# Patient Record
Sex: FEMALE | Race: WHITE | NOT HISPANIC OR LATINO | Employment: UNEMPLOYED | URBAN - NONMETROPOLITAN AREA
[De-identification: names, ages, dates, MRNs, and addresses within clinical notes are randomized per-mention and may not be internally consistent; named-entity substitution may affect disease eponyms.]

---

## 2016-04-11 LAB — EXTERNAL HIV SCREEN: NORMAL

## 2017-01-14 ENCOUNTER — HOSPITAL ENCOUNTER (EMERGENCY)
Facility: HOSPITAL | Age: 22
Discharge: HOME | End: 2017-01-14
Attending: EMERGENCY MEDICINE | Admitting: EMERGENCY MEDICINE
Payer: COMMERCIAL

## 2017-01-14 ENCOUNTER — APPOINTMENT (EMERGENCY)
Dept: RADIOLOGY | Facility: HOSPITAL | Age: 22
End: 2017-01-14
Payer: COMMERCIAL

## 2017-01-14 VITALS
OXYGEN SATURATION: 100 % | HEART RATE: 80 BPM | TEMPERATURE: 98.7 F | SYSTOLIC BLOOD PRESSURE: 117 MMHG | RESPIRATION RATE: 18 BRPM | WEIGHT: 150 LBS | DIASTOLIC BLOOD PRESSURE: 71 MMHG | BODY MASS INDEX: 27.44 KG/M2

## 2017-01-14 DIAGNOSIS — M25.569 KNEE PAIN: Primary | ICD-10-CM
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
## 2017-01-14 DIAGNOSIS — S46.819A TRAPEZIUS STRAIN: ICD-10-CM

## 2017-01-14 PROCEDURE — 73564 X-RAY EXAM KNEE 4 OR MORE: CPT

## 2017-01-14 PROCEDURE — 99284 EMERGENCY DEPT VISIT MOD MDM: CPT

## 2017-01-14 RX ORDER — DIAZEPAM 5 MG/1
5 TABLET ORAL ONCE
Status: COMPLETED | OUTPATIENT
Start: 2017-01-14 | End: 2017-01-14

## 2017-01-14 RX ORDER — NAPROXEN 250 MG/1
500 TABLET ORAL ONCE
Status: COMPLETED | OUTPATIENT
Start: 2017-01-14 | End: 2017-01-14

## 2017-01-14 RX ORDER — NAPROXEN 500 MG/1
500 TABLET ORAL 2 TIMES DAILY WITH MEALS
Qty: 14 TABLET | Refills: 0 | Status: SHIPPED | OUTPATIENT
Start: 2017-01-14 | End: 2017-08-25

## 2017-01-14 RX ORDER — DIAZEPAM 5 MG/1
5 TABLET ORAL EVERY 12 HOURS PRN
Qty: 10 TABLET | Refills: 0 | Status: SHIPPED | OUTPATIENT
Start: 2017-01-14 | End: 2017-08-25

## 2017-01-14 RX ADMIN — DIAZEPAM 5 MG: 5 TABLET ORAL at 16:09

## 2017-01-14 RX ADMIN — NAPROXEN 500 MG: 250 TABLET ORAL at 16:57

## 2017-01-19 ENCOUNTER — HOSPITAL ENCOUNTER (OUTPATIENT)
Dept: RADIOLOGY | Facility: HOSPITAL | Age: 22
Discharge: HOME | End: 2017-01-19
Payer: COMMERCIAL

## 2017-01-19 ENCOUNTER — TRANSCRIBE ORDERS (OUTPATIENT)
Dept: ADMINISTRATIVE | Facility: HOSPITAL | Age: 22
End: 2017-01-19

## 2017-01-19 ENCOUNTER — APPOINTMENT (OUTPATIENT)
Dept: LAB | Facility: HOSPITAL | Age: 22
End: 2017-01-19
Payer: COMMERCIAL

## 2017-01-19 DIAGNOSIS — O99.280 ENDOCRINE, NUTRITIONAL AND METABOLIC DISEASES COMPLICATING PREGNANCY, UNSPECIFIED TRIMESTER: ICD-10-CM

## 2017-01-19 DIAGNOSIS — M79.605 PAIN OF LEFT LEG: ICD-10-CM

## 2017-01-19 DIAGNOSIS — M79.604 PAIN OF RIGHT LEG: ICD-10-CM

## 2017-01-19 DIAGNOSIS — F39 MOOD DISORDER (HCC): ICD-10-CM

## 2017-01-19 LAB
ALBUMIN SERPL BCP-MCNC: 3.7 G/DL (ref 3.5–5)
ALP SERPL-CCNC: 82 U/L (ref 46–116)
ALT SERPL W P-5'-P-CCNC: 39 U/L (ref 12–78)
ANION GAP SERPL CALCULATED.3IONS-SCNC: 12 MMOL/L (ref 4–13)
AST SERPL W P-5'-P-CCNC: 27 U/L (ref 5–45)
BILIRUB SERPL-MCNC: 0.5 MG/DL (ref 0.2–1)
BUN SERPL-MCNC: 11 MG/DL (ref 5–25)
CALCIUM SERPL-MCNC: 9.1 MG/DL (ref 8.3–10.1)
CHLORIDE SERPL-SCNC: 103 MMOL/L (ref 100–108)
CO2 SERPL-SCNC: 25 MMOL/L (ref 21–32)
CREAT SERPL-MCNC: 0.68 MG/DL (ref 0.6–1.3)
GFR SERPL CREATININE-BSD FRML MDRD: >60 ML/MIN/1.73SQ M
GLUCOSE SERPL-MCNC: 69 MG/DL (ref 65–140)
POTASSIUM SERPL-SCNC: 3.9 MMOL/L (ref 3.5–5.3)
PROT SERPL-MCNC: 7 G/DL (ref 6.4–8.2)
SODIUM SERPL-SCNC: 140 MMOL/L (ref 136–145)
T3 SERPL-MCNC: 0.6 NG/ML (ref 0.6–1.8)
T4 FREE SERPL-MCNC: 0.6 NG/DL (ref 0.76–1.46)
TSH SERPL DL<=0.05 MIU/L-ACNC: 9.87 UIU/ML (ref 0.36–3.74)

## 2017-01-19 PROCEDURE — 36415 COLL VENOUS BLD VENIPUNCTURE: CPT

## 2017-01-19 PROCEDURE — 73522 X-RAY EXAM HIPS BI 3-4 VIEWS: CPT

## 2017-01-19 PROCEDURE — 86376 MICROSOMAL ANTIBODY EACH: CPT

## 2017-01-19 PROCEDURE — 80053 COMPREHEN METABOLIC PANEL: CPT

## 2017-01-19 PROCEDURE — 84443 ASSAY THYROID STIM HORMONE: CPT

## 2017-01-19 PROCEDURE — 84439 ASSAY OF FREE THYROXINE: CPT

## 2017-01-19 PROCEDURE — 73590 X-RAY EXAM OF LOWER LEG: CPT

## 2017-01-19 PROCEDURE — 84480 ASSAY TRIIODOTHYRONINE (T3): CPT

## 2017-01-20 LAB — THYROPEROXIDASE AB SERPL-ACNC: 395 IU/ML (ref 0–34)

## 2017-01-24 ENCOUNTER — ALLSCRIPTS OFFICE VISIT (OUTPATIENT)
Dept: FAMILY MEDICINE CLINIC | Facility: CLINIC | Age: 22
End: 2017-01-24
Payer: COMMERCIAL

## 2017-01-24 DIAGNOSIS — E06.3 AUTOIMMUNE THYROIDITIS: ICD-10-CM

## 2017-01-24 PROCEDURE — T1015 CLINIC SERVICE: HCPCS | Performed by: NURSE PRACTITIONER

## 2017-04-06 ENCOUNTER — ALLSCRIPTS OFFICE VISIT (OUTPATIENT)
Dept: FAMILY MEDICINE CLINIC | Facility: CLINIC | Age: 22
End: 2017-04-06
Payer: COMMERCIAL

## 2017-04-06 ENCOUNTER — APPOINTMENT (OUTPATIENT)
Dept: LAB | Facility: HOSPITAL | Age: 22
End: 2017-04-06
Payer: COMMERCIAL

## 2017-04-06 DIAGNOSIS — E06.3 AUTOIMMUNE THYROIDITIS: ICD-10-CM

## 2017-04-06 LAB — TSH SERPL DL<=0.05 MIU/L-ACNC: 14.5 UIU/ML (ref 0.36–3.74)

## 2017-04-06 PROCEDURE — T1015 CLINIC SERVICE: HCPCS | Performed by: NURSE PRACTITIONER

## 2017-04-06 PROCEDURE — 36415 COLL VENOUS BLD VENIPUNCTURE: CPT

## 2017-04-06 PROCEDURE — 84443 ASSAY THYROID STIM HORMONE: CPT

## 2017-04-07 ENCOUNTER — GENERIC CONVERSION - ENCOUNTER (OUTPATIENT)
Dept: OTHER | Facility: OTHER | Age: 22
End: 2017-04-07

## 2017-04-22 ENCOUNTER — HOSPITAL ENCOUNTER (EMERGENCY)
Facility: HOSPITAL | Age: 22
Discharge: HOME | End: 2017-04-22
Admitting: EMERGENCY MEDICINE
Payer: COMMERCIAL

## 2017-04-22 VITALS
TEMPERATURE: 98.9 F | OXYGEN SATURATION: 99 % | WEIGHT: 160.94 LBS | RESPIRATION RATE: 18 BRPM | BODY MASS INDEX: 29.44 KG/M2 | SYSTOLIC BLOOD PRESSURE: 101 MMHG | DIASTOLIC BLOOD PRESSURE: 62 MMHG | HEART RATE: 56 BPM

## 2017-04-22 DIAGNOSIS — E87.6 HYPOKALEMIA: ICD-10-CM

## 2017-04-22 DIAGNOSIS — E03.9 HYPOTHYROIDISM: ICD-10-CM

## 2017-04-22 DIAGNOSIS — R11.10 VOMITING: ICD-10-CM

## 2017-04-22 DIAGNOSIS — F41.0 PANIC ATTACK: Primary | ICD-10-CM

## 2017-04-22 LAB
ANION GAP SERPL CALCULATED.3IONS-SCNC: 7 MMOL/L (ref 4–13)
BASOPHILS # BLD AUTO: 0.07 THOUSANDS/ΜL (ref 0–0.1)
BASOPHILS NFR BLD AUTO: 1 % (ref 0–1)
BILIRUB UR QL STRIP: NEGATIVE
BUN SERPL-MCNC: 6 MG/DL (ref 5–25)
CALCIUM SERPL-MCNC: 8.5 MG/DL (ref 8.3–10.1)
CHLORIDE SERPL-SCNC: 105 MMOL/L (ref 100–108)
CLARITY UR: NORMAL
CO2 SERPL-SCNC: 30 MMOL/L (ref 21–32)
COLOR UR: YELLOW
CREAT SERPL-MCNC: 0.81 MG/DL (ref 0.6–1.3)
EOSINOPHIL # BLD AUTO: 0.25 THOUSAND/ΜL (ref 0–0.61)
EOSINOPHIL NFR BLD AUTO: 2 % (ref 0–6)
ERYTHROCYTE [DISTWIDTH] IN BLOOD BY AUTOMATED COUNT: 13.3 % (ref 11.6–15.1)
GFR SERPL CREATININE-BSD FRML MDRD: >60 ML/MIN/1.73SQ M
GLUCOSE SERPL-MCNC: 90 MG/DL (ref 65–140)
GLUCOSE UR STRIP-MCNC: NEGATIVE MG/DL
HCG UR QL: NEGATIVE
HCT VFR BLD AUTO: 39.2 % (ref 34.8–46.1)
HGB BLD-MCNC: 13.3 G/DL (ref 11.5–15.4)
HGB UR QL STRIP.AUTO: NEGATIVE
KETONES UR STRIP-MCNC: NEGATIVE MG/DL
LEUKOCYTE ESTERASE UR QL STRIP: NEGATIVE
LYMPHOCYTES # BLD AUTO: 3.27 THOUSANDS/ΜL (ref 0.6–4.47)
LYMPHOCYTES NFR BLD AUTO: 30 % (ref 14–44)
MCH RBC QN AUTO: 29.1 PG (ref 26.8–34.3)
MCHC RBC AUTO-ENTMCNC: 33.9 G/DL (ref 31.4–37.4)
MCV RBC AUTO: 86 FL (ref 82–98)
MONOCYTES # BLD AUTO: 1.22 THOUSAND/ΜL (ref 0.17–1.22)
MONOCYTES NFR BLD AUTO: 11 % (ref 4–12)
NEUTROPHILS # BLD AUTO: 6.16 THOUSANDS/ΜL (ref 1.85–7.62)
NEUTS SEG NFR BLD AUTO: 56 % (ref 43–75)
NITRITE UR QL STRIP: NEGATIVE
PH UR STRIP.AUTO: 6 [PH] (ref 4.5–8)
PLATELET # BLD AUTO: 346 THOUSANDS/UL (ref 149–390)
PMV BLD AUTO: 10 FL (ref 8.9–12.7)
POTASSIUM SERPL-SCNC: 3 MMOL/L (ref 3.5–5.3)
PROT UR STRIP-MCNC: NEGATIVE MG/DL
RBC # BLD AUTO: 4.57 MILLION/UL (ref 3.81–5.12)
SODIUM SERPL-SCNC: 142 MMOL/L (ref 136–145)
SP GR UR STRIP.AUTO: 1.02 (ref 1–1.03)
TSH SERPL DL<=0.05 MIU/L-ACNC: 3.81 UIU/ML (ref 0.36–3.74)
UROBILINOGEN UR QL STRIP.AUTO: 0.2 E.U./DL
WBC # BLD AUTO: 10.97 THOUSAND/UL (ref 4.31–10.16)

## 2017-04-22 PROCEDURE — 80048 BASIC METABOLIC PNL TOTAL CA: CPT | Performed by: PHYSICIAN ASSISTANT

## 2017-04-22 PROCEDURE — 81003 URINALYSIS AUTO W/O SCOPE: CPT | Performed by: PHYSICIAN ASSISTANT

## 2017-04-22 PROCEDURE — 96361 HYDRATE IV INFUSION ADD-ON: CPT

## 2017-04-22 PROCEDURE — 84443 ASSAY THYROID STIM HORMONE: CPT | Performed by: PHYSICIAN ASSISTANT

## 2017-04-22 PROCEDURE — 81025 URINE PREGNANCY TEST: CPT | Performed by: PHYSICIAN ASSISTANT

## 2017-04-22 PROCEDURE — 96375 TX/PRO/DX INJ NEW DRUG ADDON: CPT

## 2017-04-22 PROCEDURE — 85025 COMPLETE CBC W/AUTO DIFF WBC: CPT | Performed by: PHYSICIAN ASSISTANT

## 2017-04-22 PROCEDURE — 96374 THER/PROPH/DIAG INJ IV PUSH: CPT

## 2017-04-22 PROCEDURE — 36415 COLL VENOUS BLD VENIPUNCTURE: CPT | Performed by: PHYSICIAN ASSISTANT

## 2017-04-22 PROCEDURE — 93005 ELECTROCARDIOGRAM TRACING: CPT | Performed by: PHYSICIAN ASSISTANT

## 2017-04-22 PROCEDURE — 99284 EMERGENCY DEPT VISIT MOD MDM: CPT

## 2017-04-22 RX ORDER — POTASSIUM CHLORIDE 20 MEQ/1
20 TABLET, EXTENDED RELEASE ORAL ONCE
Status: COMPLETED | OUTPATIENT
Start: 2017-04-22 | End: 2017-04-22

## 2017-04-22 RX ORDER — LORAZEPAM 2 MG/ML
0.5 INJECTION INTRAMUSCULAR ONCE
Status: COMPLETED | OUTPATIENT
Start: 2017-04-22 | End: 2017-04-22

## 2017-04-22 RX ORDER — ONDANSETRON 4 MG/1
4 TABLET, ORALLY DISINTEGRATING ORAL EVERY 8 HOURS PRN
Qty: 10 TABLET | Refills: 0 | Status: SHIPPED | OUTPATIENT
Start: 2017-04-22 | End: 2017-08-25

## 2017-04-22 RX ORDER — ONDANSETRON 2 MG/ML
4 INJECTION INTRAMUSCULAR; INTRAVENOUS ONCE
Status: COMPLETED | OUTPATIENT
Start: 2017-04-22 | End: 2017-04-22

## 2017-04-22 RX ADMIN — POTASSIUM CHLORIDE 20 MEQ: 1500 TABLET, EXTENDED RELEASE ORAL at 19:50

## 2017-04-22 RX ADMIN — SODIUM CHLORIDE 1000 ML: 0.9 INJECTION, SOLUTION INTRAVENOUS at 19:07

## 2017-04-22 RX ADMIN — ONDANSETRON 4 MG: 2 INJECTION INTRAMUSCULAR; INTRAVENOUS at 19:07

## 2017-04-22 RX ADMIN — LORAZEPAM 0.5 MG: 2 INJECTION INTRAMUSCULAR; INTRAVENOUS at 19:29

## 2017-04-24 LAB
ATRIAL RATE: 63 BPM
P AXIS: 41 DEGREES
PR INTERVAL: 114 MS
QRS AXIS: 40 DEGREES
QRSD INTERVAL: 88 MS
QT INTERVAL: 402 MS
QTC INTERVAL: 411 MS
T WAVE AXIS: -2 DEGREES
VENTRICULAR RATE: 63 BPM

## 2017-08-11 ENCOUNTER — ALLSCRIPTS OFFICE VISIT (OUTPATIENT)
Dept: FAMILY MEDICINE CLINIC | Facility: CLINIC | Age: 22
End: 2017-08-11
Payer: COMMERCIAL

## 2017-08-11 DIAGNOSIS — E06.3 AUTOIMMUNE THYROIDITIS: ICD-10-CM

## 2017-08-11 PROCEDURE — T1015 CLINIC SERVICE: HCPCS | Performed by: FAMILY MEDICINE

## 2017-08-15 ENCOUNTER — ALLSCRIPTS OFFICE VISIT (OUTPATIENT)
Dept: FAMILY MEDICINE CLINIC | Facility: CLINIC | Age: 22
End: 2017-08-15
Payer: COMMERCIAL

## 2017-08-15 PROCEDURE — T1015 CLINIC SERVICE: HCPCS | Performed by: FAMILY MEDICINE

## 2017-08-25 ENCOUNTER — HOSPITAL ENCOUNTER (EMERGENCY)
Facility: HOSPITAL | Age: 22
Discharge: HOME | End: 2017-08-25
Admitting: EMERGENCY MEDICINE
Payer: COMMERCIAL

## 2017-08-25 VITALS
DIASTOLIC BLOOD PRESSURE: 81 MMHG | WEIGHT: 170 LBS | HEIGHT: 62 IN | SYSTOLIC BLOOD PRESSURE: 138 MMHG | BODY MASS INDEX: 31.28 KG/M2 | HEART RATE: 91 BPM | OXYGEN SATURATION: 99 % | TEMPERATURE: 98.4 F | RESPIRATION RATE: 20 BRPM
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
## 2017-08-25 DIAGNOSIS — S40.862A: ICD-10-CM
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
## 2017-08-25 PROCEDURE — 99281 EMR DPT VST MAYX REQ PHY/QHP: CPT

## 2017-08-25 RX ORDER — DIPHENHYDRAMINE HYDROCHLORIDE, ZINC ACETATE 2; .1 G/100G; G/100G
CREAM TOPICAL 3 TIMES DAILY PRN
Qty: 28.4 G | Refills: 0 | Status: SHIPPED | OUTPATIENT
Start: 2017-08-25 | End: 2017-11-14

## 2017-08-25 RX ORDER — DIPHENHYDRAMINE HYDROCHLORIDE, ZINC ACETATE 2; .1 G/100G; G/100G
CREAM TOPICAL 3 TIMES DAILY PRN
Status: DISCONTINUED | OUTPATIENT
Start: 2017-08-25 | End: 2017-08-25

## 2017-09-22 ENCOUNTER — APPOINTMENT (OUTPATIENT)
Dept: LAB | Facility: HOSPITAL | Age: 22
End: 2017-09-22
Payer: COMMERCIAL

## 2017-09-22 ENCOUNTER — ALLSCRIPTS OFFICE VISIT (OUTPATIENT)
Dept: FAMILY MEDICINE CLINIC | Facility: CLINIC | Age: 22
End: 2017-09-22
Payer: COMMERCIAL

## 2017-09-22 DIAGNOSIS — E03.9 HYPOTHYROIDISM: ICD-10-CM

## 2017-09-22 LAB — TSH SERPL DL<=0.05 MIU/L-ACNC: 3.84 UIU/ML (ref 0.36–3.74)

## 2017-09-22 PROCEDURE — 36415 COLL VENOUS BLD VENIPUNCTURE: CPT

## 2017-09-22 PROCEDURE — T1015 CLINIC SERVICE: HCPCS | Performed by: FAMILY MEDICINE

## 2017-09-22 PROCEDURE — 84443 ASSAY THYROID STIM HORMONE: CPT

## 2017-11-14 ENCOUNTER — APPOINTMENT (EMERGENCY)
Dept: CT IMAGING | Facility: HOSPITAL | Age: 22
End: 2017-11-14
Payer: COMMERCIAL

## 2017-11-14 ENCOUNTER — HOSPITAL ENCOUNTER (EMERGENCY)
Facility: HOSPITAL | Age: 22
Discharge: HOME | End: 2017-11-14
Attending: EMERGENCY MEDICINE | Admitting: EMERGENCY MEDICINE
Payer: COMMERCIAL

## 2017-11-14 VITALS
HEART RATE: 83 BPM | OXYGEN SATURATION: 96 % | BODY MASS INDEX: 32.37 KG/M2 | WEIGHT: 175.93 LBS | SYSTOLIC BLOOD PRESSURE: 108 MMHG | TEMPERATURE: 98.1 F | HEIGHT: 62 IN | RESPIRATION RATE: 18 BRPM | DIASTOLIC BLOOD PRESSURE: 67 MMHG
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
## 2017-11-14 DIAGNOSIS — S16.1XXA ACUTE STRAIN OF NECK MUSCLE, INITIAL ENCOUNTER: Primary | ICD-10-CM

## 2017-11-14 LAB — EXT PREG TEST URINE: NORMAL

## 2017-11-14 PROCEDURE — 81025 URINE PREGNANCY TEST: CPT | Performed by: EMERGENCY MEDICINE

## 2017-11-14 PROCEDURE — 99284 EMERGENCY DEPT VISIT MOD MDM: CPT

## 2017-11-14 PROCEDURE — 70450 CT HEAD/BRAIN W/O DYE: CPT

## 2017-11-14 PROCEDURE — 72125 CT NECK SPINE W/O DYE: CPT

## 2017-11-14 RX ORDER — NAPROXEN 500 MG/1
500 TABLET ORAL 2 TIMES DAILY WITH MEALS
Qty: 14 TABLET | Refills: 0 | Status: SHIPPED | OUTPATIENT
Start: 2017-11-14 | End: 2018-02-02 | Stop reason: SDUPTHER

## 2017-11-14 RX ORDER — NAPROXEN 250 MG/1
500 TABLET ORAL ONCE
Status: COMPLETED | OUTPATIENT
Start: 2017-11-14 | End: 2017-11-14

## 2017-11-14 RX ORDER — LIDOCAINE 50 MG/G
1 PATCH TOPICAL DAILY
Qty: 30 PATCH | Refills: 0 | Status: SHIPPED | OUTPATIENT
Start: 2017-11-14 | End: 2018-02-02 | Stop reason: SDUPTHER

## 2017-11-14 RX ADMIN — NAPROXEN 500 MG: 250 TABLET ORAL at 16:59
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
## 2017-12-08 ENCOUNTER — ALLSCRIPTS OFFICE VISIT (OUTPATIENT)
Dept: FAMILY MEDICINE CLINIC | Facility: CLINIC | Age: 22
End: 2017-12-08
Payer: COMMERCIAL

## 2017-12-08 PROCEDURE — T1015 CLINIC SERVICE: HCPCS | Performed by: FAMILY MEDICINE

## 2017-12-12 ENCOUNTER — ALLSCRIPTS OFFICE VISIT (OUTPATIENT)
Dept: FAMILY MEDICINE CLINIC | Facility: CLINIC | Age: 22
End: 2017-12-12
Payer: COMMERCIAL

## 2017-12-12 PROCEDURE — T1015 CLINIC SERVICE: HCPCS | Performed by: FAMILY MEDICINE

## 2017-12-15 NOTE — PROGRESS NOTES
Assessment  1  History of acute sinusitis (V12 69) (Z87 09)   2  History of acute bronchitis (V12 69) (Z87 09)    Plan   PMH: History of acute sinusitis    · Amoxicillin-Pot Clavulanate 875-125 MG Oral Tablet (Augmentin); TAKE 1 TABLETTWICE DAILY AFTER MEALS   · Fluticasone Propionate 50 MCG/ACT Nasal Suspension; USE 1 SPRAY IN EACHNOSTRIL TWICE DAILY  Acute sinusitis (461 9) (J01 90)       Discussion/Summary    Acute sinusitis - Augmentin and Fliuticasone sent to pharmacy  Patient advised to take abx with food  Acute bronchitis - OTC Cough syrup as needed  Continue albuterol nebulizer treatments 4x/daily  Patient is to follow up as scheduled or sooner if needed  The patient was counseled regarding instructions for management,-- patient and family education  Possible side effects of new medications were reviewed with the patient/guardian today  The treatment plan was reviewed with the patient/guardian  The patient/guardian understands and agrees with the treatment plan      Chief Complaint  Patient is here today for a sick visit  Patient stated that she may have an ear infection that she got from her son  Patient also stated that her throat is sore and her chest is hurting her along with a cough  History of Present Illness  HPI: Patient is a 25year old female presents today for sick visit  Patient reports sore throat, cough, chest tightness with cough x few days  Patient reports worsening sore throat  Patient has been taking Dayquil without relief  Review of Systems   Constitutional: No fever, no chills, feels well, no tiredness, no recent weight gain or loss  ENT: sore throat-- and-- hoarseness  Cardiovascular: no complaints of slow or fast heart rate, no chest pain, no palpitations, no leg claudication or lower extremity edema  Respiratory: cough-- and-- wheezing  Breasts: no complaints of breast pain, breast lump or nipple discharge    Gastrointestinal: no complaints of abdominal pain, no constipation, no nausea or diarrhea, no vomiting, no bloody stools  Genitourinary: no complaints of dysuria, no incontinence, no pelvic pain, no dysmenorrhea, no vaginal discharge or abnormal vaginal bleeding  Musculoskeletal: no complaints of arthralgia, no myalgia, no joint swelling or stiffness, no limb pain or swelling  Integumentary: no complaints of skin rash or lesion, no itching or dry skin, no skin wounds  Neurological: no complaints of headache, no confusion, no numbness or tingling, no dizziness or fainting  ROS reviewed  Active Problems   1  Birth control (V25 9) (Z30 9)   2  Depression (311) (F32 9)   3  Hashimoto's thyroiditis (245 2) (E06 3)   4  Mild persistent asthma without complication (064 10) (B88 88)   5  Mood disorder (296 90) (F39)   6  Non compliance w medication regimen (V15 81) (Z91 14)   7  Seizures (780 39) (R56 9)   8  Wart (078 10) (B07 9)  Asthma (493 90) (J45 909)     Strain of left shoulder, subsequent encounter (V58 89) (S46 912D)     Left arm pain (729 5) (M79 602)     Positive urine pregnancy test (V72 42) (Z32 01)     Pregnancy (V22 2) (Z33 1)     Vaginal discharge (623 5) (N89 8)     Hypothyroidism in pregnancy (648 10) (O99 280)     Lower extremity pain, bilateral (729 5) (M79 604)     Acute sinusitis (461 9) (J01 90)     Exposure to scabies (V01 89) (Z20 89)     Hypothyroidism (244 9) (E03 9)       Past Medical History  1  History of No known problems (V49 89) (Z78 9)  Active Problems And Past Medical History Reviewed: The active problems and past medical history were reviewed and updated today  Family History  Mother    1  No pertinent family history  Father    2  Family history of Thyroid disease (246 9) (E07 9)  Brother    3  Family history of Hypertension (401 9) (I10)  Grandmother    4  Family history of Thyroid disease (246 9) (E07 9)  Maternal Grandmother    5  Family history of Hypertension (401 9) (I10)  Aunt    6   Family history of Thyroid disease (246 9) (E07 9)  Family History Reviewed: The family history was reviewed and updated today  Social History   · Current smoker (305 1) (F17 200)   · Former smoker (V15 82) (T19 220)   · No alcohol use   · No drug use  The social history was reviewed and updated today  Surgical History  1  History of Appendectomy   2  History of Wrist Surgery  Surgical History Reviewed: The surgical history was reviewed and updated today  Current Meds   1  Albuterol Sulfate (2 5 MG/3ML) 0 083% Inhalation Nebulization Solution; USE 1 UNIT DOSE VIA NEBULIZER  4 TIMES A DAY AS NEEDED; Therapy: [id number] to (Last Rx:06Apr2017)  Requested for: 06Apr2017 Ordered   2  Levothyroxine Sodium 75 MCG Oral Tablet; One tablet daily in the morning on an empty stomach 45 minutes before breakfast; Therapy: 23Jan2017 to (Evaluate:[id number])  Requested for: 16Xgi0358; Last Rx:[id number] Ordered    The medication list was reviewed and updated today  Allergies  1  Abilify TABS   2  Azithromycin TABS   3  Tessalon Perles CAPS   4  Zithromax CAPS    Vitals   Recorded: [id number] 01:13PM   Temperature 97 5 F   Heart Rate 631   Systolic 325   Diastolic 70   Height 5 ft 2 in   Weight 175 lb 8 oz   BMI Calculated 32 1   BSA Calculated 1 81   O2 Saturation 98     Physical Exam   Constitutional  General appearance: No acute distress, well appearing and well nourished  Eyes  Conjunctiva and lids: No swelling, erythema or discharge  Pupils and irises: Equal, round and reactive to light  Ears, Nose, Mouth, and Throat  External inspection of ears and nose: Normal    Otoscopic examination: Tympanic membranes translucent with normal light reflex  Canals patent without erythema  Nasal mucosa, septum, and turbinates: Abnormal   There was a mucoid discharge from both nares  The bilateral nasal mucosa was edematous-- and-- red  Oropharynx: Normal with no erythema, edema, exudate or lesions     Pulmonary  Respiratory effort: Abnormal   Respiratory Findings: dry cough  Auscultation of lungs: Clear to auscultation  Cardiovascular  Auscultation of heart: Normal rate and rhythm, normal S1 and S2, without murmurs  Skin  Skin and subcutaneous tissue: Normal without rashes or lesions     Psychiatric  Orientation to person, place, and time: Normal    Mood and affect: Normal          Future Appointments    Date/Time Provider Specialty Site   12/15/2017 09:00 AM Bill Teague, [address] [place] [address] 22     Signatures   Electronically signed by : CHRISTOPHER Gunn; Dec 11 2017  7:43AM EST                       (Author)    Electronically signed by : Sonya Pena MD; Dec 14 2017  8:27AM EST                       (Author)

## 2017-12-21 ENCOUNTER — APPOINTMENT (OUTPATIENT)
Dept: FAMILY MEDICINE CLINIC | Facility: CLINIC | Age: 22
End: 2017-12-21
Payer: COMMERCIAL

## 2017-12-21 ENCOUNTER — GENERIC CONVERSION - ENCOUNTER (OUTPATIENT)
Dept: OTHER | Facility: OTHER | Age: 22
End: 2017-12-21

## 2017-12-21 PROCEDURE — T1015 CLINIC SERVICE: HCPCS | Performed by: FAMILY MEDICINE

## 2017-12-27 NOTE — PROGRESS NOTES
Assessment   1  Bullous myringitis of right ear (384 01) (H73 011)    Plan   Bullous myringitis of right ear    · Cefuroxime Axetil 500 MG Oral Tablet; TAKE 1 TABLET EVERY 12 HOURS DAILY   · Ofloxacin 0 3 % Otic Solution; INSTILL 5 DROPS INTO THE AFFECTED EAR(S)    TWICE DAILY    Discussion/Summary      Explained to patient high risk for rupture, to avoid swimming and injury to right ear  CHange oral antbx, start drops as prescribed  Recheck on Friday  Will need referral to ENT if rupture present  The patient was counseled regarding instructions for management,-- importance of compliance with treatment  total time of encounter was 20 minutes-- and-- 10 minutes was spent counseling  Possible side effects of new medications were reviewed with the patient/guardian today  The treatment plan was reviewed with the patient/guardian  The patient/guardian understands and agrees with the treatment plan      Chief Complaint   pt is here for left ear pain  History of Present Illness   HPI: 25year old here today with ear pain, left  Right ear pain, severe  SHe reports pressure and ear feeling blocked  She is currently on Augmentin  SHe denies drainage, fever  Review of Systems        Constitutional: No fever, no chills, feels well, no tiredness, no recent weight gain or loss  ENT: earache  Cardiovascular: no complaints of slow or fast heart rate, no chest pain, no palpitations, no leg claudication or lower extremity edema  Respiratory: no complaints of shortness of breath, no wheezing, no dyspnea on exertion, no orthopnea or PND  Breasts: no complaints of breast pain, breast lump or nipple discharge  Gastrointestinal: no complaints of abdominal pain, no constipation, no nausea or diarrhea, no vomiting, no bloody stools  Genitourinary: no complaints of dysuria, no incontinence, no pelvic pain, no dysmenorrhea, no vaginal discharge or abnormal vaginal bleeding        Musculoskeletal: no complaints of arthralgia, no myalgia, no joint swelling or stiffness, no limb pain or swelling  Integumentary: no complaints of skin rash or lesion, no itching or dry skin, no skin wounds  Neurological: no complaints of headache, no confusion, no numbness or tingling, no dizziness or fainting  Active Problems   1  Birth control (V25 9) (Z30 9)   2  Depression (311) (F32 9)   3  Hashimoto's thyroiditis (245 2) (E06 3)   4  Mild persistent asthma without complication (556 52) (Q34 21)   5  Mood disorder (296 90) (F39)   6  Non compliance w medication regimen (V15 81) (Z91 14)   7  Seizures (780 39) (R56 9)   8  Wart (078 10) (B07 9)    Past Medical History   1  History of No known problems (V49 89) (Z78 9)  Active Problems And Past Medical History Reviewed: The active problems and past medical history were reviewed and updated today  Family History   Mother    1  No pertinent family history  Father    2  Family history of Thyroid disease (246 9) (E07 9)  Brother    3  Family history of Hypertension (401 9) (I10)  Grandmother    4  Family history of Thyroid disease (246 9) (E07 9)  Maternal Grandmother    5  Family history of Hypertension (401 9) (I10)  Aunt    6  Family history of Thyroid disease (246 9) (E07 9)  Family History Reviewed: The family history was reviewed and updated today  Social History    · Current smoker (305 1) (F17 200)   · Former smoker (V15 82) (S08 963)   · No alcohol use   · No drug use  The social history was reviewed and updated today  The social history was reviewed and is unchanged  Surgical History   1  History of Appendectomy   2  History of Wrist Surgery  Surgical History Reviewed: The surgical history was reviewed and updated today  Current Meds    1  Albuterol Sulfate (2 5 MG/3ML) 0 083% Inhalation Nebulization Solution; USE 1 UNIT     DOSE VIA NEBULIZER  4 TIMES A DAY AS NEEDED;      Therapy: 27VBC1189 to (Last Rx:06Apr2017)  Requested for: 52KUI7131 Ordered   2  Amoxicillin-Pot Clavulanate 875-125 MG Oral Tablet; TAKE 1 TABLET TWICE DAILY     AFTER MEALS; Therapy: 23KGN1621 to (Evaluate:29Hkj6879)  Requested for: 61JTO6734; Last     Rx:79Hvw4820 Ordered   3  Fluticasone Propionate 50 MCG/ACT Nasal Suspension; USE 1 SPRAY IN EACH     NOSTRIL TWICE DAILY; Therapy: 25ESM3497 to (Last Rx:78Qrc9371)  Requested for: 55TZH2351 Ordered   4  Levothyroxine Sodium 75 MCG Oral Tablet; One tablet daily in the morning on an empty     stomach 45 minutes before breakfast;     Therapy: 39Cpu3520 to (Evaluate:08Ihz5816)  Requested for: 44Utv2205; Last     Rx:60Gsm6035 Ordered     The medication list was reviewed and updated today  Allergies   1  Abilify TABS   2  Azithromycin TABS   3  Tessalon Perles CAPS   4  Zithromax CAPS    Vitals    Recorded: 12Dec2017 09:09AM   Temperature 98 2 F   Heart Rate 90   Respiration 18   Systolic 389   Diastolic 70   Height 5 ft 2 in   Weight 174 lb    BMI Calculated 31 83   BSA Calculated 1 8   O2 Saturation 97     Physical Exam        Constitutional      General appearance: No acute distress, well appearing and well nourished  Ears, Nose, Mouth, and Throat      External inspection of ears and nose: Normal        Otoscopic examination: Abnormal  -- (fluid filled blister) The right external canal was tender  Nasal mucosa, septum, and turbinates: Normal without edema or erythema  Oropharynx: Normal with no erythema, edema, exudate or lesions  Pulmonary      Respiratory effort: No increased work of breathing or signs of respiratory distress  Auscultation of lungs: Clear to auscultation  Cardiovascular      Auscultation of heart: Normal rate and rhythm, normal S1 and S2, without murmurs  Lymphatic      Palpation of lymph nodes in neck: No lymphadenopathy         Psychiatric      Orientation to person, place, and time: Normal        Mood and affect: Normal           Future Appointments      Date/Time Provider Specialty Site   12/15/2017 09:00 AM Dago Puente [address]     Signatures    Electronically signed by :  Ines Phoenix, CRNP; Dec 12 2017 10:19AM EST                       (Author)     Electronically signed by : Gus Vigil DO; Dec 26 2017  7:27PM EST

## 2018-01-10 NOTE — MISCELLANEOUS
Message  Return to work or school:   Poppy Do is under my professional care  She was seen in my office on 12/16/2015   She is able to return to work on  12/17/2015    She can perform work without limitations  , She is able to work with limitations (no lifting, pushing or pulling 5 pounds)  Patient will return to work light duty for the next 4 weeks          Signatures   Electronically signed by : Grey Swenson, [facility]; Jan 19 2016  2:11PM EST                       (Author)    Electronically signed by : ALIX Wong ; Jan 19 2016  2:37PM EST                       (Author)

## 2018-01-12 VITALS
HEART RATE: 93 BPM | BODY MASS INDEX: 29.44 KG/M2 | WEIGHT: 160 LBS | TEMPERATURE: 98 F | HEIGHT: 62 IN | OXYGEN SATURATION: 98 % | RESPIRATION RATE: 20 BRPM

## 2018-01-13 VITALS
HEIGHT: 62 IN | DIASTOLIC BLOOD PRESSURE: 72 MMHG | OXYGEN SATURATION: 98 % | HEART RATE: 78 BPM | WEIGHT: 159 LBS | RESPIRATION RATE: 20 BRPM | BODY MASS INDEX: 29.26 KG/M2 | SYSTOLIC BLOOD PRESSURE: 112 MMHG | TEMPERATURE: 96.4 F

## 2018-01-13 VITALS
HEART RATE: 92 BPM | TEMPERATURE: 97.9 F | OXYGEN SATURATION: 98 % | WEIGHT: 165 LBS | SYSTOLIC BLOOD PRESSURE: 110 MMHG | HEIGHT: 62 IN | BODY MASS INDEX: 30.36 KG/M2 | RESPIRATION RATE: 18 BRPM | DIASTOLIC BLOOD PRESSURE: 78 MMHG

## 2018-01-13 VITALS
HEART RATE: 93 BPM | WEIGHT: 160 LBS | SYSTOLIC BLOOD PRESSURE: 124 MMHG | BODY MASS INDEX: 29.26 KG/M2 | OXYGEN SATURATION: 97 % | RESPIRATION RATE: 18 BRPM | TEMPERATURE: 96 F | DIASTOLIC BLOOD PRESSURE: 62 MMHG

## 2018-01-13 NOTE — MISCELLANEOUS
Message  Message left for patient to call office, lab results reviewed and needing medication change  Active Problems    1  Acute sinusitis (461 9) (J01 90)   2  Asthma (493 90) (J45 909)   3  Depression (311) (F32 9)   4  Hashimoto's thyroiditis (245 2) (E06 3)   5  Hypothyroidism in pregnancy (648 10,244 9) (O99 280,E03 9)   6  Left arm pain (729 5) (M79 602)   7  Lower extremity pain, bilateral (729 5) (M79 604,M79 605)   8  Mild persistent asthma without complication (862 90) (C37 15)   9  Mood disorder (296 90) (F39)   10  Positive urine pregnancy test (V72 42) (Z32 01)   11  Pregnancy (V22 2) (Z33 1)   12  Seizures (780 39) (R56 9)   13  Strain of left shoulder, subsequent encounter (V58 89,840 9) (S46 912D)   14  Vaginal discharge (623 5) (N89 8)   15  Wart (078 10) (B07 9)    Current Meds   1  Albuterol Sulfate (2 5 MG/3ML) 0 083% Inhalation Nebulization Solution; USE 1 UNIT   DOSE VIA NEBULIZER  4 TIMES A DAY AS NEEDED; Therapy: 00ZDG3060 to (Last Rx:06Apr2017)  Requested for: 06Apr2017 Ordered   2  Amoxicillin-Pot Clavulanate 875-125 MG Oral Tablet; Take one tablet twice daily for 7   days; Therapy: 86UUP7961 to (Last Rx:42Myh1547)  Requested for: 06Apr2017 Ordered   3  Gabapentin 100 MG Oral Capsule; TAKE 1 CAPSULE Bedtime; Therapy: 87KHT5289 to (Last MU:37HFI4075)  Requested for: 54ZSI4559; Status:   ACTIVE - Transmit to Pharmacy - Awaiting Verification Ordered   4  Levothyroxine Sodium 50 MCG Oral Tablet; One tablet daily on an empty stomach 45   minutes before breakfast;   Therapy: 23Jan2017 to (Evaluate:80Yqa9255)  Requested for: 23Jan2017; Last   Rx:23Jan2017 Ordered   5  Prenatal 19 Oral Tablet; TAKE 1 TABLET DAILY; Therapy: 09QNN3138 to (Evaluate:13Nov2016)  Requested for: 20Rbd5772; Last   Rx:02Jau9121 Ordered   6  TraZODone HCl - 100 MG Oral Tablet; Take 1 tablet daily; Therapy: 70WOQ4166 to (Last Rx:10Tjz0505)  Requested for: 66KZS3878 Ordered   7   Ventolin  (90 Base) MCG/ACT Inhalation Aerosol Solution; INHALE 2 PUFFS   EVERY 4 HOURS AS NEEDED; Therapy: 59LDY1936 to (Last Rx:06Apr2017)  Requested for: 06Apr2017 Ordered   8  Ziprasidone HCl - 20 MG Oral Capsule (Geodon); take 1 capsule daily; Therapy: 83POA0579 to (Evaluate:31Mar2017)  Requested for: 39LAV3569; Last   Rx:84Dyn6263 Ordered    Allergies    1  Abilify TABS   2  Azithromycin TABS   3  Tessalon Perles CAPS   4  Zithromax CAPS    Plan  Hashimoto's thyroiditis    · Levothyroxine Sodium 75 MCG Oral Tablet; One tablet daily in the morning on  an empty stomach 45 minutes before breakfast    Signatures   Electronically signed by : MARGO Wood;  Apr 7 2017  1:09PM EST                       (Author)

## 2018-01-14 VITALS
RESPIRATION RATE: 20 BRPM | TEMPERATURE: 97.4 F | HEIGHT: 62 IN | BODY MASS INDEX: 30.18 KG/M2 | HEART RATE: 64 BPM | SYSTOLIC BLOOD PRESSURE: 116 MMHG | DIASTOLIC BLOOD PRESSURE: 70 MMHG | OXYGEN SATURATION: 98 % | WEIGHT: 164 LBS

## 2018-01-15 NOTE — PROGRESS NOTES
Assessment    1  Strain of left shoulder, subsequent encounter (V50 89,240 9) (Y45 862J)    Plan  Strain of left shoulder, subsequent encounter    · Betamethasone Sod Phos & Acet 6 (3-3) MG/ML Injection Suspension   · Follow-up visit in 3 months Evaluation and Treatment  Follow-up  Status: Hold For -  Scheduling  Requested for: 67RPK7736    Discussion/Summary    Left shoulder strain secondary to fall at home improving  Patient received cortisone injection today into left subacromial space  Use ice at injection site for 20 minutes at a time  Continue home exercise program  Will return to work full duty no restrictions  Follow up in 3 months or sooner if needed  The treatment plan was reviewed with the patient/guardian  The patient/guardian understands and agrees with the treatment plan      Chief Complaint  Left shoulder pain      History of Present Illness  Left shoulder problem, follow-up  Left shoulder injury, This occurred at home  The injury resulted from a fall  Symptoms:  decreased range of motion and stiffness, but no pain  The patient is currently experiencing symptoms  Exacerbating factors:  direct pressure and arm movement  Relieving factors:  rest, ice and support  No associated symptoms are reported  Patient feels improvement today  Patient with the therapy for 2 weeks and now doing a home program  Still has some stiffness on elevation some pain at times  Review of Systems    Constitutional: No fever, no chills, feels well, no tiredness, no recent weight gain or loss  Eyes: No complaints of eyesight problems, no red eyes  ENT: no loss of hearing, no nosebleeds, no sore throat  Cardiovascular: No complaints of chest pain, no palpitations, no leg claudication or lower extremity edema  Respiratory: no compliants of shortness of breath, no wheezing, no cough  Gastrointestinal: no complaints of abdominal pain, no constipation, no nausea or diarrhea, no vomiting, no bloody stools  Genitourinary: no complaints of dysuria, no incontinence  Musculoskeletal: as noted in HPI  Integumentary: no complaints of skin rash or lesion, no itching or dry skin, no skin wounds  Neurological: no complaints of headache, no confusion, no numbness or tingling, no dizziness  Endocrine: No complaints of muscle weakness, no feelings of weakness, no frequent urination, no excessive thirst    Psychiatric: No suicidal thoughts, no anxiety, no feelings of depression  ROS reviewed  Active Problems    1  Asthma (493 90) (J45 909)   2  Depression (311) (F32 9)   3  Left arm pain (729 5) (M79 602)   4  Seizures (780 39) (R56 9)   5  Strain of left shoulder, subsequent encounter (V58 89,840 9) (Y34 428B)    Past Medical History    The active problems and past medical history were reviewed and updated today  Surgical History    The surgical history was reviewed and updated today  Family History    · No pertinent family history    · Family history of Thyroid disease (246 9) (E07 9)    · Family history of Hypertension (401 9) (I10)    · Family history of Thyroid disease (246 9) (E07 9)    · Family history of Hypertension (401 9) (I10)    · Family history of Thyroid disease (246 9) (E07 9)    The family history was reviewed and updated today  Social History    · Current smoker (305 1) (F17 200)  The social history was reviewed and updated today  The social history was reviewed and is unchanged  Current Meds   1  No Reported Medications Recorded    The medication list was reviewed and updated today  Allergies    1  Abilify TABS   2  Azithromycin TABS   3  Tessalon Perles CAPS    Vitals   Recorded: [id number] 01:32PM   Heart Rate 89   Systolic 908   Diastolic 74   Height 5 ft 2 in   Weight 168 lb    BMI Calculated 30 73   BSA Calculated 1 78     Physical Exam    subacromial bursa ROM: Full except as noted: Forward flexion: painful restricted AROM 0-165 degrees   Motor: Normal except as noted:   Special Tests: Negative except positive Neer test, but negative Painful Arc, negative Tim test, negative Drop Arm test, negative Empty Can test and negative Jerk test  Left Shoulder Appearance[de-identified] Normal except  Tenderness: None except the    Constitutional - General appearance: Normal    Musculoskeletal - Lower extremity compartments: Normal    Cardiovascular - Pulses: Normal    Neurologic - Sensation: Normal    Psychiatric - Orientation to person, place, and time: Normal    Eyes   Pupils and irises: Normal        Procedure    Procedure: Injection of the left subacromial bursa  Indication:  inflammation  Risk, benefits and alternatives were discussed with the patient  Verbal consent was obtained prior to the procedure  Alcohol was used to prep the area  ethyl chloride spray was used as a topical anesthetic  A 22-gauge was used to inject 9 mL of 1% Lidocaine, 4cc of 1% Lidocaine and 1 mL of 6mg/mL betamethasone  A bandage was applied  the patient tolerated the procedure well  Complications: none  Message  Return to work or school:   Diego Donovan is under my professional care  She was seen in my office on 12/16/2015   She is able to return to work on  12/17/2015    She can perform work without limitations  , She is able to work with limitations (no lifting, pushing or pulling 5 pounds)  Patient will return to work light duty for the next 4 weeks          Future Appointments    Date/Time Provider Specialty Site   04/19/2016 01:00 PM JOSE Armendariz MD Orthopedic Surgery [address]   Electronically signed by : Tavia Degroot, [facility]; Jan 19 2016  2:11PM EST                       (Author)    Electronically signed by : ALIX Garcia ; Jan 19 2016  2:37PM EST                       (Author)

## 2018-01-22 VITALS
OXYGEN SATURATION: 97 % | WEIGHT: 174 LBS | HEIGHT: 62 IN | DIASTOLIC BLOOD PRESSURE: 70 MMHG | BODY MASS INDEX: 32.02 KG/M2 | SYSTOLIC BLOOD PRESSURE: 108 MMHG | TEMPERATURE: 98.2 F | HEART RATE: 90 BPM | RESPIRATION RATE: 18 BRPM

## 2018-01-23 VITALS
HEART RATE: 101 BPM | OXYGEN SATURATION: 98 % | DIASTOLIC BLOOD PRESSURE: 70 MMHG | SYSTOLIC BLOOD PRESSURE: 115 MMHG | WEIGHT: 175.5 LBS | TEMPERATURE: 97.5 F | HEIGHT: 62 IN | BODY MASS INDEX: 32.3 KG/M2

## 2018-01-23 NOTE — MISCELLANEOUS
Message  Return to work or school:   Javier Collazo is under my professional care   She was seen in my office on 12/08/2017             Signatures   Electronically signed by : Tanika Kelly, ; Dec  8 2017  2:14PM EST                       (Author)

## 2018-01-24 VITALS
BODY MASS INDEX: 32.02 KG/M2 | HEIGHT: 62 IN | WEIGHT: 174 LBS | OXYGEN SATURATION: 98 % | RESPIRATION RATE: 18 BRPM | HEART RATE: 91 BPM | TEMPERATURE: 98 F

## 2018-01-29 ENCOUNTER — HOSPITAL ENCOUNTER (EMERGENCY)
Facility: HOSPITAL | Age: 23
Discharge: HOME | End: 2018-01-29
Attending: EMERGENCY MEDICINE
Payer: COMMERCIAL

## 2018-01-29 VITALS
SYSTOLIC BLOOD PRESSURE: 134 MMHG | DIASTOLIC BLOOD PRESSURE: 85 MMHG | BODY MASS INDEX: 33.87 KG/M2 | WEIGHT: 185.19 LBS | OXYGEN SATURATION: 100 % | TEMPERATURE: 98.9 F | HEART RATE: 102 BPM

## 2018-01-29 DIAGNOSIS — J02.9 PHARYNGITIS: Primary | ICD-10-CM

## 2018-01-29 PROCEDURE — 99282 EMERGENCY DEPT VISIT SF MDM: CPT

## 2018-01-29 RX ORDER — AMOXICILLIN 500 MG/1
500 CAPSULE ORAL 2 TIMES DAILY
Qty: 6 CAPSULE | Refills: 0 | Status: SHIPPED | OUTPATIENT
Start: 2018-01-29 | End: 2018-02-02 | Stop reason: SDUPTHER

## 2018-01-29 RX ADMIN — LIDOCAINE HYDROCHLORIDE 15 ML: 20 SOLUTION ORAL; TOPICAL at 04:18

## 2018-01-29 NOTE — ED PROVIDER NOTES
History  Chief Complaint   Patient presents with    Sore Throat     Pt c/o severe sore throat for the past 12 hours with fever and pain in left ear     HPI     25 yoF presents with one day sore throat, painful swallowing, painful lymph nodes, chills, and less appetite  No rashes or cp or cough or congestion  No sick contacts  Some ear fullness bilaterally  No neck rigidity or HA or neuro changes  Mdm:  Imp: pharyngitis, possible strep but well-appearing female  Warrants short course of amoxil and OP f/u  Prior to Admission Medications   Prescriptions Last Dose Informant Patient Reported? Taking? Levothyroxine Sodium 75 MCG CAPS   Yes No   Sig: Take 75 mcg by mouth daily     lidocaine (LIDODERM) 5 %   No No   Sig: Place 1 patch on the skin daily Remove & Discard patch within 12 hours or as directed by MD   naproxen (NAPROSYN) 500 mg tablet   No No   Sig: Take 1 tablet by mouth 2 (two) times a day with meals      Facility-Administered Medications: None       Past Medical History:   Diagnosis Date    Anxiety     Asthma     Depression     Disease of thyroid gland     Mood disorder ([facility] [address] )     Pregnant     Psychiatric disorder     depression,anxiety, mood disorder       Past Surgical History:   Procedure Laterality Date    APPENDECTOMY      FRACTURE SURGERY Left     wrist       History reviewed  No pertinent family history  I have reviewed and agree with the history as documented  Social History   Substance Use Topics    Smoking status: Current Every Day Smoker     Packs/day: 0 25     Types: Cigarettes    Smokeless tobacco: Never Used    Alcohol use No        Review of Systems   Constitutional: Positive for chills  Negative for fatigue and fever  HENT: Positive for sore throat  Negative for congestion, ear pain, rhinorrhea, sinus pressure, trouble swallowing and voice change  Eyes: Negative for pain and visual disturbance     Respiratory: Negative for cough, choking, shortness of breath and stridor  Cardiovascular: Negative for chest pain, palpitations and leg swelling  Gastrointestinal: Negative for abdominal pain, blood in stool, constipation, diarrhea, nausea and vomiting  Endocrine: Negative  Genitourinary: Negative for dysuria, flank pain, hematuria, pelvic pain and urgency  Musculoskeletal: Negative  Skin: Negative  Allergic/Immunologic: Negative  Neurological: Negative for dizziness, speech difficulty, weakness, light-headedness and numbness  Hematological: Negative  Psychiatric/Behavioral: Negative  Physical Exam  ED Triage Vitals [01/29/18 0343]   Temperature Pulse Resp Blood Pressure SpO2   98 9 °F (37 2 °C) 102 -- 134/85 100 %      Temp Source Heart Rate Source Patient Position - Orthostatic VS BP Location FiO2 (%)   Temporal -- Sitting Left arm --      Pain Score       Worst Possible Pain           Orthostatic Vital Signs  Vitals:    01/29/18 0343   BP: 134/85   Pulse: 102   Patient Position - Orthostatic VS: Sitting       Physical Exam   Constitutional: She is oriented to person, place, and time  She appears well-developed and well-nourished  No distress  HENT:   Head: Normocephalic and atraumatic  Nose: Nose normal    Red pharynx, no pus  No trismus  Eyes: Conjunctivae and EOM are normal  Pupils are equal, round, and reactive to light  Neck: Normal range of motion  Neck supple  Submandibular adenopathy  No rigidity  Cardiovascular: Normal rate, regular rhythm, normal heart sounds and intact distal pulses  Exam reveals no gallop and no friction rub  No murmur heard  Pulmonary/Chest: Effort normal and breath sounds normal  No stridor  No respiratory distress  She has no wheezes  She has no rales  She exhibits no tenderness  Abdominal: Soft  Bowel sounds are normal  She exhibits no distension  There is no tenderness  There is no rebound and no guarding  Musculoskeletal: Normal range of motion  She exhibits no deformity  Neurological: She is alert and oriented to person, place, and time  She exhibits normal muscle tone  Skin: Skin is warm and dry  No rash noted  No erythema  Psychiatric: She has a normal mood and affect  Vitals reviewed  ED Medications  Medications   lidocaine viscous (XYLOCAINE) 2 % mucosal solution 15 mL (not administered)       Diagnostic Studies  Results Reviewed     None                 No orders to display              Procedures  Procedures       Phone Contacts  ED Phone Contact    ED Course  ED Course                                MDM  CritCare Time    Disposition  Final diagnoses:   Pharyngitis     Time reflects when diagnosis was documented in both MDM as applicable and the Disposition within this note     Time User Action Codes Description Comment    1/29/2018  4:10 AM Reynaldo, Case Add [J02 9] Pharyngitis       ED Disposition     ED Disposition Condition Comment    Discharge  [facility] [place]queline [place] discharge to home/self care  Condition at discharge: Good        Follow-up Information     Follow up With Specialties Details Why [address] Schedule an appointment as soon as possible for a visit in 3 days As needed [facility]  [address]   [phone]          Patient's Medications   Discharge Prescriptions    AMOXICILLIN (AMOXIL) 500 MG CAPSULE    Take 1 capsule (500 mg total) by mouth 2 (two) times a day for 3 days       Start Date: 1/29/2018 End Date: 2/1/2018       Order Dose: 500 mg       Quantity: 6 capsule    Refills: 0    PHENOL-GLYCERIN (CHLORASEPTIC MAX SORE THROAT) 1 5-33 % LIQD    Apply 1 spray to the mouth or throat 3 (three) times a day as needed (sore throat) for up to 30 days       Start Date: 1/29/2018 End Date: 2/28/2018       Order Dose: 1 spray       Quantity: 1 Bottle    Refills: 0     No discharge procedures on file      ED Provider  Electronically Signed by           Justus Yates DO  01/29/18 0488

## 2018-01-29 NOTE — DISCHARGE INSTRUCTIONS

## 2018-02-02 ENCOUNTER — TRANSCRIBE ORDERS (OUTPATIENT)
Dept: ADMINISTRATIVE | Facility: HOSPITAL | Age: 23
End: 2018-02-02

## 2018-02-02 ENCOUNTER — OFFICE VISIT (OUTPATIENT)
Dept: FAMILY MEDICINE CLINIC | Facility: CLINIC | Age: 23
End: 2018-02-02
Payer: COMMERCIAL

## 2018-02-02 ENCOUNTER — APPOINTMENT (OUTPATIENT)
Dept: LAB | Facility: HOSPITAL | Age: 23
End: 2018-02-02
Payer: COMMERCIAL

## 2018-02-02 VITALS
BODY MASS INDEX: 33.86 KG/M2 | SYSTOLIC BLOOD PRESSURE: 108 MMHG | DIASTOLIC BLOOD PRESSURE: 68 MMHG | TEMPERATURE: 97.2 F | HEIGHT: 62 IN | HEART RATE: 94 BPM | WEIGHT: 184 LBS | RESPIRATION RATE: 18 BRPM | OXYGEN SATURATION: 98 %

## 2018-02-02 DIAGNOSIS — J02.0 STREP PHARYNGITIS: Primary | ICD-10-CM

## 2018-02-02 DIAGNOSIS — E06.3 HASHIMOTO'S THYROIDITIS: ICD-10-CM

## 2018-02-02 LAB — TSH SERPL DL<=0.05 MIU/L-ACNC: 8.79 UIU/ML (ref 0.36–3.74)

## 2018-02-02 PROCEDURE — 84443 ASSAY THYROID STIM HORMONE: CPT

## 2018-02-02 PROCEDURE — 36415 COLL VENOUS BLD VENIPUNCTURE: CPT

## 2018-02-02 PROCEDURE — T1015 CLINIC SERVICE: HCPCS | Performed by: FAMILY MEDICINE

## 2018-02-02 RX ORDER — LEVOTHYROXINE SODIUM 0.07 MG/1
1 TABLET ORAL
COMMUNITY
Start: 2017-01-23 | End: 2018-03-05 | Stop reason: SDUPTHER

## 2018-02-02 RX ORDER — AMOXICILLIN 250 MG/1
500 CAPSULE ORAL EVERY 12 HOURS SCHEDULED
Status: DISCONTINUED | OUTPATIENT
Start: 2018-02-02 | End: 2018-02-08

## 2018-02-02 RX ORDER — FLUTICASONE PROPIONATE 50 MCG
1 SPRAY, SUSPENSION (ML) NASAL 2 TIMES DAILY
COMMUNITY
Start: 2017-12-08 | End: 2018-02-11

## 2018-02-02 RX ORDER — ALBUTEROL SULFATE 2.5 MG/3ML
1 SOLUTION RESPIRATORY (INHALATION) 4 TIMES DAILY PRN
COMMUNITY
Start: 2016-06-13 | End: 2018-10-14 | Stop reason: HOSPADM

## 2018-02-02 NOTE — PROGRESS NOTES
OFFICE VISIT  Hellen Duong 25 y o  female MRN: [id number]      Assessment / Plan:  Diagnoses and all orders for this visit:    Strep pharyngitis  -     amoxicillin (AMOXIL) capsule 500 mg; Take 2 capsules (500 mg total) by mouth every 12 (twelve) hours     Hashimoto's thyroiditis  -     TSH baseline; Future     Emphasize on importance of taking thyroid medication daily on empty stomach in the a m  [facility] Patient has been noncompliant with this medication for over 8 months  Discussed risks of and side effects of uncontrolled thyroid  Will continue amoxicillin for a 7 day course of treatment  Reason For Visit / Chief Complaint  Chief Complaint   Patient presents with    Follow-up     States she has mono and strep  HPI:  Hellen Duong is a 25 y o  female  Presents today for followup from Ed  She was seen in ED on 1/29 for pharyngitis, was placed on amoxicillin for three days She   Then reports being seem at [facility] for same symptoms  She reports today she was positive for strep and mono  She was given prednisone and cough medication  She did not received further antbx  She reports gaining a lot of weight, she has been taking her thyroid medication  Only for the last 2 weeks     Historical Information   Past Medical History:   Diagnosis Date    Anxiety     Asthma     Depression     Disease of thyroid gland     Mood disorder ([address] )     Pregnant     Psychiatric disorder     depression,anxiety, mood disorder     Past Surgical History:   Procedure Laterality Date    APPENDECTOMY      FRACTURE SURGERY Left     wrist     Social History   History   Alcohol Use No     History   Drug Use No     History   Smoking Status    Current Every Day Smoker    Packs/day: 0 25    Types: Cigarettes   Smokeless Tobacco    Never Used     Family History   Problem Relation Age of Onset    No Known Problems Mother     Thyroid disease Father     Hypertension Brother     Hypertension Maternal Grandmother     Thyroid disease Paternal Grandmother        Meds/Allergies   Allergies   Allergen Reactions    Abilify [Aripiprazole] Other (See Comments) and Seizures     hallucination    Tessalon [Benzonatate] Anxiety     Other reaction(s): Psych complications  hallucinations    Zithromax [Azithromycin] Rash and Hallucinations       Meds:    Current Outpatient Prescriptions:     albuterol (2 5 mg/3 mL) 0 083 % nebulizer solution, Inhale 1 each 4 (four) times a day as needed, Disp: , Rfl:     fluticasone (FLONASE) 50 mcg/act nasal spray, 1 spray into each nostril 2 (two) times a day, Disp: , Rfl:     levothyroxine 75 mcg tablet, Take 1 tablet by mouth daily before breakfast, Disp: , Rfl:       REVIEW OF SYSTEMS  A comprehensive review of systems was negative except for: Ears, nose, mouth, throat, and face: positive for sore throat      Current Vitals:   Blood Pressure: 108/68 (02/02/18 1120)  Pulse: 94 (02/02/18 1120)  Temperature: (!) 97 2 °F (36 2 °C) (02/02/18 1120)  Respirations: 18 (02/02/18 1120)  Height: 5' 2" (157 5 cm) (02/02/18 1120)  Weight - Scale: 83 5 kg (184 lb) (02/02/18 1120)  SpO2: 98 % (02/02/18 1120)  [unfilled]    PHYSICAL EXAMS:  General appearance: alert and oriented, in no acute distress  Head: Normocephalic, without obvious abnormality, atraumatic  Eyes: conjunctivae/corneas clear  PERRL, EOM's intact  Fundi benign  Ears: normal TM's and external ear canals both ears  Nose: Nares normal  Septum midline  Mucosa normal  No drainage or sinus tenderness  Throat: abnormal findings: exudates present  Lungs: clear to auscultation bilaterally  Heart: regular rate and rhythm, S1, S2 normal, no murmur, click, rub or [id number]{[id number]        Follow up at this office in1  week    Counseling / Coordination of Care  Total floor / unit time spent today 20 minutes  Greater than 50% of total time was spent with the patient and / or family counseling and / or coordination of care

## 2018-02-05 ENCOUNTER — TELEPHONE (OUTPATIENT)
Dept: FAMILY MEDICINE CLINIC | Facility: CLINIC | Age: 23
End: 2018-02-05

## 2018-02-05 DIAGNOSIS — E06.3 HYPOTHYROIDISM DUE TO HASHIMOTO'S THYROIDITIS: Primary | ICD-10-CM

## 2018-02-05 DIAGNOSIS — E03.8 HYPOTHYROIDISM DUE TO HASHIMOTO'S THYROIDITIS: Primary | ICD-10-CM

## 2018-02-05 RX ORDER — LEVOTHYROXINE SODIUM 0.03 MG/1
25 TABLET ORAL DAILY
Qty: 30 TABLET | Refills: 1 | Status: SHIPPED | OUTPATIENT
Start: 2018-02-05 | End: 2018-06-08

## 2018-02-08 DIAGNOSIS — J02.0 STREP THROAT: Primary | ICD-10-CM

## 2018-02-08 RX ORDER — AMOXICILLIN 500 MG/1
500 CAPSULE ORAL EVERY 8 HOURS SCHEDULED
Qty: 21 CAPSULE | Refills: 0 | Status: SHIPPED | OUTPATIENT
Start: 2018-02-08 | End: 2018-02-11

## 2018-02-11 ENCOUNTER — HOSPITAL ENCOUNTER (EMERGENCY)
Facility: HOSPITAL | Age: 23
Discharge: HOME | End: 2018-02-11
Attending: EMERGENCY MEDICINE | Admitting: EMERGENCY MEDICINE
Payer: COMMERCIAL

## 2018-02-11 ENCOUNTER — APPOINTMENT (EMERGENCY)
Dept: CT IMAGING | Facility: HOSPITAL | Age: 23
End: 2018-02-11
Payer: COMMERCIAL

## 2018-02-11 VITALS
DIASTOLIC BLOOD PRESSURE: 72 MMHG | TEMPERATURE: 97.2 F | OXYGEN SATURATION: 96 % | HEART RATE: 84 BPM | SYSTOLIC BLOOD PRESSURE: 138 MMHG | BODY MASS INDEX: 33.15 KG/M2 | RESPIRATION RATE: 20 BRPM | WEIGHT: 181.22 LBS

## 2018-02-11 DIAGNOSIS — J02.9 PHARYNGITIS: Primary | ICD-10-CM

## 2018-02-11 DIAGNOSIS — R59.1 LYMPHADENOPATHY: ICD-10-CM

## 2018-02-11 DIAGNOSIS — J03.90 TONSILLITIS: ICD-10-CM

## 2018-02-11 LAB
ALBUMIN SERPL BCP-MCNC: 3.3 G/DL (ref 3.5–5)
ALP SERPL-CCNC: 93 U/L (ref 46–116)
ALT SERPL W P-5'-P-CCNC: 34 U/L (ref 12–78)
ANION GAP SERPL CALCULATED.3IONS-SCNC: 11 MMOL/L (ref 4–13)
AST SERPL W P-5'-P-CCNC: 18 U/L (ref 5–45)
BACTERIA UR QL AUTO: ABNORMAL /HPF
BASOPHILS # BLD AUTO: 0.05 THOUSANDS/ΜL (ref 0–0.1)
BASOPHILS NFR BLD AUTO: 0 % (ref 0–1)
BILIRUB SERPL-MCNC: 0.9 MG/DL (ref 0.2–1)
BILIRUB UR QL STRIP: NEGATIVE
BUN SERPL-MCNC: 9 MG/DL (ref 5–25)
CALCIUM SERPL-MCNC: 8.6 MG/DL (ref 8.3–10.1)
CHLORIDE SERPL-SCNC: 102 MMOL/L (ref 100–108)
CLARITY UR: ABNORMAL
CO2 SERPL-SCNC: 25 MMOL/L (ref 21–32)
COLOR UR: YELLOW
CREAT SERPL-MCNC: 0.69 MG/DL (ref 0.6–1.3)
EOSINOPHIL # BLD AUTO: 0.12 THOUSAND/ΜL (ref 0–0.61)
EOSINOPHIL NFR BLD AUTO: 1 % (ref 0–6)
ERYTHROCYTE [DISTWIDTH] IN BLOOD BY AUTOMATED COUNT: 13 % (ref 11.6–15.1)
EXT PREG TEST URINE: NEGATIVE
GFR SERPL CREATININE-BSD FRML MDRD: 124 ML/MIN/1.73SQ M
GLUCOSE SERPL-MCNC: 89 MG/DL (ref 65–140)
GLUCOSE UR STRIP-MCNC: NEGATIVE MG/DL
HCT VFR BLD AUTO: 38.5 % (ref 34.8–46.1)
HGB BLD-MCNC: 13.3 G/DL (ref 11.5–15.4)
HGB UR QL STRIP.AUTO: NEGATIVE
KETONES UR STRIP-MCNC: NEGATIVE MG/DL
LACTATE SERPL-SCNC: 0.6 MMOL/L (ref 0.5–2)
LEUKOCYTE ESTERASE UR QL STRIP: ABNORMAL
LYMPHOCYTES # BLD AUTO: 3.17 THOUSANDS/ΜL (ref 0.6–4.47)
LYMPHOCYTES NFR BLD AUTO: 14 % (ref 14–44)
MCH RBC QN AUTO: 29.3 PG (ref 26.8–34.3)
MCHC RBC AUTO-ENTMCNC: 34.5 G/DL (ref 31.4–37.4)
MCV RBC AUTO: 85 FL (ref 82–98)
MONOCYTES # BLD AUTO: 1.96 THOUSAND/ΜL (ref 0.17–1.22)
MONOCYTES NFR BLD AUTO: 8 % (ref 4–12)
NEUTROPHILS # BLD AUTO: 17.95 THOUSANDS/ΜL (ref 1.85–7.62)
NEUTS SEG NFR BLD AUTO: 77 % (ref 43–75)
NITRITE UR QL STRIP: NEGATIVE
NON-SQ EPI CELLS URNS QL MICRO: ABNORMAL /HPF
PH UR STRIP.AUTO: 5.5 [PH] (ref 4.5–8)
PLATELET # BLD AUTO: 350 THOUSANDS/UL (ref 149–390)
PMV BLD AUTO: 9.5 FL (ref 8.9–12.7)
POTASSIUM SERPL-SCNC: 3.6 MMOL/L (ref 3.5–5.3)
PROT SERPL-MCNC: 7.2 G/DL (ref 6.4–8.2)
PROT UR STRIP-MCNC: NEGATIVE MG/DL
RBC # BLD AUTO: 4.54 MILLION/UL (ref 3.81–5.12)
RBC #/AREA URNS AUTO: ABNORMAL /HPF
SODIUM SERPL-SCNC: 138 MMOL/L (ref 136–145)
SP GR UR STRIP.AUTO: 1.02 (ref 1–1.03)
UROBILINOGEN UR QL STRIP.AUTO: 0.2 E.U./DL
WBC # BLD AUTO: 23.25 THOUSAND/UL (ref 4.31–10.16)
WBC #/AREA URNS AUTO: ABNORMAL /HPF

## 2018-02-11 PROCEDURE — 81001 URINALYSIS AUTO W/SCOPE: CPT | Performed by: EMERGENCY MEDICINE

## 2018-02-11 PROCEDURE — 83605 ASSAY OF LACTIC ACID: CPT | Performed by: EMERGENCY MEDICINE

## 2018-02-11 PROCEDURE — 85025 COMPLETE CBC W/AUTO DIFF WBC: CPT | Performed by: EMERGENCY MEDICINE

## 2018-02-11 PROCEDURE — 96365 THER/PROPH/DIAG IV INF INIT: CPT

## 2018-02-11 PROCEDURE — 81025 URINE PREGNANCY TEST: CPT | Performed by: EMERGENCY MEDICINE

## 2018-02-11 PROCEDURE — 96375 TX/PRO/DX INJ NEW DRUG ADDON: CPT

## 2018-02-11 PROCEDURE — 99283 EMERGENCY DEPT VISIT LOW MDM: CPT

## 2018-02-11 PROCEDURE — 80053 COMPREHEN METABOLIC PANEL: CPT | Performed by: EMERGENCY MEDICINE

## 2018-02-11 PROCEDURE — 70491 CT SOFT TISSUE NECK W/DYE: CPT

## 2018-02-11 RX ORDER — DEXAMETHASONE SODIUM PHOSPHATE 10 MG/ML
10 INJECTION, SOLUTION INTRAMUSCULAR; INTRAVENOUS ONCE
Status: COMPLETED | OUTPATIENT
Start: 2018-02-11 | End: 2018-02-11

## 2018-02-11 RX ORDER — CLINDAMYCIN PHOSPHATE 600 MG/50ML
600 INJECTION INTRAVENOUS ONCE
Status: COMPLETED | OUTPATIENT
Start: 2018-02-11 | End: 2018-02-11

## 2018-02-11 RX ORDER — KETOROLAC TROMETHAMINE 30 MG/ML
15 INJECTION, SOLUTION INTRAMUSCULAR; INTRAVENOUS ONCE
Status: COMPLETED | OUTPATIENT
Start: 2018-02-11 | End: 2018-02-11

## 2018-02-11 RX ORDER — CLINDAMYCIN HYDROCHLORIDE 300 MG/1
300 CAPSULE ORAL 3 TIMES DAILY
Qty: 30 CAPSULE | Refills: 0 | Status: SHIPPED | OUTPATIENT
Start: 2018-02-11 | End: 2018-02-21

## 2018-02-11 RX ORDER — PREDNISOLONE 15 MG/5 ML
15 SOLUTION, ORAL ORAL DAILY
Qty: 100 ML | Refills: 0 | Status: SHIPPED | OUTPATIENT
Start: 2018-02-11 | End: 2018-02-14

## 2018-02-11 RX ADMIN — DEXAMETHASONE SODIUM PHOSPHATE 10 MG: 10 INJECTION, SOLUTION INTRAMUSCULAR; INTRAVENOUS at 13:04

## 2018-02-11 RX ADMIN — KETOROLAC TROMETHAMINE 15 MG: 30 INJECTION, SOLUTION INTRAMUSCULAR at 13:22

## 2018-02-11 RX ADMIN — CLINDAMYCIN PHOSPHATE 600 MG: 12 INJECTION, SOLUTION INTRAMUSCULAR; INTRAVENOUS at 13:07

## 2018-02-11 RX ADMIN — SODIUM CHLORIDE 1000 ML: 0.9 INJECTION, SOLUTION INTRAVENOUS at 13:02

## 2018-02-11 RX ADMIN — IOHEXOL 85 ML: 350 INJECTION, SOLUTION INTRAVENOUS at 14:20

## 2018-02-11 NOTE — ED PROVIDER NOTES
History  Chief Complaint   Patient presents with    Sore Throat     recently had mono and now throat swollen and hoarse     Patient is a 22-year-old female with a history of hypothyroidism coming in today with worsening sore throat  Patient states that she was here several weeks ago and [facility] she was diagnosed with mono and strep throat  Patient reports due to [facility] pharmacy I only took 3 days of the antibiotic  Patient was prescribed amoxicillin  Patient states over the past several days she has been having subjective fevers, chills, worsening sore throat with difficulty eating  She reports that her neck is sore  She has pain when she swallows that radiates to her ear  She has no ear pain at rest   She has difficulty eating or drinking due to her sore throat  She has no nausea, vomiting, diarrhea, abdominal pain, urinary symptoms  Patient reports that she feels her voices different  History provided by:  Patient   used: No    Sore Throat   Location:  Generalized  Quality:  Sore and aching  Severity:  Moderate  Onset quality:  Gradual  Timing:  Constant  Progression:  Worsening  Chronicity:  New  Relieved by:  None tried  Worsened by:  Nothing  Ineffective treatments:  None tried  Associated symptoms: adenopathy, chills, ear pain, trouble swallowing and voice change    Associated symptoms: no abdominal pain, no chest pain, no cough, no drooling, no ear discharge, no epistaxis, no eye discharge, no fever, no headaches, no neck stiffness, no night sweats, no plugged ear sensation, no postnasal drip, no rash, no rhinorrhea, no shortness of breath, no sinus congestion and no stridor    Risk factors: exposure to strep and exposure to mono    Risk factors: no sick contacts, no recent endoscopy and no recent ENT procedure        Prior to Admission Medications   Prescriptions Last Dose Informant Patient Reported? Taking?    albuterol (2 5 mg/3 mL) 0 083 % nebulizer solution   Yes Yes   Sig: Inhale 1 each 4 (four) times a day as needed   levothyroxine 25 mcg tablet   No No   Sig: Take 1 tablet (25 mcg total) by mouth daily   Patient taking differently: Take 100 mcg by mouth daily     levothyroxine 75 mcg tablet   Yes No   Sig: Take 1 tablet by mouth daily before breakfast      Facility-Administered Medications: None       Past Medical History:   Diagnosis Date    Anxiety     Asthma     Depression     Disease of thyroid gland     [address] disorder [address] [address]     Pregnant     Psychiatric disorder     depression,anxiety, mood disorder       Past Surgical History:   Procedure Laterality Date    APPENDECTOMY      FRACTURE SURGERY Left     wrist       Family History   Problem Relation Age of Onset    No Known Problems Mother     Thyroid disease Father     Hypertension Brother     Hypertension Maternal Grandmother     Thyroid disease Paternal Grandmother      I have reviewed and agree with the history as documented  Social History   Substance Use Topics    Smoking status: Current Every Day Smoker     Packs/day: 0 25     Types: Cigarettes    Smokeless tobacco: Never Used    Alcohol use No        Review of Systems   Constitutional: Positive for chills  Negative for fever and night sweats  HENT: Positive for ear pain, sore throat, trouble swallowing and voice change  Negative for drooling, ear discharge, nosebleeds, postnasal drip and rhinorrhea  Eyes: Negative for discharge  Respiratory: Negative for cough, shortness of breath and stridor  Cardiovascular: Negative for chest pain  Gastrointestinal: Negative for abdominal pain  Musculoskeletal: Negative for myalgias and neck stiffness  Skin: Negative for rash  Neurological: Negative for headaches  Hematological: Positive for adenopathy  Psychiatric/Behavioral: Negative for confusion  All other systems reviewed and are negative        Physical Exam  ED Triage Vitals   Temperature Pulse Respirations Blood Pressure SpO2   02/11/18 1216 02/11/18 1218 02/11/18 1216 02/11/18 1218 02/11/18 1218   (!) 97 2 °F (36 2 °C) 104 20 138/72 99 %      Temp Source Heart Rate Source Patient Position - Orthostatic VS BP Location FiO2 (%)   02/11/18 1216 02/11/18 1218 02/11/18 1218 02/11/18 1218 --   Temporal Right Sitting Right arm       Pain Score       02/11/18 1216       Worst Possible Pain           Orthostatic Vital Signs  Vitals:    02/11/18 1218 02/11/18 1400   BP: 138/72    Pulse: 104 84   Patient Position - Orthostatic VS: Sitting        Physical Exam   Constitutional: She is oriented to person, place, and time  She appears well-developed and well-nourished  No distress  HENT:   Head: Normocephalic and atraumatic  Right Ear: Hearing, tympanic membrane, external ear and ear canal normal  No mastoid tenderness  Left Ear: Hearing, tympanic membrane, external ear and ear canal normal  No mastoid tenderness  Nose: Nose normal    Mouth/Throat: Uvula is midline  Mucous membranes are dry  No uvula swelling  Oropharyngeal exudate and posterior oropharyngeal erythema present  No posterior oropharyngeal edema  Tonsillar exudate  Eyes: Conjunctivae and EOM are normal  Pupils are equal, round, and reactive to light  Neck: Normal range of motion  Neck supple  No neck rigidity  No tracheal deviation and normal range of motion present  No Brudzinski's sign and no Kernig's sign noted  Patient maintaining airway secretions   Cardiovascular: Normal rate, regular rhythm, normal heart sounds and intact distal pulses  No murmur heard  Pulmonary/Chest: Effort normal and breath sounds normal  No stridor  No respiratory distress  She has no wheezes  She exhibits no tenderness  No conversational dyspnea   Abdominal: Soft  Bowel sounds are normal  She exhibits no distension  There is no tenderness  Musculoskeletal: Normal range of motion  She exhibits no edema  Lymphadenopathy:     She has cervical adenopathy     Neurological: She is alert and oriented to person, place, and time  No cranial nerve deficit  Skin: Skin is warm  Capillary refill takes less than 2 seconds  She is not diaphoretic  Nursing note and vitals reviewed  ED Medications  Medications   sodium chloride 0 9 % bolus 1,000 mL (0 mL Intravenous Stopped 2/11/18 1351)   dexamethasone (PF) (DECADRON) injection 10 mg (10 mg Intravenous Given 2/11/18 1304)   clindamycin (CLEOCIN) IVPB (premix) 600 mg (0 mg Intravenous Stopped 2/11/18 1351)   ketorolac (TORADOL) injection 15 mg (15 mg Intravenous Given 2/11/18 1322)   iohexol (OMNIPAQUE) 350 MG/ML injection (MULTI-DOSE) 85 mL (85 mL Intravenous Given 2/11/18 1420)       Diagnostic Studies  Results Reviewed     Procedure Component Value Units Date/Time    Lactic acid, plasma [[id number]]  (Normal) Collected:  02/11/18 1300    Lab Status:  Final result Specimen:  Blood from Arm, Right Updated:  02/11/18 1333     LACTIC ACID 0 6 mmol/L     Narrative:         Result may be elevated if tourniquet was used during collection  Comprehensive metabolic panel [[id number]]  (Abnormal) Collected:  02/11/18 1300    Lab Status:  Final result Specimen:  Blood from Arm, Right Updated:  02/11/18 1330     Sodium 138 mmol/L      Potassium 3 6 mmol/L      Chloride 102 mmol/L      CO2 25 mmol/L      Anion Gap 11 mmol/L      BUN 9 mg/dL      Creatinine 0 69 mg/dL      Glucose 89 mg/dL      Calcium 8 6 mg/dL      AST 18 U/L      ALT 34 U/L      Alkaline Phosphatase 93 U/L      Total Protein 7 2 g/dL      Albumin 3 3 (L) g/dL      Total Bilirubin 0 90 mg/dL      eGFR 124 ml/min/1 73sq m     Narrative:         National Kidney Disease Education Program recommendations are as follows:  GFR calculation is accurate only with a steady state creatinine  Chronic Kidney disease less than 60 ml/min/1 73 sq  meters  Kidney failure less than 15 ml/min/1 73 sq  meters      CBC and differential [[id number]]  (Abnormal) Collected:  02/11/18 1300    Lab Status: Final result Specimen:  Blood from Arm, Right Updated:  02/11/18 1312     WBC 23 25 (H) Thousand/uL      RBC 4 54 Million/uL      Hemoglobin 13 3 g/dL      Hematocrit 38 5 %      MCV 85 fL      MCH 29 3 pg      MCHC 34 5 g/dL      RDW 13 0 %      MPV 9 5 fL      Platelets 281 Thousands/uL      Neutrophils Relative 77 (H) %      Lymphocytes Relative 14 %      Monocytes Relative 8 %      Eosinophils Relative 1 %      Basophils Relative 0 %      Neutrophils Absolute 17 95 (H) Thousands/µL      Lymphocytes Absolute 3 17 Thousands/µL      Monocytes Absolute 1 96 (H) Thousand/µL      Eosinophils Absolute 0 12 Thousand/µL      Basophils Absolute 0 05 Thousands/µL     Urine Microscopic [[id number]]  (Abnormal) Collected:  02/11/18 1249    Lab Status:  Final result Specimen:  Urine from Urine, Clean Catch Updated:  02/11/18 1310     RBC, UA 0-1 (A) /hpf      WBC, UA None Seen /hpf      Epithelial Cells None Seen /hpf      Bacteria, UA None Seen /hpf     UA w Reflex to Microscopic [[id number]]  (Abnormal) Collected:  02/11/18 1249    Lab Status:  Final result Specimen:  Urine from Urine, Clean Catch Updated:  02/11/18 1303     Color, UA Yellow     Clarity, UA Slightly Cloudy     Specific Reading, UA 1 020     pH, UA 5 5     Leukocytes, UA Trace (A)     Nitrite, UA Negative     Protein, UA Negative mg/dl      Glucose, UA Negative mg/dl      Ketones, UA Negative mg/dl      Urobilinogen, UA 0 2 E U /dl      Bilirubin, UA Negative     Blood, UA Negative    POCT pregnancy, urine [[id number]]  (Normal) Resulted:  02/11/18 1252    Lab Status:  Final result Updated:  02/11/18 1252     EXT PREG TEST UR (Ref: Negative) Negative                 CT soft tissue neck with contrast   Final Result by Shannon Payne MD (02/11 1434)      Mild enlargement of the bilateral palatine tonsils and adenoid soft tissues likely infectious or inflammatory in nature  However, there is no focal fluid collection to suggest an abscess        Mild bilateral cervical lymphadenopathy  Workstation performed: [id number]                    Procedures  Procedures       Phone Contacts  ED Phone Contact    ED Course  ED Course                                MDM  Number of Diagnoses or Management Options  Lymphadenopathy:   Pharyngitis:   Tonsillitis:   Diagnosis management comments: Patient is a 61-year-old female coming in today with worsening sore throat  On exam patient is nontoxic appearing maintaining her airway and secretions  Patient clinically has strep pharyngitis  Given her treatment of only 3 days will give clindamycin, Decadron as well as CT rule out peritonsillar abscess  Once patient is preg negative will give Toradol as well for symptomatic treatment relief  2:32 PM  Patient updated on labs  Elevated white count - however no bandemia  Patient is afebrile and hemodynamically stable in the ER  Keven Thurman Pending CT  Patient resting in bed lying on her side maintaining her airway and secretions  3:12 PM  CT without acute abscess  There is diffuse lymphadenopathy as well as tonsillitis  Patient was given dose of clindamycin as well as Decadron and Toradol  Will trial p o  patient initially sleeping on when I went update patient  Patient  updated on CT report  Patient states she feels better  Patient is maintaining her airway, maintaining her secretions and able to swallow  Patient states it does still hurt to swallow however improved compared to when she 1st came into the ER  Patient is alert oriented x3, cranial nerves 2-12 grossly intact with no focal neuro deficits  Patient has full active range of motion of bilateral upper and lower extremities  Regular rate and rhythm  Lungs clear to auscultation bilaterally  Normocephalic atraumatic  EOMI  PERRLA  Maintaining airway and maintaining secretions  Patient has diffuse lymphadenopathy in anterior cervical chain  No rigidity or meningeal sign      Patient updated stressed importance of need to complete clindamycin for full 10 days  Also give 1-2 days of steroids  Patient asking for liquids  Also discussed with her to increase fluids and stated with soft diet  Return to ER instructions given as well as need for follow-up with PCP  Amount and/or Complexity of Data Reviewed  Clinical lab tests: ordered and reviewed  Tests in the radiology section of CPT®: ordered and reviewed  Tests in the medicine section of CPT®: ordered and reviewed  Independent visualization of images, tracings, or specimens: yes      CritCare Time    Disposition  Final diagnoses:   Pharyngitis   Lymphadenopathy   Tonsillitis     Time reflects when diagnosis was documented in both MDM as applicable and the Disposition within this note     Time User Action Codes Description Comment    2/11/2018  3:12 PM Telly Piña Add [J02 9] Pharyngitis     2/11/2018  3:12 PM Telly Piña Add [R59 1] Lymphadenopathy     2/11/2018  3:26 PM Micky Llanes Add [J03 90] Tonsillitis       ED Disposition     ED Disposition Condition Comment    Discharge  [facility] discharge to home/self care      Condition at discharge: Stable        Follow-up Information     Follow up With Specialties Details Why [address] Schedule an appointment as soon as possible for a visit in 3 days  [facility]  [address]   [phone]          Discharge Medication List as of 2/11/2018  3:29 PM      START taking these medications    Details   clindamycin (CLEOCIN) 300 MG capsule Take 1 capsule (300 mg total) by mouth 3 (three) times a day for 10 days, Starting Sun 2/11/2018, Until Wed 2/21/2018, Print      prednisoLONE (PRELONE) 15 MG/5ML syrup Take 5 mL (15 mg total) by mouth daily for 3 days, Starting Sun 2/11/2018, Until Wed 2/14/2018, Print         CONTINUE these medications which have NOT CHANGED    Details   albuterol (2 5 mg/3 mL) 0 083 % nebulizer solution Inhale 1 each 4 (four) times a day as needed, Starting Mon 6/13/2016, Historical Med      !! levothyroxine 25 mcg tablet Take 1 tablet (25 mcg total) by mouth daily, Starting Mon 2/5/2018, Normal      !! levothyroxine 75 mcg tablet Take 1 tablet by mouth daily before breakfast, Starting Mon 1/23/2017, Historical Med       !! - Potential duplicate medications found  Please discuss with provider  No discharge procedures on file      ED Provider  Electronically Signed by           Adilene Tavares DO  02/11/18 2150

## 2018-02-11 NOTE — DISCHARGE INSTRUCTIONS
You must complete your full dose of antibiotics  Take acidophilus or lactobacillus while taking antibiotics to prevent yeast infection  Or eat yogurt today    Stick to a soft diet    If he developed fevers while on antibiotics or any worsening symptoms return to the ER    Lymphadenopathy   WHAT YOU NEED TO KNOW:   What is lymphadenopathy? Lymphadenopathy is swelling of your lymph nodes  Lymph nodes are small organs that are part of your immune system  Lymph nodes are found throughout your body  They are most easily felt in your neck, under your arms, and near your groin  Lymphadenopathy can occur in one or more areas of your body  What causes lymphadenopathy? Lymphadenopathy is usually caused by a bacterial, viral, or fungal infection  Other causes include autoimmune diseases (such as rheumatoid arthritis or lupus), cancer, and sarcoidosis  What are the signs and symptoms of lymphadenopathy? You may have no symptoms, or you may have any of the following:  · A painful, warm, or red lump under your skin    · More tired than usual    · Skin rash    · Unexplained weight loss    · Enlarged spleen (organ that filters blood)    · Fever or night sweats  How is lymphadenopathy diagnosed? Your healthcare provider will check your lymph node for its size and location  You may need the following tests to help healthcare providers find the cause of your lymphadenopathy:  · Blood tests  may show if you have an infection or other medical condition  · An x-ray, ultrasound, CT, or MRI  of your lymph nodes make be taken  You may be given contrast liquid to help the lymph nodes show up better in the pictures  Tell the healthcare provider if you have ever had an allergic reaction to contrast liquid  Do not enter the MRI room with anything metal  Metal can cause serious injury  Tell the healthcare provider if you have any metal in or on your body      · A lymph node biopsy  is a procedure used to remove a sample of tissue to be tested  Healthcare providers may remove lymph cells through a needle or remove one or more lymph nodes during surgery  How is lymphadenopathy treated? Your symptoms may go away without treatment  Your healthcare provider may need to treat the problem that has caused the lymph nodes to swell  Medicines may be given for infections, cancer, or other causes of your lymphadenopathy  When should I seek immediate care? · The swollen lymph nodes bleed  · You have swollen lymph nodes in your neck that affect your breathing or swallowing  When should I contact my healthcare provider? · You have a fever  · You have a new swollen and painful lymph node  · You have a skin rash  · Your lymph node remains swollen or painful, or it gets bigger  · Your lymph node has red streaks around it, or the skin around the lymph node is red  · You have questions or concerns about your condition or care  CARE AGREEMENT:   You have the right to help plan your care  Learn about your health condition and how it may be treated  Discuss treatment options with your caregivers to decide what care you want to receive  You always have the right to refuse treatment  The above information is an  only  It is not intended as medical advice for individual conditions or treatments  Talk to your doctor, nurse or pharmacist before following any medical regimen to see if it is safe and effective for you  © 2017 [employer]  Information is for End User's use only and may not be sold, redistributed or otherwise used for commercial purposes  All illustrations and images included in CareNotes® are the copyrighted property of [employer]  or [employer]  Tonsillitis   AMBULATORY CARE:   Tonsillitis  is inflammation of your tonsils  Tonsils are the lumps of tissue on both sides of the back of your throat  Tonsils are part of your immune system  They help you fight infections   Tonsillitis is usually caused by bacteria or a virus  Recurrent tonsillitis is when you have tonsillitis many times in 1 year  Chronic tonsillitis is when you have a sore throat that lasts 3 months or longer  Common symptoms include the following:   · Severe sore throat    · Red, swollen tonsils    · Painful swallowing    · Fever and chills    · Bad breath    · White spots on the tonsils  Call 911 for the following:   · Trouble breathing because your tonsils are swollen    Treatment for tonsillitis  may include any of the following:  · Acetaminophen  decreases pain and fever  It is available without a doctor's order  Ask how much to take and how often to take it  Follow directions  Acetaminophen can cause liver damage if not taken correctly  · NSAIDs , such as ibuprofen, help decrease swelling, pain, and fever  This medicine is available with or without a doctor's order  NSAIDs can cause stomach bleeding or kidney problems in certain people  If you take blood thinner medicine, always ask your healthcare provider if NSAIDs are safe for you  Always read the medicine label and follow directions  · Antibiotics  help treat a bacterial infection  · A tonsillectomy  is surgery to remove your tonsils  You may need surgery if you have chronic or recurrent tonsillitis  Surgery is also done if antibiotics are not getting rid of your tonsillitis  Rest  when you feel it is needed  Slowly start to do more each day  Return to your daily activities as directed  Drink liquids as directed: You may need to drink more liquid than usual to help prevent dehydration  Ask how much liquid to drink each day and which liquids are best for you  Gargle with warm salt water: This may help decrease throat pain  Mix 1 teaspoon of salt in 8 ounces of warm water  Ask how often you should do this  Follow up with your healthcare provider as directed:  Write down your questions so you remember to ask them during your visits     © 2017 [facility] [address] is for End User's use only and may not be sold, redistributed or otherwise used for commercial purposes  All illustrations and images included in CareNotes® are the copyrighted property of [employer]  or [employer]  The above information is an  only  It is not intended as medical advice for individual conditions or treatments  Talk to your doctor, nurse or pharmacist before following any medical regimen to see if it is safe and effective for you  Tonsillitis   WHAT YOU NEED TO KNOW:   Tonsillitis is inflammation of your tonsils  Tonsils are the lumps of tissue on both sides of the back of your throat  Tonsils are part of your immune system  They help you fight infections  Recurrent tonsillitis is when you have tonsillitis many times in 1 year  Chronic tonsillitis is when you have a sore throat that lasts 3 months or longer  DISCHARGE INSTRUCTIONS:   Medicines: You may need any of the following:  · Acetaminophen  decreases pain and fever  It is available without a doctor's order  Ask how much to take and how often to take it  Follow directions  Acetaminophen can cause liver damage if not taken correctly  · NSAIDs , such as ibuprofen, help decrease swelling, pain, and fever  This medicine is available with or without a doctor's order  NSAIDs can cause stomach bleeding or kidney problems in certain people  If you take blood thinner medicine, always ask your healthcare provider if NSAIDs are safe for you  Always read the medicine label and follow directions  · Antibiotics  help treat a bacterial infection  · Take your medicine as directed  Contact your healthcare provider if you think your medicine is not helping or if you have side effects  Tell him or her if you are allergic to any medicine  Keep a list of the medicines, vitamins, and herbs you take  Include the amounts, and when and why you take them   Bring the list or the pill bottles to follow-up visits  Carry your medicine list with you in case of an emergency  Call 911 for the following:   · You have trouble breathing because your tonsils are swollen  Contact your healthcare provider if:   · You have a fever  · Your pain gets worse or does not get better after you take pain medicine  · Your sore throat is not better after you have finished antibiotic treatment  · You have trouble sleeping and wake up trying to catch your breath  · You have questions or concerns about your condition or care  Rest  when you feel it is needed  Slowly start to do more each day  Return to your daily activities as directed  Drink liquids as directed: You may need to drink more liquid than usual to help prevent dehydration  Ask how much liquid to drink each day and which liquids are best for you  Gargle with warm salt water: This may help decrease throat pain  Mix 1 teaspoon of salt in 8 ounces of warm water  Ask how often you should do this  Prevent the spread of germs:  Wash your hands often  Do not share food or drinks with anyone  You may be able to return to work when you feel better and your fever is gone for at least 24 hours  Follow up with your healthcare provider as directed:  Write down your questions so you remember to ask them during your visits  © 2017 [address] [employer]  Information is for End User's use only and may not be sold, redistributed or otherwise used for commercial purposes  All illustrations and images included in CareNotes® are the copyrighted property of [employer]  or [employer]  The above information is an  only  It is not intended as medical advice for individual conditions or treatments  Talk to your doctor, nurse or pharmacist before following any medical regimen to see if it is safe and effective for you  Tonsillitis   WHAT YOU NEED TO KNOW:   Tonsillitis is inflammation of your tonsils   Tonsils are the lumps of tissue on both sides of the back of your throat  Tonsils are part of your immune system  They help you fight infections  Recurrent tonsillitis is when you have tonsillitis many times in 1 year  Chronic tonsillitis is when you have a sore throat that lasts 3 months or longer  DISCHARGE INSTRUCTIONS:   Medicines: You may need any of the following:  · Acetaminophen  decreases pain and fever  It is available without a doctor's order  Ask how much to take and how often to take it  Follow directions  Acetaminophen can cause liver damage if not taken correctly  · NSAIDs , such as ibuprofen, help decrease swelling, pain, and fever  This medicine is available with or without a doctor's order  NSAIDs can cause stomach bleeding or kidney problems in certain people  If you take blood thinner medicine, always ask your healthcare provider if NSAIDs are safe for you  Always read the medicine label and follow directions  · Antibiotics  help treat a bacterial infection  · Take your medicine as directed  Contact your healthcare provider if you think your medicine is not helping or if you have side effects  Tell him or her if you are allergic to any medicine  Keep a list of the medicines, vitamins, and herbs you take  Include the amounts, and when and why you take them  Bring the list or the pill bottles to follow-up visits  Carry your medicine list with you in case of an emergency  Call 911 for the following:   · You have trouble breathing because your tonsils are swollen  Contact your healthcare provider if:   · You have a fever  · Your pain gets worse or does not get better after you take pain medicine  · Your sore throat is not better after you have finished antibiotic treatment  · You have trouble sleeping and wake up trying to catch your breath  · You have questions or concerns about your condition or care  Rest  when you feel it is needed  Slowly start to do more each day   Return to your daily activities as directed  Drink liquids as directed: You may need to drink more liquid than usual to help prevent dehydration  Ask how much liquid to drink each day and which liquids are best for you  Gargle with warm salt water: This may help decrease throat pain  Mix 1 teaspoon of salt in 8 ounces of warm water  Ask how often you should do this  Prevent the spread of germs:  Wash your hands often  Do not share food or drinks with anyone  You may be able to return to work when you feel better and your fever is gone for at least 24 hours  Follow up with your healthcare provider as directed:  Write down your questions so you remember to ask them during your visits  © 2017 [facility] Information is for End User's use only and may not be sold, redistributed or otherwise used for commercial purposes  All illustrations and images included in CareNotes® are the copyrighted property of [employer]  or [employer]  The above information is an  only  It is not intended as medical advice for individual conditions or treatments  Talk to your doctor, nurse or pharmacist before following any medical regimen to see if it is safe and effective for you

## 2018-03-05 DIAGNOSIS — E03.8 HYPOTHYROIDISM DUE TO HASHIMOTO'S THYROIDITIS: Primary | ICD-10-CM

## 2018-03-05 DIAGNOSIS — E06.3 HYPOTHYROIDISM DUE TO HASHIMOTO'S THYROIDITIS: Primary | ICD-10-CM

## 2018-03-05 RX ORDER — LEVOTHYROXINE SODIUM 0.07 MG/1
75 TABLET ORAL
Qty: 30 TABLET | Refills: 3 | Status: SHIPPED | OUTPATIENT
Start: 2018-03-05 | End: 2018-08-23 | Stop reason: SDUPTHER

## 2018-04-04 ENCOUNTER — OFFICE VISIT (OUTPATIENT)
Dept: FAMILY MEDICINE CLINIC | Facility: CLINIC | Age: 23
End: 2018-04-04
Payer: COMMERCIAL

## 2018-04-04 VITALS
SYSTOLIC BLOOD PRESSURE: 124 MMHG | RESPIRATION RATE: 18 BRPM | OXYGEN SATURATION: 97 % | BODY MASS INDEX: 34.96 KG/M2 | DIASTOLIC BLOOD PRESSURE: 60 MMHG | HEART RATE: 98 BPM | WEIGHT: 190 LBS | TEMPERATURE: 97 F | HEIGHT: 62 IN

## 2018-04-04 DIAGNOSIS — J02.9 VIRAL PHARYNGITIS: ICD-10-CM

## 2018-04-04 DIAGNOSIS — E06.3 HASHIMOTO'S THYROIDITIS: Primary | ICD-10-CM

## 2018-04-04 DIAGNOSIS — J30.89 CHRONIC NONSEASONAL ALLERGIC RHINITIS DUE TO OTHER ALLERGEN: ICD-10-CM

## 2018-04-04 LAB — TSH SERPL DL<=0.05 MIU/L-ACNC: 1.88 UIU/ML (ref 0.36–3.74)

## 2018-04-04 PROCEDURE — 36415 COLL VENOUS BLD VENIPUNCTURE: CPT | Performed by: NURSE PRACTITIONER

## 2018-04-04 PROCEDURE — T1015 CLINIC SERVICE: HCPCS | Performed by: FAMILY MEDICINE

## 2018-04-04 PROCEDURE — 84443 ASSAY THYROID STIM HORMONE: CPT | Performed by: NURSE PRACTITIONER

## 2018-04-04 RX ORDER — LORATADINE 10 MG/1
10 TABLET ORAL DAILY
Qty: 30 TABLET | Refills: 3 | Status: SHIPPED | OUTPATIENT
Start: 2018-04-04 | End: 2018-09-29

## 2018-04-04 RX ORDER — ALBUTEROL SULFATE 90 UG/1
2 AEROSOL, METERED RESPIRATORY (INHALATION) EVERY 6 HOURS PRN
Qty: 1 INHALER | Refills: 3 | Status: ON HOLD | OUTPATIENT
Start: 2018-04-04 | End: 2018-09-30

## 2018-04-04 NOTE — PROGRESS NOTES
OFFICE VISIT  Shabnam Harris 25 y o  female MRN: [id number]      Assessment / Plan:  Diagnoses and all orders for this visit:    Hashimoto's thyroiditis  -     TSH, 3rd generation with T4 reflex; Future    Chronic nonseasonal allergic rhinitis due to other allergen  -     albuterol (VENTOLIN HFA) 90 mcg/act inhaler; Inhale 2 puffs every 6 (six) hours as needed for wheezing  -     loratadine (CLARITIN) 10 mg tablet; Take 1 tablet (10 mg total) by mouth daily    Viral pharyngitis     Instructed patient on warm salt water gargles for sore throat  Appears viral in nature  If symptoms persists call office  Hypothyroidism-TSH level drawn in office today  Discussed with patient better compliance  Patient to establish with OBGYN for current pregnancy  Reason For Visit / Chief Complaint  Chief Complaint   Patient presents with    Sore Throat        HPI:  Shabnam Harris is a 25 y o  female presents today with sore throat, started last week  She has had frequent sore throats  She reports worsening sore throat throughout the day  She reports feeling run down, currently pregnant  She has known hypothyroidism, she reports only taking 75mcq, was ordered 100mcg of levothyroxine       Historical Information   Past Medical History:   Diagnosis Date    Anxiety     Asthma     Depression     Disease of thyroid gland     Mood disorder ([address] )     Pregnant     Psychiatric disorder     depression,anxiety, mood disorder     Past Surgical History:   Procedure Laterality Date    APPENDECTOMY      FRACTURE SURGERY Left     wrist     Social History   History   Alcohol Use No     History   Drug Use No     History   Smoking Status    Current Every Day Smoker    Packs/day: 0 25    Types: Cigarettes   Smokeless Tobacco    Never Used     Family History   Problem Relation Age of Onset    No Known Problems Mother     Thyroid disease Father     Hypertension Brother     Hypertension Maternal Grandmother     Thyroid disease Paternal Grandmother        Meds/Allergies   Allergies   Allergen Reactions    Abilify [Aripiprazole] Other (See Comments) and Seizures     hallucination    Tessalon [Benzonatate] Anxiety     Other reaction(s): Psych complications  hallucinations    Zithromax [Azithromycin] Rash and Hallucinations       Meds:    Current Outpatient Prescriptions:     albuterol (2 5 mg/3 mL) 0 083 % nebulizer solution, Inhale 1 each 4 (four) times a day as needed, Disp: , Rfl:     levothyroxine 25 mcg tablet, Take 1 tablet (25 mcg total) by mouth daily (Patient taking differently: Take 100 mcg by mouth daily  ), Disp: 30 tablet, Rfl: 1    levothyroxine 75 mcg tablet, Take 1 tablet (75 mcg total) by mouth daily before breakfast, Disp: 30 tablet, Rfl: 3    albuterol (VENTOLIN HFA) 90 mcg/act inhaler, Inhale 2 puffs every 6 (six) hours as needed for wheezing, Disp: 1 Inhaler, Rfl: 3    loratadine (CLARITIN) 10 mg tablet, Take 1 tablet (10 mg total) by mouth daily, Disp: 30 tablet, Rfl: 3      REVIEW OF SYSTEMS  A comprehensive review of systems was negative except for: Ears, nose, mouth, throat, and face: positive for sore throat      Current Vitals:   Blood Pressure: 124/60 (04/04/18 0929)  Pulse: 98 (04/04/18 0929)  Temperature: (!) 97 °F (36 1 °C) (04/04/18 0929)  Respirations: 18 (04/04/18 0929)  Height: 5' 2" (157 5 cm) (04/04/18 0929)  Weight - Scale: 86 2 kg (190 lb) (04/04/18 0929)  SpO2: 97 % (04/04/18 0929)  [unfilled]    PHYSICAL EXAMS:  Physical Exam   Constitutional: She appears well-developed  HENT:   Head: Normocephalic  Eyes: Pupils are equal, round, and reactive to light  Neck: Normal range of motion  Cardiovascular: Normal rate  Pulmonary/Chest: Effort normal    Neurological: She is alert  Skin: Skin is warm  Psychiatric: She has a normal mood and affect  Follow up at this office if not better    Counseling / Coordination of Care  Total floor / unit time spent today 20 minutes  Greater than 50% of total time was spent with the patient and / or family counseling and / or coordination of care

## 2018-04-06 ENCOUNTER — TELEPHONE (OUTPATIENT)
Dept: FAMILY MEDICINE CLINIC | Facility: CLINIC | Age: 23
End: 2018-04-06

## 2018-04-06 NOTE — TELEPHONE ENCOUNTER
Spoke to patient, She still doesn't understand why she is gaining so much weight  I told her she is pregnant  I checked her vitals she gained 6 lbs from the beginning of February to April  I also let her know with being pregnant her body is changing and stretching to make room for her pregnancy   She sees OB on April 11 and will see what they say         ----- Message from [address] sent at 4/6/2018 11:26 AM EDT -----  Can you let patient know her TSH level is normal, we can repeat in 4-6 weeks due to her current pregnancy, she can continue 75mcq    ----- Message -----  From: Lab, Background User  Sent: 4/4/2018   7:35 PM  To: Monika Williamson

## 2018-05-01 LAB
C TRACH RRNA SPEC QL PROBE: NEGATIVE
N GONORRHOEA RRNA SPEC QL PROBE: NEGATIVE

## 2018-05-31 LAB
ABO/RH(D) (HISTORICAL): NORMAL
BASOPHILS # BLD AUTO: 0 X3/UL (ref 0–0.3)
BASOPHILS # BLD AUTO: 0.3 % (ref 0–2)
BILIRUB UR QL STRIP: ABNORMAL
BLD GP AB SCN SERPL QL: NEGATIVE
CLARITY UR: CLEAR
COLOR UR: YELLOW
DEPRECATED RDW RBC AUTO: 13.2 % (ref 11.5–14.5)
EOSINOPHIL # BLD AUTO: 0.2 X3/UL (ref 0–0.5)
EOSINOPHIL NFR BLD AUTO: 1.3 % (ref 0–5)
GLUCOSE UR STRIP-MCNC: NEGATIVE MG/DL
HCT VFR BLD AUTO: 38.9 % (ref 37–47)
HGB BLD-MCNC: 13.6 G/DL (ref 12–16)
HGB UR QL STRIP.AUTO: NEGATIVE
KETONES UR STRIP-MCNC: ABNORMAL MG/DL
LEUKOCYTE ESTERASE UR QL STRIP: NEGATIVE
LYMPHOCYTES # BLD AUTO: 2.1 X3/UL (ref 1.2–4.2)
LYMPHOCYTES NFR BLD AUTO: 16.8 % (ref 20.5–51.1)
MCH RBC QN AUTO: 29.3 PG (ref 26–34)
MCHC RBC AUTO-ENTMCNC: 34.8 G/DL (ref 31–36)
MCV RBC AUTO: 84.1 FL (ref 81–99)
MONOCYTES # BLD AUTO: 0.9 X3/UL (ref 0–1)
MONOCYTES NFR BLD AUTO: 7.6 % (ref 1.7–12)
NEUTROPHILS # BLD AUTO: 9.1 X3/UL (ref 1.4–6.5)
NEUTS SEG NFR BLD AUTO: 74 % (ref 42.2–75.2)
NITRITE UR QL STRIP: NEGATIVE
PH UR STRIP.AUTO: 6 [PH] (ref 4.5–8)
PLATELET # BLD AUTO: 325 X3/UL (ref 130–400)
PMV BLD AUTO: 9.2 FL (ref 8.6–11.7)
PROT UR STRIP-MCNC: NEGATIVE MG/DL
RBC # BLD AUTO: 4.63 X6/UL (ref 3.9–5.2)
SP GR UR STRIP.AUTO: 1.02 (ref 1–1.03)
UROBILINOGEN UR QL STRIP.AUTO: 2 EU/DL (ref 0.2–8)
WBC # BLD AUTO: 12.3 X3/UL (ref 4.8–10.8)

## 2018-06-02 LAB
HEPATITIS B SURFACE ANTIGEN (HISTORICAL): NEGATIVE
HEPATITIS C ANTIBODY (HISTORICAL): 0.1 S/CO (ref 0–0.9)
HIV1 (HISTORICAL): NON REACTIVE
RUBELLA, IGG (HISTORICAL): 1.43 INDEX

## 2018-06-05 LAB — RPR SCREEN (HISTORICAL): NORMAL

## 2018-06-06 LAB
AFP MOM (HISTORICAL): 1.19
AFP VALUE (HISTORICAL): 31.9 NG/ML
DIA MOM VALUE (HISTORICAL): 1.57
DSR (BY AGE) 1 IN (HISTORICAL): 1097
DSR (SECOND TRIMESTER) 1 IN (HISTORICAL): 5855
GESTAT. AGE BASED ON (HISTORICAL): NORMAL
GESTATIONAL AGE (HISTORICAL): 15.3 WEEKS
HCG MOM (HISTORICAL): 0.67
HCG QUANTITATIVE (HISTORICAL): NORMAL MIU/M
INSULIN DEP. DIABETIC (HISTORICAL): NO
INTERPRETATION (HISTORICAL): NORMAL
Lab: 257.99 PG/ML
Lab: NORMAL
MATERNAL AGE AT EDD (HISTORICAL): 23.2 YR
MULTIPLE GESTATION (HISTORICAL): NO
OSB RISK 1N (HISTORICAL): 6704
RACE/ETHNIC ORIGIN (HISTORICAL): NORMAL
T18 (BY AGE) (HISTORICAL): NORMAL
T18 RISK (HISTORICAL): NORMAL
TEST RESULT (HISTORICAL): NORMAL
UE3 MOM (HISTORICAL): 0.97
UE3 VALUE (HISTORICAL): 0.57 NG/ML
WEIGHT (HISTORICAL): 172 LBS

## 2018-06-08 ENCOUNTER — OFFICE VISIT (OUTPATIENT)
Dept: FAMILY MEDICINE CLINIC | Facility: CLINIC | Age: 23
End: 2018-06-08
Payer: COMMERCIAL

## 2018-06-08 VITALS
HEIGHT: 62 IN | HEART RATE: 97 BPM | OXYGEN SATURATION: 98 % | SYSTOLIC BLOOD PRESSURE: 118 MMHG | DIASTOLIC BLOOD PRESSURE: 78 MMHG | WEIGHT: 175 LBS | TEMPERATURE: 98.3 F | BODY MASS INDEX: 32.2 KG/M2 | RESPIRATION RATE: 16 BRPM

## 2018-06-08 DIAGNOSIS — J02.0 ACUTE STREPTOCOCCAL PHARYNGITIS: Primary | ICD-10-CM

## 2018-06-08 LAB — S PYO AG THROAT QL: POSITIVE

## 2018-06-08 PROCEDURE — T1015 CLINIC SERVICE: HCPCS | Performed by: FAMILY MEDICINE

## 2018-06-08 PROCEDURE — 87070 CULTURE OTHR SPECIMN AEROBIC: CPT | Performed by: FAMILY MEDICINE

## 2018-06-08 PROCEDURE — 87147 CULTURE TYPE IMMUNOLOGIC: CPT | Performed by: FAMILY MEDICINE

## 2018-06-08 PROCEDURE — 87880 STREP A ASSAY W/OPTIC: CPT | Performed by: FAMILY MEDICINE

## 2018-06-08 RX ORDER — AMOXICILLIN 875 MG/1
875 TABLET, COATED ORAL 2 TIMES DAILY
Qty: 20 TABLET | Refills: 0 | Status: SHIPPED | OUTPATIENT
Start: 2018-06-08 | End: 2018-06-18

## 2018-06-08 NOTE — PROGRESS NOTES
Assessment/Plan:     Diagnoses and all orders for this visit:    Acute streptococcal pharyngitis  -     POCT rapid strepA  -     Throat culture; Future  -     amoxicillin (AMOXIL) 875 mg tablet; Take 1 tablet (875 mg total) by mouth 2 (two) times a day for 10 days      Discussion/Plan:  Acute streptococcal pharyngitis - poct strep positive, will send for culture  Amoxicillin BID x 10 days sent to pharmacy, take with food  Rest, fluids, supportive measures  Advised patient not to share drinks/utensils with others, change toothbrush, proper oral hygiene  Subjective:      Patient ID: Kaylee Green is a 25 y o  female  Chief Complaint   Patient presents with    Sore Throat     FREQUENT SORE THROATS       Patient presents to the office today for acute sick visit  Patient reports recurrent sore throat with exudate  She has history of mono and streptococcal pharyngitis  Patient currently reports sore throat x 2 days and noticed pus pockets last night  She reports ear popping with swallowing  She reports nausea with vomiting           The following portions of the patient's history were reviewed and updated as appropriate: allergies, current medications, past family history, past medical history, past social history, past surgical history and problem list   Patient Active Problem List   Diagnosis    Depression    Hashimoto's thyroiditis    Mild persistent asthma without complication    Asthma    [facility] [address] [facility]    [facility]     Current Outpatient Prescriptions on File Prior to Visit   Medication Sig Dispense Refill    albuterol (2 5 mg/3 mL) 0 083 % nebulizer solution Inhale 1 each 4 (four) times a day as needed      albuterol (VENTOLIN HFA) 90 mcg/act inhaler Inhale 2 puffs every 6 (six) hours as needed for wheezing 1 Inhaler 3    levothyroxine 75 mcg tablet Take 1 tablet (75 mcg total) by mouth daily before breakfast 30 tablet 3    loratadine (CLARITIN) 10 mg tablet Take 1 tablet (10 mg total) by mouth daily 30 tablet 3     No current facility-administered medications on file prior to visit  Review of Systems   Constitutional: Positive for fatigue  Negative for activity change, appetite change, chills, diaphoresis, fever and unexpected weight change  HENT: Positive for ear pain, sore throat and trouble swallowing  Negative for congestion, dental problem, drooling, ear discharge, facial swelling, hearing loss, mouth sores, postnasal drip, rhinorrhea, sinus pain, sinus pressure, sneezing, tinnitus and voice change  Eyes: Negative  Respiratory: Negative  Cardiovascular: Negative  Gastrointestinal: Positive for nausea and vomiting  Negative for abdominal distention, abdominal pain, anal bleeding, blood in stool, constipation, diarrhea and rectal pain  Genitourinary: Negative  Neurological: Negative  Psychiatric/Behavioral: Negative  Objective:      /78   Pulse 97   Temp 98 3 °F (36 8 °C)   Resp 16   Ht 5' 2" (1 575 m)   Wt 79 4 kg (175 lb)   SpO2 98%   BMI 32 01 kg/m²          Physical Exam   Constitutional: She is oriented to person, place, and time  She appears well-developed and well-nourished  HENT:   Head: Normocephalic and atraumatic  Right Ear: Tympanic membrane, external ear and ear canal normal    Left Ear: Tympanic membrane, external ear and ear canal normal    Nose: Nose normal    Mouth/Throat: Uvula is midline and mucous membranes are normal  Oropharyngeal exudate, posterior oropharyngeal edema and posterior oropharyngeal erythema present  No tonsillar abscesses  Eyes: Conjunctivae are normal  Pupils are equal, round, and reactive to light  Neck: Neck supple  No JVD present  No tracheal deviation present  No thyromegaly present  Cardiovascular: Normal rate and regular rhythm  Pulmonary/Chest: Effort normal    Abdominal: Soft  Bowel sounds are normal  There is no tenderness  Lymphadenopathy:     She has no cervical adenopathy  Neurological: She is alert and oriented to person, place, and time  Skin: Skin is warm and dry  Psychiatric: She has a normal mood and affect   Her behavior is normal

## 2018-06-09 LAB — BACTERIA THROAT CULT: ABNORMAL

## 2018-07-02 ENCOUNTER — OFFICE VISIT (OUTPATIENT)
Dept: FAMILY MEDICINE CLINIC | Facility: CLINIC | Age: 23
End: 2018-07-02
Payer: COMMERCIAL

## 2018-07-02 VITALS
OXYGEN SATURATION: 98 % | DIASTOLIC BLOOD PRESSURE: 76 MMHG | HEART RATE: 83 BPM | SYSTOLIC BLOOD PRESSURE: 120 MMHG | WEIGHT: 172 LBS | BODY MASS INDEX: 31.65 KG/M2 | HEIGHT: 62 IN | RESPIRATION RATE: 16 BRPM | TEMPERATURE: 97.3 F

## 2018-07-02 DIAGNOSIS — E07.9 THYROID DISEASE DURING PREGNANCY IN SECOND TRIMESTER: ICD-10-CM

## 2018-07-02 DIAGNOSIS — E06.3 HASHIMOTO'S THYROIDITIS: Primary | ICD-10-CM

## 2018-07-02 DIAGNOSIS — O99.282 THYROID DISEASE DURING PREGNANCY IN SECOND TRIMESTER: ICD-10-CM

## 2018-07-02 LAB
ALBUMIN SERPL BCP-MCNC: 3.2 G/DL (ref 3.5–5)
ALP SERPL-CCNC: 80 U/L (ref 46–116)
ALT SERPL W P-5'-P-CCNC: 20 U/L (ref 12–78)
ANION GAP SERPL CALCULATED.3IONS-SCNC: 11 MMOL/L (ref 4–13)
AST SERPL W P-5'-P-CCNC: 20 U/L (ref 5–45)
BASOPHILS # BLD AUTO: 0.06 THOUSANDS/ΜL (ref 0–0.1)
BASOPHILS NFR BLD AUTO: 0 % (ref 0–1)
BILIRUB SERPL-MCNC: 0.57 MG/DL (ref 0.2–1)
BUN SERPL-MCNC: 5 MG/DL (ref 5–25)
CALCIUM SERPL-MCNC: 8.9 MG/DL (ref 8.3–10.1)
CHLORIDE SERPL-SCNC: 105 MMOL/L (ref 100–108)
CO2 SERPL-SCNC: 21 MMOL/L (ref 21–32)
CREAT SERPL-MCNC: 0.54 MG/DL (ref 0.6–1.3)
EOSINOPHIL # BLD AUTO: 0.2 THOUSAND/ΜL (ref 0–0.61)
EOSINOPHIL NFR BLD AUTO: 1 % (ref 0–6)
ERYTHROCYTE [DISTWIDTH] IN BLOOD BY AUTOMATED COUNT: 13.7 % (ref 11.6–15.1)
GFR SERPL CREATININE-BSD FRML MDRD: 134 ML/MIN/1.73SQ M
GLUCOSE P FAST SERPL-MCNC: 71 MG/DL (ref 65–99)
HCT VFR BLD AUTO: 36.3 % (ref 34.8–46.1)
HGB BLD-MCNC: 11.9 G/DL (ref 11.5–15.4)
IMM GRANULOCYTES # BLD AUTO: 0.07 THOUSAND/UL (ref 0–0.2)
IMM GRANULOCYTES NFR BLD AUTO: 1 % (ref 0–2)
LYMPHOCYTES # BLD AUTO: 2.45 THOUSANDS/ΜL (ref 0.6–4.47)
LYMPHOCYTES NFR BLD AUTO: 18 % (ref 14–44)
MCH RBC QN AUTO: 29.2 PG (ref 26.8–34.3)
MCHC RBC AUTO-ENTMCNC: 32.8 G/DL (ref 31.4–37.4)
MCV RBC AUTO: 89 FL (ref 82–98)
MONOCYTES # BLD AUTO: 1.02 THOUSAND/ΜL (ref 0.17–1.22)
MONOCYTES NFR BLD AUTO: 7 % (ref 4–12)
NEUTROPHILS # BLD AUTO: 10.24 THOUSANDS/ΜL (ref 1.85–7.62)
NEUTS SEG NFR BLD AUTO: 73 % (ref 43–75)
NRBC BLD AUTO-RTO: 0 /100 WBCS
PLATELET # BLD AUTO: 315 THOUSANDS/UL (ref 149–390)
PMV BLD AUTO: 11.3 FL (ref 8.9–12.7)
POTASSIUM SERPL-SCNC: 3.6 MMOL/L (ref 3.5–5.3)
PROT SERPL-MCNC: 6.6 G/DL (ref 6.4–8.2)
RBC # BLD AUTO: 4.08 MILLION/UL (ref 3.81–5.12)
SODIUM SERPL-SCNC: 137 MMOL/L (ref 136–145)
TSH SERPL DL<=0.05 MIU/L-ACNC: 1.81 UIU/ML (ref 0.36–3.74)
WBC # BLD AUTO: 14.04 THOUSAND/UL (ref 4.31–10.16)

## 2018-07-02 PROCEDURE — 85025 COMPLETE CBC W/AUTO DIFF WBC: CPT | Performed by: NURSE PRACTITIONER

## 2018-07-02 PROCEDURE — 80053 COMPREHEN METABOLIC PANEL: CPT | Performed by: NURSE PRACTITIONER

## 2018-07-02 PROCEDURE — 84443 ASSAY THYROID STIM HORMONE: CPT | Performed by: NURSE PRACTITIONER

## 2018-07-02 PROCEDURE — T1015 CLINIC SERVICE: HCPCS | Performed by: FAMILY MEDICINE

## 2018-07-02 NOTE — PROGRESS NOTES
OFFICE VISIT  Terrance Sutherland 25 y o  female MRN: [id number]      Assessment / Plan:  Diagnoses and all orders for this visit:    Hashimoto's thyroiditis    Thyroid disease during pregnancy in second trimester  -     TSH, 3rd generation with T4 reflex; Future  -     CBC and differential; Future  -     Comprehensive metabolic panel; Future          Reason For Visit / Chief Complaint  Chief Complaint   Patient presents with    Follow-up     thyroid check    Weight Loss        HPI:  Terrance Sutherland is a 25 y o  female presents today for follow up on her thyroid  She has decreased appetite over the last month, she has had weight loss of 20 pounds  She has been only eating once daily  She has occas nausea  OBGYN recommended labs drawn by PCP       Historical Information   Past Medical History:   Diagnosis Date    Anxiety     Asthma     Depression     Disease of thyroid gland     Mood disorder ([address] )     Pregnant     Psychiatric disorder     depression,anxiety, mood disorder     Past Surgical History:   Procedure Laterality Date    APPENDECTOMY      FRACTURE SURGERY Left     wrist     Social History   History   Alcohol Use No     History   Drug Use No     History   Smoking Status    Current Every Day Smoker    Packs/day: 0 25    Types: Cigarettes   Smokeless Tobacco    Never Used     Family History   Problem Relation Age of Onset    No Known Problems Mother     Thyroid disease Father     Hypertension Brother     Hypertension Maternal Grandmother     Thyroid disease Paternal Grandmother        Meds/Allergies   Allergies   Allergen Reactions    Abilify [Aripiprazole] Other (See Comments) and Seizures     hallucination    Tessalon [Benzonatate] Anxiety     Other reaction(s): Psych complications  hallucinations    Zithromax [Azithromycin] Rash and Hallucinations       Meds:    Current Outpatient Prescriptions:     albuterol (2 5 mg/3 mL) 0 083 % nebulizer solution, Inhale 1 each 4 (four) times a day as needed, Disp: , Rfl:     albuterol (VENTOLIN HFA) 90 mcg/act inhaler, Inhale 2 puffs every 6 (six) hours as needed for wheezing, Disp: 1 Inhaler, Rfl: 3    levothyroxine 75 mcg tablet, Take 1 tablet (75 mcg total) by mouth daily before breakfast, Disp: 30 tablet, Rfl: 3    loratadine (CLARITIN) 10 mg tablet, Take 1 tablet (10 mg total) by mouth daily, Disp: 30 tablet, Rfl: 3      REVIEW OF SYSTEMS  A comprehensive review of systems was negative except for: Constitutional: positive for weight loss      Current Vitals:   Blood Pressure: 120/76 (07/02/18 0812)  Pulse: 83 (07/02/18 0812)  Temperature: (!) 97 3 °F (36 3 °C) (07/02/18 0812)  Respirations: 16 (07/02/18 0812)  Height: 5' 2" (157 5 cm) (07/02/18 9042)  Weight - Scale: 78 kg (172 lb) (07/02/18 0812)  SpO2: 98 % (07/02/18 0812)  [unfilled]    PHYSICAL EXAMS:  General appearance: alert and oriented, in no acute distress  Lungs: clear to auscultation bilaterally  Heart: regular rate and rhythm, S1, S2 normal, no murmur, click, rub or gallop  Abdomen: abnormal findings:  gravis  Extremities: extremities normal, warm and well-perfused; no cyanosis, clubbing, or edema  Pulses: 2+ and symmetric  Skin: Skin color, texture, turgor normal  No rashes or lesions  Neurologic: Grossly normal{YES/NO:20        Follow up at this office in 4 weeks     Counseling / Coordination of Care  Total floor / unit time spent today 20 minutes  Greater than 50% of total time was spent with the patient and / or family counseling and / or coordination of care

## 2018-07-03 ENCOUNTER — TELEPHONE (OUTPATIENT)
Dept: FAMILY MEDICINE CLINIC | Facility: CLINIC | Age: 23
End: 2018-07-03

## 2018-07-11 ENCOUNTER — OB ABSTRACT (OUTPATIENT)
Dept: PERINATAL CARE | Facility: OTHER | Age: 23
End: 2018-07-11

## 2018-07-12 ENCOUNTER — ROUTINE PRENATAL (OUTPATIENT)
Dept: PERINATAL CARE | Facility: CLINIC | Age: 23
End: 2018-07-12
Payer: COMMERCIAL

## 2018-07-12 VITALS
DIASTOLIC BLOOD PRESSURE: 68 MMHG | HEART RATE: 102 BPM | SYSTOLIC BLOOD PRESSURE: 100 MMHG | BODY MASS INDEX: 31.5 KG/M2 | HEIGHT: 62 IN | WEIGHT: 171.2 LBS

## 2018-07-12 DIAGNOSIS — Z3A.20 20 WEEKS GESTATION OF PREGNANCY: ICD-10-CM

## 2018-07-12 DIAGNOSIS — Z36.3 ENCOUNTER FOR ANTENATAL SCREENING FOR MALFORMATIONS: Primary | ICD-10-CM

## 2018-07-12 DIAGNOSIS — Z36.86 ENCOUNTER FOR ANTENATAL SCREENING FOR CERVICAL LENGTH: ICD-10-CM

## 2018-07-12 DIAGNOSIS — O99.332 TOBACCO SMOKING AFFECTING PREGNANCY IN SECOND TRIMESTER: ICD-10-CM

## 2018-07-12 PROCEDURE — 99241 PR OFFICE CONSULTATION NEW/ESTAB PATIENT 15 MIN: CPT | Performed by: OBSTETRICS & GYNECOLOGY

## 2018-07-12 PROCEDURE — 76811 OB US DETAILED SNGL FETUS: CPT | Performed by: OBSTETRICS & GYNECOLOGY

## 2018-07-12 PROCEDURE — 76817 TRANSVAGINAL US OBSTETRIC: CPT | Performed by: OBSTETRICS & GYNECOLOGY

## 2018-07-12 RX ORDER — NICOTINE 21 MG/24HR
1 PATCH, TRANSDERMAL 24 HOURS TRANSDERMAL EVERY 24 HOURS
Qty: 28 PATCH | Refills: 5 | Status: SHIPPED | OUTPATIENT
Start: 2018-07-12 | End: 2018-09-29

## 2018-07-12 NOTE — PATIENT INSTRUCTIONS
Thank you for choosing [facility] for your  care today  If you have any questions about your ultrasound or care, please do not hesitate to contact us or your primary obstetrician  Goal: to cut down on cigarettes to 12 per day by next visit

## 2018-07-12 NOTE — PROGRESS NOTES
A transvaginal ultrasound was performed  Sonographer note on use of High Level Disinfection Process (Trophon) for transvaginal probe# 5 used, serial [id number]    Nidhi Garcia RDMS, RDCS

## 2018-08-23 DIAGNOSIS — E03.8 HYPOTHYROIDISM DUE TO HASHIMOTO'S THYROIDITIS: ICD-10-CM

## 2018-08-23 DIAGNOSIS — E06.3 HASHIMOTO'S DISEASE: Primary | ICD-10-CM

## 2018-08-23 DIAGNOSIS — E06.3 HYPOTHYROIDISM DUE TO HASHIMOTO'S THYROIDITIS: ICD-10-CM

## 2018-08-24 RX ORDER — LEVOTHYROXINE SODIUM 0.07 MG/1
75 TABLET ORAL
Qty: 30 TABLET | Refills: 0 | Status: SHIPPED | OUTPATIENT
Start: 2018-08-24 | End: 2018-10-03 | Stop reason: SDUPTHER

## 2018-09-07 ENCOUNTER — OFFICE VISIT (OUTPATIENT)
Dept: PERINATAL CARE | Facility: CLINIC | Age: 23
End: 2018-09-07
Payer: COMMERCIAL

## 2018-09-07 ENCOUNTER — HOSPITAL ENCOUNTER (OUTPATIENT)
Facility: HOSPITAL | Age: 23
Discharge: HOME | End: 2018-09-07
Attending: SPECIALIST | Admitting: SPECIALIST
Payer: COMMERCIAL

## 2018-09-07 ENCOUNTER — ULTRASOUND (OUTPATIENT)
Dept: PERINATAL CARE | Facility: CLINIC | Age: 23
End: 2018-09-07
Payer: COMMERCIAL

## 2018-09-07 VITALS
WEIGHT: 164 LBS | HEIGHT: 62 IN | DIASTOLIC BLOOD PRESSURE: 76 MMHG | HEART RATE: 94 BPM | BODY MASS INDEX: 30.18 KG/M2 | SYSTOLIC BLOOD PRESSURE: 114 MMHG

## 2018-09-07 VITALS
BODY MASS INDEX: 29.44 KG/M2 | SYSTOLIC BLOOD PRESSURE: 110 MMHG | TEMPERATURE: 98.7 F | HEART RATE: 89 BPM | HEIGHT: 62 IN | WEIGHT: 160 LBS | DIASTOLIC BLOOD PRESSURE: 56 MMHG | RESPIRATION RATE: 18 BRPM

## 2018-09-07 VITALS
DIASTOLIC BLOOD PRESSURE: 76 MMHG | WEIGHT: 164 LBS | BODY MASS INDEX: 30.18 KG/M2 | HEART RATE: 94 BPM | SYSTOLIC BLOOD PRESSURE: 114 MMHG | HEIGHT: 62 IN

## 2018-09-07 DIAGNOSIS — O99.333 TOBACCO SMOKING COMPLICATING PREGNANCY, THIRD TRIMESTER: Primary | ICD-10-CM

## 2018-09-07 DIAGNOSIS — O26.13 LOW WEIGHT GAIN DURING PREGNANCY IN THIRD TRIMESTER: Primary | ICD-10-CM

## 2018-09-07 DIAGNOSIS — Z81.0: Primary | ICD-10-CM

## 2018-09-07 DIAGNOSIS — E03.9 HYPOTHYROIDISM AFFECTING PREGNANCY IN THIRD TRIMESTER: ICD-10-CM

## 2018-09-07 DIAGNOSIS — O99.283 HYPOTHYROIDISM AFFECTING PREGNANCY IN THIRD TRIMESTER: ICD-10-CM

## 2018-09-07 DIAGNOSIS — Z3A.29 29 WEEKS GESTATION OF PREGNANCY: ICD-10-CM

## 2018-09-07 DIAGNOSIS — O09.293 HISTORY OF INTRAUTERINE GROWTH RESTRICTION IN PRIOR PREGNANCY, CURRENTLY PREGNANT, THIRD TRIMESTER: ICD-10-CM

## 2018-09-07 PROBLEM — O47.9 FALSE LABOR: Status: ACTIVE | Noted: 2018-09-07

## 2018-09-07 PROBLEM — R10.9 ABDOMINAL PAIN: Status: ACTIVE | Noted: 2018-09-07

## 2018-09-07 PROCEDURE — 76816 OB US FOLLOW-UP PER FETUS: CPT | Performed by: OBSTETRICS & GYNECOLOGY

## 2018-09-07 PROCEDURE — 99205 OFFICE O/P NEW HI 60 MIN: CPT | Performed by: GENETIC COUNSELOR, MS

## 2018-09-07 PROCEDURE — 99213 OFFICE O/P EST LOW 20 MIN: CPT | Performed by: OBSTETRICS & GYNECOLOGY

## 2018-09-07 NOTE — PROGRESS NOTES
Genetic Counseling Note        Jerrod Sanon     Appointment Date:  2018  Referred By: Haydee Ruff MD  YOB: 1995  Partner:  Stanleykerwin Porterock    Pregnancy History:   Estimated Date of Delivery: 18  Estimated Gestational Age: 33 weeks 3 days    Kulwinder is a(n) 21 y o  female who is here to discuss concerns related to a family history intellectual disability    Issues Discussed:  Average population risk: 3-4% in the average pregnancy of serious condition or birth defect  2-3% risk of mental retardation  Not all detected by prenatal testing  Family history of intellectual disability, ADD, bipolar disorder    Options Discussed:  Fragile X carrier screening, obtaining records/more information on affected family members    Additional Information / Impression / Plan / Tests Ordered:  Kulwinder presents for genetic counseling to discuss concerns regarding family history of intellectual disability in her younger brother  She is accompanied by her partner  To review, [facility] reports having a 61-year-old brother who she describes as intellectually disabled  In addition this brother has history of issues with what sounds like growth hormone deficiency in addition to having a diagnosis of ADHD  This brother did graduate high school  [facility] reports having another brother with a diagnosis of bipolar disorder, Tourette syndrome and ADHD in addition to her father having a diagnosis of bipolar disorder  She also has a niece, the daughter of her half sister with the same father, who has Asperger syndrome  [facility] was not aware of any genetic testing having been performed on any of these relatives  I explained that without a genetic diagnosis it is not possible to clearly define risk for similar issues in her children    The patient was made aware of the availability of carrier screening for Fragile X syndrome which is the most common inherited form of intellectual disability and can present with autistic features  She elected to decline this testing  Her partner also reports history of bipolar disorder in his father and ADD in himself  We reviewed the multifactorial inheritance of bipolar disorder and other mental health issues  Risk to 2nd degree relatives is estimated to be approximately 2-10%  I explained that given the history on both sides of the family the risk to their children is likely to be on the higher end of that range  Currently there are no genetic test routinely clinically available to detect susceptibility to bipolar disorder other mental illness  ADD and ADHD also follow a multifactorial pattern of inheritance therefore there is likely to be an increased risk to their children  Currently, there are no genetic test routinely clinically available to detect susceptibility to these learning disabilities either  They were encouraged to make her pediatrician aware of this history  In further reviewing her family history the patient reports having a 3year-old son, with a previous partner, who has undiagnosed issues with his immune system  She reports that he gets colds and other illnesses easily and frequently but he does not have a specific diagnosis at this time  Without a specific diagnosis it is not possible to clearly define their risk for having another child with similar problems  Oumou Porras reports having a niece, his sister's daughter, who was born with an isolated heart defect  This niece does not have any other medical problems or known genetic diagnosis  Isolated congential heart defects are one of the most common birth defect seen in the general population and also follow multifactorial pattern of inheritance  Level 2 ultrasound evaluation is able to detect many major physical birth defects including congenital heart defects  [facility] previously had level II ultrasound evaluation which was within normal limits       The patient reports being of native [place] and [place] descent while her partner reports being of [place] and [place] descent  They both deny having a known Confucianism ancestry  Carrier screening for CF and SMA was also discussed  The patient elected to decline this screening as well  Lastly, we discussed the fact that it is important to keep in mind that everyone in the general population regardless of age, family history, or medical background has approximately a 3% risk of having a child with some type of her defect, genetic disease or intellectual disability  Currently there are no tests available to rule out all birth defects or health problems  Part of this risk is an age specific risk for Down syndrome and other chromosome abnormalities  [facility] had 2nd trimester maternal serum screening the results of which were within normal limits as was her level 2 ultrasound as mentioned previously  These results of associated with a reduced risk for Down syndrome and other chromosome abnormalities          Time spent with Genetic Counselor: 45 minutes

## 2018-09-07 NOTE — PROGRESS NOTES
L&D Triage Note - OB/GYN  Nita Flight 21 y o  female MRN: [id number]  Unit/Bed#: LD Triage 4 Encounter: [id number]      Assessment:  21 y o   at 29w0d not in  labor    Plan:  1  Abdominal/back/vaginal pain  - No contractions on monitor   - Negative for vaginal infection    - Urine dip negative   - Cl  3 4 per Dr Viri Muñiz evaluation today  Discussed with Dr Chidi Sotomayor  Patient is safe for discharge  ______________________________________________________________________      Chief Compliant: abdominal pain    TIME: 1420  Subjective:   21 y o   at 29w0d who presented with abdominal pain and intermittent back pain  She has had intermittent abdominal pain since Monday  She then described intermittent back pain as well  She had one episode of emesis earlier this week  She denied diarrhea, urinary symptoms, vaginal discharge  She tolerated the speculum exam but was reduced to tears by qtip in vaginal vault  Patient is a poor historian  Patient gave multiple contradictory statements regarding her pain  She has a past medical history significant for undescribed mood disorder, asthma, and hypothyroidism    Objective:  Vitals:    18 1314   BP: 110/56   Pulse: 89   Resp: 18   Temp: 98 7 °F (37 1 °C)       SSE: normal vaginal vault, normal appearing vulva, no evidence of lesions or bleeding, no discharge or odor, visually closed cervix  FHT:  Reactive NST  [place]: none  Wet mount/KOH - negative for hyphae, trich, or clue cells  Normal epithelial cells noted             Tiffany Scheuermann, MD 2018 2:26 PM

## 2018-09-07 NOTE — DISCHARGE INSTRUCTIONS
Early Labor Signs   WHAT YOU SHOULD KNOW:   Early labor signs are changes in your body that allow your baby to pass through your birth canal   AFTER YOU LEAVE:   Signs and symptoms of early labor:   · Lightening  occurs when your baby drops inside your pelvis  You may feel increased pressure in your pelvis  This may happen a few weeks to a few hours before your labor begins  · Contractions  are cramps and tightening that occur in your uterus to help move the baby through your birth canal  Contractions occur regularly and more often each time  Each one lasts about 30 to 70 seconds, and gets stronger and more painful until you deliver your baby  Contractions do not go away with movement  They start in your lower back and move to the front in your abdomen  · Effacement  occurs when your cervix softens and thins, so it can easily open for the baby  Your primary healthcare provider [facility] or obstetrician will examine your cervix for effacement  · Dilation  is widening of your cervix, also for the baby's passage  Your PHP or obstetrician will examine your cervix for dilation  Your cervix will be fully opened and ready for delivery when it is dilated to 10 centimeters  · Increased discharge  from your vagina may occur  It may be pink, clear, or slightly bloody  This discharge may also be called bloody show  Bloody show is a mucus plug that forms and blocks your cervix during pregnancy  · Rupture of membranes  is a sudden release of clear fluid from your vagina  It is also known as when your water breaks  Your PHP or obstetrician may need to break your water if it does not break on its own  False labor: You may have false labor signs, which are also called Adam Villegas contractions  False labor is common and may happen several weeks or days before your actual labor  The contractions are not regular, and do not get closer together  The pain is usually mild, does not worsen, and is felt only in front  Adam Villegas contractions may happen later in the day, and stop after you change position, walk, or rest   Contact your PHP or obstetrician if:   · You have pain in your lower back or abdomen  · You have bloody mucus or show  · You have questions or concerns about your condition or care  Seek care immediately or call 911 if:   · You have regular, painful contractions that are less than 5 minutes apart, and last 30 to 70 seconds each  · You have heavy vaginal bleeding  · You have a constant trickle or sudden gush of clear fluid from your vagina  · You notice a sudden decrease in your baby's movement  2014 [employer] is for End User's use only and may not be sold, redistributed or otherwise used for commercial purposes  All illustrations and images included in CareNotes® are the copyrighted property of A D A M , Inc  or [employer]  The above information is an  only  It is not intended as medical advice for individual conditions or treatments  Talk to your doctor, nurse or pharmacist before following any medical regimen to see if it is safe and effective for you  Abdominal Pain in Pregnancy   WHAT YOU NEED TO KNOW:   Abdominal pain during pregnancy is common  Some of the causes include heartburn, constipation, gas, false labor, and round ligament pain  Round ligament pain is caused by stretching of the ligaments that support your uterus  Abdominal pain may be caused by a health problem, such as a stomach virus or appendicitis (inflammation of the appendix)  The pain may also be caused by a problem with your pregnancy, such as a threatened miscarriage or  labor  DISCHARGE INSTRUCTIONS:   Follow up with your obstetrician within 3 days:  Write down your questions so you remember to ask them during your visits  Self-care:   · Rest may help to relieve round ligament pain   Ask your healthcare provider about other ways to relieve this pain, such as a supportive belt or pregnancy exercises  · Use a heating pad set to the lowest setting or a hot compress to apply heat to your abdomen  Do this for 20 to 30 minutes every 2 hours for as many days as directed  · Avoid quick changes in position or movements that cause pain  · Do not lie flat in bed or bend over if you have heartburn  Ask your obstetrician if you should make any changes to the foods you eat  Ask if you can take any medicines for heartburn  · Eat more fiber and drink more liquids to relieve constipation  Fiber is found in fruits, vegetables, and whole-grain foods, such as whole-wheat bread and cereals  Ask how much liquid to drink each day and which liquids are best for you  Contact your obstetrician if:  · You continue to have abdominal pain that cannot be relieved  · You have a fever  · You have questions or concerns about your condition or care  Return to the emergency department if:   · You have sudden, severe pain or cramps that are so bad that you cannot walk or talk  · You have a fast heartbeat, shortness of breath, and you feel lightheaded or faint  · You have vaginal bleeding or discharge  · You have nausea, vomiting, fever, and severe pain on your right side  © 2017 [address]  Information is for End User's use only and may not be sold, redistributed or otherwise used for commercial purposes  All illustrations and images included in CareNotes® are the copyrighted property of A D A M , Inc  or [employer]  The above information is an  only  It is not intended as medical advice for individual conditions or treatments  Talk to your doctor, nurse or pharmacist before following any medical regimen to see if it is safe and effective for you

## 2018-09-07 NOTE — PROGRESS NOTES
DATE: 18   RE: Gabriel Cummings   : 1995  JORGE A: Estimated Date of Delivery: 18  EGA:  29w0d    Dear Dr Lubna Arellano,     Thank you for referring your patient to the [facility], [facility]  at [address]  The patient received the following education at a medical nutrition therapy counseling session for weight loss during pregnancy  Noted history of Hashimoto's Thyroiditis taking 75 mcg of Levothyroxine   Weight gain during in pregnancy  Based on the patients height of 62 inches, pre-pregnancy weight of 190 pounds (BMI 34 7) we would recommend a total weight gain of 11-20 pounds for the pregnancy  o The patients current weight is 163 6 pounds, and her weight gain to date is 0 pounds with a 26 4 pound weight loss  Based on this, we are recommending the patient gain between 11-20 pounds for the remainder of the pregnancy   Pregnancy Calorie/Protein needs: 2000 calories and at least 60 gms protein  Diet history revealed approximately 8215-4234 calories per day or less  Protein intake is variable  Overall protein intake is <60 grams per day  Patient receiving both WIC and Food stamps  Patient reported continued nausea and some vomiting, as well as heartburn  Patient has many food aversions and stated she is a "picky eater"  She also reported there have been days that she does not eat solid food but is drinking approximately 5 cans of Ensure yielding approximately 1200 calories; 50 grams of protein  She also reported she is drinking about 1 gallon of sugar sweetened iced tea per day  She is unable to tolerate prenatal vitamin and declined Flinstone chewable vitamins  She also is continuing to smoke 1 pack of cigarettes per day   Dietary Guidelines  o Basic review of macronutrients   o Meal pattern should consist of three small meals and three snacks daily  Meal suggestions provided as well as snacks  Written materials provided   Encouraged avoiding regular iced tea and trying to eat solid food then to utilize supplements between meals to meet nutritional needs  Again encouraged Flinstone chewable vitamins  Recommended patient discuss continued smoking with OB     o Appropriate serving size of foods  o Meal plan provided: 2000 calories  Patient stated she will try to incorporate the above suggestions discussed but that she is "very picky"  o Use of food diary to maintain a meal plan   o Patient is currently using Ensure nutritional supplement provided by [facility]   Follow up: Encouraged a follow up appointment to check on progress regarding suggestions made today as well as weight gain  Contact phone number provided to schedule a follow up  Patient stated she needs to check on availability to schedule the next appointment  Nutrition Goals:   Meet estimated nutritional needs with meal suggestions provided  Avoid continued weight loss and encourage weight gain based on prepregnancy BMI between 11-20 pounds   Use Ensure supplement between meals encouraging first meal intake and solid foods  Patient also reported she will complete a repeat TSH that has been ordered as well as a One hour glucose tolerance test  She was advised to complete this before next OB appointment on 9/12  Thank you for the opportunity to participate in the care of this patient  I can be reached at [phone] should you have any questions  Time spent with patient [id number]; time spent face to face counseling greater than 50% of the appointment      Sincerely,     Larisa Huang, RD,LDN,CDE  Diabetes Educator  [facility]

## 2018-09-12 LAB — EXTERNAL GROUP B STREP ANTIGEN: NEGATIVE

## 2018-09-17 PROCEDURE — 88305 TISSUE EXAM BY PATHOLOGIST: CPT | Performed by: PATHOLOGY

## 2018-09-18 ENCOUNTER — ROUTINE PRENATAL (OUTPATIENT)
Dept: PERINATAL CARE | Facility: CLINIC | Age: 23
End: 2018-09-18
Payer: COMMERCIAL

## 2018-09-18 ENCOUNTER — LAB REQUISITION (OUTPATIENT)
Dept: LAB | Facility: HOSPITAL | Age: 23
End: 2018-09-18
Payer: COMMERCIAL

## 2018-09-18 VITALS
SYSTOLIC BLOOD PRESSURE: 118 MMHG | HEART RATE: 96 BPM | WEIGHT: 166.2 LBS | HEIGHT: 62 IN | BODY MASS INDEX: 30.59 KG/M2 | DIASTOLIC BLOOD PRESSURE: 73 MMHG

## 2018-09-18 DIAGNOSIS — Z3A.29 29 WEEKS GESTATION OF PREGNANCY: ICD-10-CM

## 2018-09-18 DIAGNOSIS — O99.333 TOBACCO SMOKING COMPLICATING PREGNANCY, THIRD TRIMESTER: ICD-10-CM

## 2018-09-18 DIAGNOSIS — B07.9 VIRAL WART: ICD-10-CM

## 2018-09-18 DIAGNOSIS — O36.5930 POOR FETAL GROWTH AFFECTING MANAGEMENT OF MOTHER IN THIRD TRIMESTER, SINGLE OR UNSPECIFIED FETUS: Primary | ICD-10-CM

## 2018-09-18 PROCEDURE — 76815 OB US LIMITED FETUS(S): CPT | Performed by: OBSTETRICS & GYNECOLOGY

## 2018-09-18 PROCEDURE — 59025 FETAL NON-STRESS TEST: CPT | Performed by: OBSTETRICS & GYNECOLOGY

## 2018-09-18 PROCEDURE — 76820 UMBILICAL ARTERY ECHO: CPT | Performed by: OBSTETRICS & GYNECOLOGY

## 2018-09-18 NOTE — PROGRESS NOTES
[address]: Ms Carolina Davis was seen today at 30w4d for NST (found under the pregnancy episode) which I reviewed the RN assessment and agree, and MARTINEZ (see ultrasound report under OB procedures tab)  Umbilical Dopplers were also done and are normal  Please don't hesitate to contact our office with any concerns or questions    Maldonado Casanova MD

## 2018-09-20 ENCOUNTER — APPOINTMENT (OUTPATIENT)
Dept: LAB | Facility: HOSPITAL | Age: 23
End: 2018-09-20
Attending: SPECIALIST
Payer: COMMERCIAL

## 2018-09-20 ENCOUNTER — TRANSCRIBE ORDERS (OUTPATIENT)
Dept: ADMINISTRATIVE | Facility: HOSPITAL | Age: 23
End: 2018-09-20

## 2018-09-20 DIAGNOSIS — Z3A.31 31 WEEKS GESTATION OF PREGNANCY: ICD-10-CM

## 2018-09-20 DIAGNOSIS — E06.3 HASHIMOTO'S DISEASE: ICD-10-CM

## 2018-09-20 DIAGNOSIS — Z3A.31 31 WEEKS GESTATION OF PREGNANCY: Primary | ICD-10-CM

## 2018-09-20 LAB
ERYTHROCYTE [DISTWIDTH] IN BLOOD BY AUTOMATED COUNT: 13.5 % (ref 11.5–14.5)
GLUCOSE 1H P 50 G GLC PO SERPL-MCNC: 158 MG/DL (ref 40–134)
HCT VFR BLD AUTO: 33.1 % (ref 34.8–46.1)
HGB BLD-MCNC: 11.2 G/DL (ref 12–16)
MCH RBC QN AUTO: 30.4 PG (ref 26–34)
MCHC RBC AUTO-ENTMCNC: 33.9 G/DL (ref 31–37)
MCV RBC AUTO: 90 FL (ref 81–99)
PLATELET # BLD AUTO: 321 THOUSANDS/UL (ref 149–390)
PMV BLD AUTO: 9.3 FL (ref 8.6–11.7)
RBC # BLD AUTO: 3.69 MILLION/UL (ref 3.9–5.2)
TSH SERPL DL<=0.05 MIU/L-ACNC: 1.9 UIU/ML (ref 0.45–5.33)
WBC # BLD AUTO: 17.7 THOUSAND/UL (ref 4.8–10.8)

## 2018-09-20 PROCEDURE — 36415 COLL VENOUS BLD VENIPUNCTURE: CPT

## 2018-09-20 PROCEDURE — 84443 ASSAY THYROID STIM HORMONE: CPT

## 2018-09-20 PROCEDURE — 82950 GLUCOSE TEST: CPT

## 2018-09-20 PROCEDURE — 85027 COMPLETE CBC AUTOMATED: CPT

## 2018-09-28 ENCOUNTER — ULTRASOUND (OUTPATIENT)
Dept: PERINATAL CARE | Facility: CLINIC | Age: 23
End: 2018-09-28
Payer: COMMERCIAL

## 2018-09-28 VITALS
DIASTOLIC BLOOD PRESSURE: 75 MMHG | SYSTOLIC BLOOD PRESSURE: 111 MMHG | HEIGHT: 62 IN | BODY MASS INDEX: 30.64 KG/M2 | WEIGHT: 166.5 LBS | HEART RATE: 105 BPM

## 2018-09-28 DIAGNOSIS — Z3A.32 32 WEEKS GESTATION OF PREGNANCY: ICD-10-CM

## 2018-09-28 DIAGNOSIS — O36.5930 POOR FETAL GROWTH AFFECTING MANAGEMENT OF MOTHER IN THIRD TRIMESTER, SINGLE OR UNSPECIFIED FETUS: Primary | ICD-10-CM

## 2018-09-28 PROCEDURE — 76818 FETAL BIOPHYS PROFILE W/NST: CPT | Performed by: OBSTETRICS & GYNECOLOGY

## 2018-09-28 PROCEDURE — 76821 MIDDLE CEREBRAL ARTERY ECHO: CPT | Performed by: OBSTETRICS & GYNECOLOGY

## 2018-09-28 PROCEDURE — 76820 UMBILICAL ARTERY ECHO: CPT | Performed by: OBSTETRICS & GYNECOLOGY

## 2018-09-28 PROCEDURE — 99212 OFFICE O/P EST SF 10 MIN: CPT | Performed by: OBSTETRICS & GYNECOLOGY

## 2018-09-28 PROCEDURE — 76816 OB US FOLLOW-UP PER FETUS: CPT | Performed by: OBSTETRICS & GYNECOLOGY

## 2018-09-28 RX ORDER — AMOXICILLIN 500 MG/1
500 CAPSULE ORAL EVERY 8 HOURS SCHEDULED
COMMUNITY
End: 2018-09-30 | Stop reason: HOSPADM

## 2018-09-29 ENCOUNTER — HOSPITAL ENCOUNTER (INPATIENT)
Facility: HOSPITAL | Age: 23
LOS: 1 days | Discharge: HOME | DRG: 566 | End: 2018-09-30
Attending: OBSTETRICS & GYNECOLOGY | Admitting: SPECIALIST
Payer: COMMERCIAL

## 2018-09-29 ENCOUNTER — APPOINTMENT (EMERGENCY)
Dept: RADIOLOGY | Facility: HOSPITAL | Age: 23
End: 2018-09-29
Payer: COMMERCIAL

## 2018-09-29 ENCOUNTER — HOSPITAL ENCOUNTER (EMERGENCY)
Facility: HOSPITAL | Age: 23
End: 2018-09-29
Attending: EMERGENCY MEDICINE
Payer: COMMERCIAL

## 2018-09-29 VITALS
SYSTOLIC BLOOD PRESSURE: 105 MMHG | BODY MASS INDEX: 30.52 KG/M2 | TEMPERATURE: 97.7 F | RESPIRATION RATE: 17 BRPM | WEIGHT: 166.89 LBS | DIASTOLIC BLOOD PRESSURE: 65 MMHG | OXYGEN SATURATION: 98 % | HEART RATE: 90 BPM

## 2018-09-29 DIAGNOSIS — J45.902 STATUS ASTHMATICUS: Primary | ICD-10-CM

## 2018-09-29 DIAGNOSIS — J20.9 BRONCHITIS, ACUTE, WITH BRONCHOSPASM: ICD-10-CM

## 2018-09-29 DIAGNOSIS — J45.909 ASTHMA: Primary | ICD-10-CM

## 2018-09-29 DIAGNOSIS — J45.901 ASTHMA ATTACK: ICD-10-CM

## 2018-09-29 DIAGNOSIS — E87.6 HYPOKALEMIA: ICD-10-CM

## 2018-09-29 DIAGNOSIS — D72.829 LEUKOCYTOSIS: ICD-10-CM

## 2018-09-29 DIAGNOSIS — Z3A.32 32 WEEKS GESTATION OF PREGNANCY: ICD-10-CM

## 2018-09-29 PROBLEM — J45.21 MILD INTERMITTENT ASTHMA WITH EXACERBATION: Status: ACTIVE | Noted: 2018-09-29

## 2018-09-29 PROBLEM — J06.9 URI (UPPER RESPIRATORY INFECTION): Status: ACTIVE | Noted: 2018-09-29

## 2018-09-29 PROBLEM — D64.9 ANEMIA: Status: ACTIVE | Noted: 2018-09-29

## 2018-09-29 LAB
ALBUMIN SERPL BCP-MCNC: 2.5 G/DL (ref 3.5–5)
ALP SERPL-CCNC: 130 U/L (ref 46–116)
ALT SERPL W P-5'-P-CCNC: 19 U/L (ref 12–78)
ANION GAP SERPL CALCULATED.3IONS-SCNC: 8 MMOL/L (ref 4–13)
APTT PPP: 32 SECONDS (ref 24–36)
AST SERPL W P-5'-P-CCNC: 18 U/L (ref 5–45)
BASOPHILS # BLD AUTO: 0.08 THOUSANDS/ΜL (ref 0–0.1)
BASOPHILS NFR BLD AUTO: 0 % (ref 0–1)
BILIRUB SERPL-MCNC: 0.4 MG/DL (ref 0.2–1)
BUN SERPL-MCNC: 1 MG/DL (ref 5–25)
CALCIUM SERPL-MCNC: 9.3 MG/DL (ref 8.3–10.1)
CHLORIDE SERPL-SCNC: 101 MMOL/L (ref 100–108)
CO2 SERPL-SCNC: 25 MMOL/L (ref 21–32)
CREAT SERPL-MCNC: 0.63 MG/DL (ref 0.6–1.3)
EOSINOPHIL # BLD AUTO: 0.18 THOUSAND/ΜL (ref 0–0.61)
EOSINOPHIL NFR BLD AUTO: 1 % (ref 0–6)
ERYTHROCYTE [DISTWIDTH] IN BLOOD BY AUTOMATED COUNT: 13 % (ref 11.6–15.1)
GFR SERPL CREATININE-BSD FRML MDRD: 127 ML/MIN/1.73SQ M
GLUCOSE SERPL-MCNC: 80 MG/DL (ref 65–140)
HCT VFR BLD AUTO: 30.3 % (ref 34.8–46.1)
HGB BLD-MCNC: 10.5 G/DL (ref 11.5–15.4)
IMM GRANULOCYTES # BLD AUTO: 0.3 THOUSAND/UL (ref 0–0.2)
IMM GRANULOCYTES NFR BLD AUTO: 1 % (ref 0–2)
INR PPP: 1.02 (ref 0.86–1.17)
LYMPHOCYTES # BLD AUTO: 2.3 THOUSANDS/ΜL (ref 0.6–4.47)
LYMPHOCYTES NFR BLD AUTO: 8 % (ref 14–44)
MCH RBC QN AUTO: 29.9 PG (ref 26.8–34.3)
MCHC RBC AUTO-ENTMCNC: 34.7 G/DL (ref 31.4–37.4)
MCV RBC AUTO: 86 FL (ref 82–98)
MONOCYTES # BLD AUTO: 2.34 THOUSAND/ΜL (ref 0.17–1.22)
MONOCYTES NFR BLD AUTO: 9 % (ref 4–12)
NEUTROPHILS # BLD AUTO: 22.07 THOUSANDS/ΜL (ref 1.85–7.62)
NEUTS SEG NFR BLD AUTO: 81 % (ref 43–75)
NRBC BLD AUTO-RTO: 0 /100 WBCS
PLATELET # BLD AUTO: 334 THOUSANDS/UL (ref 149–390)
PMV BLD AUTO: 11 FL (ref 8.9–12.7)
POTASSIUM SERPL-SCNC: 3.1 MMOL/L (ref 3.5–5.3)
PROT SERPL-MCNC: 6.8 G/DL (ref 6.4–8.2)
PROTHROMBIN TIME: 12.9 SECONDS (ref 11.8–14.2)
RBC # BLD AUTO: 3.51 MILLION/UL (ref 3.81–5.12)
SODIUM SERPL-SCNC: 134 MMOL/L (ref 136–145)
TSH SERPL DL<=0.05 MIU/L-ACNC: 2.15 UIU/ML (ref 0.36–3.74)
WBC # BLD AUTO: 27.27 THOUSAND/UL (ref 4.31–10.16)

## 2018-09-29 PROCEDURE — 84145 PROCALCITONIN (PCT): CPT | Performed by: PHYSICIAN ASSISTANT

## 2018-09-29 PROCEDURE — 94640 AIRWAY INHALATION TREATMENT: CPT

## 2018-09-29 PROCEDURE — 36415 COLL VENOUS BLD VENIPUNCTURE: CPT | Performed by: EMERGENCY MEDICINE

## 2018-09-29 PROCEDURE — 99284 EMERGENCY DEPT VISIT MOD MDM: CPT

## 2018-09-29 PROCEDURE — 85730 THROMBOPLASTIN TIME PARTIAL: CPT | Performed by: EMERGENCY MEDICINE

## 2018-09-29 PROCEDURE — 96375 TX/PRO/DX INJ NEW DRUG ADDON: CPT

## 2018-09-29 PROCEDURE — 85610 PROTHROMBIN TIME: CPT | Performed by: EMERGENCY MEDICINE

## 2018-09-29 PROCEDURE — 96365 THER/PROPH/DIAG IV INF INIT: CPT

## 2018-09-29 PROCEDURE — 71045 X-RAY EXAM CHEST 1 VIEW: CPT

## 2018-09-29 PROCEDURE — 99253 IP/OBS CNSLTJ NEW/EST LOW 45: CPT | Performed by: PHYSICIAN ASSISTANT

## 2018-09-29 PROCEDURE — 87040 BLOOD CULTURE FOR BACTERIA: CPT | Performed by: EMERGENCY MEDICINE

## 2018-09-29 PROCEDURE — 80053 COMPREHEN METABOLIC PANEL: CPT | Performed by: EMERGENCY MEDICINE

## 2018-09-29 PROCEDURE — 84443 ASSAY THYROID STIM HORMONE: CPT | Performed by: PHYSICIAN ASSISTANT

## 2018-09-29 PROCEDURE — 85025 COMPLETE CBC W/AUTO DIFF WBC: CPT | Performed by: EMERGENCY MEDICINE

## 2018-09-29 RX ORDER — POTASSIUM CHLORIDE 20 MEQ/1
20 TABLET, EXTENDED RELEASE ORAL ONCE
Status: COMPLETED | OUTPATIENT
Start: 2018-09-29 | End: 2018-09-29

## 2018-09-29 RX ORDER — SODIUM CHLORIDE FOR INHALATION 0.9 %
3 VIAL, NEBULIZER (ML) INHALATION
Status: DISCONTINUED | OUTPATIENT
Start: 2018-09-30 | End: 2018-09-30 | Stop reason: HOSPADM

## 2018-09-29 RX ORDER — METHYLPREDNISOLONE SODIUM SUCCINATE 125 MG/2ML
125 INJECTION, POWDER, LYOPHILIZED, FOR SOLUTION INTRAMUSCULAR; INTRAVENOUS ONCE
Status: COMPLETED | OUTPATIENT
Start: 2018-09-29 | End: 2018-09-29

## 2018-09-29 RX ORDER — ALBUTEROL SULFATE 90 UG/1
2 AEROSOL, METERED RESPIRATORY (INHALATION) EVERY 4 HOURS PRN
Status: DISCONTINUED | OUTPATIENT
Start: 2018-09-29 | End: 2018-09-30 | Stop reason: HOSPADM

## 2018-09-29 RX ORDER — ALBUTEROL SULFATE 2.5 MG/3ML
2.5 SOLUTION RESPIRATORY (INHALATION) ONCE
Status: COMPLETED | OUTPATIENT
Start: 2018-09-29 | End: 2018-09-29

## 2018-09-29 RX ORDER — LEVOTHYROXINE SODIUM 0.03 MG/1
75 TABLET ORAL
Status: DISCONTINUED | OUTPATIENT
Start: 2018-09-30 | End: 2018-09-30 | Stop reason: HOSPADM

## 2018-09-29 RX ORDER — GUAIFENESIN 600 MG
600 TABLET, EXTENDED RELEASE 12 HR ORAL EVERY 12 HOURS SCHEDULED
Status: DISCONTINUED | OUTPATIENT
Start: 2018-09-29 | End: 2018-09-30 | Stop reason: HOSPADM

## 2018-09-29 RX ORDER — LEVALBUTEROL 1.25 MG/.5ML
1.25 SOLUTION, CONCENTRATE RESPIRATORY (INHALATION)
Status: DISCONTINUED | OUTPATIENT
Start: 2018-09-29 | End: 2018-09-30 | Stop reason: HOSPADM

## 2018-09-29 RX ORDER — PREDNISONE 20 MG/1
40 TABLET ORAL DAILY
Status: DISCONTINUED | OUTPATIENT
Start: 2018-09-30 | End: 2018-09-30 | Stop reason: HOSPADM

## 2018-09-29 RX ADMIN — POTASSIUM CHLORIDE 20 MEQ: 1500 TABLET, EXTENDED RELEASE ORAL at 23:00

## 2018-09-29 RX ADMIN — GUAIFENESIN 600 MG: 600 TABLET, EXTENDED RELEASE ORAL at 23:46

## 2018-09-29 RX ADMIN — SODIUM CHLORIDE 1000 ML: 0.9 INJECTION, SOLUTION INTRAVENOUS at 18:33

## 2018-09-29 RX ADMIN — IPRATROPIUM BROMIDE 0.5 MG: 0.5 SOLUTION RESPIRATORY (INHALATION) at 18:47

## 2018-09-29 RX ADMIN — CEFTRIAXONE 1000 MG: 1 INJECTION, SOLUTION INTRAVENOUS at 19:00

## 2018-09-29 RX ADMIN — ALBUTEROL SULFATE 2.5 MG: 2.5 SOLUTION RESPIRATORY (INHALATION) at 18:47

## 2018-09-29 RX ADMIN — POTASSIUM CHLORIDE 20 MEQ: 1500 TABLET, EXTENDED RELEASE ORAL at 19:47

## 2018-09-29 RX ADMIN — METHYLPREDNISOLONE SODIUM SUCCINATE 125 MG: 125 INJECTION, POWDER, FOR SOLUTION INTRAMUSCULAR; INTRAVENOUS at 18:49

## 2018-09-29 NOTE — ED PROVIDER NOTES
History  Chief Complaint   Patient presents with    URI     started with nasal congestion cough sore throat  now into chest on amoxil but no improvement  pt  32 weeks pregnant     40-year-old,  2 para 1 female  at 28 weeks gestation presents complaining of shortness of breath and cough which been present for a week  Patient states that 3 days ago she was placed on amoxicillin by Dr Dalia Ku of [facility] for presumed upper respiratory infection  She states she gets pneumonia frequently"  The last time she used her nebulizer was last evening  She has significant audible wheezing upon arrival          Cough   Cough characteristics:  Non-productive, dry and hacking  Sputum characteristics:  Nondescript  Severity:  Severe  Onset quality:  Gradual  Duration:  5 days  Timing:  Intermittent  Progression:  Waxing and waning  Chronicity:  New  Context: not animal exposure, not exposure to allergens and not fumes    Relieved by:  Nothing (Patient has been taking albuterol at home by her nebulizer with minimal relief)  Worsened by:  Deep breathing and activity  Ineffective treatments: Amoxicillin  Associated symptoms: rhinorrhea, sore throat and wheezing    Associated symptoms: no chest pain, no chills, no eye discharge and no headaches    Rhinorrhea:     Quality:  Clear    Severity:  Mild    Timing:  Intermittent  Wheezing:     Severity:  Moderate    Onset quality:  Gradual    Timing:  Constant    Progression:  Worsening    Chronicity:  New  Risk factors: no chemical exposure        Prior to Admission Medications   Prescriptions Last Dose Informant Patient Reported? Taking?    albuterol (2 5 mg/3 mL) 0 083 % nebulizer solution  Self Yes Yes   Sig: Inhale 1 each 4 (four) times a day as needed   albuterol (VENTOLIN HFA) 90 mcg/act inhaler  Self No Yes   Sig: Inhale 2 puffs every 6 (six) hours as needed for wheezing   amoxicillin (AMOXIL) 500 mg capsule  Self Yes Yes   Sig: Take 500 mg by mouth every 8 (eight) hours levothyroxine 75 mcg tablet  Self No Yes   Sig: Take 1 tablet (75 mcg total) by mouth daily before breakfast      Facility-Administered Medications: None       Past Medical History:   Diagnosis Date    Anxiety     Asthma     Depression     Disease of thyroid gland     Mood disorder (HCC)     Pregnant     Psychiatric disorder     depression,anxiety, mood disorder       Past Surgical History:   Procedure Laterality Date    APPENDECTOMY      FRACTURE SURGERY Left     wrist       Family History   Problem Relation Age of Onset    No Known Problems Mother     Thyroid disease Father     Hypertension Brother     Hypertension Maternal Grandmother     Thyroid disease Paternal Grandmother      I have reviewed and agree with the history as documented  Social History   Substance Use Topics    Smoking status: Current Every Day Smoker     Packs/day: 0 25     Types: Cigarettes    Smokeless tobacco: Never Used    Alcohol use No        Review of Systems   Constitutional: Negative for activity change, appetite change and chills  HENT: Positive for congestion, postnasal drip, rhinorrhea and sore throat  Negative for dental problem  Eyes: Negative for pain, discharge and itching  Respiratory: Positive for cough and wheezing  Cardiovascular: Negative for chest pain, palpitations and leg swelling  Gastrointestinal: Negative for abdominal distention, abdominal pain and anal bleeding  Endocrine: Negative for cold intolerance, heat intolerance and polydipsia  Genitourinary: Negative for difficulty urinating, dyspareunia and dysuria  Musculoskeletal: Negative for arthralgias, back pain and gait problem  Skin: Negative for pallor  Allergic/Immunologic: Negative for environmental allergies and food allergies  Neurological: Negative for dizziness, facial asymmetry and headaches  Hematological: Negative for adenopathy  Psychiatric/Behavioral: Negative for agitation and confusion     All other systems reviewed and are negative  Physical Exam  Physical Exam   Constitutional: She appears well-nourished  HENT:   Head: Normocephalic  Right Ear: External ear normal    Left Ear: External ear normal    Eyes: Pupils are equal, round, and reactive to light  Right eye exhibits no discharge  Left eye exhibits no discharge  Neck: Normal range of motion  No JVD present  No tracheal deviation present  No thyromegaly present  Cardiovascular: Normal rate and regular rhythm  Pulmonary/Chest: She is in respiratory distress  She has wheezes in the right upper field and the left upper field  She exhibits tenderness  Abdominal: Soft  She exhibits no distension  There is no tenderness  There is no guarding  Musculoskeletal: Normal range of motion  She exhibits no edema or deformity  Negative Homans sign bilaterally no calf tenderness to palpation   Neurological: She is alert  She displays normal reflexes  No cranial nerve deficit  Coordination normal    Skin: Skin is warm  Capillary refill takes less than 2 seconds  She is diaphoretic  No erythema  Psychiatric: She has a normal mood and affect  Her behavior is normal    Vitals reviewed        Vital Signs  ED Triage Vitals [09/29/18 1811]   Temperature Pulse Respirations Blood Pressure SpO2   97 7 °F (36 5 °C) 92 20 115/76 97 %      Temp Source Heart Rate Source Patient Position - Orthostatic VS BP Location FiO2 (%)   Temporal Right Sitting Right arm --      Pain Score       5           Vitals:    09/29/18 1811   BP: 115/76   Pulse: 92   Patient Position - Orthostatic VS: Sitting       Visual Acuity      ED Medications  Medications   sodium chloride 0 9 % bolus 1,000 mL (1,000 mL Intravenous New Bag 9/29/18 1833)   cefTRIAXone (ROCEPHIN) IVPB (premix) 1,000 mg (1,000 mg Intravenous New Bag 9/29/18 1900)   potassium chloride (K-DUR,KLOR-CON) CR tablet 20 mEq (not administered)   albuterol inhalation solution 2 5 mg (2 5 mg Nebulization Given 9/29/18 1847) ipratropium (ATROVENT) 0 02 % inhalation solution 0 5 mg (0 5 mg Nebulization Given 9/29/18 1847)   methylPREDNISolone sodium succinate (Solu-MEDROL) injection 125 mg (125 mg Intravenous Given 9/29/18 1849)       Diagnostic Studies  Results Reviewed     Procedure Component Value Units Date/Time    Comprehensive metabolic panel [[id number]]  (Abnormal) Collected:  09/29/18 1845    Lab Status:  Final result Specimen:  Blood from Arm, Left Updated:  09/29/18 1910     Sodium 134 (L) mmol/L      Potassium 3 1 (L) mmol/L      Chloride 101 mmol/L      CO2 25 mmol/L      ANION GAP 8 mmol/L      BUN 1 (L) mg/dL      Creatinine 0 63 mg/dL      Glucose 80 mg/dL      Calcium 9 3 mg/dL      AST 18 U/L      ALT 19 U/L      Alkaline Phosphatase 130 (H) U/L      Total Protein 6 8 g/dL      Albumin 2 5 (L) g/dL      Total Bilirubin 0 40 mg/dL      eGFR 127 ml/min/1 73sq m     Narrative:         National Kidney Disease Education Program recommendations are as follows:  GFR calculation is accurate only with a steady state creatinine  Chronic Kidney disease less than 60 ml/min/1 73 sq  meters  Kidney failure less than 15 ml/min/1 73 sq  meters      Protime-INR [[id number]]  (Normal) Collected:  09/29/18 1845    Lab Status:  Final result Specimen:  Blood from Arm, Left Updated:  09/29/18 1906     Protime 12 9 seconds      INR 1 02    APTT [[id number]]  (Normal) Collected:  09/29/18 1845    Lab Status:  Final result Specimen:  Blood from Arm, Left Updated:  09/29/18 1906     PTT 32 seconds     CBC and differential [[id number]]  (Abnormal) Collected:  09/29/18 1845    Lab Status:  Final result Specimen:  Blood from Arm, Left Updated:  09/29/18 1855     WBC 27 27 (H) Thousand/uL      RBC 3 51 (L) Million/uL      Hemoglobin 10 5 (L) g/dL      Hematocrit 30 3 (L) %      MCV 86 fL      MCH 29 9 pg      MCHC 34 7 g/dL      RDW 13 0 %      MPV 11 0 fL      Platelets 570 Thousands/uL      nRBC 0 /100 WBCs      Neutrophils Relative 81 (H) %      Immat GRANS % 1 %      Lymphocytes Relative 8 (L) %      Monocytes Relative 9 %      Eosinophils Relative 1 %      Basophils Relative 0 %      Neutrophils Absolute 22 07 (H) Thousands/µL      Immature Grans Absolute 0 30 (H) Thousand/uL      Lymphocytes Absolute 2 30 Thousands/µL      Monocytes Absolute 2 34 (H) Thousand/µL      Eosinophils Absolute 0 18 Thousand/µL      Basophils Absolute 0 08 Thousands/µL     Blood culture #2 [[id number]] Collected:  09/29/18 1845    Lab Status: In process Specimen:  Blood from Arm, Right Updated:  09/29/18 1852    Blood culture #1 [[id number]] Collected:  09/29/18 1845    Lab Status: In process Specimen:  Blood from Arm, Left Updated:  09/29/18 1852                 XR chest portable    (Results Pending)              Procedures  Procedures       Phone Contacts  ED Phone Contact    ED Course  ED Course as of Sep 29 1921   Sat Sep 29, 2018   1912 Peribronchial cuffing XR chest portable                               MDM  Number of Diagnoses or Management Options  32 weeks gestation of pregnancy:   Bronchitis, acute, with bronchospasm:   Hypokalemia:   Leukocytosis:   Status asthmaticus:   Diagnosis management comments: Patient has failed outpatient treatment for pneumonia - Patient has know history of asthma   18:20  I called to speak to OBGYN at [facility] to discuss plan of care and potential transfer   Dr Kale Nickerson with pharmacy will place patient on Rocephin         Amount and/or Complexity of Data Reviewed  Clinical lab tests: reviewed  Tests in the radiology section of CPT®: reviewed  Tests in the medicine section of CPT®: reviewed    Risk of Complications, Morbidity, and/or Mortality  Presenting problems: high  Diagnostic procedures: high  Management options: high      Total time providing critical care: 35 minutes critical care time for IV steroids, Rocephin          Disposition  Final diagnoses:   Status asthmaticus   32 weeks gestation of pregnancy   Hypokalemia   Leukocytosis Bronchitis, acute, with bronchospasm     Time reflects when diagnosis was documented in both MDM as applicable and the Disposition within this note     Time User Action Codes Description Comment    9/29/2018  6:34 PM Parvez Livers Add [J45 902] Status asthmaticus     9/29/2018  6:34 PM Parvez Livers Add [Z34 90] Pregnancy     9/29/2018  6:34 PM Parvez Livers Remove [Z34 90] Pregnancy     9/29/2018  6:35 PM Parvez Livers Add [Z3A 32] 32 weeks gestation of pregnancy     9/29/2018  7:14 PM Parvez Livers Add [E87 6] Hypokalemia     9/29/2018  7:14 PM Parvez Livers Add [H73 885] Leukocytosis     9/29/2018  7:15 PM Nora Fernandez Add [J20 9] Bronchitis, acute, with bronchospasm       ED Disposition     ED Disposition Condition Comment    Transfer to Another [address] [place] [address] should be transferred out to  [facility]       MD Documentation      Most Recent Value   Patient Condition  The patient has been stabilized such that within reasonable medical probability, no material deterioration of the patient condition or the condition of the unborn child(candido) is likely to result from the transfer   Reason for Transfer  Level of Care needed not available at this facility   Benefits of Transfer  -- [OBGYN]   Risks of Transfer  Potential for delay in receiving treatment, Potential deterioration of medical condition   Accepting Physician  Dr Servando Frazier Name, [address] [place] [address]   Provider Certification  General risk, such as traffic hazards, adverse weather conditions, rough terrain or turbulence, possible failure of equipment (including vehicle or aircraft), or consequences of actions of persons outside the control of the transport personnel      RN Documentation      Most [address] [facility] Name, [address] [place] [address]   Medications Reviewed with Next Provider of Service  Yes   Transport Mode  Ambulance   Level of Care  Advanced life support      Follow-up Information    None         Patient's Medications   Discharge Prescriptions    No medications on file     No discharge procedures on file      ED Provider  Electronically Signed by           Rashi Pimentel DO  09/29/18 8759

## 2018-09-29 NOTE — EMTALA/ACUTE CARE TRANSFER
[address]  [address] [facility] ER [address]  Dept: [phone]      EMTALA TRANSFER CONSENT    NAME Chris Jimenez                                         1995                              MRN [id number]    I have been informed of my rights regarding examination, treatment, and transfer   by Dr Carmen Trevino DO    Benefits:  (OBGYN)    Risks: Potential for delay in receiving treatment, Potential deterioration of medical condition      Transfer Request   I acknowledge that my medical condition has been evaluated and explained to me by the emergency department physician or other qualified medical person and/or my attending physician who has recommended and offered to me further medical examination and treatment  I understand the Hospital's obligation with respect to the treatment and stabilization of my emergency medical condition  I nevertheless request to be transferred  I release the Hospital, the doctor, and any other persons caring for me from all responsibility or liability for any injury or ill effects that may result from my transfer and agree to accept all responsibility for the consequences of my choice to transfer, rather than receive stabilizing treatment at the Hospital  I understand that because the transfer is my request, my insurance may not provide reimbursement for the services  The Hospital will assist and direct me and my family in how to make arrangements for transfer, but the hospital is not liable for any fees charged by the transport service  In spite of this understanding, I refuse to consent to further medical examination and treatment which has been offered to me, and request transfer to [address], [address] : [email or web address]  I authorize the performance of emergency medical procedures and treatments upon me in both transit and upon arrival at the receiving facility    Additionally, I authorize the release of any and all medical records to the receiving facility and request they be transported with me, if possible  I authorize the performance of emergency medical procedures and treatments upon me in both transit and upon arrival at the receiving facility  Additionally, I authorize the release of any and all medical records to the receiving facility and request they be transported with me, if possible  I understand that the safest mode of transportation during a medical emergency is an ambulance and that the Hospital advocates the use of this mode of transport  Risks of traveling to the receiving facility by car, including absence of medical control, life sustaining equipment, such as oxygen, and medical personnel has been explained to me and I fully understand them  (RUCHI CORRECT BOX BELOW)  [  ]  I consent to the stated transfer and to be transported by ambulance/helicopter  [  ]  I consent to the stated transfer, but refuse transportation by ambulance and accept full responsibility for my transportation by car  I understand the risks of non-ambulance transfers and I exonerate the Hospital and its staff from any deterioration in my condition that results from this refusal     X___________________________________________    DATE  18  TIME________  Signature of patient or legally responsible individual signing on patient behalf           RELATIONSHIP TO PATIENT_________________________          Provider Certification    NAME Yasmine Xie                                         1995                              MRN [id number]    A medical screening exam was performed on the above named patient  Based on the examination:    Condition Necessitating Transfer The primary encounter diagnosis was Status asthmaticus  Diagnoses of 32 weeks gestation of pregnancy, Hypokalemia, Leukocytosis, and Bronchitis, acute, with bronchospasm were also pertinent to this visit      Patient Condition: The patient has been stabilized such that within reasonable medical probability, no material deterioration of the patient condition or the condition of the unborn child(candido) is likely to result from the transfer    Reason for Transfer: Level of Care needed not available at this facility    Transfer Requirements: [address]   · Space available and qualified personnel available for treatment as acknowledged by    · Agreed to accept transfer and to provide appropriate medical treatment as acknowledged by       Dr Chun Blount  · Appropriate medical records of the examination and treatment of the patient are provided at the time of transfer   [address], [address] _______  · Transfer will be performed by qualified personnel from    and appropriate transfer equipment as required, including the use of necessary and appropriate life support measures  Provider Certification: I have examined the patient and explained the following risks and benefits of being transferred/refusing transfer to the patient/family:  General risk, such as traffic hazards, adverse weather conditions, rough terrain or turbulence, possible failure of equipment (including vehicle or aircraft), or consequences of actions of persons outside the control of the transport personnel      Based on these reasonable risks and benefits to the patient and/or the unborn child(candido), and based upon the information available at the time of the patients examination, I certify that the medical benefits reasonably to be expected from the provision of appropriate medical treatments at another medical facility outweigh the increasing risks, if any, to the individuals medical condition, and in the case of labor to the unborn child, from effecting the transfer      X____________________________________________ DATE 09/29/18        TIME_______      ORIGINAL - SEND TO MEDICAL RECORDS   COPY - SEND WITH PATIENT DURING TRANSFER

## 2018-09-30 VITALS
TEMPERATURE: 98.1 F | BODY MASS INDEX: 30.36 KG/M2 | SYSTOLIC BLOOD PRESSURE: 106 MMHG | OXYGEN SATURATION: 96 % | WEIGHT: 165 LBS | DIASTOLIC BLOOD PRESSURE: 54 MMHG | RESPIRATION RATE: 19 BRPM | HEART RATE: 95 BPM | HEIGHT: 62 IN

## 2018-09-30 PROBLEM — J40 TRACHEOBRONCHITIS: Status: ACTIVE | Noted: 2018-09-30

## 2018-09-30 LAB
ANION GAP SERPL CALCULATED.3IONS-SCNC: 12 MMOL/L (ref 4–13)
BASOPHILS # BLD AUTO: 0.05 THOUSANDS/ΜL (ref 0–0.1)
BASOPHILS NFR BLD AUTO: 0 % (ref 0–1)
BUN SERPL-MCNC: 1 MG/DL (ref 5–25)
CALCIUM SERPL-MCNC: 9.5 MG/DL (ref 8.3–10.1)
CHLORIDE SERPL-SCNC: 105 MMOL/L (ref 100–108)
CO2 SERPL-SCNC: 21 MMOL/L (ref 21–32)
CREAT SERPL-MCNC: 0.55 MG/DL (ref 0.6–1.3)
EOSINOPHIL # BLD AUTO: 0.02 THOUSAND/ΜL (ref 0–0.61)
EOSINOPHIL NFR BLD AUTO: 0 % (ref 0–6)
ERYTHROCYTE [DISTWIDTH] IN BLOOD BY AUTOMATED COUNT: 13.1 % (ref 11.6–15.1)
GFR SERPL CREATININE-BSD FRML MDRD: 133 ML/MIN/1.73SQ M
GLUCOSE SERPL-MCNC: 117 MG/DL (ref 65–140)
HCT VFR BLD AUTO: 32.8 % (ref 34.8–46.1)
HGB BLD-MCNC: 11.2 G/DL (ref 11.5–15.4)
IMM GRANULOCYTES # BLD AUTO: 0.34 THOUSAND/UL (ref 0–0.2)
IMM GRANULOCYTES NFR BLD AUTO: 1 % (ref 0–2)
LYMPHOCYTES # BLD AUTO: 1.82 THOUSANDS/ΜL (ref 0.6–4.47)
LYMPHOCYTES NFR BLD AUTO: 7 % (ref 14–44)
MCH RBC QN AUTO: 30.3 PG (ref 26.8–34.3)
MCHC RBC AUTO-ENTMCNC: 34.1 G/DL (ref 31.4–37.4)
MCV RBC AUTO: 89 FL (ref 82–98)
MONOCYTES # BLD AUTO: 0.93 THOUSAND/ΜL (ref 0.17–1.22)
MONOCYTES NFR BLD AUTO: 4 % (ref 4–12)
NEUTROPHILS # BLD AUTO: 21.86 THOUSANDS/ΜL (ref 1.85–7.62)
NEUTS SEG NFR BLD AUTO: 88 % (ref 43–75)
NRBC BLD AUTO-RTO: 0 /100 WBCS
PLATELET # BLD AUTO: 360 THOUSANDS/UL (ref 149–390)
PMV BLD AUTO: 10.7 FL (ref 8.9–12.7)
POTASSIUM SERPL-SCNC: 3.8 MMOL/L (ref 3.5–5.3)
PROCALCITONIN SERPL-MCNC: <0.05 NG/ML
RBC # BLD AUTO: 3.7 MILLION/UL (ref 3.81–5.12)
SODIUM SERPL-SCNC: 138 MMOL/L (ref 136–145)
WBC # BLD AUTO: 25.02 THOUSAND/UL (ref 4.31–10.16)

## 2018-09-30 PROCEDURE — 94760 N-INVAS EAR/PLS OXIMETRY 1: CPT

## 2018-09-30 PROCEDURE — 94640 AIRWAY INHALATION TREATMENT: CPT

## 2018-09-30 PROCEDURE — 85025 COMPLETE CBC W/AUTO DIFF WBC: CPT | Performed by: PHYSICIAN ASSISTANT

## 2018-09-30 PROCEDURE — 99231 SBSQ HOSP IP/OBS SF/LOW 25: CPT | Performed by: OBSTETRICS & GYNECOLOGY

## 2018-09-30 PROCEDURE — 99232 SBSQ HOSP IP/OBS MODERATE 35: CPT | Performed by: INTERNAL MEDICINE

## 2018-09-30 PROCEDURE — 80048 BASIC METABOLIC PNL TOTAL CA: CPT | Performed by: PHYSICIAN ASSISTANT

## 2018-09-30 PROCEDURE — 90686 IIV4 VACC NO PRSV 0.5 ML IM: CPT | Performed by: OBSTETRICS & GYNECOLOGY

## 2018-09-30 PROCEDURE — 99214 OFFICE O/P EST MOD 30 MIN: CPT

## 2018-09-30 RX ORDER — CEFDINIR 300 MG/1
300 CAPSULE ORAL EVERY 12 HOURS SCHEDULED
Qty: 10 CAPSULE | Refills: 0 | Status: SHIPPED | OUTPATIENT
Start: 2018-09-30 | End: 2018-10-06

## 2018-09-30 RX ORDER — LEVALBUTEROL 1.25 MG/.5ML
1.25 SOLUTION, CONCENTRATE RESPIRATORY (INHALATION)
Qty: 1 EACH | Refills: 0 | Status: SHIPPED | OUTPATIENT
Start: 2018-09-30 | End: 2019-06-28

## 2018-09-30 RX ORDER — CALCIUM CARBONATE 200(500)MG
1000 TABLET,CHEWABLE ORAL 3 TIMES DAILY PRN
Status: DISCONTINUED | OUTPATIENT
Start: 2018-09-30 | End: 2018-09-30 | Stop reason: HOSPADM

## 2018-09-30 RX ORDER — ALBUTEROL SULFATE 90 UG/1
2 AEROSOL, METERED RESPIRATORY (INHALATION) EVERY 6 HOURS PRN
Qty: 1 INHALER | Refills: 0 | Status: SHIPPED | OUTPATIENT
Start: 2018-09-30 | End: 2019-08-08 | Stop reason: SDUPTHER

## 2018-09-30 RX ORDER — PREDNISONE 10 MG/1
TABLET ORAL
Qty: 20 TABLET | Refills: 0 | Status: SHIPPED | OUTPATIENT
Start: 2018-09-30 | End: 2018-11-08

## 2018-09-30 RX ORDER — GUAIFENESIN 600 MG
600 TABLET, EXTENDED RELEASE 12 HR ORAL EVERY 12 HOURS SCHEDULED
Qty: 10 TABLET | Refills: 0 | Status: SHIPPED | OUTPATIENT
Start: 2018-09-30 | End: 2018-10-06

## 2018-09-30 RX ADMIN — Medication 1000 MG: at 02:02

## 2018-09-30 RX ADMIN — INFLUENZA VIRUS VACCINE 0.5 ML: 15; 15; 15; 15 SUSPENSION INTRAMUSCULAR at 10:34

## 2018-09-30 RX ADMIN — ALBUTEROL SULFATE 2 PUFF: 90 AEROSOL, METERED RESPIRATORY (INHALATION) at 02:45

## 2018-09-30 RX ADMIN — ISODIUM CHLORIDE 3 ML: 0.03 SOLUTION RESPIRATORY (INHALATION) at 05:58

## 2018-09-30 RX ADMIN — LEVALBUTEROL HYDROCHLORIDE 1.25 MG: 1.25 SOLUTION, CONCENTRATE RESPIRATORY (INHALATION) at 05:58

## 2018-09-30 RX ADMIN — LEVOTHYROXINE SODIUM 75 MCG: 25 TABLET ORAL at 07:11

## 2018-09-30 RX ADMIN — PREDNISONE 40 MG: 20 TABLET ORAL at 09:04

## 2018-09-30 RX ADMIN — GUAIFENESIN 600 MG: 600 TABLET, EXTENDED RELEASE ORAL at 09:04

## 2018-09-30 NOTE — PROGRESS NOTES
Progress Note - Joanna Morales 1995, 21 y o  female MRN: [id number]    Unit/Bed#: L&D 327-01 Encounter: [id number]    Primary Care Provider: MARGO Conte   Date and time admitted to hospital: 9/29/2018  8:50 PM        Assessment and Plan  * Asthma   Assessment & Plan    Asthma with exacerbation  Advised smoking cessation  Much improved  Able to ambulate on room air without distress  Will be discharged prednisone taper 40 mg, 10 mg every two days  Tracheobronchitis   Assessment & Plan    Will be discharged with cefdinir x5 days  Discussed with obstetrics johnny for cephalosporin     32 weeks gestation of pregnancy   Assessment & Plan    Management per primary team     Tobacco smoking complicating pregnancy, third trimester   Assessment & Plan    Advised smoking cessation     Hashimoto's thyroiditis   Assessment & Plan    Advised continuation levothyroxine     VTE Pharmacologic Prophylaxis: Low risk    Patient Centered Rounds: I have performed bedside rounds with nursing staff today  Discussions with Specialists or Other Care Team Provider:  Obstetrics    Education and Discussions with Family / Patient:     Time Spent for Care: 25 mins  More than 50% of total time spent on counseling and coordination of care as described above  Current Length of Stay: 1 day(s)    Current Patient Status: Inpatient   Certification Statement: The patient will continue to require additional inpatient hospital stay due to Asthma    Discharge Plan / Estimated Discharge Date:  Medically stable for discharge at this time  Prescription for albuterol HFA, cefdinir, and prednisone taper e-prescribed to [facility] in [address]  [facility] Status: No Order  ______________________________________________________________________________    Subjective:   Patient seen and examined  Feeling much better today  No further wheeze      Objective:   Vitals: Blood pressure 122/65, pulse 95, temperature 97 7 °F (36 5 °C), temperature source Oral, resp  rate 18, height 5' 2" (1 575 m), weight 74 8 kg (165 lb), last menstrual period 02/16/2018, SpO2 92 %, currently breastfeeding  Physical Exam:   General appearance: alert, appears stated age and cooperative  Head: Normocephalic, without obvious abnormality, atraumatic  Lungs: diminished breath sounds  Heart: regular rate and rhythm  Abdomen: Gravid nontender  Back: negative  Extremities: extremities atraumatic, no cyanosis or edema  Neurologic: Grossly normal    Additional Data:   Labs:    Results from last 7 days  Lab Units 09/30/18  0538 09/29/18  1845   WBC Thousand/uL 25 02* 27 27*   HEMOGLOBIN g/dL 11 2* 10 5*   HEMATOCRIT % 32 8* 30 3*   MCV fL 89 86   PLATELETS Thousands/uL 360 334   INR   --  1 02       Results from last 7 days  Lab Units 09/30/18  0538 09/29/18  1845   SODIUM mmol/L 138 134*   POTASSIUM mmol/L 3 8 3 1*   CHLORIDE mmol/L 105 101   CO2 mmol/L 21 25   ANION GAP mmol/L 12 8   BUN mg/dL 1* 1*   CREATININE mg/dL 0 55* 0 63   CALCIUM mg/dL 9 5 9 3   ALBUMIN g/dL  --  2 5*   TOTAL BILIRUBIN mg/dL  --  0 40   ALK PHOS U/L  --  130*   ALT U/L  --  19   AST U/L  --  18   EGFR ml/min/1 73sq m 133 127   GLUCOSE RANDOM mg/dL 117 80                    Results from last 7 days  Lab Units 09/29/18  2234   PROCALCITONIN ng/ml <0 05               Results from last 7 days  Lab Units 09/29/18  2234   TSH 3RD GENERATON uIU/mL 2 153     * I Have Reviewed All Lab Data Listed Above  Cultures:           Imaging:  Imaging Reports Reviewed Today Include:   No results found        Scheduled Meds:  Current Facility-Administered Medications:  albuterol 2 puff Inhalation Q4H PRN Lore Reagan PA-C   calcium carbonate 1,000 mg Oral TID PRN Milka Phelan MD   guaiFENesin 600 mg Oral Q12H [facility] Lore Reagan PA-C   levalbuterol 1 25 mg Nebulization TID Alley Weeks MD   levothyroxine 75 mcg Oral Daily Before Breakfast Milka Phelan MD   nicotine polacrilex 2 mg Oral Q2H PRN Alina Ball MD Ivelisse   predniSONE 40 mg Oral Daily Karen Melo PA-C   prenatal multivitamin 1 tablet Oral Daily Kassie Sharma MD   sodium chloride 3 mL Nebulization TID MD Ren Red DO  [facility] Internal Medicine  Hospitalist    ** Please Note: This note has been constructed using a voice recognition system   **

## 2018-09-30 NOTE — PROGRESS NOTES
Explained to pt hospital policy on smoking  Refuses nicotine patch at this time states it makes her sick  Does not which to quit at this time  Explained to pt she has nicotine gum ordered if she needs it   Verbalizes understanding

## 2018-09-30 NOTE — PROGRESS NOTES
Pt sleeping flat in bed  O2 sat 91-92%  Dr Keyur Ng made aware  Pt hob elevated 45 degrees    Will continue to monitor sats

## 2018-09-30 NOTE — ASSESSMENT & PLAN NOTE
· Suspect this is viral induced  · Was treated with 3 days of amoxicillin as outpatient  · CXR personally reviewed- no evidence of opacity  Await formal [facility] radiology reading  · Check procalcitonin  · Hold further ABX  Had ceftriaxone in ER  Allergic to azithromycin    · Mucinex

## 2018-09-30 NOTE — PROGRESS NOTES
Pt received to 327 via  Stretcher accompanied by ambulance crew  Ambulated to bed nurse present  Oriented to room and hospital policy   Verbalized understanding

## 2018-09-30 NOTE — PROGRESS NOTES
Pt received to 327 via stretcher accompanied by ambulance crew  Ambulated to bed, ob nurse present  Oriented to room and hospital policy   Verbalized understanding

## 2018-09-30 NOTE — PROGRESS NOTES
Antepartum Progress Note - OB/GYN   Sara Cornejo 21 y o  female MRN: [id number]  Unit/Bed#: L&D 327-01 Encounter: [id number]    ASSESSMENT:  Sara Cornejo 21 y o  Jovanna Gramajo s/p acute asthma exacerbation - resolving  Pt is well appearing and with no current complaints  PLAN:  1) Acute asthma exacerbation   - Albuterol, ipratropium, solumedrol, ceftriaxone   - Xopenex TID, PO prednisone 40mg with taper 10mg q 48hrs   - O2 sats on RA currently 97%   - Supplemental O2 discontinued, will allow ambulation and montitor sats  2) Encourage ambulation  3) Pain control as needed  4) Regular diet  5) Continue routine antpartum care  6) Dispo: stable and comfortable, anticipate discharge today    Subjective/Objective     SUBJECTIVE:  Pain: no  Tolerating Oral Intake: yes   Voiding: yes  Flatus: yes  Bowel Movement: yes  Ambulating: yes  Chest Pain: no  Shortness of Breath: no  Leg Pain/Discomfort: no    OBJECTIVE:     Vitals: Blood pressure 122/65, pulse 95, temperature 97 7 °F (36 5 °C), temperature source Oral, resp  rate 18, height 5' 2" (1 575 m), weight 74 8 kg (165 lb), last menstrual period 02/16/2018, SpO2 92 %, currently breastfeeding       General: appears well, alert and oriented x 3, pleasant and cooperative  Cardiovascular: RRR, normal S1/S2, no GRM  Lungs:  clear to auscultation bilaterally; non-labored breathing, minimal expiratory wheeze bilaterally   Abdomen: normal bowel sounds, soft, no tenderness, no distention  Lower Extremities: Non-tender, peripheral pulses normal; no clubbing, cyanosis, or edema    Labs:   Lab Results   Component Value Date    WBC 25 02 (H) 09/30/2018    HGB 11 2 (L) 09/30/2018    HCT 32 8 (L) 09/30/2018    MCV 89 09/30/2018     09/30/2018       Medications:   Current Facility-Administered Medications   Medication Dose Route Frequency    albuterol (PROVENTIL HFA,VENTOLIN HFA) inhaler 2 puff  2 puff Inhalation Q4H PRN    calcium carbonate (TUMS) chewable tablet 1,000 mg  1,000 mg Oral TID PRN    guaiFENesin (MUCINEX) 12 hr tablet 600 mg  600 mg Oral Q12H ANDREW    levalbuterol (XOPENEX) inhalation solution 1 25 mg  1 25 mg Nebulization TID    levothyroxine tablet 75 mcg  75 mcg Oral Daily Before Breakfast    nicotine polacrilex (NICORETTE) gum 2 mg  2 mg Oral Q2H PRN    predniSONE tablet 40 mg  40 mg Oral Daily    prenatal multivitamin tablet 1 tablet  1 tablet Oral Daily    sodium chloride 0 9 % inhalation solution 3 mL  3 mL Nebulization TID     Invasive Devices     Peripheral Intravenous Line            Peripheral IV 09/29/18 Left Wrist less than 1 day                     Juice Crespo MD PGY-3   9/30/2018 9:45 AM

## 2018-09-30 NOTE — H&P
H&P Exam - Obstetrics   Huma Brown 21 y o  female MRN: [id number]  Unit/Bed#: L&D 327-01 Encounter: [id number]      History of Present Illness     Chief Complaint: SOB, wheezing and cough    HPI:  Huma Brown is a 21 y o   female with an JORGE A of 2018, by Last Menstrual Period at 32w1d weeks gestation [facility] for asthma exacerbation  She reports 3-4 days ago her son became ill with URI  She reports she "gets sick easily" and has been hospitalized with pneumonia in past  She reports her asthma exacerbates mainly when she becomes ill  She was started on course of amoxicillin by She reports that her symptoms today became worse with SOB, cough and back pain  She also did not have albuterol inhaler at home so she presented to [facility] ED  In ED she received Albuterol, Ipratropium, Solumedrol 125mg IV and Ceftriaxone  Patient reports she is currently feeling much better and though is she still coughing she is not wheezing or short of breath  She denies any fever, chills, nausea, vomiting, palpitations, dysuria or diarrhea  PREGNANCY COMPLICATIONS:   Elevated 1 hour Glucola (158 on 2018)  Anemia (Hg 10 5)  H/o IUGR  Tobacco use  Hypothyroidism on Synthroid 75mcg- not routinely taking meds  Asthma   Depression    OB History    Para Term  AB Living   2 1 1     1   SAB TAB Ectopic Multiple Live Births           1      # Outcome Date GA Lbr Brenton/2nd Weight Sex Delivery Anes PTL Lv   2 Current            1 Term 16    M Lonza Rafy DONNIE          Baby complications/comments:   IUGR 3 lbs 1 oz (6%) on 2018    Review of Systems   Constitutional: Negative for chills, fatigue and fever  HENT: Positive for congestion  Negative for rhinorrhea, sore throat and trouble swallowing  Respiratory: Positive for cough, chest tightness, shortness of breath and wheezing  Negative for apnea, choking and stridor      Cardiovascular: Negative for chest pain, palpitations and leg swelling  Gastrointestinal: Positive for nausea and vomiting  Negative for abdominal distention, abdominal pain, constipation and diarrhea  Genitourinary: Negative for difficulty urinating, dysuria, vaginal bleeding, vaginal discharge and vaginal pain  Musculoskeletal: Positive for back pain  Negative for arthralgias, myalgias, neck pain and neck stiffness  Neurological: Negative for dizziness, tremors, facial asymmetry, light-headedness and headaches       Historical Information   Past Medical History:   Diagnosis Date    Anxiety     Asthma     Depression     Disease of thyroid gland     Mood disorder ([facility] [address] )     Pregnant     Psychiatric disorder     depression,anxiety, mood disorder     Past Surgical History:   Procedure Laterality Date    APPENDECTOMY      FRACTURE SURGERY Left     wrist     Social History   History   Alcohol Use No     History   Drug Use No     History   Smoking Status    Current Every Day Smoker    Packs/day: 1 00    Years: 5 00    Types: Cigarettes   Smokeless Tobacco    Never Used     Family History:   Family History   Problem Relation Age of Onset    No Known Problems Mother     Thyroid disease Father     Hypertension Brother     Hypertension Maternal Grandmother     Thyroid disease Paternal Grandmother        Meds/Allergies    {  Prescriptions Prior to Admission   Medication    albuterol (2 5 mg/3 mL) 0 083 % nebulizer solution    albuterol (VENTOLIN HFA) 90 mcg/act inhaler    amoxicillin (AMOXIL) 500 mg capsule    levothyroxine 75 mcg tablet        Allergies   Allergen Reactions    Abilify [Aripiprazole] Other (See Comments) and Seizures     hallucination    Tessalon [Benzonatate] Anxiety     Other reaction(s): Psych complications  hallucinations    Zithromax [Azithromycin] Rash and Hallucinations       OBJECTIVE:    Vitals:   /53   Pulse 91   Temp 97 7 °F (36 5 °C) (Oral)   Resp 20   Ht 5' 2" (1 575 m)   Wt 74 8 kg (165 lb)   LMP 2018   SpO2 97%   Breastfeeding? Yes   BMI 30 18 kg/m²   Body mass index is 30 18 kg/m²  Physical Exam   Constitutional: She is oriented to person, place, and time  She appears well-developed and well-nourished  No distress  HENT:   Head: Normocephalic and atraumatic  Mouth/Throat: Oropharynx is clear and moist  No oropharyngeal exudate  Cardiovascular: Normal rate and normal heart sounds  Exam reveals no gallop and no friction rub  No murmur heard  Pulmonary/Chest: Effort normal and breath sounds normal  No respiratory distress  Abdominal: Soft  Bowel sounds are normal  There is no tenderness  There is no rebound and no guarding  Gravid uterus   Musculoskeletal: She exhibits no edema or tenderness  Neurological: She is alert and oriented to person, place, and time  Skin: Skin is warm and dry  She is not diaphoretic  Psychiatric: She has a normal mood and affect  Her behavior is normal    Vitals reviewed  FHT:  Baseline Rate: 150 bpm  Variability: Moderate 6-25 bpm  Accelerations: At variable times, 10 x 10 (<32 weeks)  Decelerations: None  TOCO:   Contraction Frequency (minutes): none    Invasive Devices     Peripheral Intravenous Line            Peripheral IV 18 Left Antecubital less than 1 day              Assessment/Plan     ASSESSMENT:  20 yo  at 32w1d weeks gestation admitted to L&D with acute asthma exacerbation    PLAN:   1) Acute asthma exacerbation- improved     - S/p Albuterol, Ipratropium, Solumedrol 125mg IV and Ceftriaxone    - Continuous pulse ox    - Consultation to IM      2) 32 weeks gestation    - eFHM and toco    - Prenatal vitamin     3) Hypothyroidism    - 75 mcg PO dialy     4) Tobacco use    - Nicorette gum ordered      5) FEN:     - Regular diet     6) DVT Ppx:     - SCDs, ambulation    This patient will be an INPATIENT  and I certify the anticipated length of stay is >2 Midnights        Nena Oviedo MD  9/29/2018  10:25 PM

## 2018-09-30 NOTE — ASSESSMENT & PLAN NOTE
Asthma with exacerbation  Advised smoking cessation  Much improved  Able to ambulate on room air without distress  Will be discharged prednisone taper 40 mg, 10 mg every two days

## 2018-09-30 NOTE — CONSULTS
[address] 1995, 21 y o  female MRN: [id number]  Unit/Bed#: L&D 327-01 Encounter: [id number]  Primary Care Provider: Dulce Rapp   Date and time admitted to hospital: 9/29/2018  8:50 PM  Inpatient consult to Internal Medicine  Consult performed by: Hi Bajwa ordered by: Kathy Shields        32 weeks gestation of pregnancy   Assessment & Plan    · As per primary service  · Discussed with primary service  · Fetal monitoring  Mild intermittent asthma with exacerbation   Assessment & Plan    · Patient with mild intermittent asthma  · Mild acute exacerbation secondary to URI  · S/P 125 mg IV steroids in ER, albuterol/atovent neb at [facility]  · Plan for PO prednisone 40 mg tomorrow with tapering dose of 10 mg q48hrs until off if remains clinically stable and no further wheezing  · Xopenex TID  · Respiratory protocol  URI (upper respiratory infection)   Assessment & Plan    · Suspect this is viral induced  · Was treated with 3 days of amoxicillin as outpatient  · CXR personally reviewed- no evidence of opacity  Await formal [facility] radiology reading  · Check procalcitonin  · Hold further ABX  Had ceftriaxone in ER  Allergic to azithromycin  · Mucinex     Hashimoto's thyroiditis   Assessment & Plan    · Noncompliant with synthroid  · Check TSH  · Continue synthroid     Tobacco smoking complicating pregnancy, third trimester   Assessment & Plan    · Counseled on cessation  · Nicotine gum as per primary service     Leukocytosis   Assessment & Plan    Component      Latest Ref Rng & Units 7/2/2018 9/20/2018 9/29/2018   WBC      4 31 - 10 16 Thousand/uL 14 04 (H) 17 70 (H) 27 27 (H)     · WBC 27 7 with 81% neutrophils  · Not febrile  · On amoxicillin x 3 days as outpatient  Received 1 dose ceftriaxone in ER  · Hold on further ABX at this time  · Check procalcitonin             Recommendations for Discharge:  · We will re-evaluate in the AM to assess lung status/resp status and determine plan for discharge  Counseling / Coordination of Care Time: 45 minutes  Greater than 50% of total time spent on patient counseling and coordination of care  Collaboration of Care: Were Recommendations Directly Discussed with Primary Treatment Team? - Yes     History of Present Illness:    Namrata García is a 21 y o   female at 28 weeks gestation who is originally admitted to the OBGYN service due to asthma at 32 weeks gestation  We are consulted for asthma  She has past medical history of hypothyroidism, tobacco abuse  She denies any history of drug abuse or alcohol abuse  She has a history of mild intermittent asthma  She moved previously and therefore does not have and the albuterol inhaler  She does have an albuterol nebulizer  3-4 days ago, she began having rhinorrhea, sinus congestion, intermittent cough which was nonproductive  She was noted to call her OBGYN who prescribed her amoxicillin  She has taken this for 2-3 days without improvement  She continues to smoke 1 pack per day  She has persistent nonproductive cough however yesterday she started feeling that her asthma was acting up and that she was feeling more short of breath  She used this last evening for 1 episode and it did not improve her symptoms  Today, it was worse on 2nd birthday  She is at the birthday party and then she started feeling unwell  She felt increasingly short of breath than when she would cough it would hurt her chest   She then went to the ER  In the ER she was noted to be wheezing, she was given albuterol, Atrovent, 125 mg of IV Solu-Medrol, ceftriaxone and laboratory studies were performed ER which showed a white count of 27  Chest x-ray was performed which showed no evidence of infiltrate  [facility]      She reports that she is hospitalized previously for pneumonia and that she developed pneumonia easily  She has never been hospitalized for asthma exacerbation  and has never required ICU stay or intubation  She does not use any controller medications  She rarely uses albuterol  She really only developed asthma symptoms when she develops an illness  Review of Systems:    Review of Systems   Constitutional: Negative for diaphoresis, fatigue and fever  Respiratory: Positive for cough, shortness of breath and wheezing  Cardiovascular: Negative for chest pain, palpitations and leg swelling  Gastrointestinal: Negative for abdominal pain  Genitourinary: Negative for decreased urine volume, pelvic pain, urgency, vaginal bleeding and vaginal discharge  All other systems reviewed and are negative  Past Medical and Surgical History:     Past Medical History:   Diagnosis Date    Anxiety     Asthma     Depression     Disease of thyroid gland     [facility] disorder [facility] [address]     Pregnant     Psychiatric disorder     depression,anxiety, mood disorder       Past Surgical History:   Procedure Laterality Date    APPENDECTOMY      FRACTURE SURGERY Left     wrist       Meds/Allergies:    PTA meds:   Prior to Admission Medications   Prescriptions Last Dose Informant Patient Reported? Taking? albuterol (2 5 mg/3 mL) 0 083 % nebulizer solution  Self Yes No   Sig: Inhale 1 each 4 (four) times a day as needed   albuterol (VENTOLIN HFA) 90 mcg/act inhaler  Self No No   Sig: Inhale 2 puffs every 6 (six) hours as needed for wheezing   amoxicillin (AMOXIL) 500 mg capsule  Self Yes No   Sig: Take 500 mg by mouth every 8 (eight) hours   levothyroxine 75 mcg tablet  Self No No   Sig: Take 1 tablet (75 mcg total) by mouth daily before breakfast      Facility-Administered Medications: None       Allergies:    Allergies   Allergen Reactions    Abilify [Aripiprazole] Other (See Comments) and Seizures     hallucination    Tessalon [Benzonatate] Anxiety     Other reaction(s): Psych complications  hallucinations  Zithromax [Azithromycin] Rash and Hallucinations     Social History:     Marital Status: Single    Substance Use History:   History   Alcohol Use No     History   Smoking Status    Current Every Day Smoker    Packs/day: 1 00    Years: 5 00    Types: Cigarettes   Smokeless Tobacco    Never Used     History   Drug Use No       Family History:    Family History   Problem Relation Age of Onset    No Known Problems Mother     Thyroid disease Father     Hypertension Brother     Hypertension Maternal Grandmother     Thyroid disease Paternal Grandmother        Physical Exam:     Vitals:   Blood Pressure: 106/53 (09/29/18 2055)  Pulse: 91 (09/29/18 2145)  Temperature: 97 7 °F (36 5 °C) (09/29/18 2052)  Temp Source: Oral (09/29/18 2052)  Respirations: 20 (09/29/18 2052)  Height: 5' 2" (157 5 cm) (09/29/18 2052)  Weight - Scale: 74 8 kg (165 lb) (09/29/18 2052)  SpO2: 97 % (09/29/18 2145)    Physical Exam   Constitutional: No distress  HENT:   Head: Normocephalic and atraumatic  Eyes: Conjunctivae are normal    Cardiovascular: Normal rate, regular rhythm, normal heart sounds and intact distal pulses  No murmur heard  Pulmonary/Chest: Effort normal  No accessory muscle usage  No respiratory distress  She has no wheezes  She has no rales  She exhibits no tenderness  Decreased breath sounds at the bases  Scattered rhonchi left base  No wheezes  Good air movement in upper lung fields   Abdominal: She exhibits distension ( pregnant)  There is no tenderness  There is no rigidity, no rebound and no guarding  Musculoskeletal: She exhibits no edema  Right knee sutures intact without surrounding erythema or edema   Neurological: She is alert  Skin: Skin is warm and dry  No rash noted  She is not diaphoretic  No erythema  Psychiatric: She has a normal mood and affect  Her behavior is normal    Nursing note and vitals reviewed        Additional Data:     Lab Results: I have personally reviewed pertinent reports  Results from last 7 days  Lab Units 09/29/18  1845   WBC Thousand/uL 27 27*   HEMOGLOBIN g/dL 10 5*   HEMATOCRIT % 30 3*   PLATELETS Thousands/uL 334   NEUTROS PCT % 81*   LYMPHS PCT % 8*   MONOS PCT % 9   EOS PCT % 1       Results from last 7 days  Lab Units 09/29/18  1845   SODIUM mmol/L 134*   POTASSIUM mmol/L 3 1*   CHLORIDE mmol/L 101   CO2 mmol/L 25   BUN mg/dL 1*   CREATININE mg/dL 0 63   CALCIUM mg/dL 9 3   ALK PHOS U/L 130*   ALT U/L 19   AST U/L 18       Results from last 7 days  Lab Units 09/29/18  1845   INR  1 02         No results found for: HGBA1C        Imaging: I have personally reviewed pertinent films in PACS    CXR: personally reviewed  No evidence of infiltrate  EKG, Pathology, and Other Studies Reviewed on Admission:   · ER and outpatient records    ** Please Note: This note has been constructed using a voice recognition system   **

## 2018-09-30 NOTE — ASSESSMENT & PLAN NOTE
Component      Latest Ref Rng & Units 7/2/2018 9/20/2018 9/29/2018   WBC      4 31 - 10 16 Thousand/uL 14 04 (H) 17 70 (H) 27 27 (H)     · WBC 27 7 with 81% neutrophils  · Not febrile  · On amoxicillin x 3 days as outpatient  Received 1 dose ceftriaxone in ER  · Hold on further ABX at this time  · Check procalcitonin

## 2018-09-30 NOTE — DISCHARGE INSTRUCTIONS
Asthma, Ambulatory Care   GENERAL INFORMATION:   Asthma  is a lung disease that makes breathing difficult  Chronic inflammation and reactions to triggers narrow the airways in your lungs  Asthma can become life-threatening if it is not managed  Common symptoms include the following:   · Coughing     · Wheezing     · Shortness of breath     · Chest tightness  Seek immediate care for the following symptoms:   · Severe shortness of breath    · Blue or gray lips or nails    · Skin around your neck and ribs pulls in with each breath    · Shortness of breath, even after you take your short-term medicine as directed     · Peak flow numbers in the red zone of your asthma action plan  Treatment for asthma  will depend on how severe it is  Medicine may decrease inflammation, open airways, and make it easier to breathe  Medicines may be inhaled, taken as a pill, or injected  Short-term medicines relieve your symptoms quickly  Long-term medicines are used to prevent future attacks  You may also need medicine to help control your allergies  Manage and prevent future asthma attacks:   · Follow your asthma action pan  This is a written plan that you and your healthcare provider create  It explains which medicine you need and when to change doses if necessary  It also explains how you can monitor symptoms and use a peak flow meter  The meter measures how well your lungs are working  · Manage other health conditions , such as allergies, acid reflux, and sleep apnea  · Identify and avoid triggers  These may include pets, dust mites, mold, and cockroaches  · Do not smoke and avoid others who smoke  If you smoke, it is never too late to quit  Ask your healthcare provider if you need help quitting  · Ask about a flu vaccine  The flu can make your asthma worse  You may need a yearly flu shot  Follow up with your healthcare provider as directed:   You will need to return to make sure your medicine is working and your symptoms are controlled  You may be referred to an asthma or allergy specialist  Avijarad Alonso may be asked to keep a record of your peak flow values and bring it with you to your appointments  Write down your questions so you remember to ask them during your visits  CARE AGREEMENT:   You have the right to help plan your care  Learn about your health condition and how it may be treated  Discuss treatment options with your caregivers to decide what care you want to receive  You always have the right to refuse treatment  The above information is an  only  It is not intended as medical advice for individual conditions or treatments  Talk to your doctor, nurse or pharmacist before following any medical regimen to see if it is safe and effective for you  © 2014 [address] is for End User's use only and may not be sold, redistributed or otherwise used for commercial purposes  All illustrations and images included in CareNotes® are the copyrighted property of [employer]  or [employer]

## 2018-10-01 ENCOUNTER — OFFICE VISIT (OUTPATIENT)
Dept: SURGERY | Facility: CLINIC | Age: 23
End: 2018-10-01

## 2018-10-01 DIAGNOSIS — B07.9 VIRAL WARTS, UNSPECIFIED TYPE: Primary | ICD-10-CM

## 2018-10-01 PROCEDURE — 99024 POSTOP FOLLOW-UP VISIT: CPT | Performed by: PHYSICIAN ASSISTANT

## 2018-10-01 NOTE — PROGRESS NOTES
Progress Note - General Surgery   Payam Andrew 21 y o  female MRN: [id number]   Encounter: [id number]    Assessment/Plan:    Verruca vulgaris  Joey 22 yo female with right knee verruca s/p excision  No signs of post-op infection  Her perceived tenderness and numbness is expected to resolve with time  Advised to call if symptoms are persistent or progressive  Pathology report reviewed and copy provided  Questions answered  Follow up as needed  Diagnoses and all orders for this visit:    Viral warts, unspecified type        Subjective:      Patient ID: Payam Andrew is a 21 y o  female  Nurse Note: Patient is here s/p excision of right knee wart  The sutures were removed and the patient stated that when she presses on her knee cap she gets a weird feeling on the right side of there knee  HA-MA    Joey 22 yo F here for follow up after excision right knee verruca  Reports tenderness and numbness that is mild  No redness, swelling  Had mild clear discharge from wound  No fever or chills  No muscle weakness  The following portions of the patient's history were reviewed and updated as appropriate: allergies, current medications, past family history, past medical history, past social history, past surgical history and problem list     Review of Systems   Constitutional: Negative for appetite change, chills and fever  HENT: Negative for congestion  Eyes: Negative for visual disturbance  Respiratory: Positive for wheezing (recently admitted for asthma exacerbation)  Negative for chest tightness and shortness of breath  Cardiovascular: Negative for chest pain and palpitations  Gastrointestinal: Negative for abdominal distention, abdominal pain, blood in stool, constipation, diarrhea, nausea and vomiting  Genitourinary: Negative for dysuria  Musculoskeletal: Negative for back pain  Skin: Negative for rash  Neurological: Negative for dizziness and headaches  Psychiatric/Behavioral: Negative for confusion  Objective:      LMP 02/16/2018          Physical Exam   Constitutional: She is oriented to person, place, and time  She appears well-developed and well-nourished  No distress  HENT:   Head: Normocephalic and atraumatic  Eyes: Pupils are equal, round, and reactive to light  Conjunctivae are normal    Neck: Normal range of motion  Pulmonary/Chest: Effort normal  No respiratory distress  Musculoskeletal: Normal range of motion  Neurological: She is alert and oriented to person, place, and time  Skin: Skin is warm and dry  She is not diaphoretic  Psychiatric: She has a normal mood and affect   Her behavior is normal

## 2018-10-01 NOTE — ASSESSMENT & PLAN NOTE
Pleasant 20 yo female with right knee verruca s/p excision  No signs of post-op infection  Her perceived tenderness and numbness is expected to resolve with time  Advised to call if symptoms are persistent or progressive  Pathology report reviewed and copy provided  Questions answered  Follow up as needed

## 2018-10-01 NOTE — CASE MANAGEMENT
Initial Clinical Review    Thank you,  [address]  Utilization Review Department  Phone: [phone]; Fax [phone]  ATTENTION: Please call with any questions or concerns to [phone]  and carefully follow the prompts so that you are directed to the right person  Send all requests for admission clinical reviews, approved or denied determinations and any other requests to fax [phone]  All voicemails are confidential        Admission: Date/Time/Statement: 9/29/18 @ 2050     Orders Placed This Encounter   Procedures    Inpatient Admission     Standing Status:   Standing     Number of Occurrences:   1     Order Specific Question:   Admitting Physician     Answer:   Solitario Healy     Order Specific Question:   Level of Care     Answer:   Med Surg [16]     Order Specific Question:   Estimated length of stay     Answer:   More than 2 Midnights     Order Specific Question:   Certification     Answer:   I certify that inpatient services are medically necessary for this patient for a duration of greater than two midnights  See H&P and MD Progress Notes for additional information about the patient's course of treatment  ED: Date/Time/Mode of Arrival:  Tx from [facility] [facility] Unit 9/29    Chief Complaint: SOB, wheezing and cough    History of Illness:  21 y o  Dennis Broadwater female with an JORGE A of 11/23/2018, by Last Menstrual Period at 32w1d weeks gestation who was transferred from [facility] for asthma exacerbation  She reports 3-4 days ago her son became ill with URI  She reports she "gets sick easily" and has been hospitalized with pneumonia in past  She reports her asthma exacerbates mainly when she becomes ill  She was started on course of amoxicillin by She reports that her symptoms today became worse with SOB, cough and back pain   She also did not have albuterol inhaler at home so she presented to [address] [place] [address] ED       In ED she received Albuterol, Ipratropium, Solumedrol 125mg IV and Ceftriaxone  Patient reports she is currently feeling much better although she is still coughing, she is not wheezing or short of breath       ED Vital Signs:   ED Triage Vitals   Temperature Pulse Respirations Blood Pressure SpO2   09/29/18 2052 09/29/18 2054 09/29/18 2052 09/29/18 2055 09/29/18 2052   97 7 °F (36 5 °C) 96 20 106/53 97 %      Temp Source Heart Rate Source Patient Position - Orthostatic VS BP Location FiO2 (%)   09/29/18 2052 09/30/18 0341 -- 09/30/18 0341 --   Oral Monitor  Right arm       Pain Score       09/29/18 2052       No Pain        Wt Readings from Last 1 Encounters:   09/29/18 74 8 kg (165 lb)       Vital Signs (abnormal): BP 91/45 -- Sats 68, 71, 75% on RA    Abnormal Labs/Diagnostic Test Results: WBC 27 27, Hgb 10 5, Hct 30 3, Na 134, K 3 1, Alk phos 130  CXR -- Mild pulmonary vascular prominence  There is some central peribronchial cuffing    Past Medical/Surgical History:    Active Ambulatory Problems     Diagnosis Date Noted    Depression 12/16/2015    Hashimoto's thyroiditis 01/23/2017    Mild persistent asthma without complication 73/38/3266    Asthma 12/16/2015    Mood disorder ([facility] [address] ) 12/01/2016    Seizures ([facility] [address] ) 12/16/2015    History of intrauterine growth restriction in prior pregnancy, currently pregnant, third trimester 09/07/2018    Tobacco smoking complicating pregnancy, third trimester 09/07/2018    Hypothyroidism affecting pregnancy in third trimester 09/07/2018    Abdominal pain 09/07/2018    False labor 09/07/2018    32 weeks gestation of pregnancy 09/07/2018    Low weight gain during pregnancy in third trimester 09/07/2018    Poor fetal growth affecting management of mother in third trimester 09/28/2018    Verruca vulgaris 10/01/2018     Past Medical History:   Diagnosis Date    Anxiety     Asthma     Depression     Disease of thyroid gland     Mood disorder ([facility] [address] )     Pregnant     Psychiatric disorder Admitting Diagnosis: Asthma [J45 909]    Age/Sex: 21 y o  female    Assessment/Plan:   ASSESSMENT:  22 yo  at 32w1d weeks gestation admitted to L&D with acute asthma exacerbation     PLAN:              1) Acute asthma exacerbation    Admit to L&D unit    Telem, O2 2 lpm nc                          - Albuterol prn, Ipratropium prn, Solumedrol 125mg IV and Ceftriaxone                          - Continuous pulse ox                          - Consultation to IM                  2) 32 weeks gestation                          - eFHM and toco                          - Prenatal vitamin

## 2018-10-01 NOTE — LETTER
October 1, 2018     Afia Yun, [address] [address]    Patient: Sheba Mcrae   YOB: 1995   Date of Visit: 10/1/2018       Dear Dr Marquis Barrow: Thank you for referring Sheba Mcrae to me for evaluation  Below are my notes for this consultation  If you have questions, please do not hesitate to call me  I look forward to following your patient along with you  Sincerely,        Chloe Lara PA-C        CC: No Recipients  Chloe Lara PA-C  10/1/2018 12:25 PM  Cosign Needed  Progress Note - General Surgery   Sheba Mcrae 21 y o  female MRN: [id number]   Encounter: [id number]    Assessment/Plan:    Verruca vulgaris  Pleasant 22 yo female with right knee verruca s/p excision  No signs of post-op infection  Her perceived tenderness and numbness is expected to resolve with time  Advised to call if symptoms are persistent or progressive  Pathology report reviewed and copy provided  Questions answered  Follow up as needed  Diagnoses and all orders for this visit:    Viral warts, unspecified type        Subjective:      Patient ID: Sheba Mcrae is a 21 y o  female  Nurse Note: Patient is here s/p excision of right knee wart  The sutures were removed and the patient stated that when she presses on her knee cap she gets a weird feeling on the right side of there knee  HA-EMERALD Cook 22 yo F here for follow up after excision right knee verruca  Reports tenderness and numbness that is mild  No redness, swelling  Had mild clear discharge from wound  No fever or chills  No muscle weakness  The following portions of the patient's history were reviewed and updated as appropriate: allergies, current medications, past family history, past medical history, past social history, past surgical history and problem list     Review of Systems   Constitutional: Negative for appetite change, chills and fever     HENT: Negative for congestion  Eyes: Negative for visual disturbance  Respiratory: Positive for wheezing (recently admitted for asthma exacerbation)  Negative for chest tightness and shortness of breath  Cardiovascular: Negative for chest pain and palpitations  Gastrointestinal: Negative for abdominal distention, abdominal pain, blood in stool, constipation, diarrhea, nausea and vomiting  Genitourinary: Negative for dysuria  Musculoskeletal: Negative for back pain  Skin: Negative for rash  Neurological: Negative for dizziness and headaches  Psychiatric/Behavioral: Negative for confusion  Objective:      [facility] 02/16/2018          Physical Exam   Constitutional: She is oriented to person, place, and time  She appears well-developed and well-nourished  No distress  HENT:   Head: Normocephalic and atraumatic  Eyes: Pupils are equal, round, and reactive to light  Conjunctivae are normal    Neck: Normal range of motion  Pulmonary/Chest: Effort normal  No respiratory distress  Musculoskeletal: Normal range of motion  Neurological: She is alert and oriented to person, place, and time  Skin: Skin is warm and dry  She is not diaphoretic  Psychiatric: She has a normal mood and affect   Her behavior is normal

## 2018-10-03 ENCOUNTER — TRANSCRIBE ORDERS (OUTPATIENT)
Dept: PERINATAL CARE | Facility: CLINIC | Age: 23
End: 2018-10-03

## 2018-10-03 DIAGNOSIS — O09.90 SUPERVISION OF HIGH-RISK PREGNANCY, UNSPECIFIED TRIMESTER: Primary | ICD-10-CM

## 2018-10-03 DIAGNOSIS — E03.8 HYPOTHYROIDISM DUE TO HASHIMOTO'S THYROIDITIS: ICD-10-CM

## 2018-10-03 DIAGNOSIS — E06.3 HYPOTHYROIDISM DUE TO HASHIMOTO'S THYROIDITIS: ICD-10-CM

## 2018-10-03 RX ORDER — LEVOTHYROXINE SODIUM 0.07 MG/1
75 TABLET ORAL
Qty: 30 TABLET | Refills: 3 | Status: SHIPPED | OUTPATIENT
Start: 2018-10-03 | End: 2019-05-14 | Stop reason: SDUPTHER

## 2018-10-03 NOTE — CASE MANAGEMENT
Notification of Discharge  This is a Notification of Discharge from our facility [address]  Please be advised that this patient has been discharge from our facility  Below you will find the admission and discharge date and time including the patients disposition  PRESENTATION DATE: 9/29/2018  8:50 PM  IP ADMISSION DATE: 9/29/18 2050  DISCHARGE DATE: 9/30/2018 11:05 AM  DISPOSITION: Home/Self Care    [address] [facility] in the [facility] by [place] Utilization Review Department  Phone: [phone]; Fax [phone]  ATTENTION: The Network Utilization Review Department is now centralized for our 9 Facilities  Make a note that we have a new phone and fax numbers for our Department  Please call with any questions or concerns to [phone] and carefully follow the prompts so that you are directed to the right person  All voicemails are confidential  Fax any determinations, approvals, denials, and requests for initial or continue stay review clinical to [phone]  Due to HIGH CALL volume, it would be easier if you could please send faxed requests to expedite your requests and in part, help us provide discharge notifications faster

## 2018-10-05 ENCOUNTER — ROUTINE PRENATAL (OUTPATIENT)
Dept: PERINATAL CARE | Facility: CLINIC | Age: 23
End: 2018-10-05
Payer: COMMERCIAL

## 2018-10-05 VITALS
WEIGHT: 162.6 LBS | BODY MASS INDEX: 29.92 KG/M2 | HEART RATE: 105 BPM | HEIGHT: 62 IN | SYSTOLIC BLOOD PRESSURE: 124 MMHG | DIASTOLIC BLOOD PRESSURE: 83 MMHG

## 2018-10-05 DIAGNOSIS — Z3A.33 33 WEEKS GESTATION OF PREGNANCY: ICD-10-CM

## 2018-10-05 DIAGNOSIS — O09.293 HISTORY OF INTRAUTERINE GROWTH RESTRICTION IN PRIOR PREGNANCY, CURRENTLY PREGNANT, THIRD TRIMESTER: ICD-10-CM

## 2018-10-05 DIAGNOSIS — O99.333 TOBACCO SMOKING COMPLICATING PREGNANCY, THIRD TRIMESTER: ICD-10-CM

## 2018-10-05 DIAGNOSIS — O09.90 SUPERVISION OF HIGH-RISK PREGNANCY, UNSPECIFIED TRIMESTER: ICD-10-CM

## 2018-10-05 DIAGNOSIS — J45.909 ASTHMA, UNSPECIFIED ASTHMA SEVERITY, UNSPECIFIED WHETHER COMPLICATED, UNSPECIFIED WHETHER PERSISTENT: ICD-10-CM

## 2018-10-05 DIAGNOSIS — O36.5930 POOR FETAL GROWTH AFFECTING MANAGEMENT OF MOTHER IN THIRD TRIMESTER, SINGLE OR UNSPECIFIED FETUS: Primary | ICD-10-CM

## 2018-10-05 LAB
BACTERIA BLD CULT: NORMAL
BACTERIA BLD CULT: NORMAL

## 2018-10-05 PROCEDURE — 76815 OB US LIMITED FETUS(S): CPT | Performed by: OBSTETRICS & GYNECOLOGY

## 2018-10-05 PROCEDURE — 59025 FETAL NON-STRESS TEST: CPT | Performed by: OBSTETRICS & GYNECOLOGY

## 2018-10-05 PROCEDURE — 76820 UMBILICAL ARTERY ECHO: CPT | Performed by: OBSTETRICS & GYNECOLOGY

## 2018-10-05 PROCEDURE — 76821 MIDDLE CEREBRAL ARTERY ECHO: CPT | Performed by: OBSTETRICS & GYNECOLOGY

## 2018-10-05 PROCEDURE — 99284 EMERGENCY DEPT VISIT MOD MDM: CPT

## 2018-10-05 NOTE — PROGRESS NOTES
Ms Michael Sanabria  is a 22 yo  at 35 3/7 weeks gestation who presents for a fetal ultrasound examination for evaluation of MARTINEZ, Cord and MCA Doppler and NST  Hx of  asthma, smoker , fetal growth restriction   She is symptomatic with persistent cough  Pulse oximetry wnl today in the office  See Ob procedures  1  MARTINEZ  9 9 wnl  2  NST reactive  3  Umbilical cord Doppler wnl at the 50%  Recommendations; 1  Twice a week NSTs, once a week MARTINEZ and umbilical cord Doppler  2  Fetal movement counts  3  D/C smoking  4  Consult the hospsitl if there is shortness of breath or other progressing symptoms      Esther Christiansen MD

## 2018-10-06 ENCOUNTER — HOSPITAL ENCOUNTER (EMERGENCY)
Facility: HOSPITAL | Age: 23
Discharge: HOME | End: 2018-10-06
Attending: EMERGENCY MEDICINE | Admitting: EMERGENCY MEDICINE
Payer: COMMERCIAL

## 2018-10-06 ENCOUNTER — APPOINTMENT (EMERGENCY)
Dept: RADIOLOGY | Facility: HOSPITAL | Age: 23
End: 2018-10-06
Payer: COMMERCIAL

## 2018-10-06 VITALS
DIASTOLIC BLOOD PRESSURE: 60 MMHG | OXYGEN SATURATION: 99 % | BODY MASS INDEX: 30.39 KG/M2 | HEIGHT: 62 IN | RESPIRATION RATE: 16 BRPM | WEIGHT: 165.12 LBS | TEMPERATURE: 97.5 F | HEART RATE: 98 BPM | SYSTOLIC BLOOD PRESSURE: 111 MMHG

## 2018-10-06 DIAGNOSIS — R10.13 EPIGASTRIC ABDOMINAL PAIN: Primary | ICD-10-CM

## 2018-10-06 LAB
ALBUMIN SERPL BCP-MCNC: 2.5 G/DL (ref 3.5–5)
ALP SERPL-CCNC: 121 U/L (ref 46–116)
ALT SERPL W P-5'-P-CCNC: 31 U/L (ref 12–78)
ANION GAP SERPL CALCULATED.3IONS-SCNC: 9 MMOL/L (ref 4–13)
AST SERPL W P-5'-P-CCNC: 34 U/L (ref 5–45)
ATRIAL RATE: 88 BPM
BASOPHILS # BLD MANUAL: 0 THOUSAND/UL (ref 0–0.1)
BASOPHILS NFR MAR MANUAL: 0 % (ref 0–1)
BILIRUB SERPL-MCNC: 0.4 MG/DL (ref 0.2–1)
BUN SERPL-MCNC: 5 MG/DL (ref 5–25)
CALCIUM SERPL-MCNC: 9 MG/DL (ref 8.3–10.1)
CHLORIDE SERPL-SCNC: 103 MMOL/L (ref 100–108)
CO2 SERPL-SCNC: 23 MMOL/L (ref 21–32)
CREAT SERPL-MCNC: 0.57 MG/DL (ref 0.6–1.3)
DEPRECATED D DIMER PPP: 548 NG/ML (FEU) (ref 0–424)
EOSINOPHIL # BLD MANUAL: 0.86 THOUSAND/UL (ref 0–0.4)
EOSINOPHIL NFR BLD MANUAL: 3 % (ref 0–6)
ERYTHROCYTE [DISTWIDTH] IN BLOOD BY AUTOMATED COUNT: 12.7 % (ref 11.6–15.1)
GFR SERPL CREATININE-BSD FRML MDRD: 131 ML/MIN/1.73SQ M
GLUCOSE SERPL-MCNC: 80 MG/DL (ref 65–140)
HCT VFR BLD AUTO: 30.7 % (ref 34.8–46.1)
HGB BLD-MCNC: 10.6 G/DL (ref 11.5–15.4)
LYMPHOCYTES # BLD AUTO: 17 % (ref 14–44)
LYMPHOCYTES # BLD AUTO: 4.89 THOUSAND/UL (ref 0.6–4.47)
MCH RBC QN AUTO: 29.7 PG (ref 26.8–34.3)
MCHC RBC AUTO-ENTMCNC: 34.5 G/DL (ref 31.4–37.4)
MCV RBC AUTO: 86 FL (ref 82–98)
MONOCYTES # BLD AUTO: 2.3 THOUSAND/UL (ref 0–1.22)
MONOCYTES NFR BLD: 8 % (ref 4–12)
NEUTROPHILS # BLD MANUAL: 20.72 THOUSAND/UL (ref 1.85–7.62)
NEUTS BAND NFR BLD MANUAL: 2 % (ref 0–8)
NEUTS SEG NFR BLD AUTO: 70 % (ref 43–75)
NRBC BLD AUTO-RTO: 0 /100 WBCS
P AXIS: 38 DEGREES
PLATELET # BLD AUTO: 378 THOUSANDS/UL (ref 149–390)
PLATELET BLD QL SMEAR: ADEQUATE
PMV BLD AUTO: 10.6 FL (ref 8.9–12.7)
POTASSIUM SERPL-SCNC: 3.4 MMOL/L (ref 3.5–5.3)
PR INTERVAL: 116 MS
PROT SERPL-MCNC: 6.6 G/DL (ref 6.4–8.2)
QRS AXIS: 49 DEGREES
QRSD INTERVAL: 88 MS
QT INTERVAL: 338 MS
QTC INTERVAL: 408 MS
RBC # BLD AUTO: 3.57 MILLION/UL (ref 3.81–5.12)
RBC MORPH BLD: NORMAL
SODIUM SERPL-SCNC: 135 MMOL/L (ref 136–145)
T WAVE AXIS: 27 DEGREES
TOTAL CELLS COUNTED SPEC: 100
TROPONIN I SERPL-MCNC: <0.02 NG/ML
VENTRICULAR RATE: 88 BPM
WBC # BLD AUTO: 28.78 THOUSAND/UL (ref 4.31–10.16)

## 2018-10-06 PROCEDURE — 71046 X-RAY EXAM CHEST 2 VIEWS: CPT

## 2018-10-06 PROCEDURE — 93005 ELECTROCARDIOGRAM TRACING: CPT

## 2018-10-06 PROCEDURE — 85027 COMPLETE CBC AUTOMATED: CPT | Performed by: EMERGENCY MEDICINE

## 2018-10-06 PROCEDURE — 80053 COMPREHEN METABOLIC PANEL: CPT | Performed by: EMERGENCY MEDICINE

## 2018-10-06 PROCEDURE — 84484 ASSAY OF TROPONIN QUANT: CPT | Performed by: EMERGENCY MEDICINE

## 2018-10-06 PROCEDURE — 85007 BL SMEAR W/DIFF WBC COUNT: CPT | Performed by: EMERGENCY MEDICINE

## 2018-10-06 PROCEDURE — 93010 ELECTROCARDIOGRAM REPORT: CPT | Performed by: INTERNAL MEDICINE

## 2018-10-06 PROCEDURE — 96360 HYDRATION IV INFUSION INIT: CPT

## 2018-10-06 PROCEDURE — 36415 COLL VENOUS BLD VENIPUNCTURE: CPT | Performed by: EMERGENCY MEDICINE

## 2018-10-06 PROCEDURE — 85379 FIBRIN DEGRADATION QUANT: CPT | Performed by: EMERGENCY MEDICINE

## 2018-10-06 RX ORDER — SUCRALFATE ORAL 1 G/10ML
1000 SUSPENSION ORAL ONCE
Status: COMPLETED | OUTPATIENT
Start: 2018-10-06 | End: 2018-10-06

## 2018-10-06 RX ORDER — RANITIDINE 150 MG/1
150 CAPSULE ORAL DAILY
Qty: 14 CAPSULE | Refills: 0 | Status: SHIPPED | OUTPATIENT
Start: 2018-10-06 | End: 2019-03-14

## 2018-10-06 RX ADMIN — SODIUM CHLORIDE 500 ML: 0.9 INJECTION, SOLUTION INTRAVENOUS at 01:23

## 2018-10-06 RX ADMIN — SUCRALFATE 1000 MG: 1 SUSPENSION ORAL at 01:22

## 2018-10-06 NOTE — DISCHARGE INSTRUCTIONS
Acute Abdominal Pain   WHAT YOU NEED TO KNOW:   The cause of your abdominal pain may not be found  If a cause is found, treatment will depend on what the cause is  DISCHARGE INSTRUCTIONS:   Return to the emergency department if:   · You vomit blood or cannot stop vomiting  · You have blood in your bowel movement or it looks like tar  · You have bleeding from your rectum  · Your abdomen is larger than usual, more painful, and hard  · You have severe pain in your abdomen  · You stop passing gas and having bowel movements  · You feel weak, dizzy, or faint  Contact your healthcare provider if:   · You have a fever  · You have new signs and symptoms  · Your symptoms do not get better with treatment  · You have questions or concerns about your condition or care  Medicines  may be given to decrease pain, treat an infection, and manage your symptoms  Take your medicine as directed  Call your healthcare provider if you think your medicine is not helping or if you have side effects  Tell him if you are allergic to any medicine  Keep a list of the medicines, vitamins, and herbs you take  Include the amounts, and when and why you take them  Bring the list or the pill bottles to follow-up visits  Carry your medicine list with you in case of an emergency  Manage your symptoms:   · Apply heat  on your abdomen for 20 to 30 minutes every 2 hours for as many days as directed  Heat helps decrease pain and muscle spasms  · Manage your stress  Stress may cause abdominal pain  Your healthcare provider may recommend relaxation techniques and deep breathing exercises to help decrease your stress  Your healthcare provider may recommend you talk to someone about your stress or anxiety, such as a counselor or a trusted friend  Get plenty of sleep and exercise regularly  · Limit or do not drink alcohol  Alcohol can make your abdominal pain worse   Ask your healthcare provider if it is safe for you to drink alcohol  Also ask how much is safe for you to drink  · Do not smoke  Nicotine and other chemicals in cigarettes can damage your esophagus and stomach  Ask your healthcare provider for information if you currently smoke and need help to quit  E-cigarettes or smokeless tobacco still contain nicotine  Talk to your healthcare provider before you use these products  Make changes to the food you eat as directed:  Do not eat foods that cause abdominal pain or other symptoms  Eat small meals more often  · Eat more high-fiber foods if you are constipated  High-fiber foods include fruits, vegetables, whole-grain foods, and legumes  · Do not eat foods that cause gas if you have bloating  Examples include broccoli, cabbage, and cauliflower  Do not drink soda or carbonated drinks, because these may also cause gas  · Do not eat foods or drinks that contain sorbitol or fructose if you have diarrhea and bloating  Some examples are fruit juices, candy, jelly, and sugar-free gum  · Do not eat high-fat foods, such as fried foods, cheeseburgers, hot dogs, and desserts  · Limit or do not drink caffeine  Caffeine may make symptoms, such as heart burn or nausea, worse  · Drink plenty of liquids to prevent dehydration from diarrhea or vomiting  Ask your healthcare provider how much liquid to drink each day and which liquids are best for you  Follow up with your healthcare provider as directed:  Write down your questions so you remember to ask them during your visits  © 2017 [address] Information is for End User's use only and may not be sold, redistributed or otherwise used for commercial purposes  All illustrations and images included in CareNotes® are the copyrighted property of [employer]  or [employer]  The above information is an  only  It is not intended as medical advice for individual conditions or treatments   Talk to your doctor, nurse or pharmacist before following any medical regimen to see if it is safe and effective for you

## 2018-10-06 NOTE — ED PROVIDER NOTES
History  Chief Complaint   Patient presents with    Abdominal Pain     abdominal pain with vomiting for the past two days     HPI     Patient is a 21 yof with epigastric abdominal pain and nausea/vomiting for the past two days  No urinary symptoms  She notes the epigastric and lower chest pain is pressure like in nature  Currently on prednisone after a recent diagnosis of asthma  She is a high risk pregnancy because her first son was small and this child is small  Patients shruthi is Ak[place]ah, [address]    Of note, discussed extensively the possibility of chest ct or abdominal ct for her continued epigastric pain  Bedside US of the GB with no pericholecystic fluid, wall thickness of 0 15cm  Will dc home  Patient does not want ct imaging at this time  The patient (and any family present) verbalized understanding of the discharge instructions and warnings that would necessitate return to the Emergency Department  Gave verbal in addition to written discharge instructions  Specifically highlighted areas of special concern regarding the written and verbal discharge instructions and return precautions  All questions were answered prior to discharge  Prior to Admission Medications   Prescriptions Last Dose Informant Patient Reported? Taking?    albuterol (2 5 mg/3 mL) 0 083 % nebulizer solution  Self Yes Yes   Sig: Inhale 1 each 4 (four) times a day as needed   albuterol (VENTOLIN HFA) 90 mcg/act inhaler  Self No Yes   Sig: Inhale 2 puffs every 6 (six) hours as needed for wheezing   levalbuterol (XOPENEX) 1 25 mg/0 5 mL nebulizer solution  Self No Yes   Sig: Take 0 5 mL (1 25 mg total) by nebulization 3 (three) times a day   levothyroxine 75 mcg tablet  Self No Yes   Sig: Take 1 tablet (75 mcg total) by mouth daily before breakfast   predniSONE 10 mg tablet  Self No Yes   Sig: Days 1-2: 4 tablets daily Days 3-4: 3 tablets daily Days 5-6: 2 tablets daily Days 7-8: 1 tablet daily then stop      Facility-Administered Medications: None       Past Medical History:   Diagnosis Date    Anxiety     Asthma     Depression     Disease of thyroid gland     Mood disorder ([address] )     Pregnant     Psychiatric disorder     depression,anxiety, mood disorder       Past Surgical History:   Procedure Laterality Date    APPENDECTOMY      FRACTURE SURGERY Left     wrist       Family History   Problem Relation Age of Onset    No Known Problems Mother     Thyroid disease Father     Hypertension Brother     Hypertension Maternal Grandmother     Thyroid disease Paternal Grandmother      I have reviewed and agree with the history as documented  Social History   Substance Use Topics    Smoking status: Current Every Day Smoker     Packs/day: 1 00     Years: 5 00     Types: Cigarettes    Smokeless tobacco: Never Used    Alcohol use No        Review of Systems   Constitutional: Negative for chills and fever  Cardiovascular: Positive for chest pain  Genitourinary: Negative for dyspareunia, dysuria, vaginal bleeding and vaginal discharge  Musculoskeletal: Negative for back pain  Skin: Negative for color change  All other systems reviewed and are negative  Physical Exam  Physical Exam   Constitutional: She is oriented to person, place, and time  She appears well-developed and well-nourished  HENT:   Head: Normocephalic and atraumatic  Right Ear: External ear normal    Left Ear: External ear normal    Eyes: Conjunctivae and EOM are normal    Neck: Normal range of motion  Neck supple  No JVD present  No tracheal deviation present  Cardiovascular: Normal rate, regular rhythm and normal heart sounds  Pulmonary/Chest: Effort normal  No respiratory distress  She has no wheezes  She has no rales  Abdominal: Soft  Bowel sounds are normal  There is tenderness  There is no rebound and no guarding     Mild epigastric tenderness   Musculoskeletal: She exhibits no edema or tenderness  Neurological: She is alert and oriented to person, place, and time  Skin: Skin is warm and dry  No rash noted  No erythema  Psychiatric: She has a normal mood and affect  Thought content normal    Nursing note and vitals reviewed  Vital Signs  ED Triage Vitals   Temperature Pulse Respirations Blood Pressure SpO2   10/06/18 0003 10/06/18 0003 10/06/18 0003 10/06/18 0003 10/06/18 0003   97 5 °F (36 4 °C) (!) 114 18 122/75 98 %      Temp Source Heart Rate Source Patient Position - Orthostatic VS BP Location FiO2 (%)   10/06/18 0003 10/06/18 0003 10/06/18 0003 10/06/18 0003 --   Temporal Monitor Sitting Left arm       Pain Score       10/06/18 0020       5           Vitals:    10/06/18 0003 10/06/18 0101 10/06/18 0225   BP: 122/75  111/60   Pulse: (!) 114 99 98   Patient Position - Orthostatic VS: Sitting  Lying       Visual Acuity      ED Medications  Medications   sucralfate (CARAFATE) oral suspension 1,000 mg (1,000 mg Oral Given 10/6/18 0122)   sodium chloride 0 9 % bolus 500 mL (0 mL Intravenous Stopped 10/6/18 0227)       Diagnostic Studies  Results Reviewed     Procedure Component Value Units Date/Time    CBC and differential [[id number]]  (Abnormal) Collected:  10/06/18 0112    Lab Status:  Final result Specimen:  Blood from Arm, Right Updated:  10/06/18 0148     WBC 28 78 (H) Thousand/uL      RBC 3 57 (L) Million/uL      Hemoglobin 10 6 (L) g/dL      Hematocrit 30 7 (L) %      MCV 86 fL      MCH 29 7 pg      MCHC 34 5 g/dL      RDW 12 7 %      MPV 10 6 fL      Platelets 177 Thousands/uL      nRBC 0 /100 WBCs     Narrative: This is an appended report  These results have been appended to a previously verified report      Troponin I [[id number]]  (Normal) Collected:  10/06/18 0114    Lab Status:  Final result Specimen:  Blood from Arm, Right Updated:  10/06/18 0137     Troponin I <0 02 ng/mL     Comprehensive metabolic panel [[id number]]  (Abnormal) Collected:  10/06/18 0112    Lab Status: Final result Specimen:  Blood from Arm, Right Updated:  10/06/18 0134     Sodium 135 (L) mmol/L      Potassium 3 4 (L) mmol/L      Chloride 103 mmol/L      CO2 23 mmol/L      ANION GAP 9 mmol/L      BUN 5 mg/dL      Creatinine 0 57 (L) mg/dL      Glucose 80 mg/dL      Calcium 9 0 mg/dL      AST 34 U/L      ALT 31 U/L      Alkaline Phosphatase 121 (H) U/L      Total Protein 6 6 g/dL      Albumin 2 5 (L) g/dL      Total Bilirubin 0 40 mg/dL      eGFR 131 ml/min/1 73sq m     Narrative:         National Kidney Disease Education Program recommendations are as follows:  GFR calculation is accurate only with a steady state creatinine  Chronic Kidney disease less than 60 ml/min/1 73 sq  meters  Kidney failure less than 15 ml/min/1 73 sq  meters  D-dimer, quantitative [[id number]]  (Abnormal) Collected:  10/06/18 0112    Lab Status:  Final result Specimen:  Blood from Arm, Right Updated:  10/06/18 0133     D-Dimer, Quant 548 (H) ng/ml (FEU)                  XR chest 2 views   Final Result by Chuck Ashton DO (10/06 0620)      Low lung volumes but no acute cardiopulmonary disease is seen  Workstation performed: [id number]                    Procedures  Procedures       Phone Contacts  ED Phone Contact    ED Course  ED Course as of Oct 06 0625   Sat Oct 06, 2018   0145 On steroids which explains the wbc count WBC: (!) 28 78                               MDM  CritCare Time    Disposition  Final diagnoses:   Epigastric abdominal pain     Time reflects when diagnosis was documented in both MDM as applicable and the Disposition within this note     Time User Action Codes Description Comment    10/6/2018  3:47 AM Abbi, [address] [R10 13] Epigastric abdominal pain       ED Disposition     ED Disposition Condition Comment    Discharge  [facility] discharge to home/self care      Condition at discharge: Good        Follow-up Information     Follow up With Specialties Details Why [address], [address], Nurse Practitioner In 1 day  [facility] [place]  [address] [address]   [phone]            Discharge Medication List as of 10/6/2018  3:49 AM      START taking these medications    Details   ranitidine (ZANTAC) 150 MG capsule Take 1 capsule (150 mg total) by mouth daily for 14 days, Starting Sat 10/6/2018, Until Sat 10/20/2018, Normal         CONTINUE these medications which have NOT CHANGED    Details   albuterol (2 5 mg/3 mL) 0 083 % nebulizer solution Inhale 1 each 4 (four) times a day as needed, Starting Mon 6/13/2016, Historical Med      albuterol (VENTOLIN HFA) 90 mcg/act inhaler Inhale 2 puffs every 6 (six) hours as needed for wheezing, Starting Sun 9/30/2018, Normal      levalbuterol (XOPENEX) 1 25 mg/0 5 mL nebulizer solution Take 0 5 mL (1 25 mg total) by nebulization 3 (three) times a day, Starting Sun 9/30/2018, Normal      levothyroxine 75 mcg tablet Take 1 tablet (75 mcg total) by mouth daily before breakfast, Starting Wed 10/3/2018, Normal      predniSONE 10 mg tablet Days 1-2: 4 tablets daily Days 3-4: 3 tablets daily Days 5-6: 2 tablets daily Days 7-8: 1 tablet daily then stop, Normal           No discharge procedures on file      ED Provider  Electronically Signed by           Yazmin Joseph MD  10/06/18 2340

## 2018-10-12 ENCOUNTER — HOSPITAL ENCOUNTER (INPATIENT)
Facility: HOSPITAL | Age: 23
LOS: 2 days | Discharge: LEFT BEFORE DISCHARGE | DRG: 560 | End: 2018-10-14
Attending: SPECIALIST | Admitting: SPECIALIST
Payer: COMMERCIAL

## 2018-10-12 ENCOUNTER — ROUTINE PRENATAL (OUTPATIENT)
Dept: PERINATAL CARE | Facility: CLINIC | Age: 23
End: 2018-10-12
Payer: COMMERCIAL

## 2018-10-12 VITALS
HEART RATE: 114 BPM | DIASTOLIC BLOOD PRESSURE: 78 MMHG | HEIGHT: 62 IN | BODY MASS INDEX: 29.19 KG/M2 | WEIGHT: 158.6 LBS | SYSTOLIC BLOOD PRESSURE: 120 MMHG

## 2018-10-12 DIAGNOSIS — O26.13 LOW WEIGHT GAIN DURING PREGNANCY IN THIRD TRIMESTER: ICD-10-CM

## 2018-10-12 DIAGNOSIS — O36.5930 POOR FETAL GROWTH AFFECTING MANAGEMENT OF MOTHER IN THIRD TRIMESTER, SINGLE OR UNSPECIFIED FETUS: ICD-10-CM

## 2018-10-12 DIAGNOSIS — J45.909 ASTHMA, UNSPECIFIED ASTHMA SEVERITY, UNSPECIFIED WHETHER COMPLICATED, UNSPECIFIED WHETHER PERSISTENT: Primary | ICD-10-CM

## 2018-10-12 DIAGNOSIS — O99.333 TOBACCO SMOKING COMPLICATING PREGNANCY, THIRD TRIMESTER: ICD-10-CM

## 2018-10-12 DIAGNOSIS — Z3A.34 34 WEEKS GESTATION OF PREGNANCY: ICD-10-CM

## 2018-10-12 DIAGNOSIS — Z3A.34 34 WEEKS GESTATION OF PREGNANCY: Primary | ICD-10-CM

## 2018-10-12 LAB
ABO GROUP BLD: NORMAL
BASOPHILS # BLD AUTO: 0.03 THOUSANDS/ΜL (ref 0–0.1)
BASOPHILS NFR BLD AUTO: 0 % (ref 0–1)
BLD GP AB SCN SERPL QL: NEGATIVE
EOSINOPHIL # BLD AUTO: 0.17 THOUSAND/ΜL (ref 0–0.61)
EOSINOPHIL NFR BLD AUTO: 1 % (ref 0–6)
ERYTHROCYTE [DISTWIDTH] IN BLOOD BY AUTOMATED COUNT: 12.8 % (ref 11.6–15.1)
HCT VFR BLD AUTO: 33.4 % (ref 34.8–46.1)
HGB BLD-MCNC: 11.7 G/DL (ref 11.5–15.4)
IMM GRANULOCYTES # BLD AUTO: 0.21 THOUSAND/UL (ref 0–0.2)
IMM GRANULOCYTES NFR BLD AUTO: 1 % (ref 0–2)
LYMPHOCYTES # BLD AUTO: 2.55 THOUSANDS/ΜL (ref 0.6–4.47)
LYMPHOCYTES NFR BLD AUTO: 15 % (ref 14–44)
MCH RBC QN AUTO: 30.6 PG (ref 26.8–34.3)
MCHC RBC AUTO-ENTMCNC: 35 G/DL (ref 31.4–37.4)
MCV RBC AUTO: 87 FL (ref 82–98)
MONOCYTES # BLD AUTO: 1.34 THOUSAND/ΜL (ref 0.17–1.22)
MONOCYTES NFR BLD AUTO: 8 % (ref 4–12)
NEUTROPHILS # BLD AUTO: 12.63 THOUSANDS/ΜL (ref 1.85–7.62)
NEUTS SEG NFR BLD AUTO: 75 % (ref 43–75)
NRBC BLD AUTO-RTO: 0 /100 WBCS
PLATELET # BLD AUTO: 359 THOUSANDS/UL (ref 149–390)
PMV BLD AUTO: 10.9 FL (ref 8.9–12.7)
RBC # BLD AUTO: 3.82 MILLION/UL (ref 3.81–5.12)
RH BLD: POSITIVE
SPECIMEN EXPIRATION DATE: NORMAL
WBC # BLD AUTO: 16.93 THOUSAND/UL (ref 4.31–10.16)

## 2018-10-12 PROCEDURE — 76821 MIDDLE CEREBRAL ARTERY ECHO: CPT | Performed by: OBSTETRICS & GYNECOLOGY

## 2018-10-12 PROCEDURE — 86850 RBC ANTIBODY SCREEN: CPT | Performed by: OBSTETRICS & GYNECOLOGY

## 2018-10-12 PROCEDURE — 86592 SYPHILIS TEST NON-TREP QUAL: CPT | Performed by: OBSTETRICS & GYNECOLOGY

## 2018-10-12 PROCEDURE — 87591 N.GONORRHOEAE DNA AMP PROB: CPT | Performed by: OBSTETRICS & GYNECOLOGY

## 2018-10-12 PROCEDURE — 76818 FETAL BIOPHYS PROFILE W/NST: CPT | Performed by: OBSTETRICS & GYNECOLOGY

## 2018-10-12 PROCEDURE — 87491 CHLMYD TRACH DNA AMP PROBE: CPT | Performed by: OBSTETRICS & GYNECOLOGY

## 2018-10-12 PROCEDURE — 76816 OB US FOLLOW-UP PER FETUS: CPT | Performed by: OBSTETRICS & GYNECOLOGY

## 2018-10-12 PROCEDURE — 86901 BLOOD TYPING SEROLOGIC RH(D): CPT | Performed by: OBSTETRICS & GYNECOLOGY

## 2018-10-12 PROCEDURE — 87653 STREP B DNA AMP PROBE: CPT | Performed by: OBSTETRICS & GYNECOLOGY

## 2018-10-12 PROCEDURE — 86900 BLOOD TYPING SEROLOGIC ABO: CPT | Performed by: OBSTETRICS & GYNECOLOGY

## 2018-10-12 PROCEDURE — 76820 UMBILICAL ARTERY ECHO: CPT | Performed by: OBSTETRICS & GYNECOLOGY

## 2018-10-12 PROCEDURE — 85025 COMPLETE CBC W/AUTO DIFF WBC: CPT | Performed by: OBSTETRICS & GYNECOLOGY

## 2018-10-12 PROCEDURE — 4A1HXCZ MONITORING OF PRODUCTS OF CONCEPTION, CARDIAC RATE, EXTERNAL APPROACH: ICD-10-PCS | Performed by: OBSTETRICS & GYNECOLOGY

## 2018-10-12 PROCEDURE — 59025 FETAL NON-STRESS TEST: CPT | Performed by: OBSTETRICS & GYNECOLOGY

## 2018-10-12 PROCEDURE — 99213 OFFICE O/P EST LOW 20 MIN: CPT | Performed by: OBSTETRICS & GYNECOLOGY

## 2018-10-12 RX ORDER — ALBUTEROL SULFATE 90 UG/1
2 AEROSOL, METERED RESPIRATORY (INHALATION) EVERY 6 HOURS PRN
Status: DISCONTINUED | OUTPATIENT
Start: 2018-10-12 | End: 2018-10-14 | Stop reason: HOSPADM

## 2018-10-12 RX ORDER — BETAMETHASONE SODIUM PHOSPHATE AND BETAMETHASONE ACETATE 3; 3 MG/ML; MG/ML
12 INJECTION, SUSPENSION INTRA-ARTICULAR; INTRALESIONAL; INTRAMUSCULAR; SOFT TISSUE EVERY 24 HOURS
Status: COMPLETED | OUTPATIENT
Start: 2018-10-12 | End: 2018-10-13

## 2018-10-12 RX ORDER — SODIUM CHLORIDE FOR INHALATION 0.9 %
3 VIAL, NEBULIZER (ML) INHALATION EVERY 8 HOURS PRN
Status: DISCONTINUED | OUTPATIENT
Start: 2018-10-12 | End: 2018-10-14 | Stop reason: HOSPADM

## 2018-10-12 RX ORDER — ACETAMINOPHEN 325 MG/1
650 TABLET ORAL EVERY 4 HOURS PRN
Status: DISCONTINUED | OUTPATIENT
Start: 2018-10-12 | End: 2018-10-14 | Stop reason: HOSPADM

## 2018-10-12 RX ORDER — LEVALBUTEROL 1.25 MG/.5ML
1.25 SOLUTION, CONCENTRATE RESPIRATORY (INHALATION)
Status: DISCONTINUED | OUTPATIENT
Start: 2018-10-12 | End: 2018-10-12

## 2018-10-12 RX ORDER — SODIUM CHLORIDE FOR INHALATION 0.9 %
VIAL, NEBULIZER (ML) INHALATION
Status: DISPENSED
Start: 2018-10-12 | End: 2018-10-13

## 2018-10-12 RX ORDER — SODIUM CHLORIDE, SODIUM LACTATE, POTASSIUM CHLORIDE, CALCIUM CHLORIDE 600; 310; 30; 20 MG/100ML; MG/100ML; MG/100ML; MG/100ML
125 INJECTION, SOLUTION INTRAVENOUS CONTINUOUS
Status: DISCONTINUED | OUTPATIENT
Start: 2018-10-12 | End: 2018-10-14

## 2018-10-12 RX ORDER — LEVALBUTEROL 1.25 MG/.5ML
1.25 SOLUTION, CONCENTRATE RESPIRATORY (INHALATION) EVERY 8 HOURS PRN
Status: DISCONTINUED | OUTPATIENT
Start: 2018-10-12 | End: 2018-10-14 | Stop reason: HOSPADM

## 2018-10-12 RX ORDER — ONDANSETRON 2 MG/ML
4 INJECTION INTRAMUSCULAR; INTRAVENOUS EVERY 6 HOURS PRN
Status: DISCONTINUED | OUTPATIENT
Start: 2018-10-12 | End: 2018-10-14 | Stop reason: HOSPADM

## 2018-10-12 RX ADMIN — BETAMETHASONE SODIUM PHOSPHATE AND BETAMETHASONE ACETATE 12 MG: 3; 3 INJECTION, SUSPENSION INTRA-ARTICULAR; INTRALESIONAL; INTRAMUSCULAR at 14:04

## 2018-10-12 RX ADMIN — SODIUM CHLORIDE, SODIUM LACTATE, POTASSIUM CHLORIDE, AND CALCIUM CHLORIDE 125 ML/HR: .6; .31; .03; .02 INJECTION, SOLUTION INTRAVENOUS at 14:15

## 2018-10-12 RX ADMIN — ACETAMINOPHEN 650 MG: 325 TABLET, FILM COATED ORAL at 20:11

## 2018-10-12 RX ADMIN — SODIUM CHLORIDE, SODIUM LACTATE, POTASSIUM CHLORIDE, AND CALCIUM CHLORIDE 125 ML/HR: .6; .31; .03; .02 INJECTION, SOLUTION INTRAVENOUS at 22:16

## 2018-10-12 NOTE — CONSULTS
[address] 21 y o  female MRN: [id number]  Unit/Bed#: L&D 322-01 Encounter: [id number]    History of Present Illness     Inpatient consult to Neonatology  Consult performed by: Yunier Manzo ordered by: Bruce Dawn          HPI: Dereck Prado is a 21y o  year old  female at 31w0d with an JORGE A of 2018, by Last Menstrual Period who presents with NRNST at [facility]  She has the following prenatal labs: ABO: O, Rh: positive, Hep B: negative, RPR: NR, Rubella: not found, GC: negative, Chlamydia: negative, HIV: negative, Group B Strep: negative    Pregnancy complications: severe IUGR  Fetal Complications: IUGR  Maternal medical history and medications: Hypothyroidism on 75mcg of Synthroid, Asthma, Tobacco use (1PPD), Hx of IUGR in previous pregnancy, Anemia, Depression, abnormal 1hr Glucose (158) without 3hr GTT  FHx: 2 y o sib born at 42 weeks; surgery at 3 weeks for inguinal hernias  Maternal social history: no alcohol or illicit drug use  Marital status: Single  FOB +/- involved        Maternal  medications:  steroids: planned    Historical Information   Past Medical History:   Diagnosis Date    Anxiety     Asthma     Depression     Disease of thyroid gland     Mood disorder ([facility] [address] )     Pregnant     Psychiatric disorder     depression,anxiety, mood disorder     Past Surgical History:   Procedure Laterality Date    APPENDECTOMY      FRACTURE SURGERY Left     wrist     History   Smoking Status    Current Some Day Smoker    Packs/day: 1 00    Years: 5 00    Types: Cigarettes   Smokeless Tobacco    Never Used     OB History:   #: 1, Date: 16, Sex: Male, Weight: None, GA: None, Delivery: Vaginal, Spontaneous Delivery, Apgar1: None, Apgar5: None, Living: Living, Birth Comments: None    #: 2, Date: None, Sex: None, Weight: None, GA: None, Delivery: None, Apgar1: None, Apgar5: None, Living: None, Birth Comments: None    Family History: mental retardation in brother    Meds/Allergies   all current active meds have been reviewed    Allergies   Allergen Reactions    Abilify [Aripiprazole] Other (See Comments) and Seizures     hallucination    Tessalon [Benzonatate] Anxiety     Other reaction(s): Psych complications  hallucinations    Zithromax [Azithromycin] Rash and Hallucinations       Counseling / Coordination of Care  Total floor / unit time spent today 40 minutes  Greater than 50% of total time was spent with the patient and / or family counseling and / or coordination of care  A description of the counseling / coordination of care: expected LOS until 36-38 weeks gestation, our presence at delivery, brief time for bonding after delivery, common complications requiring SCN care--hypoglycemia, hypothermia, feeding problems, apnea  Less common complications: lung problems, digestion problems, infection  Recommended providing breast milk; mother seems to have had normal supply with previous baby  Mother wants to exclusively breastfeed  Ideally wanted to avoid bottles, explained baby may need bottled breast milk secondary to prematurity  Reviewed availability of donor milk--prefers not to use but open if recommended  Visitation policy provided  PCP--Dr Odalys Herr  Assessment/Plan   Principal Problem:    34 weeks gestation of pregnancy  Active Problems:    Hashimoto's thyroiditis    History of intrauterine growth restriction in prior pregnancy, currently pregnant, third trimester    Hypothyroidism affecting pregnancy in third trimester    Poor fetal growth affecting management of mother in third trimester    Mild intermittent asthma with exacerbation    Anemia    Assessment:  pregnancy complicated by IUGR and NR NST requiring induction  Plan for complete course of steroids  Plan:  Neonatology at delivery and admission to [facility]

## 2018-10-12 NOTE — H&P
H&P Exam - Obstetrics   Caron Sims 21 y o  female MRN: [id number]  Unit/Bed#: L&D 322-01 Encounter: [id number]    >2 Midnights    INPATIENT     Pt is under the care of Dr Chanelle Rios    History of Present Illness   Chief Complaint: IUGR with non-reassuring NST at [facility], [facility]  today    HPI:  Caron Sims is a 21 y o   female with an JORGE A of 2018, by Last Menstrual Period at 34w0d weeks gestation who is being admitted for Induction of labor following administration of steroids secondary to severe IUGR and increased umbilical dopplers at  center  Pt was seen at [facility] earlier this morning and noted to have findings of IUGR in 5% and non-reassuring NST, however, BPP of 8/10  Contractions: Frequency: Every 3 minutes since 0100  Leakage of fluid: None  Bleeding: None  Fetal movement: present  Her current obstetrical history is significant for severe UGR (<5%) with elevated umbilical cord doppler noted on US 10/12/18, Hypothyroidism on 75mcg of Synthroid, Asthma, Tobacco use (1PPD), Hx of IUGR in previous pregnancy, Anemia, Depression, abnormal 1hr Glucose (158) without 3hr GTT  Review of Systems   Constitutional: Negative for fever  Gastrointestinal: Positive for abdominal pain (contractions every 3mins)  Genitourinary: Negative for vaginal bleeding and vaginal discharge          Negative for LOF         Historical Information   OB History    Para Term  AB Living   2 1 1     1   SAB TAB Ectopic Multiple Live Births           1      # Outcome Date GA Lbr Brenton/2nd Weight Sex Delivery Anes PTL Lv   2 Current            1 Term 16    M Vag-Spont  Y DONNIE        Baby complications/comments: Severe IUGR <5% with EFW 1831g (0rpm4hb) and increased umbilical dopplers, VTX  Past Medical History:   Diagnosis Date    Anxiety     Asthma     Depression     Disease of thyroid gland     Mood disorder ([facility] [address] )     Pregnant     Psychiatric disorder     depression,anxiety, mood disorder     Past Surgical History:   Procedure Laterality Date    APPENDECTOMY      FRACTURE SURGERY Left     wrist     Social History   History   Alcohol Use No     History   Drug Use No     History   Smoking Status    Current Some Day Smoker    Packs/day: 1 00    Years: 5 00    Types: Cigarettes   Smokeless Tobacco    Never Used     Family History: Mental retardation in brother    Meds/Allergies   {  Prescriptions Prior to Admission   Medication    albuterol (2 5 mg/3 mL) 0 083 % nebulizer solution    albuterol (VENTOLIN HFA) 90 mcg/act inhaler    levalbuterol (XOPENEX) 1 25 mg/0 5 mL nebulizer solution    levothyroxine 75 mcg tablet    predniSONE 10 mg tablet    ranitidine (ZANTAC) 150 MG capsule     Allergies   Allergen Reactions    Abilify [Aripiprazole] Other (See Comments) and Seizures     hallucination    Tessalon [Benzonatate] Anxiety     Other reaction(s): Psych complications  hallucinations    Zithromax [Azithromycin] Rash and Hallucinations       Objective   Vitals: Blood pressure 115/73, pulse 95, temperature 98 3 °F (36 8 °C), temperature source Oral, resp  rate 18, height 5' 2" (1 575 m), weight 71 7 kg (158 lb), last menstrual period 02/16/2018, currently breastfeeding  Body mass index is 28 9 kg/m²  Invasive Devices          No matching active lines, drains, or airways          Physical Exam   Constitutional: She is oriented to person, place, and time  She appears well-developed and well-nourished  No distress  HENT:   Head: Normocephalic and atraumatic  Cardiovascular: Normal rate, regular rhythm and normal heart sounds  Exam reveals no gallop and no friction rub  No murmur heard  Pulmonary/Chest: Effort normal and breath sounds normal  No respiratory distress  She has no wheezes  She has no rales  Abdominal: Soft  Bowel sounds are normal  There is no tenderness  There is no rebound and no guarding     Gravid uterus   Genitourinary: Vagina normal    Musculoskeletal: Normal range of motion  She exhibits no edema, tenderness or deformity  Neurological: She is alert and oriented to person, place, and time  Skin: Skin is warm and dry  No rash noted  She is not diaphoretic  No erythema  No pallor  Psychiatric: She has a normal mood and affect  Her behavior is normal  Judgment and thought content normal    Vitals reviewed  Vaginal Exam  Dilation: 3  Effacement (%): 60  Station: -2  Presentation: Vertex  Position: Unknown  Method: Manual  OB Examiner: Rachel Meenndez  Membranes: Intact  FHR: Baseline: 120 bpm, Variability: Moderate 6 - 25 bpm, Accelerations: Reactive, Decelerations: Absent and Category 1    [place]: Frequency: Every 3-4 minutes    Prenatal Labs: I have personally reviewed pertinent reports  , Blood Type:   Lab Results   Component Value Date/Time    ABO Grouping O 03/25/2016 04:06 PM     , D (Rh type):   Lab Results   Component Value Date/Time    Rh Factor Positive 03/25/2016 04:06 PM     , Antibody Screen:   Lab Results   Component Value Date/Time    Antibody Screen Negative 05/31/2018 02:00 PM    , 1 hour Glucola:   Lab Results   Component Value Date/Time    Glucose 158 (H) 09/20/2018 11:56 AM   , Rubella:      , VDRL/RPR:   Lab Results   Component Value Date/Time    RPR SCREEN NONREACT 05/31/2018 02:08 PM      , Hep B:   Lab Results   Component Value Date/Time    HEPATITIS B SURFACE ANTIGEN Negative 05/31/2018 02:08 PM     , HIV: negative  , Chlamydia: negative  , Gonorrhea:   Lab Results   Component Value Date/Time    N gonorrhoeae, DNA Probe N  gonorrhoeae Amplified DNA Negative 06/14/2016 12:50 PM     , Group B Strep:  negative     Imaging, EKG, Pathology, and Other Studies: I have personally reviewed pertinent reports         Assessment/Plan     Assessment:  IUP at 34w0d with severe IUGR and non-reassuring NST here for steroid administration and IOL as indicated    Plan:  1) Admit to L&D  2) Routine Labs: CBC, T&S, RPR  3) F/E/N: Clear Liquid diet, IV LR at 125mL/hr  4) Administer betamethasone 12mg q24hrs x 2 doses for fetal lung maturity  5) cEFM  6) F/u  consult, NICU notified   7) GBS performed 18: negative, PCN not indicated at this time  8) Tobacco use: 1PPD, however pt declined nicotine patch (SE vomiting)  9) Hypothyroidism: 75mcg daily  10) Asthma: albuterol inhaler PRN, Levalbuterol TID  11) Anemia: hgb 10 6 on 10/6/18   F/u admission CBC  12) Depression: Case Management consult PP    D/w Dr Lana Coffey MD  OBGYN, PGY-2  10/12/2018 2:40 PM

## 2018-10-12 NOTE — PROGRESS NOTES
Ms Sascha Isabel is a 22 yo  at 29 3/7  weeks gestation who presents for a fetal ultrasound  examination to evaluate growth  Known to carry a fetus that has fetal growth restruiction  Asthma  Heavy smoker  She is symptomatic had nausea, vomiting and has lost weight since her last visit  No fetal genetic screening tests available  See Ob procedures  Ultrasound:  1  Live fetus in vertex presentation  2    Fetal size < dates  EFW = 1831 grams = 55 for GA  Was 1395 grams two weeks ago on   3   No fetal anomalies observed  4  Normal MARTINEZ  5  Elevated umbilical cord Doppler at 1 30 ( 955 for GA= 1 19)  6  Placenta is posterior  and not a placenta previa  7    Non reactive NST with one variable deceleration  8  BPP:   I reviewed the results of this ultrasound and answered  all the questions  Recommendations:  1  Admission to the hospital for work-up of GI symptoms, rule out preeclampsia (has normal B/P to day at 120/78)  2  Consider use of a course of corticosteroids for fetal lung maturity if fetal testing is normal, otherwise if fetal testing is equivocal plan delivery as she is > 34 weeks and has a fetus with severe growth restriction and abnormal umbilical cord Doppler  3  Consultation to neonatology      Cecilia Fernandez MD

## 2018-10-13 PROCEDURE — 3E0P7VZ INTRODUCTION OF HORMONE INTO FEMALE REPRODUCTIVE, VIA NATURAL OR ARTIFICIAL OPENING: ICD-10-PCS | Performed by: OBSTETRICS & GYNECOLOGY

## 2018-10-13 RX ORDER — CALCIUM CARBONATE 200(500)MG
500 TABLET,CHEWABLE ORAL DAILY PRN
Status: DISCONTINUED | OUTPATIENT
Start: 2018-10-13 | End: 2018-10-13

## 2018-10-13 RX ORDER — CALCIUM CARBONATE 200(500)MG
500 TABLET,CHEWABLE ORAL AS NEEDED
Status: DISCONTINUED | OUTPATIENT
Start: 2018-10-13 | End: 2018-10-14 | Stop reason: HOSPADM

## 2018-10-13 RX ADMIN — MISOPROSTOL 25 MCG: 100 TABLET ORAL at 20:48

## 2018-10-13 RX ADMIN — LEVOTHYROXINE SODIUM 75 MCG: 25 TABLET ORAL at 06:11

## 2018-10-13 RX ADMIN — SODIUM CHLORIDE, SODIUM LACTATE, POTASSIUM CHLORIDE, AND CALCIUM CHLORIDE 999 ML/HR: .6; .31; .03; .02 INJECTION, SOLUTION INTRAVENOUS at 22:25

## 2018-10-13 RX ADMIN — SODIUM CHLORIDE, SODIUM LACTATE, POTASSIUM CHLORIDE, AND CALCIUM CHLORIDE 125 ML/HR: .6; .31; .03; .02 INJECTION, SOLUTION INTRAVENOUS at 07:10

## 2018-10-13 RX ADMIN — ACETAMINOPHEN 650 MG: 325 TABLET, FILM COATED ORAL at 01:24

## 2018-10-13 RX ADMIN — SODIUM CHLORIDE, SODIUM LACTATE, POTASSIUM CHLORIDE, AND CALCIUM CHLORIDE 125 ML/HR: .6; .31; .03; .02 INJECTION, SOLUTION INTRAVENOUS at 15:19

## 2018-10-13 RX ADMIN — Medication 500 MG: at 19:09

## 2018-10-13 RX ADMIN — BETAMETHASONE SODIUM PHOSPHATE AND BETAMETHASONE ACETATE 12 MG: 3; 3 INJECTION, SUSPENSION INTRA-ARTICULAR; INTRALESIONAL; INTRAMUSCULAR at 14:05

## 2018-10-13 RX ADMIN — Medication 500 MG: at 03:15

## 2018-10-13 NOTE — PROGRESS NOTES
Patient received her 2nd dose of betamethasone  After discussion with Dr Robbie Ny, the plan is for the patient to begin her induction this evening with Cytotec for cervical ripening followed by Pitocin titration tomorrow morning  Patient was upset here this as she just wants to be delivered  She would like her family to be present and a live over an hour away  It was explained to her that even if her induction were to begin right now she would still require cervical ripening and would likely not deliver before tomorrow anyway  Family members were understanding however patient was tearful

## 2018-10-13 NOTE — PROGRESS NOTES
Pt escorted for walk off unit by labor and delivery staff- walk ok per Dr Laya Reza   Pt also able to shower per Dr Laya Reza

## 2018-10-14 ENCOUNTER — ANESTHESIA EVENT (INPATIENT)
Dept: LABOR AND DELIVERY | Facility: HOSPITAL | Age: 23
DRG: 560 | End: 2018-10-14
Payer: COMMERCIAL

## 2018-10-14 VITALS
BODY MASS INDEX: 29.08 KG/M2 | RESPIRATION RATE: 18 BRPM | DIASTOLIC BLOOD PRESSURE: 71 MMHG | HEART RATE: 82 BPM | HEIGHT: 62 IN | TEMPERATURE: 98.8 F | SYSTOLIC BLOOD PRESSURE: 117 MMHG | WEIGHT: 158 LBS | OXYGEN SATURATION: 97 %

## 2018-10-14 LAB
BASE EXCESS BLDCOA CALC-SCNC: -1.5 MMOL/L (ref 3–11)
BASE EXCESS BLDCOV CALC-SCNC: -2.1 MMOL/L (ref 1–9)
CHLAMYDIA DNA CVX QL NAA+PROBE: NORMAL
GP B STREP DNA SPEC QL NAA+PROBE: ABNORMAL
HCO3 BLDCOA-SCNC: 24.7 MMOL/L (ref 17.3–27.3)
HCO3 BLDCOV-SCNC: 24.3 MMOL/L (ref 12.2–28.6)
N GONORRHOEA DNA GENITAL QL NAA+PROBE: NORMAL
O2 CT VFR BLDCOA CALC: 9.2 ML/DL
OXYHGB MFR BLDCOA: 40.5 %
OXYHGB MFR BLDCOV: 49.4 %
PCO2 BLDCOA: 46.9 MM[HG] (ref 30–60)
PCO2 BLDCOV: 47.3 MM HG (ref 27–43)
PH BLDCOA: 7.34 [PH] (ref 7.23–7.43)
PH BLDCOV: 7.33 [PH] (ref 7.19–7.49)
PO2 BLDCOA: 17.9 MM HG (ref 5–25)
PO2 BLDCOV: 20.5 MM HG (ref 15–45)
SAO2 % BLDCOV: 11.5 ML/DL

## 2018-10-14 PROCEDURE — 82805 BLOOD GASES W/O2 SATURATION: CPT | Performed by: SPECIALIST

## 2018-10-14 PROCEDURE — 88307 TISSUE EXAM BY PATHOLOGIST: CPT | Performed by: PATHOLOGY

## 2018-10-14 RX ORDER — SIMETHICONE 80 MG
80 TABLET,CHEWABLE ORAL 4 TIMES DAILY PRN
Status: DISCONTINUED | OUTPATIENT
Start: 2018-10-14 | End: 2018-10-14 | Stop reason: HOSPADM

## 2018-10-14 RX ORDER — TRISODIUM CITRATE DIHYDRATE AND CITRIC ACID MONOHYDRATE 500; 334 MG/5ML; MG/5ML
30 SOLUTION ORAL ONCE
Status: COMPLETED | OUTPATIENT
Start: 2018-10-14 | End: 2018-10-14

## 2018-10-14 RX ORDER — ACETAMINOPHEN 325 MG/1
650 TABLET ORAL EVERY 6 HOURS PRN
Status: DISCONTINUED | OUTPATIENT
Start: 2018-10-14 | End: 2018-10-14 | Stop reason: HOSPADM

## 2018-10-14 RX ORDER — DOCUSATE SODIUM 100 MG/1
100 CAPSULE, LIQUID FILLED ORAL 2 TIMES DAILY
Status: DISCONTINUED | OUTPATIENT
Start: 2018-10-14 | End: 2018-10-14 | Stop reason: HOSPADM

## 2018-10-14 RX ORDER — METOCLOPRAMIDE HYDROCHLORIDE 5 MG/ML
10 INJECTION INTRAMUSCULAR; INTRAVENOUS EVERY 6 HOURS PRN
Status: DISCONTINUED | OUTPATIENT
Start: 2018-10-14 | End: 2018-10-14 | Stop reason: HOSPADM

## 2018-10-14 RX ORDER — SENNOSIDES 8.6 MG
1 TABLET ORAL DAILY
Status: DISCONTINUED | OUTPATIENT
Start: 2018-10-15 | End: 2018-10-14 | Stop reason: HOSPADM

## 2018-10-14 RX ORDER — OXYCODONE HYDROCHLORIDE AND ACETAMINOPHEN 5; 325 MG/1; MG/1
2 TABLET ORAL EVERY 4 HOURS PRN
Status: DISCONTINUED | OUTPATIENT
Start: 2018-10-14 | End: 2018-10-14 | Stop reason: HOSPADM

## 2018-10-14 RX ORDER — BISACODYL 10 MG
10 SUPPOSITORY, RECTAL RECTAL DAILY PRN
Status: DISCONTINUED | OUTPATIENT
Start: 2018-10-14 | End: 2018-10-14 | Stop reason: HOSPADM

## 2018-10-14 RX ORDER — OXYTOCIN/RINGER'S LACTATE 30/500 ML
1-30 PLASTIC BAG, INJECTION (ML) INTRAVENOUS
Status: DISCONTINUED | OUTPATIENT
Start: 2018-10-14 | End: 2018-10-14 | Stop reason: HOSPADM

## 2018-10-14 RX ORDER — DIAPER,BRIEF,INFANT-TODD,DISP
1 EACH MISCELLANEOUS AS NEEDED
Status: DISCONTINUED | OUTPATIENT
Start: 2018-10-14 | End: 2018-10-14 | Stop reason: HOSPADM

## 2018-10-14 RX ORDER — ROPIVACAINE HYDROCHLORIDE 2 MG/ML
INJECTION, SOLUTION EPIDURAL; INFILTRATION; PERINEURAL AS NEEDED
Status: DISCONTINUED | OUTPATIENT
Start: 2018-10-14 | End: 2018-10-14 | Stop reason: SURG

## 2018-10-14 RX ORDER — IBUPROFEN 600 MG/1
600 TABLET ORAL EVERY 6 HOURS PRN
Status: DISCONTINUED | OUTPATIENT
Start: 2018-10-14 | End: 2018-10-14 | Stop reason: HOSPADM

## 2018-10-14 RX ORDER — DIPHENHYDRAMINE HCL 25 MG
25 TABLET ORAL EVERY 6 HOURS PRN
Status: DISCONTINUED | OUTPATIENT
Start: 2018-10-14 | End: 2018-10-14 | Stop reason: HOSPADM

## 2018-10-14 RX ORDER — ROPIVACAINE HYDROCHLORIDE 5 MG/ML
INJECTION, SOLUTION EPIDURAL; INFILTRATION; PERINEURAL AS NEEDED
Status: DISCONTINUED | OUTPATIENT
Start: 2018-10-14 | End: 2018-10-14 | Stop reason: SURG

## 2018-10-14 RX ORDER — LIDOCAINE HYDROCHLORIDE AND EPINEPHRINE 15; 5 MG/ML; UG/ML
INJECTION, SOLUTION EPIDURAL AS NEEDED
Status: DISCONTINUED | OUTPATIENT
Start: 2018-10-14 | End: 2018-10-14 | Stop reason: SURG

## 2018-10-14 RX ORDER — OXYCODONE HYDROCHLORIDE AND ACETAMINOPHEN 5; 325 MG/1; MG/1
1 TABLET ORAL EVERY 4 HOURS PRN
Status: DISCONTINUED | OUTPATIENT
Start: 2018-10-14 | End: 2018-10-14 | Stop reason: HOSPADM

## 2018-10-14 RX ADMIN — Medication 2 MILLI-UNITS/MIN: at 05:40

## 2018-10-14 RX ADMIN — SODIUM CHLORIDE, SODIUM LACTATE, POTASSIUM CHLORIDE, AND CALCIUM CHLORIDE 125 ML/HR: .6; .31; .03; .02 INJECTION, SOLUTION INTRAVENOUS at 00:27

## 2018-10-14 RX ADMIN — LEVOTHYROXINE SODIUM 75 MCG: 25 TABLET ORAL at 06:30

## 2018-10-14 RX ADMIN — SODIUM CHLORIDE, SODIUM LACTATE, POTASSIUM CHLORIDE, AND CALCIUM CHLORIDE 125 ML/HR: .6; .31; .03; .02 INJECTION, SOLUTION INTRAVENOUS at 06:33

## 2018-10-14 RX ADMIN — SODIUM CHLORIDE, SODIUM LACTATE, POTASSIUM CHLORIDE, AND CALCIUM CHLORIDE 125 ML/HR: .6; .31; .03; .02 INJECTION, SOLUTION INTRAVENOUS at 14:51

## 2018-10-14 RX ADMIN — SODIUM CITRATE AND CITRIC ACID MONOHYDRATE 30 ML: 500; 334 SOLUTION ORAL at 01:50

## 2018-10-14 RX ADMIN — SODIUM CHLORIDE, SODIUM LACTATE, POTASSIUM CHLORIDE, AND CALCIUM CHLORIDE 999 ML/HR: .6; .31; .03; .02 INJECTION, SOLUTION INTRAVENOUS at 03:05

## 2018-10-14 RX ADMIN — SODIUM CHLORIDE, SODIUM LACTATE, POTASSIUM CHLORIDE, AND CALCIUM CHLORIDE 999 ML/HR: .6; .31; .03; .02 INJECTION, SOLUTION INTRAVENOUS at 04:19

## 2018-10-14 RX ADMIN — ROPIVACAINE HYDROCHLORIDE 6 ML: 5 INJECTION, SOLUTION EPIDURAL; INFILTRATION; PERINEURAL at 13:05

## 2018-10-14 RX ADMIN — SODIUM CHLORIDE, SODIUM LACTATE, POTASSIUM CHLORIDE, AND CALCIUM CHLORIDE 999 ML/HR: .6; .31; .03; .02 INJECTION, SOLUTION INTRAVENOUS at 13:06

## 2018-10-14 RX ADMIN — LIDOCAINE HYDROCHLORIDE AND EPINEPHRINE 3 ML: 15; 5 INJECTION, SOLUTION EPIDURAL at 04:06

## 2018-10-14 RX ADMIN — ALBUTEROL SULFATE 2 PUFF: 90 AEROSOL, METERED RESPIRATORY (INHALATION) at 12:00

## 2018-10-14 RX ADMIN — SODIUM CHLORIDE, SODIUM LACTATE, POTASSIUM CHLORIDE, AND CALCIUM CHLORIDE 125 ML/HR: .6; .31; .03; .02 INJECTION, SOLUTION INTRAVENOUS at 07:54

## 2018-10-14 RX ADMIN — ROPIVACAINE HYDROCHLORIDE 5 ML: 2 INJECTION, SOLUTION EPIDURAL; INFILTRATION at 04:09

## 2018-10-14 RX ADMIN — ROPIVACAINE HYDROCHLORIDE: 2 INJECTION, SOLUTION EPIDURAL; INFILTRATION at 04:17

## 2018-10-14 RX ADMIN — ROPIVACAINE HYDROCHLORIDE: 2 INJECTION, SOLUTION EPIDURAL; INFILTRATION at 12:45

## 2018-10-14 RX ADMIN — ROPIVACAINE HYDROCHLORIDE 5 ML: 2 INJECTION, SOLUTION EPIDURAL; INFILTRATION at 04:12

## 2018-10-14 RX ADMIN — ROPIVACAINE HYDROCHLORIDE 5 ML: 5 INJECTION, SOLUTION EPIDURAL; INFILTRATION; PERINEURAL at 15:52

## 2018-10-14 NOTE — OB LABOR/OXYTOCIN SAFETY PROGRESS
Labor Progress Note - Christy Reynoso 21 y o  female MRN: [id number]    Unit/Bed#: L&D 322-01 Encounter: [id number]    Obstetric History       T1      L1     SAB0   TAB0   Ectopic0   Multiple0   Live Births1      Gestational Age: 34w2d     Contraction Frequency (minutes):  Every 5 minutes  Contraction Quality: Mild  Tachysystole: No   Dilation: 2-3        Effacement (%): 50  Station: -2  Baseline Rate: 130 bpm  Fetal Heart Rate: 145 BPM             Notes/comments:     She is florentin every 5 minutes  Comfortable with epidural   As stirp is now CAT I, will start low dose pitocin for augmentation              Minor Bella MD 10/14/2018 4:42 AM

## 2018-10-14 NOTE — DISCHARGE SUMMARY
Discharge Summary - Bret Nation 21 y o  female MRN: [id number]    Unit/Bed#: L&D 322-01 Encounter: [id number]    Admission Date: 10/12/2018     Discharge Date: 10/14/2018    Admitting Diagnosis:   1  Pregnancy at 34w2d  2  Severe IUGR  3  Smoker  4  Mild persistent asthma  5  Hypothyroidism  6  GBS negative (18)    Discharge Diagnosis:   7  Same, delivered  8  GBS positive (10/12/18)  9  GBS inadequately treated    Procedures: spontaneous vaginal delivery    Attending: Eneida Mathew MD    Hospital Course:     Bret Nation is a 21 y o   at 34w2d wks who was initially admitted for induction of labor - severe IUGR with abnormal uterine Dopplers  She received a complete course of betamethasone on 10/12-10/13  Patient was noted to be GBS negative as of a swab taken on 18  A subsequent repeat swab was taken 10/12/18 on admission  The patient was not started on PCN prophylaxis despite prematurity as the initial GBS swab was negative  Patient received Cytotec and low-dose Pitocin for cervical ripening followed by Pitocin titration  She SROM'd clear fluid 10/14/18 @ 1515  Fetal heart tones were intermittently category II  Patient ultimately, became complete and pushed in one push  She delivered a viable male  on 10/14/2018 at [phone]  Weight 3lbs 14oz via spontaneous vaginal delivery  Apgars were 8 (1 min) and 8 (5 min)   was transferred to NICU  Patient tolerated the procedure well and was transferred to recovery in stable condition  GBS result from 10/12/18 had returned at this time positive  Given that the patient never received penicillin for prophylaxis she is considered inadequately treated  NICU is aware of the 's GBS status  Immediately following delivery, several hours after, patient stated she "felt fine" and demanded to sign out AMA from the hospital  The risks were reviewed with her in depth, including PPH, DVT, PE, infection and death   She verbalized understanding and provided written consent  KRYSTIN paperwork was signed  On day of discharge AMA, she was ambulating and able to reasonably perform all ADLs  She was voiding and had appropriate bowel function  Pain was well controlled  She was discharged home on post-partum day #0 without complications  Patient was instructed to follow up with her OB as an outpatient and was given appropriate warnings to call provider if she develops signs of infection or uncontrolled pain  Complications: none apparent    Condition at discharge: stable     Discharge instructions/Information to patient and family:   See after visit summary for information provided to patient and family  Provisions for Follow-Up Care:  See after visit summary for information related to follow-up care and any pertinent home health orders        Disposition: Home    Planned Readmission: No    Camila Lozada DO  PGY-2 OB/GYN   10/14/2018 8:19 PM

## 2018-10-14 NOTE — OB LABOR/OXYTOCIN SAFETY PROGRESS
Labor Progress Note - Giovanna Chapman 21 y o  female MRN: [id number]    Unit/Bed#: L&D 322-01 Encounter: [id number]    Obstetric History       T1      L1     SAB0   TAB0   Ectopic0   Multiple0   Live Births1      Gestational Age: 34w2d     Contraction Frequency (minutes): irregular (pt reports discomfort/cramping)  Contraction Quality: Mild  Tachysystole: No   Dilation: 2-3        Effacement (%): 50  Station: -2  Baseline Rate: 120 bpm  Fetal Heart Rate: 145 BPM             Notes/comments:     Patient has made change on one dose of cytotec  She is florentin every 3 minutes  Her FHT is not as reactive as it was prior to cytotec placement  Baseline 125 with moderate variability  One late deceleration at 2258  Will expectantly manage as she is florentin frequently  If strip becomes more reactive and contraction pattern becomes more irregular, will start low dose pitocin              Angel Landrum MD 10/14/2018 12:25 AM

## 2018-10-14 NOTE — OB LABOR/OXYTOCIN SAFETY PROGRESS
Labor Progress Note - Cristo Vega 21 y o  female MRN: [id number]    Unit/Bed#: L&D 322-01 Encounter: [id number]    Obstetric History       T1      L1     SAB0   TAB0   Ectopic0   Multiple0   Live Births1      Gestational Age: 34w2d  Dose (pearl-units/min) Oxytocin: 2 pearl-units/min  Contraction Frequency (minutes): 3-5  Contraction Quality: Mild  Tachysystole: No   Dilation: 3        Effacement (%): 70  Station: -2  Baseline Rate: 125 bpm  Fetal Heart Rate: 145 BPM  FHR Category: Category I          Notes/comments:      Patient was examined, comfortable with epidural, cervical exam unchanged  FHT's overall reactive and reassuring  Will start Pitocin titration 2milliunits/min  by 2milliunits/min every 20 minutes  Consider AROM next check         Gayathri Vo MD 10/14/2018 8:58 AM

## 2018-10-14 NOTE — OB LABOR/OXYTOCIN SAFETY PROGRESS
Labor Progress Note - Marisabel Castanon 21 y o  female MRN: [id number]    Unit/Bed#: L&D 322-01 Encounter: [id number]    Obstetric History       T1      L1     SAB0   TAB0   Ectopic0   Multiple0   Live Births1      Gestational Age: 34w2d     Contraction Frequency (minutes): 3-4  Contraction Quality: Mild  Tachysystole: No   Dilation: 2-3        Effacement (%): 50  Station: -2  Baseline Rate: 125 bpm  Fetal Heart Rate: 145 BPM             Notes/comments:     Patient has not made cervical change  However, she is extremely uncomfortable and tearful  She is undecided on whether she would like an epidural  Would like to call her mother before making decision  As she has not made cervical change, will likely need augmentation with pitocin  However, will address patient's discomfort prior to increasing strength of her contraction  FHT CAT I for the past hour  She had a variable deceleration at 0139 which resolved spontaneously              Georgina Romano MD 10/14/2018 2:55 AM

## 2018-10-14 NOTE — PROGRESS NOTES
C/o chest pain to the nurse anesthetist  Pulse ox applied 100% with rebreather mask  Denied the need for breathing treatment  Head of the bed elevated  Pt states she feels better "must be an anxiety attack"

## 2018-10-14 NOTE — ANESTHESIA PREPROCEDURE EVALUATION
Review of Systems/Medical History  Patient summary reviewed  Chart reviewed      Cardiovascular  Negative cardio ROS    Pulmonary  Smoker cigarette smoker  , Asthma , well controlled/ stable ,        GI/Hepatic    GERD well controlled,             Endo/Other  History of thyroid disease ,      GYN  Currently pregnant ,          Hematology   Musculoskeletal  Negative musculoskeletal ROS        Neurology  Negative neurology ROS      Psychology   Anxiety, Depression ,              Physical Exam    Airway    Mallampati score: II  TM Distance: >3 FB  Neck ROM: full     Dental   No notable dental hx     Cardiovascular  Comment: Negative ROS, Rhythm: regular, Rate: normal, Cardiovascular exam normal    Pulmonary  Pulmonary exam normal Breath sounds clear to auscultation,     Other Findings        Anesthesia Plan  ASA Score- 2     Anesthesia Type- epidural with ASA Monitors  Additional Monitors:   Airway Plan:         Plan Factors-    Induction-     Postoperative Plan-     Informed Consent- Anesthetic plan and risks discussed with patient

## 2018-10-14 NOTE — PROGRESS NOTES
Dr Jose Elias Lopez and Dr Aniyah Melara aware of fetal monitor strip  Dr Aniyah Melara keeping Dr Alexis Sutton updated on monitor strip and pt progress

## 2018-10-14 NOTE — OB LABOR/OXYTOCIN SAFETY PROGRESS
Oxytocin Safety Progress Check Note - Latisha Seay 21 y o  female MRN: [id number]    Unit/Bed#: L&D 322-01 Encounter: [id number]    Obstetric History       T1      L1     SAB0   TAB0   Ectopic0   Multiple0   Live Births1      Gestational Age: 34w2d  Dose (pearl-units/min) Oxytocin: 16 pearl-units/min  Contraction Frequency (minutes): 2-4  Contraction Quality: Mild  Tachysystole: No   Dilation: 3        Effacement (%): 70  Station: -2  Baseline Rate: 145 bpm  Fetal Heart Rate: 145 BPM  FHR Category: Category I          Notes/comments:      Fetal heart tones category II with 3 recurrent late decelerations with the lowest  Patient was repositioned laterally  FHT's returned to moderate variability with accelerations  Intermittent infrequent late and variable deceleration noted  Patient given 1L IVF, O2 therapy  Pitocin at 16 milliunits/min now  Discussed with patient remote from delivery, fetal tolerance to Pitocin, if evidence of fetal compromise with decelerations, will recommend a  delivery  D/W Dr Garrison Harris MD 10/14/2018 1:21 PM

## 2018-10-14 NOTE — OB LABOR/OXYTOCIN SAFETY PROGRESS
Oxytocin Safety Progress Check Note - Shannon Torres 21 y o  female MRN: [id number]    Unit/Bed#: L&D 322-01 Encounter: [id number]    Obstetric History       T1      L1     SAB0   TAB0   Ectopic0   Multiple0   Live Births1      Gestational Age: 34w2d  Dose (pearl-units/min) Oxytocin: 18 pearl-units/min  Contraction Frequency (minutes): 2-4  Contraction Quality: Mild  Tachysystole: No   Dilation: 4        Effacement (%): 80  Station: -2  Baseline Rate: 130 bpm  Fetal Heart Rate: 145 BPM  FHR Category: Category I          Notes/comments:      Patient SROM'd clear fluid @ 1515  Exam now 4/80/-2  Contractions irregular q1-5min  Patient comfortable with epidural since anesthesia re-bolused  Pitocin at 18milliunits/min  Will keep at this steady state  D/W Dr Lilian Villa MD 10/14/2018 3:22 PM

## 2018-10-14 NOTE — OB LABOR/OXYTOCIN SAFETY PROGRESS
Labor Progress Note - Fayrene Johan 21 y o  female MRN: [id number]    Unit/Bed#: L&D 322-01 Encounter: [id number]    Obstetric History       T1      L1     SAB0   TAB0   Ectopic0   Multiple0   Live Births1      Gestational Age: 34w1d     Contraction Frequency (minutes): 0 (per patient)  Contraction Quality: Mild  Tachysystole: No   Dilation: 1-2        Effacement (%): 30  Station: -2  Baseline Rate: 135 bpm  Fetal Heart Rate: 145 BPM           Notes/comments:     First dose of Cytotec given  Patient comfortable, was able to eat dinner and shower earlier this evening  CAT I tracing    D/w Dr Ericka Cervantes MD 10/13/2018 8:51 PM

## 2018-10-14 NOTE — LACTATION NOTE
Met with mother  Provided mother with Ready, Set, Baby booklet  Discussed Skin to Skin contact an benefits to mom and baby  Talked about the delay of the first bath until baby has adjusted  Spoke about the benefits of rooming in  Feeding on cue and what that means for recognizing infant's hunger  Avoidance of pacifiers for the first month discussed  Talked about exclusive breastfeeding for the first 6 months  Positioning and latch reviewed as well as showing images of other feeding positions  Discussed the properties of a good latch in any position  Reviewed hand/manual expression  Discussed s/s that baby is getting enough milk and some s/s that breastfeeding dyad may need further help  Gave information on common concerns, what to expect the first few weeks after delivery, preparing for other caregivers, and how partners can help  Resources for support also provided  Information on hand expression given  Discussed benefits of knowing how to manually express breast including stimulating milk supply, softening nipple for latch and evacuating breast in the event of engorgement  Instructions given on pumping  Discussed when to start, frequency, different pumps available versus manual expression  Discussed hygiene of hands and supplies as well as assembly, placement of flanges, size of flanged, preparing the breast and cycles and suction settings on pump  Demonstrated use of hand pump  Discussed labeling of milk, storage, and preparation of stored milk  Discussed with mom information on how to Increase Breast milk Supply  Provided information on stimulation of breasts, things to avoid, use of a breast pump,caring for self, conditioning letdown reflex, readily available galactagogues, keeping records and s/s to look for that may lead to decreased productions so they can be corrected   If supplementation is recommended for infant growth needs, how to prioritize protecting milk supply  Script for medela breast pump obtained  Mom has concerns regarding inverted nipples  Mom stated she needed nipple shield to help with latch when feeding her first child  Encouraged to begin pumping right away to increase supply for NICU baby and handout given  Encouraged MOB to call for assistance, questions, and concerns about breastfeeding  Extension provided

## 2018-10-14 NOTE — OB LABOR/OXYTOCIN SAFETY PROGRESS
Labor Progress Note - Jamil Arshad 21 y o  female MRN: [id number]    Unit/Bed#: L&D 322-01 Encounter: [id number]    Obstetric History       T1      L1     SAB0   TAB0   Ectopic0   Multiple0   Live Births1      Gestational Age: 34w2d  Dose (pearl-units/min) Oxytocin: 12 pearl-units/min  Contraction Frequency (minutes): 2-5  Contraction Quality: Mild  Tachysystole: No   Dilation: 3        Effacement (%): 70  Station: -2  Baseline Rate: 130 bpm  Fetal Heart Rate: 145 BPM  FHR Category: Category I          Notes/comments:      Patient feeling some discomfort, advised to click her epidural button as she has not been doing so  FHT's overall category 1 - two late decelerations few hours ago resolved spontaneously  Monitor  Continue with Pitocin titration  Consider AROM next check  D/W Dr Praveen Nevarez MD 10/14/2018 10:18 AM

## 2018-10-14 NOTE — L&D DELIVERY NOTE
DELIVERY NOTE  Deanna Lamas 21 y o  female MRN: [id number]  Unit/Bed#: L&D 322-01 Encounter: [id number]    Obstetrician:   Dr Dianne Robles ([facility])    Assistant: Dr Nicole Hampton    Pre-Delivery Diagnosis: Term pregnancy  Single fetus  Induction of labor - severe IUGR  SROM  Mild persistent asthma  Hypothyroidism  Smoker    Post-Delivery Diagnosis: Same as above - Delivered    Procedure: Spontaneous vaginal delivery    Delivery Date and Time: 10/14/2018 @ 1631    Estimated Blood Loss:  468FN           Complications:  None    Brief description of labor:  Please see additional notes for details of the labor course  Description of Procedure: With maternal expulsive efforts, the patient delivered a viable male   The position at delivery was occiput anterior  The fetal vertex delivered spontaneously  There was no nuchal cord  The anterior shoulder delivered atraumatically with maternal expulsive forces and the assistance of gentle downward traction  The posterior shoulder delivered with maternal expulsive forces and the assistance of gentle upward traction  The remainder of the fetus delivered spontaneously  Upon delivery, the  was placed on the mother's abdomen and the umbilical cord was clamped and cut  The  resuscitator evaluated the  at bedside  Arterial and venous cord blood gases and cord blood were collected for analysis  These were sent to the lab  Active management of the third stage of labor included uterine massage and administration of IV Pitocin at 250 wally-units per minute  The uterus was noted to contract down well  The placenta delivered spontaneously and was noted to have a centrally-inserted 3-vessel cord  It was sent to pathology  The vagina, cervix, perineum, and rectum were inspected and there was noted to be no lacerations  Hemostasis was confirmed at the conclusion of this procedure      At the conclusion of the delivery, all needle, sponge, and instrument counts were noted to be correct  The patient tolerated the procedure well and was allowed to recover in labor and delivery room  Dr Sixto Soria MD was present

## 2018-10-14 NOTE — ANESTHESIA PROCEDURE NOTES
Epidural Block    Patient location during procedure: OB  Start time: 10/14/2018 4:03 AM  Reason for block: at surgeon's request  Staffing  Anesthesiologist: Reggie Pappas  Performed: anesthesiologist   Preanesthetic Checklist  Completed: patient identified, site marked, surgical consent, pre-op evaluation, timeout performed, IV checked, risks and benefits discussed and monitors and equipment checked  Epidural  Patient position: sitting  Prep: Betadine  Patient monitoring: heart rate, continuous pulse ox and frequent blood pressure checks  Approach: midline  Location: lumbar (1-5)  Injection technique: SYLVESTER saline  Needle  Needle type: Tuohy   Needle gauge: 18 G  Catheter type: side hole  Catheter size: 20 G  Catheter at skin depth: 9 5 cm  Test dose: negative and lidocaine 1 5% with epinephrine 1-to-200,000negative aspiration for CSF, negative aspiration for heme and no paresthesia on injection  patient tolerated the procedure well with no immediate complications

## 2018-10-14 NOTE — OB LABOR/OXYTOCIN SAFETY PROGRESS
Labor Progress Note - Dereck Johan 21 y o  female MRN: [id number]    Unit/Bed#: L&D 322-01 Encounter: [id number]    Obstetric History       T1      L1     SAB0   TAB0   Ectopic0   Multiple0   Live Births1      Gestational Age: 34w2d     Contraction Frequency (minutes): 3-6  Contraction Quality: Mild  Tachysystole: No   Dilation: 3        Effacement (%): 70  Station: -2  Baseline Rate: 125 bpm  Fetal Heart Rate: 145 BPM  FHR Category: Category I          Notes/comments:     Patient made slow change from 2-3 to 3cm but is effacing  Pitocin was withheld earlier due to a less reactive strip  Will start low dose pitocin now    Comfortable with epidural             Lucinda Hinson MD 10/14/2018 5:32 AM

## 2018-10-14 NOTE — DISCHARGE INSTRUCTIONS
Vaginal Delivery   WHAT YOU SHOULD KNOW:   A vaginal delivery is the birth of your baby through your vagina (birth canal)  AFTER YOU LEAVE:   Medicines:  · NSAIDs  help decrease swelling and pain or fever  This medicine is available with or without a doctor's order  NSAIDs can cause stomach bleeding or kidney problems in certain people  If you take blood thinner medicine, always ask your healthcare provider if NSAIDs are safe for you  Always read the medicine label and follow directions  · Take your medicine as directed  Call your healthcare provider if you think your medicine is not helping or if you have side effects  Tell him if you are allergic to any medicine  Keep a list of the medicines, vitamins, and herbs you take  Include the amounts, and when and why you take them  Bring the list or the pill bottles to follow-up visits  Carry your medicine list with you in case of an emergency  Follow up with your primary healthcare provider:  Most women need to return 6 weeks after a vaginal delivery  Ask about how to care for your wounds or stitches  Write down your questions so you remember to ask them during your visits  Activity:  Rest as much as possible  Try to keep all activities short  You may be able to do some exercise soon after you have your baby  Talk with your primary healthcare provider before you start exercising  If you work outside the home, ask when you can return to your job  Kegel exercises:  Kegel exercises may help your vaginal and rectal muscles heal faster  You can do Kegel exercises by tightening and relaxing the muscles around your vagina  Kegel exercises help make the muscles stronger  Breast care:  When your milk comes in, your breasts may feel full and hard  Ask how to care for your breasts, even if you are not breastfeeding  Constipation:  Do not try to push the bowel movement out if it is too hard   High-fiber foods, extra liquids, and regular exercise can help you prevent constipation  Examples of high-fiber foods are fruit and bran  Prune juice and water are good liquids to drink  Regular exercise helps your digestive system work  You may also be told to take over-the-counter fiber and stool softener medicines  Take these items as directed  Hemorrhoids:  Pregnancy can cause severe hemorrhoids  You may have rectal pain because of the hemorrhoids  Ask how to prevent or treat hemorrhoids  Perineum care: Your perineum is the area between your vagina and anus  Keep the area clean and dry to help it heal and to prevent infection  Wash the area gently with soap and water when you bathe or shower  Rinse your perineum with warm water when you use the toilet  Your primary healthcare provider may suggest you use a warm sitz bath to help decrease pain  A sitz bath is a bathtub or basin filled to hip level  Stay in the sitz bath for 20 to 30 minutes, or as directed  Vaginal discharge: You will have vaginal discharge, called lochia, after your delivery  The lochia is bright red the first day or two after the birth  By the fourth day, the amount decreases, and it turns red-brown  Use a sanitary pad rather than a tampon to prevent a vaginal infection  It is normal to have lochia up to 8 weeks after your baby is born  Monthly periods: Your period may start again within 7 to 12 weeks after your baby is born  If you are breastfeeding, it may take longer for your period to start again  You can still get pregnant again even though you do not have your monthly period  Talk with your primary healthcare provider about a birth control method that will be good for you if you do not want to get pregnant  Mood changes: Many new mothers have some kind of mood changes after delivery  Some of these changes occur because of lack of sleep, hormone changes, and caring for a new baby  Some mood changes can be more serious, such as postpartum depression   Talk with your primary healthcare provider if you feel unable to care for yourself or your baby  Sexual activity:  You may need to avoid sex for 6 to 7 weeks after you have your baby  You may notice you have a decreased desire for sex, or sex may be painful  You may need to use a vaginal lubricant (gel) to help make sex more comfortable  Contact your primary healthcare provider if:   · You have heavy vaginal bleeding that fills 1 or more sanitary pads in 1 hour  · You have a fever  · Your pain does not go away, or gets worse  · The skin between your vagina and rectum is swollen, warm, or red  · You have swollen, hard, or painful breasts  · You feel very sad or depressed  · You feel more tired than usual      · You have questions or concerns about your condition or care  Seek care immediately or call 911 if:   · You have pus or yellow drainage coming from your vagina or wound  · You are urinating very little, or not at all  · Your arm or leg feels warm, tender, and painful  It may look swollen and red  · You feel lightheaded, have sudden and worsening chest pain, or trouble breathing  You may have more pain when you take deep breaths or cough, or you may cough up blood  © 2014 [address] is for End User's use only and may not be sold, redistributed or otherwise used for commercial purposes  All illustrations and images included in CareNotes® are the copyrighted property of [employer]  or [employer]  The above information is an  only  It is not intended as medical advice for individual conditions or treatments  Talk to your doctor, nurse or pharmacist before following any medical regimen to see if it is safe and effective for you  Breastfeeding Your Baby   WHAT YOU NEED TO KNOW:   Breastfeeding is good for your baby and for you  Experts recommend that you feed your baby only breast milk until he is 7 months old   Breastfeeding for the first 6 months can decrease your baby's risk for illnesses  These illnesses include respiratory (lung) infections, allergies, asthma, and stomach problems  Experts also recommend that you continue to breastfeed your baby until he is at least 13 months old after he starts eating solid foods  You can breastfeed longer if you choose to  DISCHARGE INSTRUCTIONS:   Seek care immediately if:   · You are very depressed or have thoughts of hurting your baby  Contact your healthcare provider if:   · Your baby is feeding fewer than 8 times each day  · Your baby is 3or more days old and has fewer than 6 wet diapers each day  · Your baby is 3or more days old and has fewer than 3 to 4 bowel movements each day  · You have nipple pain while feeding or between feedings  · Your nipples look red, dry, cracked, or they have scabs on them  · You feel a lump in your breast that feels tender  · Your breasts become painful and swollen  · Your baby becomes jaundiced (skin and whites of the eyes are turning yellow)  Follow up with your healthcare provider as directed:  Write down your questions so you remember to ask them during your visits  Ways that breastfeeding is good for your baby:   · Breast milk gives your baby the best nutrition  Your breasts will first produce colostrum  Colostrum is rich in antibodies (proteins that protect your baby's immune system)  Breast milk starts to replace colostrum 2 to 4 days after your baby's birth  Breast milk contains the protein, fat, sugar, vitamins, and minerals that your baby needs to grow  Your baby will need a vitamin D supplement soon after birth  Talk to your healthcare provider about the amount and type of vitamin D supplement that is best for your baby  · Breast milk is safe and easy for your baby to digest   Breast milk does not need to be prepared  · Breast milk helps your baby develop a strong immune system  The immune system helps fight off infection   Breast milk has antibodies and other substances that help protect your baby's immune system  This helps protect your baby from allergies and infections   babies have a lower risk for allergy problems such as eczema  Eczema causes red, itchy, swollen skin  Breast milk can also help protect your baby against ear infections, diarrhea, and lung infections  · Breast milk decreases your baby's risk for certain medical conditions   babies may have a lower risk for sudden infant death syndrome (SIDS)  They also have a lower risk of becoming obese or developing diabetes, high cholesterol, or high blood pressure  Ways that breastfeeding is good for you:   · Breastfeeding can help you recover faster after delivery  Breastfeeding right after the delivery of your baby helps stop bleeding from your uterus  It also helps shrink your uterus back to the size it was before your pregnancy  You may be able to lose weight by following a healthy diet if you are breastfeeding  This can happen because of the extra calories your body needs to support breastfeeding  · Breastfeeding may decrease your risk for postpartum depression and certain diseases  Breastfeeding may lower your risk of postpartum depression, and breast and ovarian cancer  Breastfeeding also decreases your risk of type 2 diabetes if you did not have gestational diabetes during pregnancy  Breastfeeding can make your bones stronger  This can help prevent osteoporosis and fractures later in life  · Breastfeeding has other benefits  Breastfeeding is a special experience that can help you bond with your baby  Breastfeeding can save you time and money because you do not have to buy and prepare milk  How often to breastfeed your baby:   · Your baby may let you know when he is ready to breastfeed  He may be more awake and may be moving more  He may put his hands up to his mouth  Crying is normally a late sign that your baby is hungry   You should breastfeed your baby between 6 and 12 times each day  This includes waking to breastfeed him during the night  If your baby is sleeping and it is time to feed, lightly rub your finger across his lips  · Your baby may breastfeed for about 15 to 20 minutes on each breast  Some babies breastfeed for a shorter or longer amount of time  Let your baby feed from each breast until he stops on his own  Breastfeeding a premature baby:   · Some premature babies are not able to eat on their own and need to be fed through a tube  Even if your premature baby cannot feed directly from your breast, he can still be given breast milk  It can be expressed or pumped and then fed to your baby  As your baby grows and develops, he may learn to breastfeed  Express milk after your baby is born so that he can receive antibodies from colostrum  When you express milk from your breasts, you stimulate them to make more milk  · Breast milk is especially good for premature babies who have a very low birthweight  Premature babies are at risk for medical problems because their immune system is not fully formed  The antibodies and nutrients found in colostrum and breast milk can help to protect a premature baby against medical problems  Breast milk helps your baby's eyes, brain, and digestive system develop  When not to breastfeed:   · Your baby has galactosemia, a condition that keeps his body from breaking down galactose (a form of sugar found in breast milk)  · You have active tuberculosis (TB) that has not been treated for at least 2 weeks  · You have HIV or AIDS  · You use illegal drugs, or you drink alcohol often or in large amounts  Prevent breastfeeding problems:   · Work with your healthcare provider or lactation specialist   Write down questions or concerns about breastfeeding to ask during your appointments  · Ask about your medicines  Talk to your healthcare provider before you take any medicines   This includes all prescription and over-the-counter medicines  Some medicines may decrease the amount of breast milk you make  Other medicines may enter your breast milk and affect your baby  · Do not smoke  Nicotine goes into your breast milk  Your baby is exposed to these chemicals through breastfeeding and inhaling cigarette smoke  Smoking can also decrease the amount of breast milk you make  Do not use e-cigarettes or smokeless tobacco in place of cigarettes or to help you quit  They still contain nicotine  Ask your healthcare provider for information if you currently smoke and need help quitting  · Limit or do not drink alcohol  Alcohol passes from your breast milk to your baby  If you choose to drink alcohol, breastfeed your baby before you drink alcohol  Do not breastfeed your baby for at least 2 hours after you have 1 drink  One drink of alcohol is 12 ounces of beer, 4 ounces of wine, or 1½ ounces of liquor  Care for yourself while you are breastfeeding:   · Get enough rest   You may have a hard time resting while you are caring for your   Ask for help from family and friends so that you can get the rest you need  · Eat healthy foods  A healthy meal plan can keep you healthy and support milk production  You need extra calories each day while you are breastfeeding  Your healthcare provider may also have you take vitamins, including pregnancy vitamins and vitamin D  Talk with him before you take any vitamins or supplements  · Manage stress  Increased stress can decrease the amount of breast milk you make  Relaxation can help decrease your stress and help you feel better  Deep breathing, meditating, and listening to music also may help you cope with stress  Talk to your healthcare provider about other ways to manage stress    For support and more information about breastfeeding your baby:   · American [employer] Pediatrics  [address] , [address]  Phone: 6- 161 - 217-8808  Web Address: http://Golden[employer]UN/  · [facility]  [address]   [facility] Niya Harris  Phone: 1- 799 - 612-7193  Phone: 5- 148 - 061-3179  Web Address: http://[employer] gris helms/  org  2017 [employer] Information is for End User's use only and may not be sold, redistributed or otherwise used for commercial purposes  All illustrations and images included in CareNotes® are the copyrighted property of [employer]  or [employer]  The above information is an  only  It is not intended as medical advice for individual conditions or treatments  Talk to your doctor, nurse or pharmacist before following any medical regimen to see if it is safe and effective for you  Postpartum Bleeding   WHAT YOU NEED TO KNOW:   Postpartum bleeding is vaginal bleeding after childbirth  This bleeding is normal, whether your baby was born vaginally or by   It contains blood and the tissue that lined the inside of your uterus when you were pregnant  DISCHARGE INSTRUCTIONS:   What to expect with postpartum bleeding:  Postpartum bleeding usually lasts at least 10 days, and may last longer than 6 weeks  Your bleeding may range from light (barely staining a pad) to heavy (soaking a pad in 1 hour)  Usually, you have heavier bleeding right after childbirth, which slows over the next few weeks until it stops  The bleeding is red or dark brown with clots for the first 1 to 3 days  It then turns pink for several days, and then becomes a white or yellow discharge until it ends  Follow up with your obstetrician as directed:  Do not have sex until your obstetrician says it is okay  Write down your questions so you remember to ask them during your visits  Contact your healthcare provider or obstetrician if:   · Your bleeding increases, or you have heavy bleeding that soaks a pad in 1 hour for 2 hours in a row  · You pass large blood clots      · You are breathing faster than normal, or your heart is beating faster than normal     · You are urinating less than usual, or not at all  · You feel dizzy  · You have questions or concerns about your condition or care  Seek immediate care or call 911 if:   · You are suddenly short of breath and feel lightheaded  · You have sudden chest pain  © 2017 [address] Information is for End User's use only and may not be sold, redistributed or otherwise used for commercial purposes  All illustrations and images included in CareNotes® are the copyrighted property of [employer]  or [employer]  The above information is an  only  It is not intended as medical advice for individual conditions or treatments  Talk to your doctor, nurse or pharmacist before following any medical regimen to see if it is safe and effective for you

## 2018-10-15 LAB — RPR SER QL: NORMAL

## 2018-10-15 NOTE — NURSING NOTE
TC- advised MD of pt's request to leave hospital immediately  Pt stated that she would like to smoke; nicotine patch offered, pt denied  Pt denied any postpartum education, and states, " its not my first rodeo"  AMA paperwork given to pt by MD  Pt has footwear, but requests to walk out of facility barefoot and without assistance from staff

## 2018-10-15 NOTE — UTILIZATION REVIEW
Notification of Maternity Inpatient Admission/Maternity Inpatient Authorization Request  This is a Notification of Maternity Inpatient Admission/Maternity Inpatient Authorization Request to our facility [address] [place] [address]  Please be advised that this patient is currently in our facility under Inpatient Status  Below you will find the Birth/[place] Summary, Attending Physician and Facilitys information including NPI#  and contact information for the Utilization Review Department where the patient is receiving care services  Facility: [address] [place] [address]  Address: Doris Garcia, [address] [facility]  Phone: [phone] Tax ID: [id number]  NPI: [id number]  Medicare ID: [id number]    Place of Service Code: 24   Place of Service Name: Inpatient Hospital  Presentation Date & Time: 10/12/2018  1:16 PM  Inpatient Admission Date & Time: 10/12/18 1316  Discharge Date & Time: 10/14/2018  8:25 PM   Discharge Disposition (if discharged): Left against medical advice or discontinued care  Attending Physician & NPI: Eneida Mathew, Jose Guadalupe Caraballo [[id number]]  JOSE Crow    Specialty- Obstetrics and Gynecology  [facility] ID- [id number]  Primary Office:  [address] [place] [address], [address]  Phone 1: [phone]  Fax: [phone]  Mother of [place] Information: Bret Nation   MRN: [id number] YOB: 1995   Mother's Admitting Diagnosis: Intrauterine growth restriction (IUGR) affecting care of mother, third trimester, single gestation [O36 5930]  Estimated Date of Delivery: 18  Type of Delivery: Vaginal, Spontaneous Delivery    Delivering clinician: Abebe Donovan   OB History      Para Term  AB Living    2 2 1 1   2    SAB TAB Ectopic Multiple Live Births          0 2        [place] Name & MRN:   Information for the patient's :  Guanaco Rodriguez 51  Sachi Sink) [[id number]]     [place] Delivery Information:  Sex: male  Delivered 10/14/2018 4:31 PM by Vaginal, Spontaneous Delivery; Gestational Age: 35w2d    [place] Measurements:  Weight: 3 lb 13 9 oz (1755 g); Height:      APGAR 1 minute 5 minutes 10 minutes   Totals: 8 8      Thank you,  [address] [facility] Review Department  Phone: Azalea Calabrese  - [phone]; Fax [phone]  ATTENTION: Please call with any questions or concerns to [phone]  and carefully follow the prompts so that you are directed to the right person  Send all requests for admission clinical reviews, approved or denied determinations and any other requests to fax [phone]   All voicemails are confidential

## 2018-10-15 NOTE — PROGRESS NOTES
Called to patient's room as patient stated to nurse that she wanted to leave immediately  She states that she has not been with here 3year-old son for several days since admission and is currently with her mom and ex-boyfriend  She states she lives 1 hour away and cannot wait any longer patient  She also states her mom has to go to work tomorrow and will not be able to take care of the son  I explained to the patient that most postpartum others stay for a minimum of 24 hours in the event that there is a postpartum complication  I reviewed the possible complications with the patient, such as postpartum hemorrhage, DVT, pulmonary embolism, fever endometritis and the benefits of being hospital for proper treatment of the above versus the risk of the above occurring and not being adequately treated  Patient verbalized her understanding and her intent to still signed out against medical advice  Dr Gerry Deluna was notified and the patient signed the against medical advice form  The risks were again reviewed with the patient, she however declined to stay for any further evaluation      Yaya Rivera DO  PGY-2 OB/GYN   10/14/2018 8:15 PM

## 2018-11-08 ENCOUNTER — OFFICE VISIT (OUTPATIENT)
Dept: FAMILY MEDICINE CLINIC | Facility: CLINIC | Age: 23
End: 2018-11-08
Payer: COMMERCIAL

## 2018-11-08 VITALS
WEIGHT: 156 LBS | HEART RATE: 65 BPM | DIASTOLIC BLOOD PRESSURE: 72 MMHG | OXYGEN SATURATION: 98 % | HEIGHT: 62 IN | BODY MASS INDEX: 28.71 KG/M2 | SYSTOLIC BLOOD PRESSURE: 110 MMHG | TEMPERATURE: 97.6 F | RESPIRATION RATE: 18 BRPM

## 2018-11-08 DIAGNOSIS — R53.83 OTHER FATIGUE: ICD-10-CM

## 2018-11-08 DIAGNOSIS — E06.3 HASHIMOTO'S THYROIDITIS: ICD-10-CM

## 2018-11-08 DIAGNOSIS — E06.3 HASHIMOTO'S THYROIDITIS: Primary | ICD-10-CM

## 2018-11-08 LAB
25(OH)D3 SERPL-MCNC: 23.7 NG/ML (ref 30–100)
BASOPHILS # BLD AUTO: 0.07 THOUSANDS/ΜL (ref 0–0.1)
BASOPHILS NFR BLD AUTO: 1 % (ref 0–1)
EOSINOPHIL # BLD AUTO: 0.21 THOUSAND/ΜL (ref 0–0.61)
EOSINOPHIL NFR BLD AUTO: 2 % (ref 0–6)
ERYTHROCYTE [DISTWIDTH] IN BLOOD BY AUTOMATED COUNT: 12.8 % (ref 11.6–15.1)
HCT VFR BLD AUTO: 39.5 % (ref 34.8–46.1)
HGB BLD-MCNC: 12.7 G/DL (ref 11.5–15.4)
IMM GRANULOCYTES # BLD AUTO: 0.02 THOUSAND/UL (ref 0–0.2)
IMM GRANULOCYTES NFR BLD AUTO: 0 % (ref 0–2)
IRON SERPL-MCNC: 59 UG/DL (ref 50–170)
LYMPHOCYTES # BLD AUTO: 2.55 THOUSANDS/ΜL (ref 0.6–4.47)
LYMPHOCYTES NFR BLD AUTO: 27 % (ref 14–44)
MCH RBC QN AUTO: 29.3 PG (ref 26.8–34.3)
MCHC RBC AUTO-ENTMCNC: 32.2 G/DL (ref 31.4–37.4)
MCV RBC AUTO: 91 FL (ref 82–98)
MONOCYTES # BLD AUTO: 0.71 THOUSAND/ΜL (ref 0.17–1.22)
MONOCYTES NFR BLD AUTO: 7 % (ref 4–12)
NEUTROPHILS # BLD AUTO: 6.08 THOUSANDS/ΜL (ref 1.85–7.62)
NEUTS SEG NFR BLD AUTO: 63 % (ref 43–75)
NRBC BLD AUTO-RTO: 0 /100 WBCS
PLATELET # BLD AUTO: 439 THOUSANDS/UL (ref 149–390)
PMV BLD AUTO: 10.4 FL (ref 8.9–12.7)
RBC # BLD AUTO: 4.34 MILLION/UL (ref 3.81–5.12)
TSH SERPL DL<=0.05 MIU/L-ACNC: 1.38 UIU/ML (ref 0.36–3.74)
WBC # BLD AUTO: 9.64 THOUSAND/UL (ref 4.31–10.16)

## 2018-11-08 PROCEDURE — 82306 VITAMIN D 25 HYDROXY: CPT | Performed by: NURSE PRACTITIONER

## 2018-11-08 PROCEDURE — T1015 CLINIC SERVICE: HCPCS | Performed by: FAMILY MEDICINE

## 2018-11-08 PROCEDURE — 84443 ASSAY THYROID STIM HORMONE: CPT | Performed by: NURSE PRACTITIONER

## 2018-11-08 PROCEDURE — 83540 ASSAY OF IRON: CPT | Performed by: NURSE PRACTITIONER

## 2018-11-08 PROCEDURE — 85025 COMPLETE CBC W/AUTO DIFF WBC: CPT | Performed by: NURSE PRACTITIONER

## 2018-11-08 NOTE — PROGRESS NOTES
OFFICE VISIT  Glennette Felty 21 y o  female MRN: [id number]      Assessment / Plan:  Diagnoses and all orders for this visit:    Hashimoto's thyroiditis  -     TSH, 3rd generation with Free T4 reflex; Future    Other fatigue  -     Iron; Future  -     Vitamin D 25 hydroxy; Future  -     CBC and differential; Future          Reason For Visit / Chief Complaint  Chief Complaint   Patient presents with    Follow-up     thyroid         HPI:  Glennette Felty is a 21 y o  female who presents today for routine followup  She reports feeling tired, run down  She has a two year old son and 4 week infant  She reports no complications, signed self out of hospital one day after delivery  [facility] stayed in NICU 3 weeks, est with Dr Blanka Andrew  She has known thyroid, last level within normal limits        Historical Information   Past Medical History:   Diagnosis Date    Anxiety     Asthma     Depression     Disease of thyroid gland     Mood disorder (HCC)     Pregnant     Psychiatric disorder     depression,anxiety, mood disorder     Past Surgical History:   Procedure Laterality Date    APPENDECTOMY      FRACTURE SURGERY Left     wrist     Social History   History   Alcohol Use No     History   Drug Use No     History   Smoking Status    Current Some Day Smoker    Packs/day: 1 00    Years: 5 00    Types: Cigarettes   Smokeless Tobacco    Never Used     Family History   Problem Relation Age of Onset    No Known Problems Mother     Thyroid disease Father     Hypertension Brother     Hypertension Maternal Grandmother     Thyroid disease Paternal Grandmother        Meds/Allergies   Allergies   Allergen Reactions    Abilify [Aripiprazole] Other (See Comments) and Seizures     hallucination    Tessalon [Benzonatate] Anxiety     Other reaction(s): Psych complications  hallucinations    Zithromax [Azithromycin] Rash and Hallucinations       Meds:    Current Outpatient Prescriptions:     albuterol (VENTOLIN HFA) 90 mcg/act inhaler, Inhale 2 puffs every 6 (six) hours as needed for wheezing, Disp: 1 Inhaler, Rfl: 0    levalbuterol (XOPENEX) 1 25 mg/0 5 mL nebulizer solution, Take 0 5 mL (1 25 mg total) by nebulization 3 (three) times a day, Disp: 1 each, Rfl: 0    levothyroxine 75 mcg tablet, Take 1 tablet (75 mcg total) by mouth daily before breakfast, Disp: 30 tablet, Rfl: 3    ranitidine (ZANTAC) 150 MG capsule, Take 1 capsule (150 mg total) by mouth daily for 14 days, Disp: 14 capsule, Rfl: 0      REVIEW OF SYSTEMS  Review of Systems   Constitutional: Positive for fatigue  Negative for chills and fever  HENT: Negative for congestion, ear discharge, ear pain, sore throat, trouble swallowing and voice change  Eyes: Negative for pain and redness  Respiratory: Negative for cough, chest tightness, shortness of breath and wheezing  Gastrointestinal: Negative for abdominal pain, blood in stool, constipation, diarrhea, nausea and vomiting  Endocrine: Negative for cold intolerance, heat intolerance, polydipsia, polyphagia and polyuria  Genitourinary: Negative for decreased urine volume, dysuria, frequency and urgency  Musculoskeletal: Negative for arthralgias, back pain, myalgias and neck pain  Skin: Negative for color change and rash  Neurological: Negative for dizziness, syncope, weakness, light-headedness, numbness and headaches  Psychiatric/Behavioral: Negative for sleep disturbance and suicidal ideas  The patient is not nervous/anxious  Current Vitals:   Blood Pressure: 110/72 (11/08/18 1022)  Pulse: 65 (11/08/18 1022)  Temperature: 97 6 °F (36 4 °C) (11/08/18 1022)  Respirations: 18 (11/08/18 1022)  Height: 5' 2" (157 5 cm) (11/08/18 1022)  Weight - Scale: 70 8 kg (156 lb) (11/08/18 1022)  SpO2: 98 % (11/08/18 1022)  [unfilled]    PHYSICAL EXAMS:  Physical Exam   Constitutional: She is oriented to person, place, and time  She appears well-developed and well-nourished  HENT:   Head: Normocephalic  Right Ear: External ear normal    Left Ear: External ear normal    Mouth/Throat: Oropharynx is clear and moist    Eyes: Pupils are equal, round, and reactive to light  Conjunctivae are normal    Neck: Neck supple  Cardiovascular: Normal rate and regular rhythm  Pulmonary/Chest: Effort normal and breath sounds normal    Abdominal: Soft  Bowel sounds are normal  She exhibits no distension  There is no tenderness  Musculoskeletal: Normal range of motion  Neurological: She is alert and oriented to person, place, and time  Skin: Skin is warm and dry  Psychiatric: She has a normal mood and affect  Follow up at this office in one week     Counseling / Coordination of Care  Total floor / unit time spent today 20 minutes  Greater than 50% of total time was spent with the patient and / or family counseling and / or coordination of care

## 2018-11-09 DIAGNOSIS — E55.9 VITAMIN D DEFICIENCY: Primary | ICD-10-CM

## 2018-11-09 RX ORDER — MELATONIN
1000 DAILY
Qty: 90 TABLET | Refills: 0 | Status: SHIPPED | OUTPATIENT
Start: 2018-11-09 | End: 2019-08-08

## 2018-11-09 NOTE — TELEPHONE ENCOUNTER
Patient is aware of results  Sent down VIT D to the pharmacy     ----- Message from Marybeth Hammans sent at 11/9/2018  9:00 AM EST -----  Can you let her know her TSH was WNL   She can take vit d 1000 units daily, all other labs were normal

## 2018-12-16 ENCOUNTER — HOSPITAL ENCOUNTER (EMERGENCY)
Facility: HOSPITAL | Age: 23
Discharge: LEFT BEFORE DISCHARGE | End: 2018-12-16
Attending: EMERGENCY MEDICINE
Payer: COMMERCIAL

## 2018-12-16 VITALS
SYSTOLIC BLOOD PRESSURE: 92 MMHG | WEIGHT: 159.6 LBS | HEIGHT: 62 IN | OXYGEN SATURATION: 99 % | TEMPERATURE: 97.5 F | RESPIRATION RATE: 18 BRPM | DIASTOLIC BLOOD PRESSURE: 52 MMHG | BODY MASS INDEX: 29.37 KG/M2 | HEART RATE: 75 BPM

## 2018-12-16 DIAGNOSIS — R51.9 ACUTE NONINTRACTABLE HEADACHE, UNSPECIFIED HEADACHE TYPE: Primary | ICD-10-CM

## 2018-12-16 LAB
B-HCG SERPL-ACNC: <2 MIU/ML
EXT PREG TEST URINE: NEGATIVE
HOLD SPECIMEN: NORMAL

## 2018-12-16 PROCEDURE — 81025 URINE PREGNANCY TEST: CPT | Performed by: EMERGENCY MEDICINE

## 2018-12-16 PROCEDURE — 96360 HYDRATION IV INFUSION INIT: CPT

## 2018-12-16 PROCEDURE — 99284 EMERGENCY DEPT VISIT MOD MDM: CPT

## 2018-12-16 PROCEDURE — 36415 COLL VENOUS BLD VENIPUNCTURE: CPT

## 2018-12-16 PROCEDURE — 84702 CHORIONIC GONADOTROPIN TEST: CPT | Performed by: EMERGENCY MEDICINE

## 2018-12-16 RX ORDER — DIPHENHYDRAMINE HYDROCHLORIDE 50 MG/ML
25 INJECTION INTRAMUSCULAR; INTRAVENOUS ONCE
Status: DISCONTINUED | OUTPATIENT
Start: 2018-12-16 | End: 2018-12-16 | Stop reason: HOSPADM

## 2018-12-16 RX ORDER — ACETAMINOPHEN 325 MG/1
650 TABLET ORAL EVERY 6 HOURS PRN
Status: DISCONTINUED | OUTPATIENT
Start: 2018-12-16 | End: 2018-12-16 | Stop reason: HOSPADM

## 2018-12-16 RX ORDER — ACETAMINOPHEN 325 MG/1
650 TABLET ORAL EVERY 6 HOURS PRN
Qty: 1 BOTTLE | Refills: 0 | Status: SHIPPED | OUTPATIENT
Start: 2018-12-16 | End: 2019-08-08

## 2018-12-16 RX ORDER — MAGNESIUM SULFATE HEPTAHYDRATE 40 MG/ML
2 INJECTION, SOLUTION INTRAVENOUS ONCE
Status: DISCONTINUED | OUTPATIENT
Start: 2018-12-16 | End: 2018-12-16 | Stop reason: HOSPADM

## 2018-12-16 RX ORDER — METOCLOPRAMIDE HYDROCHLORIDE 5 MG/ML
10 INJECTION INTRAMUSCULAR; INTRAVENOUS ONCE
Status: DISCONTINUED | OUTPATIENT
Start: 2018-12-16 | End: 2018-12-16 | Stop reason: HOSPADM

## 2018-12-16 RX ADMIN — SODIUM CHLORIDE 1000 ML: 0.9 INJECTION, SOLUTION INTRAVENOUS at 18:50

## 2018-12-16 RX ADMIN — ACETAMINOPHEN 650 MG: 325 TABLET, FILM COATED ORAL at 18:51

## 2018-12-17 NOTE — DISCHARGE INSTRUCTIONS
HEADACHE DANGER SIGNS -- The vast majority of headaches are not life threatening  You should seek medical attention immediately if your headache:  ?Comes on suddenly, becomes severe within a few seconds or minutes, or that could be described as "the worst headache of your life"  ? Is severe and occurs with a fever or stiff neck  ? Occurs with a seizure, personality changes, confusion, or passing out  ? Begins quickly after strenuous exercise or minor injury  ? Is new and occurs with weakness, numbness, or difficulty seeing  While migraine headaches can sometimes cause these symptoms, you should be evaluated urgently the first time these symptoms appear  If you have persistent or frequent headaches, headaches that interfere with normal activities, or your headaches become more painful, you should see a healthcare provider during normal office hours  Against Medical Advice   WHAT YOU NEED TO KNOW:   Discharge against medical advice means that you choose to leave the hospital before your healthcare provider recommends that you do  Your healthcare provider may still need to diagnose or treat your condition or your treatment may not be complete  DISCHARGE INSTRUCTIONS:   Risks:  Risks of leaving the hospital before your healthcare providers recommend include the following:  · Your condition may cause other health problems if not treated properly  · You may need to be admitted to the hospital again for the same condition  · Your condition could become life-threatening  Follow up with your healthcare provider as directed:  Write down your questions so you remember to ask them during your visits  © 2017 [employer] Information is for End User's use only and may not be sold, redistributed or otherwise used for commercial purposes  All illustrations and images included in CareNotes® are the copyrighted property of [employer]  or [employer]    The above information is an  only  It is not intended as medical advice for individual conditions or treatments  Talk to your doctor, nurse or pharmacist before following any medical regimen to see if it is safe and effective for you  Acute Headache   WHAT YOU NEED TO KNOW:   An acute headache is pain or discomfort that starts suddenly and gets worse quickly  You may have an acute headache only when you feel stress or eat certain foods  Other acute headache pain can happen every day, and sometimes several times a day  DISCHARGE INSTRUCTIONS:   Return to the emergency department if:   · You have severe pain  · You have numbness or weakness on one side of your face or body  · You have a headache that occurs after a blow to the head, a fall, or other trauma  · You have a headache, are forgetful or confused, or have trouble speaking  · You have a headache, stiff neck, and a fever  Contact your healthcare provider if:   · You have a constant headache and are vomiting  · You have a headache each day that does not get better, even after treatment  · You have changes in your headaches, or new symptoms that occur when you have a headache  · You have questions or concerns about your condition or care  Medicines: You may need any of the following:  · Prescription pain medicine  may be given  The medicine your healthcare provider recommends will depend on the kind of headaches you have  You will need to take prescription headache medicines as directed to prevent a problem called rebound headache  These headaches happen with regular use of pain relievers for headache disorders  · NSAIDs , such as ibuprofen, help decrease swelling, pain, and fever  This medicine is available with or without a doctor's order  NSAIDs can cause stomach bleeding or kidney problems in certain people  If you take blood thinner medicine, always ask your healthcare provider if NSAIDs are safe for you   Always read the medicine label and follow directions  · Acetaminophen  decreases pain and fever  It is available without a doctor's order  Ask how much to take and how often to take it  Follow directions  Read the labels of all other medicines you are using to see if they also contain acetaminophen, or ask your doctor or pharmacist  Acetaminophen can cause liver damage if not taken correctly  Do not use more than 3 grams (3,000 milligrams) total of acetaminophen in one day  · Antidepressants  may be given for some kinds of headaches  · Take your medicine as directed  Contact your healthcare provider if you think your medicine is not helping or if you have side effects  Tell him or her if you are allergic to any medicine  Keep a list of the medicines, vitamins, and herbs you take  Include the amounts, and when and why you take them  Bring the list or the pill bottles to follow-up visits  Carry your medicine list with you in case of an emergency  Manage your symptoms:   · Apply heat or ice  on the headache area  Use a heat or ice pack  For an ice pack, you can also put crushed ice in a plastic bag  Cover the pack or bag with a towel before you apply it to your skin  Ice and heat both help decrease pain, and heat also helps decrease muscle spasms  Apply heat for 20 to 30 minutes every 2 hours  Apply ice for 15 to 20 minutes every hour  Apply heat or ice for as long and for as many days as directed  You may alternate heat and ice  · Relax your muscles  Lie down in a comfortable position and close your eyes  Relax your muscles slowly  Start at your toes and work your way up your body  · Keep a record of your headaches  Write down when your headaches start and stop  Include your symptoms and what you were doing when the headache began  Record what you ate or drank for 24 hours before the headache started  Describe the pain and where it hurts  Keep track of what you did to treat your headache and if it worked    Prevent an acute headache:   · Avoid anything that triggers an acute headache  Examples include exposure to chemicals, going to high altitude, or not getting enough sleep  Create a regular sleep routine  Go to sleep at the same time and wake up at the same time each day  Do not use electronic devices before bedtime  These may trigger a headache or prevent you from sleeping well  · Do not smoke  Nicotine and other chemicals in cigarettes and cigars can trigger an acute headache or make it worse  Ask your healthcare provider for information if you currently smoke and need help to quit  E-cigarettes or smokeless tobacco still contain nicotine  Talk to your healthcare provider before you use these products  · Limit alcohol as directed  Alcohol can trigger an acute headache or make it worse  If you have cluster headaches, do not drink alcohol during an episode  For other types of headaches, ask your healthcare provider if it is safe for you to drink alcohol  Ask how much is safe for you to drink, and how often  · Exercise as directed  Exercise can reduce tension and help with headache pain  Aim for 30 minutes of physical activity on most days of the week  Your healthcare provider can help you create an exercise plan  · Eat a variety of healthy foods  Healthy foods include fruits, vegetables, low-fat dairy products, lean meats, fish, whole grains, and cooked beans  Your healthcare provider or dietitian can help you create meals plans if you need to avoid foods that trigger headaches  Follow up with your healthcare provider as directed:  Bring your headache record with you when you see your healthcare provider  Write down your questions so you remember to ask them during your visits  © 2017 [employer] Information is for End User's use only and may not be sold, redistributed or otherwise used for commercial purposes   All illustrations and images included in CareNotes® are the copyrighted property of A  [employer]  or [employer]  The above information is an  only  It is not intended as medical advice for individual conditions or treatments  Talk to your doctor, nurse or pharmacist before following any medical regimen to see if it is safe and effective for you

## 2018-12-19 ENCOUNTER — OFFICE VISIT (OUTPATIENT)
Dept: FAMILY MEDICINE CLINIC | Facility: CLINIC | Age: 23
End: 2018-12-19
Payer: COMMERCIAL

## 2018-12-19 DIAGNOSIS — G43.909 MIGRAINE WITHOUT STATUS MIGRAINOSUS, NOT INTRACTABLE, UNSPECIFIED MIGRAINE TYPE: Primary | ICD-10-CM

## 2018-12-19 PROCEDURE — T1015 CLINIC SERVICE: HCPCS | Performed by: FAMILY MEDICINE

## 2018-12-19 RX ORDER — PREDNISONE 10 MG/1
TABLET ORAL
Qty: 18 TABLET | Refills: 0 | Status: SHIPPED | OUTPATIENT
Start: 2018-12-19 | End: 2019-06-19 | Stop reason: ALTCHOICE

## 2018-12-19 RX ORDER — BUTALBITAL, ACETAMINOPHEN AND CAFFEINE 50; 325; 40 MG/1; MG/1; MG/1
1 TABLET ORAL EVERY 4 HOURS PRN
Qty: 15 TABLET | Refills: 0 | Status: SHIPPED | OUTPATIENT
Start: 2018-12-19 | End: 2019-06-19

## 2018-12-19 NOTE — PROGRESS NOTES
OFFICE VISIT  Ean Almeida 21 y o  female MRN: [id number]      Assessment / Plan:  Diagnoses and all orders for this visit:    Migraine without status migrainosus, not intractable, unspecified migraine type  -     butalbital-acetaminophen-caffeine (FIORICET,ESGIC) -40 mg per tablet; Take 1 tablet by mouth every 4 (four) hours as needed for headaches  -     predniSONE 10 mg tablet; 30 mg by mouth daily for 3 days, then 20 mg by mouth daily for 3 days, then 10 mg by mouth daily for 3 days, then stop          Reason For Visit / Chief Complaint  Chief Complaint   Patient presents with    Migraine     She states she had her first migraine  HPI:  Ean Almeida is a 21 y o  female who presents today for followup  Pt was in ED on 12/16  She reports leaving AMA  She reports having a migraine, with nausea  She reports the intensity has decreased since three days ago  She has tried advil without relief  Headache is described as pounding       Historical Information   Past Medical History:   Diagnosis Date    Anxiety     Asthma     Depression     Disease of thyroid gland     Mood disorder (HCC)     Pregnant     Psychiatric disorder     depression,anxiety, mood disorder     Past Surgical History:   Procedure Laterality Date    APPENDECTOMY      FRACTURE SURGERY Left     wrist     Social History   History   Alcohol Use No     History   Drug Use No     History   Smoking Status    Current Some Day Smoker    Packs/day: 1 00    Years: 5 00    Types: Cigarettes   Smokeless Tobacco    Never Used     Family History   Problem Relation Age of Onset    No Known Problems Mother     Thyroid disease Father     Hypertension Brother     Hypertension Maternal Grandmother     Thyroid disease Paternal Grandmother        Meds/Allergies   Allergies   Allergen Reactions    Abilify [Aripiprazole] Other (See Comments), Seizures and Confusion     hallucinaTIONS  hallucination    Tessalon [Benzonatate] Anxiety     Other reaction(s): Psych complications  hallucinations  Other reaction(s): Psych complications  hallucinations    Zithromax [Azithromycin] Rash, Hallucinations and Hives       Meds:    Current Outpatient Prescriptions:     acetaminophen (TYLENOL) 325 mg tablet, Take 2 tablets (650 mg total) by mouth every 6 (six) hours as needed (pain, fever), Disp: 1 Bottle, Rfl: 0    albuterol (VENTOLIN HFA) 90 mcg/act inhaler, Inhale 2 puffs every 6 (six) hours as needed for wheezing, Disp: 1 Inhaler, Rfl: 0    butalbital-acetaminophen-caffeine (FIORICET,ESGIC) -40 mg per tablet, Take 1 tablet by mouth every 4 (four) hours as needed for headaches, Disp: 15 tablet, Rfl: 0    cholecalciferol (VITAMIN D3) 1,000 units tablet, Take 1 tablet (1,000 Units total) by mouth daily, Disp: 90 tablet, Rfl: 0    levalbuterol (XOPENEX) 1 25 mg/0 5 mL nebulizer solution, Take 0 5 mL (1 25 mg total) by nebulization 3 (three) times a day, Disp: 1 each, Rfl: 0    levothyroxine 75 mcg tablet, Take 1 tablet (75 mcg total) by mouth daily before breakfast, Disp: 30 tablet, Rfl: 3    predniSONE 10 mg tablet, 30 mg by mouth daily for 3 days, then 20 mg by mouth daily for 3 days, then 10 mg by mouth daily for 3 days, then stop, Disp: 18 tablet, Rfl: 0    ranitidine (ZANTAC) 150 MG capsule, Take 1 capsule (150 mg total) by mouth daily for 14 days, Disp: 14 capsule, Rfl: 0      REVIEW OF SYSTEMS  Review of Systems   Constitutional: Negative for chills, fatigue and fever  HENT: Negative for congestion, ear discharge, ear pain, sore throat, trouble swallowing and voice change  Eyes: Negative for pain and redness  Respiratory: Positive for wheezing  Negative for cough, chest tightness and shortness of breath  Gastrointestinal: Negative for abdominal pain, blood in stool, constipation, diarrhea, nausea and vomiting  Endocrine: Negative for cold intolerance, heat intolerance, polydipsia, polyphagia and polyuria  Genitourinary: Negative for decreased urine volume, dysuria, frequency and urgency  Musculoskeletal: Negative for arthralgias, back pain, myalgias and neck pain  Skin: Negative for color change and rash  Neurological: Positive for headaches  Negative for dizziness, syncope, weakness, light-headedness and numbness  Psychiatric/Behavioral: Negative for sleep disturbance and suicidal ideas  The patient is not nervous/anxious  Current Vitals:      [unfilled]    PHYSICAL EXAMS:  Physical Exam   Constitutional: She is oriented to person, place, and time  She appears well-developed and well-nourished  HENT:   Head: Normocephalic  Right Ear: External ear normal    Left Ear: External ear normal    Mouth/Throat: Oropharynx is clear and moist    Eyes: Pupils are equal, round, and reactive to light  Conjunctivae are normal    Neck: Neck supple  Cardiovascular: Normal rate and regular rhythm  Pulmonary/Chest: Effort normal  She has wheezes  Abdominal: Soft  Bowel sounds are normal  She exhibits no distension  There is no tenderness  Musculoskeletal: Normal range of motion  Neurological: She is alert and oriented to person, place, and time  Skin: Skin is warm and dry  Psychiatric: She has a normal mood and affect  Follow up at this office in if not better     Counseling / Coordination of Care  Total floor / unit time spent today 20 minutes  Greater than 50% of total time was spent with the patient and / or family counseling and / or coordination of care

## 2018-12-22 NOTE — ED PROVIDER NOTES
History  Chief Complaint   Patient presents with    Headache     patient started with bilateral ear pain around 1400, developed a "pounding" headache and began vomiting had about 5 episodes in 30 min and feels "shakey"      Patient: Glennette Felty  23 y o /female  YOB: 1995  MRN: [id number]  PCP: Steffen Russell  Date of evaluation: 12/16/2018    (N B   [address] may have been used in the preparation of this document  Occasional wrong word or "sound-alike" substitutions may have occurred due to the inherent limitations of voice recognition software  Interpretation should be guided by context )    Chief complaint is headache        History provided by:  Patient  Headache - New Onset or New Symptoms   Pain location:  R parietal, L parietal, L temporal and R temporal  Radiates to:  Does not radiate  Onset quality:  Gradual  Duration: hours  Progression:  Unchanged  Chronicity:  New  Similar to prior headaches: no    Relieved by:  Nothing  Worsened by:  Light and sound  Associated symptoms: visual change (right eye, resolved)    Associated symptoms: no abdominal pain, no back pain, no blurred vision, no congestion, no cough, no diarrhea, no dizziness, no eye pain, no facial pain, no fever, no focal weakness, no hearing loss, no loss of balance, no nausea, no near-syncope, no neck pain, no neck stiffness, no numbness, no paresthesias, no seizures, no syncope, no vomiting and no weakness  Ear pain: bilateral         Prior to Admission Medications   Prescriptions Last Dose Informant Patient Reported? Taking?    albuterol (VENTOLIN HFA) 90 mcg/act inhaler  Self No No   Sig: Inhale 2 puffs every 6 (six) hours as needed for wheezing   cholecalciferol (VITAMIN D3) 1,000 units tablet   No No   Sig: Take 1 tablet (1,000 Units total) by mouth daily   levalbuterol (XOPENEX) 1 25 mg/0 5 mL nebulizer solution  Self No No   Sig: Take 0 5 mL (1 25 mg total) by nebulization 3 (three) times a day   levothyroxine 75 mcg tablet  Self No No   Sig: Take 1 tablet (75 mcg total) by mouth daily before breakfast   ranitidine (ZANTAC) 150 MG capsule   No No   Sig: Take 1 capsule (150 mg total) by mouth daily for 14 days      Facility-Administered Medications: None       Past Medical History:   Diagnosis Date    Anxiety     Asthma     Depression     Disease of thyroid gland     Mood disorder (HCC)     Pregnant     Psychiatric disorder     depression,anxiety, mood disorder       Past Surgical History:   Procedure Laterality Date    APPENDECTOMY      FRACTURE SURGERY Left     wrist       Family History   Problem Relation Age of Onset    No Known Problems Mother     Thyroid disease Father     Hypertension Brother     Hypertension Maternal Grandmother     Thyroid disease Paternal Grandmother      I have reviewed and agree with the history as documented  Social History   Substance Use Topics    Smoking status: Current Some Day Smoker     Packs/day: 1 00     Years: 5 00     Types: Cigarettes    Smokeless tobacco: Never Used    Alcohol use No        Review of Systems   Constitutional: Negative for chills and fever  HENT: Negative for congestion, hearing loss, trouble swallowing and voice change  Ear pain: bilateral     Eyes: Negative for blurred vision, pain, redness and visual disturbance  Respiratory: Negative for cough and shortness of breath  Cardiovascular: Negative for chest pain, palpitations, syncope and near-syncope  Gastrointestinal: Negative for abdominal pain, constipation, diarrhea, nausea and vomiting  Genitourinary: Negative for dysuria, hematuria, vaginal bleeding and vaginal discharge  Musculoskeletal: Negative for back pain, gait problem, neck pain and neck stiffness  Skin: Negative for color change and rash  Neurological: Positive for headaches   Negative for dizziness, tremors, focal weakness, seizures, syncope, facial asymmetry, speech difficulty, weakness, light-headedness, numbness, paresthesias and loss of balance  Psychiatric/Behavioral: Negative for confusion and decreased concentration  The patient is not nervous/anxious  All other systems reviewed and are negative  Physical Exam  Physical Exam   Constitutional: She is oriented to person, place, and time  She appears well-developed and well-nourished  HENT:   Mouth/Throat: Oropharynx is clear and moist and mucous membranes are normal    Voice normal   Eyes: Pupils are equal, round, and reactive to light  EOM are normal    Cardiovascular: Normal rate and regular rhythm  Pulmonary/Chest: Effort normal    Abdominal: Soft  Bowel sounds are normal    Neurological: She is alert and oriented to person, place, and time  She has normal strength  No cranial nerve deficit  She exhibits normal muscle tone  Coordination and gait normal  GCS eye subscore is 4  GCS verbal subscore is 5  GCS motor subscore is 6  Skin: Skin is warm and dry  Psychiatric: She has a normal mood and affect  Her speech is normal and behavior is normal    Nursing note and vitals reviewed        Vital Signs  ED Triage Vitals [12/16/18 1659]   Temperature Pulse Respirations Blood Pressure SpO2   97 5 °F (36 4 °C) 72 18 104/62 99 %      Temp Source Heart Rate Source Patient Position - Orthostatic VS BP Location FiO2 (%)   Oral Monitor Sitting Right arm --      Pain Score       8           Vitals:    12/16/18 1659 12/16/18 1917 12/16/18 1955   BP: 104/62 90/54 92/52   Pulse: 72 75 75   Patient Position - Orthostatic VS: Sitting Lying Lying       Visual Acuity  Visual Acuity      Most Recent Value   L Pupil Size (mm)  3   R Pupil Size (mm)  3          ED Medications  Medications   sodium chloride 0 9 % bolus 1,000 mL (0 mL Intravenous Stopped 12/16/18 1956)       Diagnostic Studies  Results Reviewed     Procedure Component Value Units Date/Time    [place] draw [[id number]] Collected:  12/16/18 1806    Lab Status:  Final result Specimen: Blood Updated:  12/16/18 2002    Narrative: The following orders were created for panel order [place] draw  Procedure                               Abnormality         Status                     ---------                               -----------         ------                     Tereso Rebel Top on TJQW[[id number]]                            Final result               Green / Yellow tube on DOAX[[id number]]                       Final result               Green / Black tube on MDVK[[id number]]                        Final result               Lavender Top 3 ml on HONN[[id number]]                         Final result                 Please view results for these tests on the individual orders  hCG, quantitative [[id number]]  (Normal) Collected:  12/16/18 1902    Lab Status:  Final result Specimen:  Blood Updated:  12/16/18 1927     HCG, Quant <2 mIU/mL     Narrative:          Expected Ranges:     Approximate               Approximate HCG  Gestation age          Concentration ( mIU/mL)  _____________          ______________________   Delmer Speaks                      HCG values  0 2-1                       5-50  1-2                           2-3                         100-5000  3-4                         500-37101  4-5                         1000-61086  5-6                         [id number]  6-8                         19960-131729  8-12                        [id number]    POCT pregnancy, urine [[id number]]  (Normal) Resulted:  12/16/18 1802    Lab Status:  Final result Updated:  12/16/18 1802     EXT PREG TEST UR (Ref: Negative) Negative                 No orders to display              Procedures  Procedures       Phone Contacts  ED Phone Contact    ED Course  ED Course as of Dec 21 2255   Sun Dec 16, 2018   1948 I reviewed with her the differential diagnosis of acute new pattern headache  She understands    She would ordinarily agree to the workup but she feels unable to stay right now due to family constraints  She understands the need to follow up immediately with her PCP  She understands the need to call 911 if any of the danger headache signs listed what happened to her  MDM  Number of Diagnoses or Management Options  Acute nonintractable headache, unspecified headache type:      Amount and/or Complexity of Data Reviewed  Tests in the radiology section of CPT®: ordered  Obtain history from someone other than the patient: yes    Risk of Complications, Morbidity, and/or Mortality  Presenting problems: high  General comments:   Anant Hinojosa Differential diagnosis includes but is not limited to migraine headache, tension headache, more doubt fully intracranial hemorrhage, intracranial mass  I discussed this with the patient who said she understood the risk of permanent disability or death associated with missing these latter diagnoses  She feels that she must leave  I encouraged her to return at any time for further evaluation  Patient Progress  Patient progress: improved    CritCare Time    Disposition  Final diagnoses:   Acute nonintractable headache, unspecified headache type     Time reflects when diagnosis was documented in both MDM as applicable and the Disposition within this note     Time User Action Codes Description Comment    12/16/2018  7:47 PM Andi Henao Add [R51] Acute nonintractable headache, unspecified headache type       ED Disposition     ED Disposition Condition Comment    AMA  Date: 12/16/2018  Patient: Ean Almeida  Admitted: 12/16/2018  5:01 PM  Attending Provider: Gianni Lr MD    Ean Almeida or her authorized caregiver has made the decision for the patient to leave the emergency department against  the advice of her attending physician  She or her authorized caregiver has been informed and understands the inherent risks, including death, permanent disability    She or her authorized caregiver has decided to accept the responsibility for this decisi on  [address] and all necessary parties have been advised that she may return for further evaluation or treatment  Her condition at time of discharge was improved  [address] had current vital signs as follows:  BP 90/54 (BP Loc ation: Right arm)   Pulse 75   Temp 97 5 °F (36 4 °C) (Oral)   Resp 18   Ht 5' 2" (1 575 m)   Wt 72 4 kg (159 lb 9 6 oz)   LMP 02/16/2018         Follow-up Information     Follow up With Specialties Details Why [address], [address] [facility], Nurse Practitioner Call in 1 day WITHOUT FAIL [facility] [place]  [address]  [phone]         Return to ER right away if symptoms worsen  Discharge Medication List as of 12/16/2018  7:48 PM      START taking these medications    Details   acetaminophen (TYLENOL) 325 mg tablet Take 2 tablets (650 mg total) by mouth every 6 (six) hours as needed (pain, fever), Starting Sun 12/16/2018, Print         CONTINUE these medications which have NOT CHANGED    Details   albuterol (VENTOLIN HFA) 90 mcg/act inhaler Inhale 2 puffs every 6 (six) hours as needed for wheezing, Starting Sun 9/30/2018, Normal      cholecalciferol (VITAMIN D3) 1,000 units tablet Take 1 tablet (1,000 Units total) by mouth daily, Starting Fri 11/9/2018, Normal      levalbuterol (XOPENEX) 1 25 mg/0 5 mL nebulizer solution Take 0 5 mL (1 25 mg total) by nebulization 3 (three) times a day, Starting Sun 9/30/2018, Normal      levothyroxine 75 mcg tablet Take 1 tablet (75 mcg total) by mouth daily before breakfast, Starting Wed 10/3/2018, Normal      ranitidine (ZANTAC) 150 MG capsule Take 1 capsule (150 mg total) by mouth daily for 14 days, Starting Sat 10/6/2018, Until Sat 10/20/2018, Normal           No discharge procedures on file      ED Provider  Electronically Signed by           Golden Wilkinson MD  12/21/18 1690

## 2019-03-14 ENCOUNTER — HOSPITAL ENCOUNTER (EMERGENCY)
Facility: HOSPITAL | Age: 24
Discharge: HOME | End: 2019-03-14
Attending: EMERGENCY MEDICINE
Payer: COMMERCIAL

## 2019-03-14 ENCOUNTER — APPOINTMENT (EMERGENCY)
Dept: CT IMAGING | Facility: HOSPITAL | Age: 24
End: 2019-03-14
Payer: COMMERCIAL

## 2019-03-14 VITALS
OXYGEN SATURATION: 99 % | HEART RATE: 72 BPM | RESPIRATION RATE: 16 BRPM | SYSTOLIC BLOOD PRESSURE: 93 MMHG | TEMPERATURE: 97.2 F | DIASTOLIC BLOOD PRESSURE: 55 MMHG | WEIGHT: 150 LBS | HEIGHT: 62 IN | BODY MASS INDEX: 27.6 KG/M2

## 2019-03-14 DIAGNOSIS — R51.9 ACUTE HEADACHE: Primary | ICD-10-CM

## 2019-03-14 PROCEDURE — 70450 CT HEAD/BRAIN W/O DYE: CPT

## 2019-03-14 PROCEDURE — 99284 EMERGENCY DEPT VISIT MOD MDM: CPT

## 2019-03-14 PROCEDURE — 96375 TX/PRO/DX INJ NEW DRUG ADDON: CPT

## 2019-03-14 PROCEDURE — 96365 THER/PROPH/DIAG IV INF INIT: CPT

## 2019-03-14 RX ORDER — MAGNESIUM SULFATE HEPTAHYDRATE 40 MG/ML
2 INJECTION, SOLUTION INTRAVENOUS ONCE
Status: COMPLETED | OUTPATIENT
Start: 2019-03-14 | End: 2019-03-14

## 2019-03-14 RX ORDER — DIPHENHYDRAMINE HYDROCHLORIDE 50 MG/ML
25 INJECTION INTRAMUSCULAR; INTRAVENOUS ONCE
Status: COMPLETED | OUTPATIENT
Start: 2019-03-14 | End: 2019-03-14

## 2019-03-14 RX ORDER — METOCLOPRAMIDE HYDROCHLORIDE 5 MG/ML
10 INJECTION INTRAMUSCULAR; INTRAVENOUS ONCE
Status: COMPLETED | OUTPATIENT
Start: 2019-03-14 | End: 2019-03-14

## 2019-03-14 RX ORDER — KETOROLAC TROMETHAMINE 30 MG/ML
15 INJECTION, SOLUTION INTRAMUSCULAR; INTRAVENOUS ONCE
Status: COMPLETED | OUTPATIENT
Start: 2019-03-14 | End: 2019-03-14

## 2019-03-14 RX ADMIN — METOCLOPRAMIDE HYDROCHLORIDE 10 MG: 5 INJECTION INTRAMUSCULAR; INTRAVENOUS at 10:27

## 2019-03-14 RX ADMIN — KETOROLAC TROMETHAMINE 15 MG: 30 INJECTION, SOLUTION INTRAMUSCULAR; INTRAVENOUS at 10:27

## 2019-03-14 RX ADMIN — MAGNESIUM SULFATE HEPTAHYDRATE 2 G: 40 INJECTION, SOLUTION INTRAVENOUS at 10:30

## 2019-03-14 RX ADMIN — SODIUM CHLORIDE 1000 ML: 0.9 INJECTION, SOLUTION INTRAVENOUS at 10:20

## 2019-03-14 RX ADMIN — DIPHENHYDRAMINE HYDROCHLORIDE 25 MG: 50 INJECTION INTRAMUSCULAR; INTRAVENOUS at 10:23

## 2019-03-14 NOTE — ED PROVIDER NOTES
History  Chief Complaint   Patient presents with    Headache     Awoke with Migraine  Hit in head by bubble metcalf a few day s ago  Patient complains of sudden, severe frontal headache that awoke her from sleep this morning and is associated with nausea and vomiting, photophobia and malaise  She reports having similar but milder, less sudden headaches over the past couple of months for which she has seen her family doctor and was given a caffeine pill to take  She did take this pill this morning but vomited afterward  She has not seen a neurologist or had a formal diagnosis of migraines  She states that she was struck in the forehead yesterday with a bubble maker that fell from a closet shelf  She thought she might have a bruise from that but did not have any loss of consciousness or other complaints at the time  Today, she did awaken earlier to urinate and did not have any headache or other associated symptoms  She continues to smoke  Denies f/c, midline neck or back pain, CP, SOB, abdominal pain, diarrhea or dysuria  12 system ROS o/w negative  History provided by:  Patient and medical records  Headache   Pain location:  Frontal  Quality:  Dull (Throbbing)  Radiates to:  Does not radiate  Severity currently:  7/10  Severity at highest:  9/10  Onset quality:  Sudden  Duration:  2 hours  Timing:  Constant  Progression:  Unchanged  Chronicity:  New  Similar to prior headaches: Yes, but more intense      Context: bright light    Context: not coughing, not defecating, not eating, not stress, not intercourse, not loud noise and not straining    Relieved by:  Nothing  Worsened by:  Light (Standing upright)  Ineffective treatments: Caffeine pill  Associated symptoms: nausea, photophobia and vomiting    Associated symptoms: no abdominal pain, no back pain, no blurred vision, no congestion, no cough, no diarrhea, no dizziness, no eye pain, no facial pain, no fatigue, no fever, no focal weakness, no hearing loss, no loss of balance, no myalgias, no near-syncope, no neck pain, no neck stiffness, no numbness, no paresthesias, no seizures, no sore throat, no swollen glands, no syncope, no tingling, no URI, no visual change and no weakness        Prior to Admission Medications   Prescriptions Last Dose Informant Patient Reported?  Taking?   acetaminophen (TYLENOL) 325 mg tablet   No Yes   Sig: Take 2 tablets (650 mg total) by mouth every 6 (six) hours as needed (pain, fever)   albuterol (VENTOLIN HFA) 90 mcg/act inhaler  Self No Yes   Sig: Inhale 2 puffs every 6 (six) hours as needed for wheezing   butalbital-acetaminophen-caffeine (FIORICET,ESGIC) -40 mg per tablet   No Yes   Sig: Take 1 tablet by mouth every 4 (four) hours as needed for headaches   cholecalciferol (VITAMIN D3) 1,000 units tablet   No Yes   Sig: Take 1 tablet (1,000 Units total) by mouth daily   levalbuterol (XOPENEX) 1 25 mg/0 5 mL nebulizer solution  Self No Yes   Sig: Take 0 5 mL (1 25 mg total) by nebulization 3 (three) times a day   levothyroxine 75 mcg tablet Not Taking at Unknown time Self No No   Sig: Take 1 tablet (75 mcg total) by mouth daily before breakfast   Patient not taking: Reported on 3/14/2019   predniSONE 10 mg tablet   No Yes   Si mg by mouth daily for 3 days, then 20 mg by mouth daily for 3 days, then 10 mg by mouth daily for 3 days, then stop      Facility-Administered Medications: None       Past Medical History:   Diagnosis Date    Anxiety     Asthma     Depression     Disease of thyroid gland     Migraine     Mood disorder (HCC)     Pregnant     Psychiatric disorder     depression,anxiety, mood disorder       Past Surgical History:   Procedure Laterality Date    APPENDECTOMY      FRACTURE SURGERY Left     wrist       Family History   Problem Relation Age of Onset    No Known Problems Mother     Thyroid disease Father     Hypertension Brother     Hypertension Maternal Grandmother     Thyroid disease Paternal Grandmother      I have reviewed and agree with the history as documented  Social History     Tobacco Use    Smoking status: Current Some Day Smoker     Packs/day: 1 00     Years: 5 00     Pack years: 5 00     Types: Cigarettes    Smokeless tobacco: Never Used   Substance Use Topics    Alcohol use: No    Drug use: No        Review of Systems   Constitutional: Negative for chills, fatigue and fever  HENT: Negative for congestion, facial swelling, hearing loss and sore throat  Eyes: Positive for photophobia  Negative for blurred vision, pain and visual disturbance  Respiratory: Negative for cough, chest tightness and shortness of breath  Cardiovascular: Negative for chest pain, leg swelling, syncope and near-syncope  Gastrointestinal: Positive for nausea and vomiting  Negative for abdominal distention, abdominal pain and diarrhea  Genitourinary: Negative for dysuria and flank pain  Musculoskeletal: Negative for back pain, myalgias, neck pain and neck stiffness  Skin: Negative for pallor and rash  Neurological: Positive for headaches  Negative for dizziness, focal weakness, seizures, facial asymmetry, weakness, light-headedness, numbness, paresthesias and loss of balance  Hematological: Negative for adenopathy  Psychiatric/Behavioral: Negative for confusion  All other systems reviewed and are negative  Physical Exam  Physical Exam   Constitutional: She is oriented to person, place, and time  She appears well-developed and well-nourished  She appears distressed (Mildly)  HENT:   Head: Normocephalic and atraumatic  Right Ear: External ear normal    Left Ear: External ear normal    Nose: Nose normal    Mouth/Throat: Oropharynx is clear and moist  No oropharyngeal exudate  Eyes: Pupils are equal, round, and reactive to light  Conjunctivae and EOM are normal    (+) photophobia   Neck: Normal range of motion  Neck supple     No nuchal rigidity Cardiovascular: Normal rate, regular rhythm and normal heart sounds  No murmur heard  Pulmonary/Chest: Effort normal and breath sounds normal    Abdominal: Soft  Bowel sounds are normal  There is no tenderness  Musculoskeletal: Normal range of motion  She exhibits no edema or tenderness  Lymphadenopathy:     She has no cervical adenopathy  Neurological: She is alert and oriented to person, place, and time  She has normal reflexes  No cranial nerve deficit or sensory deficit  She exhibits normal muscle tone  Skin: Skin is warm and dry  Capillary refill takes less than 2 seconds  No rash noted  She is not diaphoretic  Psychiatric: She has a normal mood and affect  Her behavior is normal  Thought content normal    Vitals reviewed        Vital Signs  ED Triage Vitals   Temperature Pulse Respirations Blood Pressure SpO2   03/14/19 0847 03/14/19 0849 03/14/19 0849 03/14/19 0849 03/14/19 0849   (!) 97 2 °F (36 2 °C) 69 18 109/73 99 %      Temp Source Heart Rate Source Patient Position - Orthostatic VS BP Location FiO2 (%)   03/14/19 0847 03/14/19 0847 03/14/19 0849 03/14/19 0849 --   Temporal Monitor Sitting Right arm       Pain Score       03/14/19 0847       7           Vitals:    03/14/19 0849 03/14/19 1103 03/14/19 1106 03/14/19 1108   BP: 109/73  93/55 93/55   Pulse: 69 71 74 72   Patient Position - Orthostatic VS: Sitting  Lying Lying       qSOFA     Row Name 03/14/19 1108 03/14/19 1106 03/14/19 0849          Altered mental status GCS < 15  --  --  --      Respiratory Rate > / =22  0  --  0      Systolic BP < / =482  1  1  0      Q Sofa Score  1  1  0            Visual Acuity      ED Medications  Medications   diphenhydrAMINE (BENADRYL) injection 25 mg (25 mg Intravenous Given 3/14/19 1023)   metoclopramide (REGLAN) injection 10 mg (10 mg Intravenous Given 3/14/19 1027)   magnesium sulfate 2 g/50 mL IVPB (premix) 2 g (0 g Intravenous Stopped 3/14/19 1148)   ketorolac (TORADOL) injection 15 mg (15 mg Intravenous Given 3/14/19 1027)   sodium chloride 0 9 % bolus 1,000 mL (0 mL Intravenous Stopped 3/14/19 1146)       Diagnostic Studies  Results Reviewed     None                 CT head wo contrast   Final Result by Xiomara Morel MD (03/14 1013)      No acute intracranial abnormality  Workstation performed: [id number]                    Procedures  Procedures       Phone Contacts  ED Phone Contact    ED Course  ED Course as of Mar 14 1504   Thu Mar 14, 2019   1102 Patient resting quietly, appears less uncomfortable  Meds infusing  1135 Treatment complete  Patient feels ready to go home  MDM  Number of Diagnoses or Management Options  Diagnosis management comments: DDx: HA - migraine, doubt ICH, edema, pseudotumor cerebri  A/P: Will check CT head, treat symptoms, reevaluate for further w/u or disposition  Amount and/or Complexity of Data Reviewed  Tests in the radiology section of CPT®: ordered and reviewed  Review and summarize past medical records: yes        Disposition  Final diagnoses:   Acute headache     Time reflects when diagnosis was documented in both MDM as applicable and the Disposition within this note     Time User Action Codes Description Comment    3/14/2019 11:35 AM Alex Blevins Add [R51] Acute headache       ED Disposition     ED Disposition Condition Date/Time Comment    Discharge Stable Thu Mar 14, 2019 11:35 AM [address] discharge to home/self care              Follow-up Information     Follow up With Specialties Details Why Contact Info Additional Information    Michelle Dallas, [address] Nurse Practitioner Go in 3 days If symptoms worsen [facility] [address]  [address] [facility] Call  If symptoms worsen and for further evaluation and treatment [id number] [place] [address] [address] [facility] [place], [facility] [address] Buddy Russell, [place], [place], [address]          Discharge Medication List as of 3/14/2019 11:36 AM      CONTINUE these medications which have NOT CHANGED    Details   acetaminophen (TYLENOL) 325 mg tablet Take 2 tablets (650 mg total) by mouth every 6 (six) hours as needed (pain, fever), Starting Sun 12/16/2018, Print      albuterol (VENTOLIN HFA) 90 mcg/act inhaler Inhale 2 puffs every 6 (six) hours as needed for wheezing, Starting Sun 9/30/2018, Normal      butalbital-acetaminophen-caffeine (FIORICET,ESGIC) -40 mg per tablet Take 1 tablet by mouth every 4 (four) hours as needed for headaches, Starting Wed 12/19/2018, Print      cholecalciferol (VITAMIN D3) 1,000 units tablet Take 1 tablet (1,000 Units total) by mouth daily, Starting Fri 11/9/2018, Normal      levalbuterol (XOPENEX) 1 25 mg/0 5 mL nebulizer solution Take 0 5 mL (1 25 mg total) by nebulization 3 (three) times a day, Starting Sun 9/30/2018, Normal      predniSONE 10 mg tablet 30 mg by mouth daily for 3 days, then 20 mg by mouth daily for 3 days, then 10 mg by mouth daily for 3 days, then stop, Print      levothyroxine 75 mcg tablet Take 1 tablet (75 mcg total) by mouth daily before breakfast, Starting Wed 10/3/2018, Normal           No discharge procedures on file      ED Provider  Electronically Signed by           Dom Dimas,   03/14/19 0319

## 2019-05-14 ENCOUNTER — OFFICE VISIT (OUTPATIENT)
Dept: FAMILY MEDICINE CLINIC | Facility: HOME HEALTHCARE | Age: 24
End: 2019-05-14
Payer: COMMERCIAL

## 2019-05-14 VITALS
HEART RATE: 101 BPM | SYSTOLIC BLOOD PRESSURE: 110 MMHG | BODY MASS INDEX: 32.57 KG/M2 | WEIGHT: 177 LBS | DIASTOLIC BLOOD PRESSURE: 72 MMHG | HEIGHT: 62 IN | RESPIRATION RATE: 17 BRPM | OXYGEN SATURATION: 98 % | TEMPERATURE: 98.4 F

## 2019-05-14 DIAGNOSIS — Z13.220 SCREENING, LIPID: ICD-10-CM

## 2019-05-14 DIAGNOSIS — R53.83 FATIGUE, UNSPECIFIED TYPE: ICD-10-CM

## 2019-05-14 DIAGNOSIS — E06.3 HASHIMOTO'S THYROIDITIS: Primary | ICD-10-CM

## 2019-05-14 DIAGNOSIS — E03.8 HYPOTHYROIDISM DUE TO HASHIMOTO'S THYROIDITIS: ICD-10-CM

## 2019-05-14 DIAGNOSIS — E06.3 HYPOTHYROIDISM DUE TO HASHIMOTO'S THYROIDITIS: ICD-10-CM

## 2019-05-14 PROCEDURE — T1015 CLINIC SERVICE: HCPCS | Performed by: FAMILY MEDICINE

## 2019-05-14 PROCEDURE — 99213 OFFICE O/P EST LOW 20 MIN: CPT | Performed by: FAMILY MEDICINE

## 2019-05-14 RX ORDER — LEVOTHYROXINE SODIUM 0.07 MG/1
75 TABLET ORAL
Qty: 30 TABLET | Refills: 0 | Status: SHIPPED | OUTPATIENT
Start: 2019-05-14 | End: 2019-10-08 | Stop reason: SDUPTHER

## 2019-06-14 ENCOUNTER — OFFICE VISIT (OUTPATIENT)
Dept: URGENT CARE | Facility: CLINIC | Age: 24
End: 2019-06-14
Payer: COMMERCIAL

## 2019-06-14 ENCOUNTER — APPOINTMENT (OUTPATIENT)
Dept: LAB | Facility: CLINIC | Age: 24
End: 2019-06-14
Payer: COMMERCIAL

## 2019-06-14 VITALS
DIASTOLIC BLOOD PRESSURE: 61 MMHG | RESPIRATION RATE: 18 BRPM | SYSTOLIC BLOOD PRESSURE: 118 MMHG | OXYGEN SATURATION: 98 % | TEMPERATURE: 98.3 F | HEART RATE: 93 BPM

## 2019-06-14 DIAGNOSIS — E06.3 HASHIMOTO'S THYROIDITIS: ICD-10-CM

## 2019-06-14 DIAGNOSIS — R53.83 FATIGUE, UNSPECIFIED TYPE: ICD-10-CM

## 2019-06-14 DIAGNOSIS — D50.8 IRON DEFICIENCY ANEMIA DUE TO DIETARY CAUSES: ICD-10-CM

## 2019-06-14 DIAGNOSIS — E55.9 VITAMIN D DEFICIENCY: Primary | ICD-10-CM

## 2019-06-14 DIAGNOSIS — J02.9 SORE THROAT: Primary | ICD-10-CM

## 2019-06-14 LAB
25(OH)D3 SERPL-MCNC: 16.4 NG/ML (ref 30–100)
BASOPHILS # BLD AUTO: 0.07 THOUSANDS/ΜL (ref 0–0.1)
BASOPHILS NFR BLD AUTO: 1 % (ref 0–1)
EOSINOPHIL # BLD AUTO: 0.32 THOUSAND/ΜL (ref 0–0.61)
EOSINOPHIL NFR BLD AUTO: 4 % (ref 0–6)
ERYTHROCYTE [DISTWIDTH] IN BLOOD BY AUTOMATED COUNT: 13.3 % (ref 11.6–15.1)
HCT VFR BLD AUTO: 44.3 % (ref 34.8–46.1)
HGB BLD-MCNC: 14.5 G/DL (ref 11.5–15.4)
IMM GRANULOCYTES # BLD AUTO: 0.03 THOUSAND/UL (ref 0–0.2)
IMM GRANULOCYTES NFR BLD AUTO: 0 % (ref 0–2)
IRON SERPL-MCNC: 31 UG/DL (ref 50–170)
LYMPHOCYTES # BLD AUTO: 1.92 THOUSANDS/ΜL (ref 0.6–4.47)
LYMPHOCYTES NFR BLD AUTO: 24 % (ref 14–44)
MCH RBC QN AUTO: 27.4 PG (ref 26.8–34.3)
MCHC RBC AUTO-ENTMCNC: 32.7 G/DL (ref 31.4–37.4)
MCV RBC AUTO: 84 FL (ref 82–98)
MONOCYTES # BLD AUTO: 0.86 THOUSAND/ΜL (ref 0.17–1.22)
MONOCYTES NFR BLD AUTO: 11 % (ref 4–12)
NEUTROPHILS # BLD AUTO: 4.88 THOUSANDS/ΜL (ref 1.85–7.62)
NEUTS SEG NFR BLD AUTO: 60 % (ref 43–75)
NRBC BLD AUTO-RTO: 0 /100 WBCS
PLATELET # BLD AUTO: 407 THOUSANDS/UL (ref 149–390)
PMV BLD AUTO: 10.7 FL (ref 8.9–12.7)
RBC # BLD AUTO: 5.3 MILLION/UL (ref 3.81–5.12)
S PYO AG THROAT QL: NEGATIVE
TSH SERPL DL<=0.05 MIU/L-ACNC: 3.06 UIU/ML (ref 0.36–3.74)
WBC # BLD AUTO: 8.08 THOUSAND/UL (ref 4.31–10.16)

## 2019-06-14 PROCEDURE — 83540 ASSAY OF IRON: CPT

## 2019-06-14 PROCEDURE — 99283 EMERGENCY DEPT VISIT LOW MDM: CPT | Performed by: PHYSICIAN ASSISTANT

## 2019-06-14 PROCEDURE — 85025 COMPLETE CBC W/AUTO DIFF WBC: CPT

## 2019-06-14 PROCEDURE — 87880 STREP A ASSAY W/OPTIC: CPT | Performed by: PHYSICIAN ASSISTANT

## 2019-06-14 PROCEDURE — 84443 ASSAY THYROID STIM HORMONE: CPT

## 2019-06-14 PROCEDURE — 99203 OFFICE O/P NEW LOW 30 MIN: CPT | Performed by: PHYSICIAN ASSISTANT

## 2019-06-14 PROCEDURE — 36415 COLL VENOUS BLD VENIPUNCTURE: CPT

## 2019-06-14 PROCEDURE — 82306 VITAMIN D 25 HYDROXY: CPT

## 2019-06-14 PROCEDURE — G0382 LEV 3 HOSP TYPE B ED VISIT: HCPCS | Performed by: PHYSICIAN ASSISTANT

## 2019-06-14 PROCEDURE — 87070 CULTURE OTHR SPECIMN AEROBIC: CPT | Performed by: PHYSICIAN ASSISTANT

## 2019-06-14 RX ORDER — AMOXICILLIN 875 MG/1
875 TABLET, COATED ORAL 2 TIMES DAILY
Qty: 14 TABLET | Refills: 0 | Status: SHIPPED | OUTPATIENT
Start: 2019-06-14 | End: 2019-06-19 | Stop reason: ALTCHOICE

## 2019-06-16 LAB — BACTERIA THROAT CULT: NORMAL

## 2019-06-17 RX ORDER — FERROUS SULFATE TAB EC 324 MG (65 MG FE EQUIVALENT) 324 (65 FE) MG
324 TABLET DELAYED RESPONSE ORAL
Qty: 30 TABLET | Refills: 0 | Status: SHIPPED | OUTPATIENT
Start: 2019-06-17 | End: 2019-10-08

## 2019-06-17 RX ORDER — ERGOCALCIFEROL 1.25 MG/1
50000 CAPSULE ORAL WEEKLY
Qty: 12 CAPSULE | Refills: 0 | Status: SHIPPED | OUTPATIENT
Start: 2019-06-17 | End: 2019-10-08

## 2019-06-19 ENCOUNTER — OFFICE VISIT (OUTPATIENT)
Dept: URGENT CARE | Facility: CLINIC | Age: 24
End: 2019-06-19
Payer: COMMERCIAL

## 2019-06-19 VITALS
HEIGHT: 62 IN | TEMPERATURE: 97.6 F | DIASTOLIC BLOOD PRESSURE: 73 MMHG | RESPIRATION RATE: 16 BRPM | WEIGHT: 177 LBS | OXYGEN SATURATION: 99 % | BODY MASS INDEX: 32.57 KG/M2 | SYSTOLIC BLOOD PRESSURE: 124 MMHG | HEART RATE: 89 BPM

## 2019-06-19 DIAGNOSIS — J03.90 ACUTE TONSILLITIS, UNSPECIFIED ETIOLOGY: Primary | ICD-10-CM

## 2019-06-19 PROCEDURE — 99284 EMERGENCY DEPT VISIT MOD MDM: CPT | Performed by: NURSE PRACTITIONER

## 2019-06-19 PROCEDURE — 99214 OFFICE O/P EST MOD 30 MIN: CPT | Performed by: NURSE PRACTITIONER

## 2019-06-19 PROCEDURE — G0383 LEV 4 HOSP TYPE B ED VISIT: HCPCS | Performed by: NURSE PRACTITIONER

## 2019-06-19 RX ORDER — METHYLPREDNISOLONE 4 MG/1
TABLET ORAL
Qty: 1 EACH | Refills: 0 | Status: SHIPPED | OUTPATIENT
Start: 2019-06-19 | End: 2019-06-28

## 2019-06-19 RX ORDER — CEPHALEXIN 500 MG/1
500 CAPSULE ORAL 2 TIMES DAILY
Qty: 20 CAPSULE | Refills: 0 | Status: SHIPPED | OUTPATIENT
Start: 2019-06-19 | End: 2019-06-28

## 2019-06-28 ENCOUNTER — APPOINTMENT (EMERGENCY)
Dept: RADIOLOGY | Facility: HOSPITAL | Age: 24
End: 2019-06-28
Payer: COMMERCIAL

## 2019-06-28 ENCOUNTER — HOSPITAL ENCOUNTER (EMERGENCY)
Facility: HOSPITAL | Age: 24
Discharge: HOME | End: 2019-06-29
Attending: EMERGENCY MEDICINE | Admitting: EMERGENCY MEDICINE
Payer: COMMERCIAL

## 2019-06-28 DIAGNOSIS — S93.421A TEAR OF DELTOID LIGAMENT OF ANKLE, RIGHT, INITIAL ENCOUNTER: Primary | ICD-10-CM

## 2019-06-28 PROCEDURE — 73630 X-RAY EXAM OF FOOT: CPT

## 2019-06-28 PROCEDURE — 73610 X-RAY EXAM OF ANKLE: CPT

## 2019-06-28 PROCEDURE — 99283 EMERGENCY DEPT VISIT LOW MDM: CPT

## 2019-06-28 RX ORDER — NAPROXEN 375 MG/1
375 TABLET ORAL 2 TIMES DAILY WITH MEALS
Qty: 20 TABLET | Refills: 0 | Status: SHIPPED | OUTPATIENT
Start: 2019-06-28 | End: 2019-08-08

## 2019-06-28 RX ORDER — IBUPROFEN 400 MG/1
400 TABLET ORAL ONCE
Status: COMPLETED | OUTPATIENT
Start: 2019-06-28 | End: 2019-06-28

## 2019-06-28 RX ADMIN — IBUPROFEN 400 MG: 400 TABLET ORAL at 22:53

## 2019-06-29 VITALS
OXYGEN SATURATION: 98 % | HEART RATE: 98 BPM | TEMPERATURE: 97.9 F | DIASTOLIC BLOOD PRESSURE: 68 MMHG | WEIGHT: 177.03 LBS | SYSTOLIC BLOOD PRESSURE: 104 MMHG | HEIGHT: 62 IN | BODY MASS INDEX: 32.58 KG/M2 | RESPIRATION RATE: 18 BRPM

## 2019-06-29 PROCEDURE — 99283 EMERGENCY DEPT VISIT LOW MDM: CPT | Performed by: EMERGENCY MEDICINE

## 2019-06-29 NOTE — ED PROVIDER NOTES
History  Chief Complaint   Patient presents with    Ankle Pain     Patient fell over her sons train and hurt her right foot  History provided by:  Patient  Ankle Pain   Location:  Ankle  Time since incident:  12 hours  Injury: yes    Mechanism of injury comment:  Struck R ankle this morning against toy on ground and lost balance, causing sudden movement of ankle--patient unsure of exact mechanism of injury  Pain details:     Quality:  Aching    Radiates to:  Does not radiate    Severity:  Moderate    Onset quality:  Sudden    Duration:  12 hours    Timing:  Constant    Progression:  Waxing and waning  Chronicity:  Recurrent (Multiple ankle sprains previously)  Prior injury to area:  Yes (Multiple bilateral ankle sprains with chronic ankle laxity/instability; no fracture in RLE previously)  Relieved by:  Rest  Worsened by:  Bearing weight, extension and flexion (Pressure to medial ankle and AROM of R ankle both markedly worsen sx)  Ineffective treatments:  None tried  Associated symptoms: decreased ROM, stiffness and swelling    Associated symptoms: no muscle weakness, no numbness and no tingling    Associated symptoms comment:  Patient reports sensation of ankle instability with abnormal medial translation of ankle when bearing weight on the right lower extremity  She is able to partially weightbear of the right ankle but has been attempting to minimize secondary to severe pain when she does so  Risk factors comment:  No other trauma/injury from this episode      Prior to Admission Medications   Prescriptions Last Dose Informant Patient Reported?  Taking?   acetaminophen (TYLENOL) 325 mg tablet   No No   Sig: Take 2 tablets (650 mg total) by mouth every 6 (six) hours as needed (pain, fever)   albuterol (VENTOLIN HFA) 90 mcg/act inhaler  Self No Yes   Sig: Inhale 2 puffs every 6 (six) hours as needed for wheezing   cholecalciferol (VITAMIN D3) 1,000 units tablet   No Yes   Sig: Take 1 tablet (1,000 Units total) by mouth daily   ergocalciferol (VITAMIN D2) 50,000 units   No No   Sig: Take 1 capsule (50,000 Units total) by mouth once a week   ferrous sulfate 324 (65 Fe) mg   No Yes   Sig: Take 1 tablet (324 mg total) by mouth daily before breakfast   levothyroxine 75 mcg tablet   No Yes   Sig: Take 1 tablet (75 mcg total) by mouth daily before breakfast      Facility-Administered Medications: None       Past Medical History:   Diagnosis Date    Anxiety     Asthma     Depression     Disease of thyroid gland     Migraine     Mood disorder (HCC)     Pregnant     Psychiatric disorder     depression,anxiety, mood disorder       Past Surgical History:   Procedure Laterality Date    APPENDECTOMY      FRACTURE SURGERY Left     wrist       Family History   Problem Relation Age of Onset    No Known Problems Mother     Thyroid disease Father     Hypertension Brother     Hypertension Maternal Grandmother     Thyroid disease Paternal Grandmother      I have reviewed and agree with the history as documented  Social History     Tobacco Use    Smoking status: Current Some Day Smoker     Packs/day: 1 00     Years: 5 00     Pack years: 5 00     Types: Cigarettes    Smokeless tobacco: Never Used   Substance Use Topics    Alcohol use: No    Drug use: No        Review of Systems   Constitutional: Negative for chills and diaphoresis  Musculoskeletal: Positive for arthralgias, gait problem, joint swelling and stiffness  Skin: Negative for color change, pallor, rash and wound  Neurological: Negative for weakness and numbness  Hematological: Negative for adenopathy  Does not bruise/bleed easily  Physical Exam  Physical Exam   Constitutional: She is oriented to person, place, and time  Vital signs are normal  She appears well-developed and well-nourished  She is active and cooperative  She appears distressed (moderate painful distress)  HENT:   Head: Normocephalic and atraumatic     Neck: Trachea normal and phonation normal    Cardiovascular: Normal rate, regular rhythm, intact distal pulses and normal pulses  Pulses:       Dorsalis pedis pulses are 2+ on the right side, and 2+ on the left side  Posterior tibial pulses are 2+ on the right side, and 2+ on the left side  Pulmonary/Chest: Effort normal  No respiratory distress  Musculoskeletal:        Right knee: Normal         Left knee: Normal         Right ankle: She exhibits decreased range of motion, swelling and ecchymosis  She exhibits no deformity  Tenderness  Medial malleolus tenderness found  No AITFL, no CF ligament, no posterior TFL, no head of 5th metatarsal and no proximal fibula tenderness found  Achilles tendon exhibits no pain, no defect and normal Sheffield's test results  Left ankle: Normal         Right lower leg: Normal         Left lower leg: Normal         Right foot: Normal         Left foot: Normal         Feet:    Moderate soft tissue swelling immediately inferior/anterior to the medial malleolus with no palpable bony deformity and no overlying skin breakdown  Marked ttp over medial malleolus and immediately inferior/anterior in region of deltoid ligament  Remainder of tibia and fibular nontender including fibular head  No ttp of proximal 5th MT  Approx 20 deg AROM in flexion/extension; minimal ability to dee dee/invert ankle 2/2 pain  Strength 5/5 in LE bilaterally at knee/ankle/toes in flexion/extension  Neurological: She is alert and oriented to person, place, and time  She has normal strength  No sensory deficit  GCS eye subscore is 4  GCS verbal subscore is 5  GCS motor subscore is 6  Sensation is intact to sharp/dull sensation bilateral lower extremity in L2-S2 distribution  Strength 5/5 bilateral lower extremity at hip/knee/ankle in all planes of motion  Skin: Skin is warm, dry and intact  Capillary refill takes less than 2 seconds  Capillary refill less than 2 seconds in bilateral lower extremity   She is not diaphoretic  Nursing note and vitals reviewed  Vital Signs  ED Triage Vitals [06/28/19 2230]   Temperature Pulse Respirations Blood Pressure SpO2   97 9 °F (36 6 °C) 87 18 134/69 99 %      Temp Source Heart Rate Source Patient Position - Orthostatic VS BP Location FiO2 (%)   Temporal Monitor -- -- --      Pain Score       8           Vitals:    06/28/19 2230 06/28/19 2300   BP: 134/69 109/67   Pulse: 87 97         Visual Acuity      ED Medications  Medications   ibuprofen (MOTRIN) tablet 400 mg (400 mg Oral Given 6/28/19 2253)       Diagnostic Studies  Results Reviewed     None                 XR foot 3+ views RIGHT   ED Interpretation by Alek Rosado DO (06/29 0000)   No evidence of fracture/dislocation  Joint spaces appear normal      XR ankle 3+ views RIGHT   ED Interpretation by Alek Rosado DO (06/29 0000)   No evidence of fracture/dislocation  Joint spaces appear normal                  Procedures  Procedures       ED Course         MDM  Number of Diagnoses or Management Options  Tear of deltoid ligament of ankle, right, initial encounter: new and requires workup     Amount and/or Complexity of Data Reviewed  Tests in the radiology section of CPT®: ordered and reviewed  Decide to obtain previous medical records or to obtain history from someone other than the patient: yes  Review and summarize past medical records: yes  Independent visualization of images, tracings, or specimens: yes    Risk of Complications, Morbidity, and/or Mortality  Presenting problems: moderate  Diagnostic procedures: moderate  Management options: moderate  General comments: No radiographic evidence of injury and no evidence of neurovascular injury on examination  No signs/symptoms to suggest compartment syndrome  Findings of exquisite local tenderness in the area of the deltoid ligament and patient's reported sensation of ankle instability suggest a moderate-to-severe injury of the medial aspect of the ankle    Patient will require splinting of the ankle and close follow-up with orthopedic surgery or Podiatry for further evaluation management  She was unable to tolerate application of an air cast and accordingly a short-leg posterior splint was applied and she was given crutches for ambulation  NSAID for pain control  All questions were answered prior to discharge  The patient expressed understanding and agreed to plan  Splint application: Short leg posterior splint was applied by technician  Appropriate position for splint type  Neurovascular status (including capillary refill <3s) and accessible tendon function intact post application  Evaluated by me prior to discharge  Patient Progress  Patient progress: improved      Disposition  Final diagnoses:   Tear of deltoid ligament of ankle, right, initial encounter     Time reflects when diagnosis was documented in both MDM as applicable and the Disposition within this note     Time User Action Codes Description Comment    6/28/2019 11:59 PM Imelda Ricketts Add [B61 020A] Tear of deltoid ligament of ankle, right, initial encounter       ED Disposition     ED Disposition Condition Date/Time Comment    Discharge Stable Fri Jun 28, 2019 11:56 PM [place] 1 discharge to home/self care              Follow-up Information     Follow up With Specialties Details Why Contact Info    Joeann Galeazzi, DPM Podiatry, Wound Care Call in 3 days For an appointment for further evaluation (foot doctor) [address] [place] [address] [facility]      Paris Marin MD Orthopedic Surgery Call in 3 days For an appointment for further evaluation (orthopedic surgery) [address] [facility] [address] [phone]            Patient's Medications   Discharge Prescriptions    NAPROXEN (NAPROSYN) 375 MG TABLET    Take 1 tablet (375 mg total) by mouth 2 (two) times a day with meals       Start Date: 6/28/2019 End Date: --       Order Dose: 375 mg       Quantity: 20 tablet Refills: 0     No discharge procedures on file      ED Provider  Electronically Signed by           Mckenzie Caputo DO  06/29/19 0030

## 2019-07-01 ENCOUNTER — TELEPHONE (OUTPATIENT)
Dept: OBGYN CLINIC | Facility: CLINIC | Age: 24
End: 2019-07-01

## 2019-07-03 ENCOUNTER — OFFICE VISIT (OUTPATIENT)
Dept: OBGYN CLINIC | Facility: CLINIC | Age: 24
End: 2019-07-03
Payer: COMMERCIAL

## 2019-07-03 VITALS
WEIGHT: 179 LBS | HEART RATE: 91 BPM | HEIGHT: 62 IN | BODY MASS INDEX: 32.94 KG/M2 | SYSTOLIC BLOOD PRESSURE: 103 MMHG | DIASTOLIC BLOOD PRESSURE: 70 MMHG

## 2019-07-03 DIAGNOSIS — S93.409A SPRAIN OF ANKLE, INITIAL ENCOUNTER: ICD-10-CM

## 2019-07-03 DIAGNOSIS — S90.31XA CONTUSION OF RIGHT FOOT, INITIAL ENCOUNTER: Primary | ICD-10-CM

## 2019-07-03 PROCEDURE — 99203 OFFICE O/P NEW LOW 30 MIN: CPT | Performed by: ORTHOPAEDIC SURGERY

## 2019-07-03 NOTE — PATIENT INSTRUCTIONS
We will put the patient in a Cam boot weight-bearing as tolerated  She is to ice and elevate 20 minutes to 3 times a day for pain and swelling  Recommend use of the anti-inflammatory provided by the emergency room or Tylenol  Start the patient in physical therapy  Recheck in 4 weeks  Activity as tolerated in the Cam boot which may be removed for sleeping at night

## 2019-07-03 NOTE — PROGRESS NOTES
23 y o female presents for ER follow-up of right foot and ankle contusion that occurred 06/29/2019 1 1 of her child's toys fell onto her foot ankle region on the inner side and then later she twisted her foot somewhat and had pain  She was seen ER and placed in a splint after x-rays were done of the foot and ankle on which were read as negative  She got her splint wet  She has been walking without it  She has a history of multiple ankle injuries in the past   She denies numbness or tingling  She has not used the anti-inflammatories provided by the hospital because she does not like to take medication      Review of Systems  Review of systems negative unless otherwise specified in HPI    Past Medical History  Past Medical History:   Diagnosis Date    Anxiety     Asthma     Depression     Disease of thyroid gland     Migraine     Mood disorder ([facility] [address] )     Pregnant     Psychiatric disorder     depression,anxiety, mood disorder     Past Surgical History  Past Surgical History:   Procedure Laterality Date    APPENDECTOMY      FRACTURE SURGERY Left     wrist     Current Medications  Current Outpatient Medications on File Prior to Visit   Medication Sig Dispense Refill    albuterol (VENTOLIN HFA) 90 mcg/act inhaler Inhale 2 puffs every 6 (six) hours as needed for wheezing 1 Inhaler 0    ergocalciferol (VITAMIN D2) 50,000 units Take 1 capsule (50,000 Units total) by mouth once a week 12 capsule 0    ferrous sulfate 324 (65 Fe) mg Take 1 tablet (324 mg total) by mouth daily before breakfast 30 tablet 0    levothyroxine 75 mcg tablet Take 1 tablet (75 mcg total) by mouth daily before breakfast 30 tablet 0    acetaminophen (TYLENOL) 325 mg tablet Take 2 tablets (650 mg total) by mouth every 6 (six) hours as needed (pain, fever) (Patient not taking: Reported on 7/3/2019) 1 Bottle 0    cholecalciferol (VITAMIN D3) 1,000 units tablet Take 1 tablet (1,000 Units total) by mouth daily (Patient not taking: Reported on 7/3/2019) 90 tablet 0    naproxen (NAPROSYN) 375 mg tablet Take 1 tablet (375 mg total) by mouth 2 (two) times a day with meals (Patient not taking: Reported on 7/3/2019) 20 tablet 0     No current facility-administered medications on file prior to visit  Recent Labs (HCT,HGB,PT,INR,ESR,CRP,GLU,HgA1C)  0   Lab Value Date/Time    HCT 44 3 06/14/2019 1048    HCT 38 9 05/31/2018 1408    HGB 14 5 06/14/2019 1048    HGB 13 6 05/31/2018 1408    WBC 8 08 06/14/2019 1048    WBC 12 3 (H) 05/31/2018 1408    INR 1 02 09/29/2018 1845     Physical exam  · General: Awake, Alert, Oriented  · Eyes: Pupils equal, round and reactive to light  · Heart: regular rate and rhythm  · Lungs: No audible wheezing  · Abdomen: soft  right Foot  · Tenderness along the medial aspect of the mid foot and ankle without swelling or ecchymosis  There is no crepitation  Patient's gross motion is intact with plantar flexion dorsiflexion inversion and eversion  She has no lateral or posterior or specific anterior foot or ankle pain  No calf pain  Negative anterior drawer  Dorsalis pedis and posterior tibial pulses are intact without deficit  Procedure  None  Imaging  I reviewed ER x-rays agree with radiologist's reading of no obvious fracture or dislocation  1  Contusion of right foot, initial encounter    2  Sprain of ankle, initial encounter      Assessment:  right foot contusion and sprain    Plan: We will put the patient in a Cam boot weight-bearing as tolerated  She is to ice and elevate 20 minutes to 3 times a day for pain and swelling  Recommend use of the anti-inflammatory provided by the emergency room or Tylenol  Start the patient in physical therapy  Recheck in 4 weeks  Activity as tolerated in the Cam boot which may be removed for sleeping at night

## 2019-07-25 DIAGNOSIS — E06.3 HYPOTHYROIDISM DUE TO HASHIMOTO'S THYROIDITIS: Primary | ICD-10-CM

## 2019-07-25 DIAGNOSIS — E03.8 HYPOTHYROIDISM DUE TO HASHIMOTO'S THYROIDITIS: Primary | ICD-10-CM

## 2019-07-30 ENCOUNTER — OFFICE VISIT (OUTPATIENT)
Dept: URGENT CARE | Facility: CLINIC | Age: 24
End: 2019-07-30
Payer: COMMERCIAL

## 2019-07-30 ENCOUNTER — APPOINTMENT (OUTPATIENT)
Dept: LAB | Facility: CLINIC | Age: 24
End: 2019-07-30
Payer: COMMERCIAL

## 2019-07-30 VITALS
DIASTOLIC BLOOD PRESSURE: 84 MMHG | RESPIRATION RATE: 18 BRPM | OXYGEN SATURATION: 97 % | SYSTOLIC BLOOD PRESSURE: 115 MMHG | TEMPERATURE: 97.9 F | HEART RATE: 77 BPM

## 2019-07-30 DIAGNOSIS — J02.9 SORE THROAT: Primary | ICD-10-CM

## 2019-07-30 DIAGNOSIS — E03.8 HYPOTHYROIDISM DUE TO HASHIMOTO'S THYROIDITIS: ICD-10-CM

## 2019-07-30 DIAGNOSIS — E06.3 HYPOTHYROIDISM DUE TO HASHIMOTO'S THYROIDITIS: ICD-10-CM

## 2019-07-30 LAB — TSH SERPL DL<=0.05 MIU/L-ACNC: 2.57 UIU/ML (ref 0.36–3.74)

## 2019-07-30 PROCEDURE — 99283 EMERGENCY DEPT VISIT LOW MDM: CPT | Performed by: PHYSICIAN ASSISTANT

## 2019-07-30 PROCEDURE — 36415 COLL VENOUS BLD VENIPUNCTURE: CPT

## 2019-07-30 PROCEDURE — G0382 LEV 3 HOSP TYPE B ED VISIT: HCPCS | Performed by: PHYSICIAN ASSISTANT

## 2019-07-30 PROCEDURE — 84443 ASSAY THYROID STIM HORMONE: CPT

## 2019-07-30 PROCEDURE — 99213 OFFICE O/P EST LOW 20 MIN: CPT | Performed by: PHYSICIAN ASSISTANT

## 2019-07-30 RX ORDER — FLUCONAZOLE 100 MG/1
100 TABLET ORAL DAILY
Qty: 7 TABLET | Refills: 0 | Status: SHIPPED | OUTPATIENT
Start: 2019-07-30 | End: 2019-08-06

## 2019-07-30 NOTE — PATIENT INSTRUCTIONS
Started weakness prescribed  Throat culture results should be back in 3 days  If there is bacteria which needs to be treated will be contacted regarding those results if you do not hear anything no news is good news

## 2019-07-30 NOTE — PROGRESS NOTES
[facility]        NAME: Jame Napoles is a 21 y o  female  : 1995    MRN: [id number]  DATE: 2019  TIME: 1:14 PM    Assessment and Plan   Sore throat [J02 9]  1  Sore throat  Throat culture    fluconazole (DIFLUCAN) 100 mg tablet         Patient Instructions       Follow up with PCP in 3-5 days  Proceed to  ER if symptoms worsen  Chief Complaint     Chief Complaint   Patient presents with    Sore Throat     Pt c/o a sore throat and swollen glands for two days  History of Present Illness       Patient presents with a 2 day history of sore throat swollen glands  He states his repetitive problem for the past few months  She has had rapid strep with negative failed amoxicillin therapy came back was treated with prednisone and Keflex for tonsillitis  Patient states that she feels soreness within her mouth but denies any white patches in her mouth  There are no fever chills cough chest pain shortness of breath  Review of Systems   Review of Systems   Constitutional: Negative for chills and fever  HENT: Positive for sore throat  Negative for congestion, ear pain and trouble swallowing  Respiratory: Positive for cough  Cardiovascular: Negative for chest pain  Gastrointestinal: Negative for nausea and vomiting  Skin: Negative for rash  Neurological: Negative for light-headedness  Hematological: Positive for adenopathy           Current Medications       Current Outpatient Medications:     acetaminophen (TYLENOL) 325 mg tablet, Take 2 tablets (650 mg total) by mouth every 6 (six) hours as needed (pain, fever) (Patient not taking: Reported on 7/3/2019), Disp: 1 Bottle, Rfl: 0    albuterol (VENTOLIN HFA) 90 mcg/act inhaler, Inhale 2 puffs every 6 (six) hours as needed for wheezing, Disp: 1 Inhaler, Rfl: 0    cholecalciferol (VITAMIN D3) 1,000 units tablet, Take 1 tablet (1,000 Units total) by mouth daily (Patient not taking: Reported on 7/3/2019), Disp: 90 tablet, Rfl: 0    ergocalciferol (VITAMIN D2) 50,000 units, Take 1 capsule (50,000 Units total) by mouth once a week, Disp: 12 capsule, Rfl: 0    ferrous sulfate 324 (65 Fe) mg, Take 1 tablet (324 mg total) by mouth daily before breakfast, Disp: 30 tablet, Rfl: 0    fluconazole (DIFLUCAN) 100 mg tablet, Take 1 tablet (100 mg total) by mouth daily for 7 days, Disp: 7 tablet, Rfl: 0    levothyroxine 75 mcg tablet, Take 1 tablet (75 mcg total) by mouth daily before breakfast, Disp: 30 tablet, Rfl: 0    naproxen (NAPROSYN) 375 mg tablet, Take 1 tablet (375 mg total) by mouth 2 (two) times a day with meals (Patient not taking: Reported on 7/3/2019), Disp: 20 tablet, Rfl: 0    Current Allergies     Allergies as of 07/30/2019 - Reviewed 07/30/2019   Allergen Reaction Noted    Abilify [aripiprazole] Other (See Comments), Seizures, and Confusion 12/16/2015    Tessalon [benzonatate] Anxiety 04/01/2016    Zithromax [azithromycin] Rash, Hallucinations, and Hives 12/16/2015            The following portions of the patient's history were reviewed and updated as appropriate: allergies, current medications, past family history, past medical history, past social history, past surgical history and problem list      Past Medical History:   Diagnosis Date    Anxiety     Asthma     Depression     Disease of thyroid gland     Migraine     Mood disorder ([address] )     Pregnant     Psychiatric disorder     depression,anxiety, mood disorder       Past Surgical History:   Procedure Laterality Date    APPENDECTOMY      FRACTURE SURGERY Left     wrist       Family History   Problem Relation Age of Onset    No Known Problems Mother     Thyroid disease Father     Hypertension Brother     Hypertension Maternal Grandmother     Thyroid disease Paternal Grandmother          Medications have been verified          Objective   /84   Pulse 77   Temp 97 9 °F (36 6 °C)   Resp 18   SpO2 97%        Physical Exam Physical Exam   Constitutional: She is oriented to person, place, and time  She appears well-developed and well-nourished  HENT:   Head: Normocephalic and atraumatic  Right Ear: Tympanic membrane normal    Left Ear: Tympanic membrane normal    Mouth/Throat: Uvula is midline, oropharynx is clear and moist and mucous membranes are normal  No uvula swelling  No oropharyngeal exudate or posterior oropharyngeal erythema  Neck: Neck supple  Cardiovascular: Normal rate, regular rhythm and normal heart sounds  Pulmonary/Chest: Effort normal and breath sounds normal    Lymphadenopathy:     She has cervical adenopathy (Palpable right anterior cervical node which is tender  )  Neurological: She is alert and oriented to person, place, and time  Skin: Skin is warm and dry  Psychiatric: She has a normal mood and affect  Her behavior is normal    Nursing note and vitals reviewed

## 2019-08-02 ENCOUNTER — TRANSCRIBE ORDERS (OUTPATIENT)
Dept: URGENT CARE | Facility: CLINIC | Age: 24
End: 2019-08-02

## 2019-08-02 DIAGNOSIS — J02.9 SORETHROAT: Primary | ICD-10-CM

## 2019-08-02 PROCEDURE — 87070 CULTURE OTHR SPECIMN AEROBIC: CPT | Performed by: PHYSICIAN ASSISTANT

## 2019-08-04 LAB — BACTERIA THROAT CULT: NORMAL

## 2019-08-08 ENCOUNTER — OFFICE VISIT (OUTPATIENT)
Dept: FAMILY MEDICINE CLINIC | Facility: HOME HEALTHCARE | Age: 24
End: 2019-08-08
Payer: COMMERCIAL

## 2019-08-08 VITALS
RESPIRATION RATE: 18 BRPM | OXYGEN SATURATION: 97 % | SYSTOLIC BLOOD PRESSURE: 132 MMHG | HEART RATE: 107 BPM | BODY MASS INDEX: 33.13 KG/M2 | TEMPERATURE: 97.7 F | HEIGHT: 62 IN | WEIGHT: 180 LBS | DIASTOLIC BLOOD PRESSURE: 84 MMHG

## 2019-08-08 DIAGNOSIS — J45.21 MILD INTERMITTENT ASTHMA WITH EXACERBATION: ICD-10-CM

## 2019-08-08 DIAGNOSIS — E66.1 CLASS 1 DRUG-INDUCED OBESITY WITHOUT SERIOUS COMORBIDITY WITH BODY MASS INDEX (BMI) OF 32.0 TO 32.9 IN ADULT: ICD-10-CM

## 2019-08-08 DIAGNOSIS — J06.9 ACUTE URI: Primary | ICD-10-CM

## 2019-08-08 PROCEDURE — T1015 CLINIC SERVICE: HCPCS | Performed by: FAMILY MEDICINE

## 2019-08-08 PROCEDURE — 99213 OFFICE O/P EST LOW 20 MIN: CPT | Performed by: FAMILY MEDICINE

## 2019-08-08 RX ORDER — PREDNISONE 10 MG/1
TABLET ORAL
Qty: 18 TABLET | Refills: 0 | Status: SHIPPED | OUTPATIENT
Start: 2019-08-08 | End: 2019-10-08

## 2019-08-08 RX ORDER — ALBUTEROL SULFATE 90 UG/1
2 AEROSOL, METERED RESPIRATORY (INHALATION) EVERY 6 HOURS PRN
Qty: 1 INHALER | Refills: 0 | Status: SHIPPED | OUTPATIENT
Start: 2019-08-08 | End: 2020-07-14 | Stop reason: SDUPTHER

## 2019-08-08 RX ORDER — FLUTICASONE PROPIONATE 50 MCG
1 SPRAY, SUSPENSION (ML) NASAL DAILY
Qty: 1 BOTTLE | Refills: 0 | Status: SHIPPED | OUTPATIENT
Start: 2019-08-08 | End: 2019-11-15 | Stop reason: ALTCHOICE

## 2019-08-08 NOTE — PROGRESS NOTES
OFFICE VISIT  Jay Ch 21 y o  female MRN: [id number]      Assessment / Plan:  Diagnoses and all orders for this visit:    Acute URI  -     fluticasone (FLONASE) 50 mcg/act nasal spray; 1 spray into each nostril daily    Mild intermittent asthma with exacerbation  -     predniSONE 10 mg tablet; 30 mg by mouth daily for 3 days, then 20 mg by mouth daily for 3 days, then 10 mg by mouth daily for 3 days, then stop  -     albuterol (VENTOLIN HFA) 90 mcg/act inhaler; Inhale 2 puffs every 6 (six) hours as needed for wheezing    Class 1 drug-induced obesity without serious comorbidity with body mass index (BMI) of 32 0 to 32 9 in adult    reduce ice tea intake, drink water  Discussed empty calories and proper calorie intake  Regular exercise and physical activity may help you control your weight  Diet and exercise play an important role in controlling your weight  Exercise strengthens your heart and improves your circulation  This lowers risks of heart disease  Exercise can lower your blood sugar level and help your insulin work better  This will cut down your risk of type 2 diabetes  Exercise can improve your mood and make you feel more relaxed  This can reduce your risk of depression  Reason For Visit / Chief Complaint  Chief Complaint   Patient presents with    Weight Gain    Cough    Nasal Congestion        HPI:  Jay Ch is a 21 y o  female who presents today with concern of her weight, she has known hypothyroidism, tsh level normal  She has a 9 month old infant  She has implant to left arm  She reports only eating one meal, one gallon of guers ice tea today  She also reports cough, nasal congestion, she has tried otc allergy Medication  She has known asthma, has nit used inhalers, reports wheezing more at hs       Historical Information   Past Medical History:   Diagnosis Date    Anxiety     Asthma     Depression     Disease of thyroid gland     Migraine     Mood disorder ([facility] [address] [facility]     Pregnant     Psychiatric disorder     depression,anxiety, mood disorder     Past Surgical History:   Procedure Laterality Date    APPENDECTOMY      FRACTURE SURGERY Left     wrist     Social History   Social History     Substance and Sexual Activity   Alcohol Use No     Social History     Substance and Sexual Activity   Drug Use No     Social History     Tobacco Use   Smoking Status Current Some Day Smoker    Packs/day: 1 00    Years: 5 00    Pack years: 5 00    Types: Cigarettes   Smokeless Tobacco Never Used     Family History   Problem Relation Age of Onset    No Known Problems Mother     Thyroid disease Father     Hypertension Brother     Hypertension Maternal Grandmother     Thyroid disease Paternal Grandmother        Meds/Allergies   Allergies   Allergen Reactions    Abilify [Aripiprazole] Other (See Comments), Seizures and Confusion     hallucinaTIONS  hallucination    Tessalon [Benzonatate] Anxiety     Other reaction(s): Psych complications  hallucinations  Other reaction(s): Psych complications  hallucinations    Zithromax [Azithromycin] Rash, Hallucinations and Hives       Meds:    Current Outpatient Medications:     albuterol (VENTOLIN HFA) 90 mcg/act inhaler, Inhale 2 puffs every 6 (six) hours as needed for wheezing, Disp: 1 Inhaler, Rfl: 0    ergocalciferol (VITAMIN D2) 50,000 units, Take 1 capsule (50,000 Units total) by mouth once a week, Disp: 12 capsule, Rfl: 0    ferrous sulfate 324 (65 Fe) mg, Take 1 tablet (324 mg total) by mouth daily before breakfast, Disp: 30 tablet, Rfl: 0    fluticasone (FLONASE) 50 mcg/act nasal spray, 1 spray into each nostril daily, Disp: 1 Bottle, Rfl: 0    levothyroxine 75 mcg tablet, Take 1 tablet (75 mcg total) by mouth daily before breakfast, Disp: 30 tablet, Rfl: 0    predniSONE 10 mg tablet, 30 mg by mouth daily for 3 days, then 20 mg by mouth daily for 3 days, then 10 mg by mouth daily for 3 days, then stop, Disp: 18 tablet, Rfl: 0      REVIEW OF SYSTEMS  Review of Systems   Constitutional: Positive for unexpected weight change  Negative for chills, fatigue and fever  HENT: Positive for congestion  Negative for ear discharge, ear pain, sore throat, trouble swallowing and voice change  Eyes: Negative for pain and redness  Respiratory: Positive for cough and wheezing  Negative for choking, chest tightness and shortness of breath  Gastrointestinal: Negative for abdominal pain, blood in stool, constipation, diarrhea, nausea and vomiting  Endocrine: Negative for cold intolerance, heat intolerance, polydipsia, polyphagia and polyuria  Genitourinary: Negative for decreased urine volume, dysuria, frequency and urgency  Musculoskeletal: Negative for arthralgias, back pain, myalgias and neck pain  Skin: Negative for color change and rash  Neurological: Negative for dizziness, syncope, weakness, light-headedness, numbness and headaches  Psychiatric/Behavioral: Negative for sleep disturbance and suicidal ideas  The patient is not nervous/anxious  Current Vitals:   Blood Pressure: 132/84 (08/08/19 0832)  Pulse: (!) 107 (08/08/19 0832)  Temperature: 97 7 °F (36 5 °C) (08/08/19 0832)  Respirations: 18 (08/08/19 0832)  Height: 5' 2" (157 5 cm) (08/08/19 1401)  Weight - Scale: 81 6 kg (180 lb) (08/08/19 0832)  SpO2: 97 % (08/08/19 0832)  [unfilled]    PHYSICAL EXAMS:  Physical Exam   Constitutional: She is oriented to person, place, and time  She appears well-developed and well-nourished  HENT:   Head: Normocephalic  Right Ear: External ear normal    Left Ear: External ear normal    Mouth/Throat: Oropharynx is clear and moist    Eyes: Pupils are equal, round, and reactive to light  Conjunctivae are normal    Neck: Neck supple  Cardiovascular: Normal rate and regular rhythm  Pulmonary/Chest: Effort normal  She has wheezes  Abdominal: Soft  Bowel sounds are normal  She exhibits no distension  There is no tenderness  Musculoskeletal: Normal range of motion  Neurological: She is alert and oriented to person, place, and time  Skin: Skin is warm and dry  Psychiatric: She has a normal mood and affect  Follow up at this office in if not better     Counseling / Coordination of Care  Total floor / unit time spent today 20 minutes  Greater than 50% of total time was spent with the patient and / or family counseling and / or coordination of care

## 2019-08-29 DIAGNOSIS — B85.0 HEAD LICE: Primary | ICD-10-CM

## 2019-08-29 RX ORDER — PERMETHRIN 50 MG/G
CREAM TOPICAL ONCE
Qty: 60 G | Refills: 1 | Status: SHIPPED | OUTPATIENT
Start: 2019-08-29 | End: 2019-08-29

## 2019-08-30 ENCOUNTER — OFFICE VISIT (OUTPATIENT)
Dept: URGENT CARE | Facility: CLINIC | Age: 24
End: 2019-08-30
Payer: COMMERCIAL

## 2019-08-30 VITALS
OXYGEN SATURATION: 97 % | HEART RATE: 88 BPM | DIASTOLIC BLOOD PRESSURE: 70 MMHG | SYSTOLIC BLOOD PRESSURE: 136 MMHG | TEMPERATURE: 98.7 F | RESPIRATION RATE: 18 BRPM

## 2019-08-30 DIAGNOSIS — H92.02 LEFT EAR PAIN: ICD-10-CM

## 2019-08-30 DIAGNOSIS — J02.9 SORE THROAT: ICD-10-CM

## 2019-08-30 DIAGNOSIS — J02.9 ACUTE PHARYNGITIS, UNSPECIFIED ETIOLOGY: Primary | ICD-10-CM

## 2019-08-30 LAB — S PYO AG THROAT QL: NEGATIVE

## 2019-08-30 PROCEDURE — 87070 CULTURE OTHR SPECIMN AEROBIC: CPT | Performed by: PHYSICIAN ASSISTANT

## 2019-08-30 PROCEDURE — G0382 LEV 3 HOSP TYPE B ED VISIT: HCPCS | Performed by: PHYSICIAN ASSISTANT

## 2019-08-30 PROCEDURE — 99213 OFFICE O/P EST LOW 20 MIN: CPT | Performed by: PHYSICIAN ASSISTANT

## 2019-08-30 PROCEDURE — 87880 STREP A ASSAY W/OPTIC: CPT | Performed by: PHYSICIAN ASSISTANT

## 2019-08-30 PROCEDURE — 99283 EMERGENCY DEPT VISIT LOW MDM: CPT | Performed by: PHYSICIAN ASSISTANT

## 2019-08-30 NOTE — PATIENT INSTRUCTIONS
Pharyngitis   AMBULATORY CARE:   Pharyngitis , or sore throat, is inflammation of the tissues and structures in your pharynx (throat)  Pharyngitis is most often caused by bacteria  It may also be caused by a cold or flu virus  Other causes include smoking, allergies, or acid reflux  Signs and symptoms that may occur with pharyngitis:   · Sore throat or pain when you swallow    · Fever, chills, and body aches    · Hoarse or raspy voice    · Cough, runny or stuffy nose, itchy or watery eyes    · Headache    · Upset stomach and loss of appetite    · Mild neck stiffness    · Swollen glands that feel like hard lumps when you touch your neck    · White and yellow pus-filled blisters in the back of your throat  Call 911 for any of the following:   · You have trouble breathing or swallowing because your throat is swollen or sore  Seek care immediately if:   · You are drooling because it hurts too much to swallow  · Your fever is higher than 102? F (39?C) or lasts longer than 3 days  · You are confused  · You taste blood in your throat  Contact your healthcare provider if:   · Your throat pain gets worse  · You have a painful lump in your throat that does not go away after 5 days  · Your symptoms do not improve after 5 days  · You have questions or concerns about your condition or care  Treatment for pharyngitis:  Viral pharyngitis will go away on its own without treatment  Your sore throat should start to feel better in 3 to 5 days for both viral and bacterial infections  You may need any of the following:  · Antibiotics  treat a bacterial infection  · NSAIDs , such as ibuprofen, help decrease swelling, pain, and fever  NSAIDs can cause stomach bleeding or kidney problems in certain people  If you take blood thinner medicine, always ask your healthcare provider if NSAIDs are safe for you  Always read the medicine label and follow directions  · Acetaminophen  decreases pain and fever   It is available without a doctor's order  Ask how much to take and how often to take it  Follow directions  Acetaminophen can cause liver damage if not taken correctly  Manage your symptoms:   · Gargle salt water  Mix ¼ teaspoon salt in an 8 ounce glass of warm water and gargle  This may help decrease swelling in your throat  · Drink liquids as directed  You may need to drink more liquids than usual  Liquids may help soothe your throat and prevent dehydration  Ask how much liquid to drink each day and which liquids are best for you  · Use a cool-steam humidifier  to help moisten the air in your room and calm your cough  · Soothe your throat  with cough drops, ice, soft foods, or popsicles  Prevent the spread of pharyngitis:  Cover your mouth and nose when you cough or sneeze  Do not share food or drinks  Wash your hands often  Use soap and water  If soap and water are unavailable, use an alcohol based hand   Follow up with your healthcare provider as directed:  Write down your questions so you remember to ask them during your visits  © 2017 [address] [facility] Information is for End User's use only and may not be sold, redistributed or otherwise used for commercial purposes  All illustrations and images included in CareNotes® are the copyrighted property of [employer]  or [employer]  The above information is an  only  It is not intended as medical advice for individual conditions or treatments  Talk to your doctor, nurse or pharmacist before following any medical regimen to see if it is safe and effective for you

## 2019-08-30 NOTE — PROGRESS NOTES
[facility]        NAME: Wanda Sandoval is a 25 y o  female  : 1995    MRN: [id number]  DATE: 2019  TIME: 1:31 PM    Assessment and Plan   Acute pharyngitis, unspecified etiology [J02 9]  1  Acute pharyngitis, unspecified etiology     2  Left ear pain           Patient Instructions       Follow up with PCP in 3-5 days  Proceed to  ER if symptoms worsen  Chief Complaint     Chief Complaint   Patient presents with    Sore Throat     Pt c/o a sore throat and left ear pain  History of Present Illness       Patient presents with left ear pain and a sore throat for the past 2 days  She has a history of chronic pharyngitis  Review of Systems   Review of Systems   Constitutional: Negative for chills and fever  HENT: Positive for ear pain and sore throat  Negative for congestion, ear discharge, hearing loss and trouble swallowing  Respiratory: Negative for cough  Gastrointestinal: Negative for nausea and vomiting  Skin: Negative for rash  Neurological: Negative for headaches  Hematological: Negative for adenopathy           Current Medications       Current Outpatient Medications:     albuterol (VENTOLIN HFA) 90 mcg/act inhaler, Inhale 2 puffs every 6 (six) hours as needed for wheezing, Disp: 1 Inhaler, Rfl: 0    ergocalciferol (VITAMIN D2) 50,000 units, Take 1 capsule (50,000 Units total) by mouth once a week, Disp: 12 capsule, Rfl: 0    ferrous sulfate 324 (65 Fe) mg, Take 1 tablet (324 mg total) by mouth daily before breakfast, Disp: 30 tablet, Rfl: 0    fluticasone (FLONASE) 50 mcg/act nasal spray, 1 spray into each nostril daily, Disp: 1 Bottle, Rfl: 0    levothyroxine 75 mcg tablet, Take 1 tablet (75 mcg total) by mouth daily before breakfast, Disp: 30 tablet, Rfl: 0    predniSONE 10 mg tablet, 30 mg by mouth daily for 3 days, then 20 mg by mouth daily for 3 days, then 10 mg by mouth daily for 3 days, then stop (Patient not taking: Reported on 8/30/2019), Disp: 18 tablet, Rfl: 0    Current Allergies     Allergies as of 08/30/2019 - Reviewed 08/30/2019   Allergen Reaction Noted    Abilify [aripiprazole] Other (See Comments), Seizures, and Confusion 12/16/2015    Tessalon [benzonatate] Anxiety 04/01/2016    Zithromax [azithromycin] Rash, Hallucinations, and Hives 12/16/2015            The following portions of the patient's history were reviewed and updated as appropriate: allergies, current medications, past family history, past medical history, past social history, past surgical history and problem list      Past Medical History:   Diagnosis Date    Anxiety     Asthma     Depression     Disease of thyroid gland     Migraine     Mood disorder ([facility] [address]     Pregnant     Psychiatric disorder     depression,anxiety, mood disorder       Past Surgical History:   Procedure Laterality Date    APPENDECTOMY      FRACTURE SURGERY Left     wrist       Family History   Problem Relation Age of Onset    No Known Problems Mother     Thyroid disease Father     Hypertension Brother     Hypertension Maternal Grandmother     Thyroid disease Paternal Grandmother          Medications have been verified  Objective   /70   Pulse 88   Temp 98 7 °F (37 1 °C)   Resp 18   SpO2 97%        Physical Exam     Physical Exam   Constitutional: She is oriented to person, place, and time  She appears well-developed and well-nourished  HENT:   Head: Normocephalic and atraumatic  Right Ear: Tympanic membrane and ear canal normal    Left Ear: Tympanic membrane and ear canal normal    Mouth/Throat: Uvula is midline and mucous membranes are normal  No uvula swelling  Posterior oropharyngeal erythema present  No posterior oropharyngeal edema  Neck: Neck supple  Cardiovascular: Normal rate, regular rhythm and normal heart sounds  Pulmonary/Chest: Effort normal and breath sounds normal    Lymphadenopathy:     She has no cervical adenopathy     Neurological: She is alert and oriented to person, place, and time  Skin: Skin is warm and dry  Psychiatric: She has a normal mood and affect  Her behavior is normal    Nursing note and vitals reviewed

## 2019-09-01 LAB — BACTERIA THROAT CULT: NORMAL

## 2019-09-18 DIAGNOSIS — B85.0 HEAD LICE: Primary | ICD-10-CM

## 2019-09-24 ENCOUNTER — OFFICE VISIT (OUTPATIENT)
Dept: OTOLARYNGOLOGY | Facility: CLINIC | Age: 24
End: 2019-09-24
Payer: COMMERCIAL

## 2019-09-24 VITALS
BODY MASS INDEX: 33.86 KG/M2 | OXYGEN SATURATION: 98 % | DIASTOLIC BLOOD PRESSURE: 70 MMHG | HEART RATE: 90 BPM | SYSTOLIC BLOOD PRESSURE: 112 MMHG | HEIGHT: 62 IN | WEIGHT: 184 LBS

## 2019-09-24 DIAGNOSIS — J02.9 PHARYNGITIS, UNSPECIFIED ETIOLOGY: Primary | ICD-10-CM

## 2019-09-24 DIAGNOSIS — R09.82 POST-NASAL DRIP: ICD-10-CM

## 2019-09-24 DIAGNOSIS — J30.9 ALLERGIC RHINITIS, UNSPECIFIED SEASONALITY, UNSPECIFIED TRIGGER: ICD-10-CM

## 2019-09-24 PROCEDURE — 99243 OFF/OP CNSLTJ NEW/EST LOW 30: CPT | Performed by: OTOLARYNGOLOGY

## 2019-09-24 RX ORDER — PERMETHRIN 50 MG/G
CREAM TOPICAL
Refills: 1 | COMMUNITY
Start: 2019-08-29 | End: 2019-10-08

## 2019-09-24 NOTE — PROGRESS NOTES
Consultation - Otolaryngology - Head and Neck Surgery  Facial Plastic and Reconstructive Surgery  [facility] 25 y o  female MRN: [id number]  Encounter: [id number]        Assessment/Plan:  1  Pharyngitis, unspecified etiology     2  Allergic rhinitis, unspecified seasonality, unspecified trigger     3  Post-nasal drip         We discussed criteria for tonsillectomy including frequency of infection, and sleep apnea  She does not meet criteria for tonsillectomy from either standpoint at this time  We discussed that her symptoms may be due to her current viral pharyngitis versus reflux versus allergies  She is not interested in pursuing any sleep study as she does not feel she has sleep apnea  At this time, she can try simple remedies including salt water gargles, trial of antihistamines, or trial of reflux medications  Our discussion was cut short today as she has another appointment for her infant son  She agrees to try antihistamines and saltwater gargles  She will call up to follow up if symptoms persist or worsen  History of Present Illness   Physician Requesting Consult: Gregory Powers  Reason for Consult / Principal Problem:  Sore throats  HPI: [facility] is a 25y o  year old female who presents with recurrent sore throats  She states that she had an episode of mono approximately 1 and half years ago  Since then, she has been having recurrent episodes of sore throats  She states she will have sore throat fairly frequently  Episodes will last from 2 days up to a week  She has been seen by urgent care numerous times and had strep testing done  However, each time has returned negative for strep  She denies any associated fevers, headaches, lymphadenopathy  She has been treated for antibiotics prophylactically despite negative cultures  She denies any suspicion of snoring or sleep apnea  She does note some positive allergy symptoms including itchy watery eyes    No treatment for this  Review of systems:  ROS was performed by the MA and documented in the attached note  This was reviewed personally      Historical Information   Past Medical History:   Diagnosis Date    Anxiety     Asthma     Depression     Disease of thyroid gland     Migraine     Mood disorder ([address]     Pregnant     Psychiatric disorder     depression,anxiety, mood disorder     Past Surgical History:   Procedure Laterality Date    APPENDECTOMY      FRACTURE SURGERY Left     wrist     Social History   Social History     Substance and Sexual Activity   Alcohol Use No     Social History     Substance and Sexual Activity   Drug Use No     Social History     Tobacco Use   Smoking Status Current Some Day Smoker    Packs/day: 1 00    Years: 5 00    Pack years: 5 00    Types: Cigarettes   Smokeless Tobacco Never Used     Family History:   Family History   Problem Relation Age of Onset    No Known Problems Mother     Thyroid disease Father     Hypertension Brother     Hypertension Maternal Grandmother     Thyroid disease Paternal Grandmother        Current Outpatient Medications on File Prior to Visit   Medication Sig    albuterol (VENTOLIN HFA) 90 mcg/act inhaler Inhale 2 puffs every 6 (six) hours as needed for wheezing    fluticasone (FLONASE) 50 mcg/act nasal spray 1 spray into each nostril daily    levothyroxine 75 mcg tablet Take 1 tablet (75 mcg total) by mouth daily before breakfast    ergocalciferol (VITAMIN D2) 50,000 units Take 1 capsule (50,000 Units total) by mouth once a week (Patient not taking: Reported on 9/24/2019)    ferrous sulfate 324 (65 Fe) mg Take 1 tablet (324 mg total) by mouth daily before breakfast (Patient not taking: Reported on 9/24/2019)    permethrin (ELIMITE) 5 % cream APPLY TOPICALLY ONCE FOR 1 DOSE USE AS DIRECTED REPEAT IN ONE WEEK    predniSONE 10 mg tablet 30 mg by mouth daily for 3 days, then 20 mg by mouth daily for 3 days, then 10 mg by mouth daily for 3 days, then stop (Patient not taking: Reported on 8/30/2019)     No current facility-administered medications on file prior to visit  Allergies   Allergen Reactions    Abilify [Aripiprazole] Other (See Comments), Seizures and Confusion     hallucinaTIONS  hallucination    Tessalon [Benzonatate] Anxiety     Other reaction(s): Psych complications  hallucinations  Other reaction(s): Psych complications  hallucinations    Zithromax [Azithromycin] Rash, Hallucinations and Hives       Vitals:    09/24/19 0800   BP: 112/70   Pulse: 90   SpO2: 98%       Physical Exam   Constitutional: Oriented to person, place, and time  Well-developed and well-nourished, no apparent distress, non-toxic appearance  Cooperative, able to hear and answer questions without difficulty  Voice: Normal voice quality  Head: Normocephalic, atraumatic  No scars, masses or lesions  Face: Symmetric, no edema, no sinus tenderness  Eyes: Vision grossly intact, extra-ocular movement intact  Ears: External ears normal  Tympanic membranes intact with intact normal landmarks  No post-auricular erythema or tenderness  Nose: Septum intact, nares clear  Mucosa moist, turbinates well appearing  No crusting, polyps or discharge evident  Oral cavity: Dentition intact  Mucosa moist, lips without lesions or masses  Tongue mobile, floor of mouth soft and flat  Hard palate intact  No masses or lesions  Oropharynx: Uvula is midline, soft palate intact without lesion or mass  Oropharyngeal inlet without obstruction  Tonsils 2+ bilateral   No erythema or exudate  Posterior pharyngeal wall clear  No masses or lesions  Salivary glands:  Parotid glands and submandibular glands symmetric, no enlargement or tenderness  Neck: Normal laryngeal elevation with swallow  Trachea midline  No masses or lesions  No palpable adenopathy  Thyroid: Without tenderness or palpable nodules  Pulmonary/Chest: Normal effort and rate   No respiratory distress  No stertor or stridor  Musculoskeletal: Normal range of motion  Neurological: Cranial nerves 2-12 intact  Skin: Skin is warm and dry  Psychiatric: Normal mood and affect  Imaging Studies: I have personally reviewed pertinent reports  Lab Results: I have personally reviewed pertinent lab results  Greater than 40 minutes were spent in consultation  More than 1/2 of that time was spent in counselling and coordination of care

## 2019-09-24 NOTE — PROGRESS NOTES
Review of Systems   Constitutional: Negative  HENT: Positive for trouble swallowing  Negative for sore throat (tonsil stones)  Eyes: Negative  Respiratory: Positive for shortness of breath and wheezing  Cardiovascular: Negative  Gastrointestinal: Negative  Endocrine: Negative  Genitourinary: Negative  Musculoskeletal: Negative  Skin: Negative  Allergic/Immunologic: Positive for environmental allergies (seasonal)  Neurological: Positive for headaches  Hematological: Bruises/bleeds easily  Psychiatric/Behavioral: Negative

## 2019-10-08 ENCOUNTER — OFFICE VISIT (OUTPATIENT)
Dept: FAMILY MEDICINE CLINIC | Facility: HOME HEALTHCARE | Age: 24
End: 2019-10-08
Payer: COMMERCIAL

## 2019-10-08 VITALS
SYSTOLIC BLOOD PRESSURE: 112 MMHG | HEIGHT: 62 IN | TEMPERATURE: 97.4 F | WEIGHT: 186 LBS | RESPIRATION RATE: 16 BRPM | HEART RATE: 89 BPM | BODY MASS INDEX: 34.23 KG/M2 | DIASTOLIC BLOOD PRESSURE: 68 MMHG

## 2019-10-08 DIAGNOSIS — Z13.220 SCREENING, LIPID: ICD-10-CM

## 2019-10-08 DIAGNOSIS — E03.8 HYPOTHYROIDISM DUE TO HASHIMOTO'S THYROIDITIS: ICD-10-CM

## 2019-10-08 DIAGNOSIS — J45.909 MODERATE ASTHMA, UNSPECIFIED WHETHER COMPLICATED, UNSPECIFIED WHETHER PERSISTENT: ICD-10-CM

## 2019-10-08 DIAGNOSIS — E55.9 VITAMIN D DEFICIENCY: ICD-10-CM

## 2019-10-08 DIAGNOSIS — E06.3 HYPOTHYROIDISM DUE TO HASHIMOTO'S THYROIDITIS: ICD-10-CM

## 2019-10-08 DIAGNOSIS — G43.909 MIGRAINE WITHOUT STATUS MIGRAINOSUS, NOT INTRACTABLE, UNSPECIFIED MIGRAINE TYPE: Primary | ICD-10-CM

## 2019-10-08 PROBLEM — J40 TRACHEOBRONCHITIS: Status: RESOLVED | Noted: 2018-09-30 | Resolved: 2019-10-08

## 2019-10-08 PROBLEM — O99.333 TOBACCO SMOKING COMPLICATING PREGNANCY, THIRD TRIMESTER: Status: RESOLVED | Noted: 2018-09-07 | Resolved: 2019-10-08

## 2019-10-08 PROBLEM — O09.293 HISTORY OF INTRAUTERINE GROWTH RESTRICTION IN PRIOR PREGNANCY, CURRENTLY PREGNANT, THIRD TRIMESTER: Status: RESOLVED | Noted: 2018-09-07 | Resolved: 2019-10-08

## 2019-10-08 PROBLEM — Z3A.34 34 WEEKS GESTATION OF PREGNANCY: Status: RESOLVED | Noted: 2018-09-07 | Resolved: 2019-10-08

## 2019-10-08 PROBLEM — O26.13 LOW WEIGHT GAIN DURING PREGNANCY IN THIRD TRIMESTER: Status: RESOLVED | Noted: 2018-09-07 | Resolved: 2019-10-08

## 2019-10-08 PROBLEM — E87.6 HYPOKALEMIA: Status: RESOLVED | Noted: 2018-09-29 | Resolved: 2019-10-08

## 2019-10-08 PROBLEM — O36.5930 POOR FETAL GROWTH AFFECTING MANAGEMENT OF MOTHER IN THIRD TRIMESTER: Status: RESOLVED | Noted: 2018-09-28 | Resolved: 2019-10-08

## 2019-10-08 PROBLEM — J45.21 MILD INTERMITTENT ASTHMA WITH EXACERBATION: Status: RESOLVED | Noted: 2018-09-29 | Resolved: 2019-10-08

## 2019-10-08 PROBLEM — D64.9 ANEMIA: Status: RESOLVED | Noted: 2018-09-29 | Resolved: 2019-10-08

## 2019-10-08 PROBLEM — D72.829 LEUKOCYTOSIS: Status: RESOLVED | Noted: 2018-09-29 | Resolved: 2019-10-08

## 2019-10-08 PROBLEM — R10.9 ABDOMINAL PAIN: Status: RESOLVED | Noted: 2018-09-07 | Resolved: 2019-10-08

## 2019-10-08 PROBLEM — O99.283 HYPOTHYROIDISM AFFECTING PREGNANCY IN THIRD TRIMESTER: Status: RESOLVED | Noted: 2018-09-07 | Resolved: 2019-10-08

## 2019-10-08 PROBLEM — O47.9 FALSE LABOR: Status: RESOLVED | Noted: 2018-09-07 | Resolved: 2019-10-08

## 2019-10-08 PROBLEM — J06.9 URI (UPPER RESPIRATORY INFECTION): Status: RESOLVED | Noted: 2018-09-29 | Resolved: 2019-10-08

## 2019-10-08 PROBLEM — E03.9 HYPOTHYROIDISM AFFECTING PREGNANCY IN THIRD TRIMESTER: Status: RESOLVED | Noted: 2018-09-07 | Resolved: 2019-10-08

## 2019-10-08 PROCEDURE — 99213 OFFICE O/P EST LOW 20 MIN: CPT | Performed by: FAMILY MEDICINE

## 2019-10-08 PROCEDURE — T1015 CLINIC SERVICE: HCPCS | Performed by: FAMILY MEDICINE

## 2019-10-08 RX ORDER — LEVOTHYROXINE SODIUM 0.07 MG/1
75 TABLET ORAL
Qty: 90 TABLET | Refills: 0 | Status: SHIPPED | OUTPATIENT
Start: 2019-10-08 | End: 2020-07-14 | Stop reason: SDUPTHER

## 2019-10-08 RX ORDER — TOPIRAMATE 25 MG/1
25 CAPSULE, COATED PELLETS ORAL DAILY
Qty: 30 CAPSULE | Refills: 0 | Status: SHIPPED | OUTPATIENT
Start: 2019-10-08 | End: 2019-10-22

## 2019-10-08 RX ORDER — LEVOTHYROXINE SODIUM 0.07 MG/1
TABLET ORAL
Qty: 30 TABLET | Refills: 3 | OUTPATIENT
Start: 2019-10-08

## 2019-10-08 NOTE — PATIENT INSTRUCTIONS
Smoking cessation advised  Reduction of personal exposure to occupation dusts, fumes, and gases  Reduction to indoor and outdoor air pollutants  Advised influenza vaccine  Pneumococcal vaccine advised  Regular exercise and physical activity may help you control your weight  Diet and exercise play an important role in controlling your weight  Exercise strengthens your heart and improves your circulation  This lowers risks of heart disease  Exercise can lower your blood sugar level and help your insulin work better  This will cut down your risk of type 2 diabetes  Exercise can improve your mood and make you feel more relaxed  This can reduce your risk of depression

## 2019-10-08 NOTE — PROGRESS NOTES
[address]       NAME: Mauri Gallagher is a 25 y o  female  : 1995    MRN: [id number]  DATE: 2019  TIME: 1:21 PM    Assessment and Plan   Diagnoses and all orders for this visit:    Migraine without status migrainosus, not intractable, unspecified migraine type  Comments:  New referral to Neurology placed  Will start Topamax at 25 mg daily, follow-up in 10-14 days to assess effectiveness/need for titration  Orders:  -     topiramate (TOPAMAX) 25 mg sprinkle capsule; Take 1 capsule (25 mg total) by mouth daily  -     Ambulatory referral to Neurology; Future    Hypothyroidism due to Hashimoto's thyroiditis  Comments: Will call if changes need to be made based on lab values  Orders:  -     CBC and differential; Future  -     Comprehensive metabolic panel; Future  -     TSH, 3rd generation with Free T4 reflex; Future  -     HEMOGLOBIN A1C W/ EAG ESTIMATION; Future    Vitamin D deficiency  Comments:  Patient admits being noncompliant with medication, did review indications less dosing  States she has some at home and will begin to take it  Orders:  -     Vitamin D 25 hydroxy; Future    Screening, lipid  -     Lipid Panel with Direct LDL reflex; Future    Moderate asthma, unspecified whether complicated, unspecified whether persistent  Comments:  Smoking cessation encouraged  Flu shot encourage  Follow up with pulmonology, may need PFTs  Orders:  -     Ambulatory referral to Pulmonology; Future        No problem-specific Assessment & Plan notes found for this encounter  Patient Instructions           Chief Complaint     Chief Complaint   Patient presents with    Medication Refill     Pt is not currently having headaches at this moment, but is requesting Topamax     Weight Gain     Pt states that she is gaining weight         History of Present Illness       [facility] is here for routine follow-up  Does have known history of hypothyroid, headaches    Was referred to Neurology some time ago for workup of these headaches, patient admits that she has not scheduled the appointment due to family obligations  Does states that she was in the emergency room in [facility] a few months ago for similar symptoms, did receive MRI of brain x2 with no abnormalities  Denies slurred speech, facial drooping, changes in vision, or weakness  States that headache usually begins unilaterally and then switches to contralateral side  Denies aura, does see spots of light associated with bad headaches  No nausea/vomiting/diarrhea  Does develop photosensitivity  Symptoms occurring more than 2 weeks out of the month  Is requesting to start Topamax  Patient is an every day current smoker, declines smoking cessation at this time  States she will think about it  Will discuss at next visit  Does have history of asthma, states that she wheezes when walking regularly  Is not currently short of breath  Denies cough  Review of Systems   Review of Systems   Constitutional: Negative  HENT: Negative  Respiratory: Positive for wheezing  Cardiovascular: Negative  Gastrointestinal: Negative  Musculoskeletal: Negative  Skin: Negative  Neurological: Positive for headaches  Negative for dizziness, tremors, syncope, facial asymmetry, speech difficulty, weakness, light-headedness and numbness  Hematological: Negative  Psychiatric/Behavioral: Negative            Current Medications       Current Outpatient Medications:     albuterol (VENTOLIN HFA) 90 mcg/act inhaler, Inhale 2 puffs every 6 (six) hours as needed for wheezing, Disp: 1 Inhaler, Rfl: 0    fluticasone (FLONASE) 50 mcg/act nasal spray, 1 spray into each nostril daily, Disp: 1 Bottle, Rfl: 0    levothyroxine 75 mcg tablet, Take 1 tablet (75 mcg total) by mouth daily before breakfast, Disp: 30 tablet, Rfl: 0    topiramate (TOPAMAX) 25 mg sprinkle capsule, Take 1 capsule (25 mg total) by mouth daily, Disp: 30 capsule, Rfl: 0    Current Allergies     Allergies as of 10/08/2019 - Reviewed 10/08/2019   Allergen Reaction Noted    Abilify [aripiprazole] Other (See Comments), Seizures, and Confusion 12/16/2015    Tessalon [benzonatate] Anxiety 04/01/2016    Zithromax [azithromycin] Rash, Hallucinations, and Hives 12/16/2015            The following portions of the patient's history were reviewed and updated as appropriate: allergies, current medications, past family history, past medical history, past social history, past surgical history and problem list      Past Medical History:   Diagnosis Date    Anxiety     Asthma     Depression     Disease of thyroid gland     Migraine     Mood disorder ([address] )     Pregnant     Psychiatric disorder     depression,anxiety, mood disorder       Past Surgical History:   Procedure Laterality Date    APPENDECTOMY      FRACTURE SURGERY Left     wrist       Family History   Problem Relation Age of Onset    No Known Problems Mother     Thyroid disease Father     Hypertension Brother     Hypertension Maternal Grandmother     Thyroid disease Paternal Grandmother          Medications have been verified  Objective   /68 (BP Location: Right arm, Patient Position: Sitting, Cuff Size: Large)   Pulse 89   Temp (!) 97 4 °F (36 3 °C) (Tympanic)   Resp 16   Ht 5' 2" (1 575 m)   Wt 84 4 kg (186 lb)   BMI 34 02 kg/m²        Physical Exam     Physical Exam   Constitutional: She appears well-developed and well-nourished  HENT:   Head: Normocephalic  Mouth/Throat: Oropharynx is clear and moist    Eyes: Pupils are equal, round, and reactive to light  Conjunctivae and EOM are normal    Neck: Normal range of motion  Neck supple  Cardiovascular: Normal rate and regular rhythm  Pulmonary/Chest: Effort normal and breath sounds normal    Abdominal: Soft  Bowel sounds are normal    Musculoskeletal: Normal range of motion  Neurological: She is alert  Skin: Skin is warm and dry  Capillary refill takes less than 2 seconds  Psychiatric: She has a normal mood and affect  Her behavior is normal  Judgment and thought content normal    Nursing note and vitals reviewed  Kulwinder was seen today for medication refill and weight gain  Diagnoses and all orders for this visit:    Migraine without status migrainosus, not intractable, unspecified migraine type  Comments:  New referral to Neurology placed  Will start Topamax at 25 mg daily, follow-up in 10-14 days to assess effectiveness/need for titration  Orders:  -     topiramate (TOPAMAX) 25 mg sprinkle capsule; Take 1 capsule (25 mg total) by mouth daily  -     Ambulatory referral to Neurology; Future    Hypothyroidism due to Hashimoto's thyroiditis  Comments: Will call if changes need to be made based on lab values  Orders:  -     CBC and differential; Future  -     Comprehensive metabolic panel; Future  -     TSH, 3rd generation with Free T4 reflex; Future  -     HEMOGLOBIN A1C W/ EAG ESTIMATION; Future    Vitamin D deficiency  Comments:  Patient admits being noncompliant with medication, did review indications less dosing  States she has some at home and will begin to take it  Orders:  -     Vitamin D 25 hydroxy; Future    Screening, lipid  -     Lipid Panel with Direct LDL reflex; Future    Moderate asthma, unspecified whether complicated, unspecified whether persistent  Comments:  Smoking cessation encouraged  Flu shot encourage  Follow up with pulmonology, may need PFTs  Orders:  -     Ambulatory referral to Pulmonology;  Future

## 2019-10-14 DIAGNOSIS — E06.3 HYPOTHYROIDISM DUE TO HASHIMOTO'S THYROIDITIS: ICD-10-CM

## 2019-10-14 DIAGNOSIS — E03.8 HYPOTHYROIDISM DUE TO HASHIMOTO'S THYROIDITIS: ICD-10-CM

## 2019-10-14 DIAGNOSIS — Z13.220 SCREENING, LIPID: ICD-10-CM

## 2019-10-14 DIAGNOSIS — E55.9 VITAMIN D DEFICIENCY: ICD-10-CM

## 2019-10-14 LAB
25(OH)D3 SERPL-MCNC: 19.5 NG/ML (ref 30–100)
ALBUMIN SERPL BCP-MCNC: 4.1 G/DL (ref 3.5–5)
ALP SERPL-CCNC: 115 U/L (ref 46–116)
ALT SERPL W P-5'-P-CCNC: 43 U/L (ref 12–78)
ANION GAP SERPL CALCULATED.3IONS-SCNC: 8 MMOL/L (ref 4–13)
AST SERPL W P-5'-P-CCNC: 25 U/L (ref 5–45)
BASOPHILS # BLD AUTO: 0.12 THOUSANDS/ΜL (ref 0–0.1)
BASOPHILS NFR BLD AUTO: 1 % (ref 0–1)
BILIRUB SERPL-MCNC: 0.76 MG/DL (ref 0.2–1)
BUN SERPL-MCNC: 6 MG/DL (ref 5–25)
CALCIUM SERPL-MCNC: 9.5 MG/DL (ref 8.3–10.1)
CHLORIDE SERPL-SCNC: 112 MMOL/L (ref 100–108)
CHOLEST SERPL-MCNC: 149 MG/DL (ref 50–200)
CO2 SERPL-SCNC: 21 MMOL/L (ref 21–32)
CREAT SERPL-MCNC: 0.94 MG/DL (ref 0.6–1.3)
EOSINOPHIL # BLD AUTO: 0.28 THOUSAND/ΜL (ref 0–0.61)
EOSINOPHIL NFR BLD AUTO: 3 % (ref 0–6)
ERYTHROCYTE [DISTWIDTH] IN BLOOD BY AUTOMATED COUNT: 13.1 % (ref 11.6–15.1)
EST. AVERAGE GLUCOSE BLD GHB EST-MCNC: 117 MG/DL
GFR SERPL CREATININE-BSD FRML MDRD: 85 ML/MIN/1.73SQ M
GLUCOSE P FAST SERPL-MCNC: 77 MG/DL (ref 65–99)
HBA1C MFR BLD: 5.7 % (ref 4.2–6.3)
HCT VFR BLD AUTO: 44.7 % (ref 34.8–46.1)
HDLC SERPL-MCNC: 34 MG/DL (ref 40–60)
HGB BLD-MCNC: 14.5 G/DL (ref 11.5–15.4)
IMM GRANULOCYTES # BLD AUTO: 0.03 THOUSAND/UL (ref 0–0.2)
IMM GRANULOCYTES NFR BLD AUTO: 0 % (ref 0–2)
LDLC SERPL CALC-MCNC: 88 MG/DL (ref 0–100)
LYMPHOCYTES # BLD AUTO: 2.45 THOUSANDS/ΜL (ref 0.6–4.47)
LYMPHOCYTES NFR BLD AUTO: 26 % (ref 14–44)
MCH RBC QN AUTO: 27.7 PG (ref 26.8–34.3)
MCHC RBC AUTO-ENTMCNC: 32.4 G/DL (ref 31.4–37.4)
MCV RBC AUTO: 85 FL (ref 82–98)
MONOCYTES # BLD AUTO: 0.93 THOUSAND/ΜL (ref 0.17–1.22)
MONOCYTES NFR BLD AUTO: 10 % (ref 4–12)
NEUTROPHILS # BLD AUTO: 5.62 THOUSANDS/ΜL (ref 1.85–7.62)
NEUTS SEG NFR BLD AUTO: 60 % (ref 43–75)
NRBC BLD AUTO-RTO: 0 /100 WBCS
PLATELET # BLD AUTO: 381 THOUSANDS/UL (ref 149–390)
PMV BLD AUTO: 10.9 FL (ref 8.9–12.7)
POTASSIUM SERPL-SCNC: 3.8 MMOL/L (ref 3.5–5.3)
PROT SERPL-MCNC: 7.7 G/DL (ref 6.4–8.2)
RBC # BLD AUTO: 5.24 MILLION/UL (ref 3.81–5.12)
SODIUM SERPL-SCNC: 141 MMOL/L (ref 136–145)
TRIGL SERPL-MCNC: 134 MG/DL
TSH SERPL DL<=0.05 MIU/L-ACNC: 3.64 UIU/ML (ref 0.36–3.74)
WBC # BLD AUTO: 9.43 THOUSAND/UL (ref 4.31–10.16)

## 2019-10-14 PROCEDURE — 83036 HEMOGLOBIN GLYCOSYLATED A1C: CPT | Performed by: NURSE PRACTITIONER

## 2019-10-14 PROCEDURE — 80053 COMPREHEN METABOLIC PANEL: CPT | Performed by: NURSE PRACTITIONER

## 2019-10-14 PROCEDURE — 85025 COMPLETE CBC W/AUTO DIFF WBC: CPT | Performed by: NURSE PRACTITIONER

## 2019-10-14 PROCEDURE — 82306 VITAMIN D 25 HYDROXY: CPT | Performed by: NURSE PRACTITIONER

## 2019-10-14 PROCEDURE — 80061 LIPID PANEL: CPT | Performed by: NURSE PRACTITIONER

## 2019-10-14 PROCEDURE — 84443 ASSAY THYROID STIM HORMONE: CPT | Performed by: NURSE PRACTITIONER

## 2019-10-22 ENCOUNTER — OFFICE VISIT (OUTPATIENT)
Dept: FAMILY MEDICINE CLINIC | Facility: HOME HEALTHCARE | Age: 24
End: 2019-10-22
Payer: COMMERCIAL

## 2019-10-22 VITALS
HEART RATE: 86 BPM | WEIGHT: 186 LBS | BODY MASS INDEX: 34.23 KG/M2 | TEMPERATURE: 98 F | RESPIRATION RATE: 18 BRPM | HEIGHT: 62 IN | DIASTOLIC BLOOD PRESSURE: 72 MMHG | OXYGEN SATURATION: 98 % | SYSTOLIC BLOOD PRESSURE: 116 MMHG

## 2019-10-22 DIAGNOSIS — E55.9 VITAMIN D DEFICIENCY: Primary | ICD-10-CM

## 2019-10-22 DIAGNOSIS — E66.09 CLASS 1 OBESITY DUE TO EXCESS CALORIES WITHOUT SERIOUS COMORBIDITY WITH BODY MASS INDEX (BMI) OF 34.0 TO 34.9 IN ADULT: ICD-10-CM

## 2019-10-22 DIAGNOSIS — R73.03 PRE-DIABETES: ICD-10-CM

## 2019-10-22 DIAGNOSIS — G43.909 MIGRAINE WITHOUT STATUS MIGRAINOSUS, NOT INTRACTABLE, UNSPECIFIED MIGRAINE TYPE: ICD-10-CM

## 2019-10-22 PROCEDURE — T1015 CLINIC SERVICE: HCPCS | Performed by: FAMILY MEDICINE

## 2019-10-22 PROCEDURE — 99213 OFFICE O/P EST LOW 20 MIN: CPT | Performed by: FAMILY MEDICINE

## 2019-10-22 RX ORDER — TOPIRAMATE 25 MG/1
50 CAPSULE, COATED PELLETS ORAL DAILY
Qty: 60 CAPSULE | Refills: 0 | Status: SHIPPED | OUTPATIENT
Start: 2019-10-22 | End: 2019-11-11 | Stop reason: SDUPTHER

## 2019-10-22 RX ORDER — ERGOCALCIFEROL 1.25 MG/1
50000 CAPSULE ORAL WEEKLY
Qty: 4 CAPSULE | Refills: 0 | Status: SHIPPED | OUTPATIENT
Start: 2019-10-22 | End: 2020-03-20 | Stop reason: ALTCHOICE

## 2019-10-22 NOTE — PATIENT INSTRUCTIONS
Smoking cessation advised  Reduction of personal exposure to occupation dusts, fumes, and gases  Reduction to indoor and outdoor air pollutants  Advised influenza vaccine  Pneumococcal vaccine advised  Regular exercise and physical activity may help you control your weight  Diet and exercise play an important role in controlling your weight  Exercise strengthens your heart and improves your circulation  This lowers risks of heart disease  Exercise can lower your blood sugar level and help your insulin work better  This will cut down your risk of type 2 diabetes  Exercise can improve your mood and make you feel more relaxed  This can reduce your risk of depression  Hyperlipidemia-eat a heart healthy diet, this includes reduction of saturated fat and trans fat  Reducing red meat and home health     Regular exercise  40 minutes of aerobic exercise or vigorous walking can lower cholesterol and blood pressure  Avoid tobacco smoke  Smoking lowers HDL cholesterol  This places a person at risk for coronary artery disease  Weight loss recommended  Being overweight or obese tends to raise LDL cholesterol and lower HDL cholesterol  Where a medical alert identification to carry  Check your feet each day for sores, make sure your socks and shoes fit correctly  Did not trim year nails  Established with a podiatrist   Maintain a healthy weight, this can help you control her diabetes  Follow a proper meal plan  Keep track of her carbohydrates, eat low-fat foods, low-salt, high-fiber foods, limit alcohol  Exercise can help her blood sugar level study, decrease her risk of heart disease, and help you lose weight  Smoking cessation advised if applicable  Good blood pressure control is recommended  A normal blood pressure is 119/78 or lower  In neural blood pressure can help prevent certain complications from diabetes  Recommend yearly eye exams to assess for retinopathy    Consider vaccination against flu, pneumonia, and hepatitis    Start food journal, tracking calories in portion size

## 2019-10-22 NOTE — PROGRESS NOTES
[address]       NAME: Lydia Darling is a 25 y o  female  : 1995    MRN: [id number]  DATE: 2019  TIME: 2:57 PM    Assessment and Plan   Diagnoses and all orders for this visit:    Vitamin D deficiency  -     ergocalciferol (VITAMIN D2) 50,000 units; Take 1 capsule (50,000 Units total) by mouth once a week  -     Vitamin D 25 hydroxy; Future    Migraine without status migrainosus, not intractable, unspecified migraine type  Comments:  Patient advised to follow through with for Neurology   Increase Topamax to 50 mg daily  Orders:  -     topiramate (TOPAMAX) 25 mg sprinkle capsule; Take 2 capsules (50 mg total) by mouth daily    Class 1 obesity due to excess calories without serious comorbidity with body mass index (BMI) of 34 0 to 34 9 in adult  Comments:  Lifestyle modifications  Patient education regarding calorie counting and daily activity  Nutrition referral  Orders:  -     Ambulatory referral to Nutrition Services; Future    Pre-diabetes  Comments:  Lifestyle modifications encouraged  Metformin started, will reassess in 1 month  Orders:  -     metFORMIN (GLUCOPHAGE) 500 mg tablet; Take 1 tablet (500 mg total) by mouth 2 (two) times a day with meals  -     Ambulatory referral to Nutrition Services; Future        No problem-specific Assessment & Plan notes found for this encounter  Patient Instructions           Chief Complaint     Chief Complaint   Patient presents with    Follow-up     go over labs and med check         History of Present Illness       [facility] is here for routine follow-up  Does have known history of hypothyroid, headaches  Was referred to Neurology some time ago for workup of these headaches, patient admits that she has not scheduled the appointment due to family obligations  Does states that she was in the emergency room in [facility] a few months ago for similar symptoms, did receive MRI of brain x2 with no abnormalities    Denies slurred speech, facial drooping, changes in vision, or weakness  States that headache usually begins unilaterally and then switches to contralateral side  Denies aura, does see spots of light associated with bad headaches  Did start Topamax at last visit, patient feels that the length of her symptoms has decreased, but did have migraine over the weekend  Would like to increase Topamax  Patient is an every day current smoker, declines smoking cessation at this time  States she will think about it  Will discuss again at next visit  Patient is also concerned regarding her weight  States that she does not feel she is eating very much at all, and yet she is at the heaviest weight she has been  After some discussion it was noted that the patient has a poor appetite, but she drinks ice tea and soda daily  Her spouse indicates that she drinks a gal of ice tea every day  Spent significant amount of time discussing how to read nutrition labels, carbohydrate counting, and trying to eat carbohydrates versus drinking them  She is open to a nutrition referral for weight loss, previous A1c was 5 7  After some discussion with patient it was decided to start metformin at this time  Will bring her back in 1 month to assess effectiveness and likely recheck labs at that time  Review of Systems   Review of Systems   Constitutional: Negative  HENT: Negative  Respiratory: Negative  Cardiovascular: Negative  Gastrointestinal: Negative  Genitourinary: Negative  Musculoskeletal: Negative  Neurological: Positive for headaches  Psychiatric/Behavioral: Negative            Current Medications       Current Outpatient Medications:     albuterol (VENTOLIN HFA) 90 mcg/act inhaler, Inhale 2 puffs every 6 (six) hours as needed for wheezing, Disp: 1 Inhaler, Rfl: 0    ergocalciferol (VITAMIN D2) 50,000 units, Take 1 capsule (50,000 Units total) by mouth once a week, Disp: 4 capsule, Rfl: 0    fluticasone (FLONASE) 50 mcg/act nasal spray, 1 spray into each nostril daily, Disp: 1 Bottle, Rfl: 0    levothyroxine 75 mcg tablet, Take 1 tablet (75 mcg total) by mouth daily before breakfast, Disp: 90 tablet, Rfl: 0    metFORMIN (GLUCOPHAGE) 500 mg tablet, Take 1 tablet (500 mg total) by mouth 2 (two) times a day with meals, Disp: 60 tablet, Rfl: 2    topiramate (TOPAMAX) 25 mg sprinkle capsule, Take 2 capsules (50 mg total) by mouth daily, Disp: 60 capsule, Rfl: 0    Current Allergies     Allergies as of 10/22/2019 - Reviewed 10/22/2019   Allergen Reaction Noted    Abilify [aripiprazole] Other (See Comments), Seizures, and Confusion 12/16/2015    Tessalon [benzonatate] Anxiety 04/01/2016    Zithromax [azithromycin] Rash, Hallucinations, and Hives 12/16/2015            The following portions of the patient's history were reviewed and updated as appropriate: allergies, current medications, past family history, past medical history, past social history, past surgical history and problem list      Past Medical History:   Diagnosis Date    Anxiety     Asthma     Depression     Disease of thyroid gland     Migraine     [facility] disorder [facility] [address]     Pregnant     Psychiatric disorder     depression,anxiety, mood disorder       Past Surgical History:   Procedure Laterality Date    APPENDECTOMY      FRACTURE SURGERY Left     wrist       Family History   Problem Relation Age of Onset    No Known Problems Mother     Thyroid disease Father     Hypertension Brother     Hypertension Maternal Grandmother     Thyroid disease Paternal Grandmother          Medications have been verified  Objective   /72 (BP Location: Left arm, Patient Position: Sitting, Cuff Size: Standard)   Pulse 86   Temp 98 °F (36 7 °C) (Tympanic)   Resp 18   Ht 5' 2" (1 575 m)   Wt 84 4 kg (186 lb)   SpO2 98%   BMI 34 02 kg/m²        Physical Exam     Physical Exam   Constitutional: She is oriented to person, place, and time   She appears well-developed and well-nourished  HENT:   Mouth/Throat: Oropharynx is clear and moist    Eyes: Pupils are equal, round, and reactive to light  Conjunctivae and EOM are normal    Neck: Normal range of motion  Neck supple  Cardiovascular: Normal rate, regular rhythm, normal heart sounds and intact distal pulses  Pulmonary/Chest: Effort normal and breath sounds normal    Abdominal: Soft  Bowel sounds are normal    Musculoskeletal: Normal range of motion  Neurological: She is alert and oriented to person, place, and time  Skin: Skin is warm and dry  Capillary refill takes less than 2 seconds  Psychiatric: She has a normal mood and affect  Her behavior is normal  Judgment and thought content normal    Nursing note and vitals reviewed

## 2019-11-01 ENCOUNTER — APPOINTMENT (OUTPATIENT)
Dept: LAB | Facility: HOSPITAL | Age: 24
End: 2019-11-01
Payer: COMMERCIAL

## 2019-11-01 ENCOUNTER — CLINICAL SUPPORT (OUTPATIENT)
Dept: NUTRITION | Facility: HOSPITAL | Age: 24
End: 2019-11-01
Payer: COMMERCIAL

## 2019-11-01 VITALS — BODY MASS INDEX: 33.97 KG/M2 | HEIGHT: 62 IN | WEIGHT: 184.6 LBS

## 2019-11-01 DIAGNOSIS — R73.03 PRE-DIABETES: ICD-10-CM

## 2019-11-01 DIAGNOSIS — E66.09 CLASS 1 OBESITY DUE TO EXCESS CALORIES WITHOUT SERIOUS COMORBIDITY WITH BODY MASS INDEX (BMI) OF 34.0 TO 34.9 IN ADULT: ICD-10-CM

## 2019-11-01 DIAGNOSIS — E55.9 VITAMIN D DEFICIENCY: ICD-10-CM

## 2019-11-01 LAB — 25(OH)D3 SERPL-MCNC: 30.9 NG/ML (ref 30–100)

## 2019-11-01 PROCEDURE — 36415 COLL VENOUS BLD VENIPUNCTURE: CPT

## 2019-11-01 PROCEDURE — S9470 NUTRITIONAL COUNSELING, DIET: HCPCS

## 2019-11-01 PROCEDURE — 82306 VITAMIN D 25 HYDROXY: CPT

## 2019-11-01 NOTE — PROGRESS NOTES
Initial Nutrition Assessment Form    Patient Name: Venessa Shields    YOB: 1995    Sex: Female     Assessment Date: 11/1/2019  Start Time: 0945 Stop Time: 1045 Total Minutes: 61     Data:  Present at session: Self/son Osmany   Parent Concerns:    Medical Dx/Reason for Referral: Obesity/prediabetes   Past Medical History:   Diagnosis Date    Anxiety     Asthma     Depression     Disease of thyroid gland     Migraine     Mood disorder ([address] )     Pregnant     Psychiatric disorder     depression,anxiety, mood disorder       Current Outpatient Medications   Medication Sig Dispense Refill    albuterol (VENTOLIN HFA) 90 mcg/act inhaler Inhale 2 puffs every 6 (six) hours as needed for wheezing 1 Inhaler 0    ergocalciferol (VITAMIN D2) 50,000 units Take 1 capsule (50,000 Units total) by mouth once a week 4 capsule 0    fluticasone (FLONASE) 50 mcg/act nasal spray 1 spray into each nostril daily 1 Bottle 0    levothyroxine 75 mcg tablet Take 1 tablet (75 mcg total) by mouth daily before breakfast 90 tablet 0    metFORMIN (GLUCOPHAGE) 500 mg tablet Take 1 tablet (500 mg total) by mouth 2 (two) times a day with meals 60 tablet 2    topiramate (TOPAMAX) 25 mg sprinkle capsule Take 2 capsules (50 mg total) by mouth daily 60 capsule 0     No current facility-administered medications for this visit  Additional Meds/Supplements: Metformin just started    Special Learning Needs: none   Height: HC Readings from Last 3 Encounters:   No data found for [facility]       Weight: Wt Readings from Last 3 Encounters:   10/22/19 84 4 kg (186 lb)   10/08/19 84 4 kg (186 lb)   09/24/19 83 5 kg (184 lb)     There is no height or weight on file to calculate BMI     Recent Weight Change: [x]Yes     []No  Amount: Patient states she was 189# recently and since watching diet lost 4 4#      Energy Needs: 4030-3685 calories/188-225 g carbs daily   Allergies   Allergen Reactions    Abilify [Aripiprazole] Other (See Comments), Seizures and Confusion     hallucinaTIONS  hallucination    Tessalon [Benzonatate] Anxiety     Other reaction(s): Psych complications  hallucinations  Other reaction(s): Psych complications  hallucinations    Zithromax [Azithromycin] Rash, Hallucinations and Hives       Social History     Substance and Sexual Activity   Alcohol Use No       Social History     Tobacco Use   Smoking Status Current Some Day Smoker    Packs/day: 1 00    Years: 5 00    Pack years: 5 00    Types: Cigarettes   Smokeless Tobacco Never Used       Who shops? patient   Who cooks? patient   Exercise: No routine exercise; does have busy life working and taking care of two young children   Prior Counseling? 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 (time varies); omelet with meat, cheese and vegetables and glass of regular ice tea   Lunch:  does not eat during day         Dinner: 5 p m  Cooked meal but patient admits she eats the same meals almost daily which includes chip steak, shrimp with lite amount butter, mash potato and corn  Depending on week, family may eat out at fast food or grab a pizza       Snacks:   HS - patient reports she does not eat all night at work most of the time but was drinking almost  A gallon of ice tea  She has since cut back to 1/2 gallon  Last night she ate ~15 snack size rice kripies treats           Nutrition Diagnosis:   Involuntary weight gain  related to excessive caloric intake as evidenced by  Increasing BMI       Medical Nutrition Therapy Intervention:  [x]Individualized Meal Plan []Understanding Lab Values   []Basic Pathophysiology of Disease []Food/Medication Interactions   []Food Diary [x]Exercise   [x]Lifestyle/Behavior Modification Techniques []Medication, Mechanism of Action   [x]Label Reading []Self Blood Glucose Monitoring   [x]Weight/BMI Goals []Other - Other Notes:  Patient admits she is trying to make better choices but she thinks it will be a slow process        Comprehension: []Excellent  []Very Good  [x]Good  []Fair   []Poor    Receptivity: []Excellent  []Very Good  [x]Good  []Fair   []Poor    Expected Compliance: []Excellent  []Very Good  [x]Good  []Fair   []Poor        Goals:  1  Carb counting with goal intake <225 g daily   2  Eat breakfast every day   3  Cut ice tea amount in half from 1/2 gallon to quart       Follow up to be determined  Patient has appointment next week for her son and will discuss scheduling a follow up appointment at that time when patient has her schedule      Labs:  CMP  Lab Results   Component Value Date    K 3 8 10/14/2019     (H) 10/14/2019    CO2 21 10/14/2019    BUN 6 10/14/2019    CREATININE 0 94 10/14/2019    GLUF 77 10/14/2019    CALCIUM 9 5 10/14/2019    AST 25 10/14/2019    ALT 43 10/14/2019    ALKPHOS 115 10/14/2019    EGFR 85 10/14/2019       BMP  Lab Results   Component Value Date    CALCIUM 9 5 10/14/2019    K 3 8 10/14/2019    CO2 21 10/14/2019     (H) 10/14/2019    BUN 6 10/14/2019    CREATININE 0 94 10/14/2019       Lipids  No results found for: CHOL  Lab Results   Component Value Date    HDL 34 (L) 10/14/2019     Lab Results   Component Value Date    LDLCALC 88 10/14/2019     Lab Results   Component Value Date    TRIG 134 10/14/2019     No results found for: CHOLHDL    Hemoglobin A1C  Lab Results   Component Value Date    HGBA1C 5 7 10/14/2019       Fasting Glucose  Lab Results   Component Value Date    GLUF 77 10/14/2019       Insulin     Thyroid  Lab Results   Component Value Date    U6YQGZN 0 6 01/19/2017       Hepatic Function Panel  Lab Results   Component Value Date    ALT 43 10/14/2019    AST 25 10/14/2019    ALKPHOS 115 10/14/2019       Celiac Disease Antibody Panel  No results found for: ENDOMYSIAL IGA, GLIADIN IGA, GLIADIN IGG, IGA, TISSUE TRANSGLUT AB, TTG IGA   Iron  Lab Results Component Value Date    IRON 31 (L) 06/14/2019       Vitamins  No results found for: VITAMIN B2   No results found for: NICOTINAMIDE, NICOTINIC ACID   No results found for: VITAMINB6  No results found for: [id number]  No results found for: VITB5  No results found for: O8JYXGMQ  No results found for: THYROGLB  No results found for: VITAMIN K   No results found for: 25-HYDROXY VIT D   No components found for: [address] [facility] MA,RD,LDN,CDE  [address] [facility]  [address] [facility] [id number]

## 2019-11-04 ENCOUNTER — OFFICE VISIT (OUTPATIENT)
Dept: FAMILY MEDICINE CLINIC | Facility: HOME HEALTHCARE | Age: 24
End: 2019-11-04
Payer: COMMERCIAL

## 2019-11-04 VITALS
SYSTOLIC BLOOD PRESSURE: 116 MMHG | HEIGHT: 62 IN | TEMPERATURE: 97.7 F | WEIGHT: 185 LBS | BODY MASS INDEX: 34.04 KG/M2 | RESPIRATION RATE: 16 BRPM | DIASTOLIC BLOOD PRESSURE: 84 MMHG | OXYGEN SATURATION: 98 % | HEART RATE: 68 BPM

## 2019-11-04 DIAGNOSIS — J02.9 SORE THROAT: ICD-10-CM

## 2019-11-04 DIAGNOSIS — J35.01 CHRONIC TONSILLITIS: ICD-10-CM

## 2019-11-04 DIAGNOSIS — R09.82 POST-NASAL DRIP: Primary | ICD-10-CM

## 2019-11-04 DIAGNOSIS — Z23 NEED FOR INFLUENZA VACCINATION: ICD-10-CM

## 2019-11-04 DIAGNOSIS — J06.9 VIRAL URI: ICD-10-CM

## 2019-11-04 DIAGNOSIS — J02.0 STREP PHARYNGITIS: ICD-10-CM

## 2019-11-04 LAB — S PYO AG THROAT QL: POSITIVE

## 2019-11-04 PROCEDURE — 87880 STREP A ASSAY W/OPTIC: CPT | Performed by: FAMILY MEDICINE

## 2019-11-04 PROCEDURE — 99213 OFFICE O/P EST LOW 20 MIN: CPT | Performed by: FAMILY MEDICINE

## 2019-11-04 PROCEDURE — T1015 CLINIC SERVICE: HCPCS | Performed by: FAMILY MEDICINE

## 2019-11-04 RX ORDER — AMOXICILLIN 500 MG/1
1000 CAPSULE ORAL EVERY 8 HOURS SCHEDULED
Qty: 60 CAPSULE | Refills: 0 | Status: SHIPPED | OUTPATIENT
Start: 2019-11-04 | End: 2019-11-14

## 2019-11-04 RX ORDER — LORATADINE 10 MG/1
10 TABLET ORAL DAILY
Qty: 30 TABLET | Refills: 0 | Status: SHIPPED | OUTPATIENT
Start: 2019-11-04 | End: 2022-07-19

## 2019-11-04 NOTE — LETTER
November 4, 2019     Patient: Karmen Jin   YOB: 1995   Date of Visit: 11/4/2019       To Whom it May Concern:    Abraham Morton is under my professional care  She was seen in my office on 11/4/2019  She may return to work on 11/06/2019  If you have any questions or concerns, please don't hesitate to call           Sincerely,          MARGO Mckeon        CC: No Recipients

## 2019-11-04 NOTE — PROGRESS NOTES
Nuha Simmons is a 25 y o  female who presents for evaluation of chills, nasal congestion and sore throat  Symptoms began 1 day ago  Symptoms have been gradually worsening since that time  Past history is significant for recurrent viral tonsillitis  The following portions of the patient's history were reviewed and updated as appropriate: allergies, current medications, past family history, past medical history, past social history, past surgical history and problem list     Review of Systems  Constitutional: negative except for chills  Ears, nose, mouth, throat, and face: negative except for nasal congestion and sore throat  Respiratory: negative  Cardiovascular: negative  Gastrointestinal: negative  Objective     /84 (BP Location: Left arm, Patient Position: Sitting, Cuff Size: Large)   Pulse 68   Temp 97 7 °F (36 5 °C) (Tympanic)   Resp 16   Ht 5' 2" (1 575 m)   Wt 83 9 kg (185 lb)   SpO2 98%   BMI 33 84 kg/m²   General appearance: alert and oriented, in no acute distress  Ears: normal TM's and external ear canals both ears  Nose: Nares normal  Septum midline  Mucosa normal  No drainage or sinus tenderness  Throat: lips, mucosa, and tongue normal; teeth and gums normal   Pharynx with erythema  Mild cervical anterior adenopathy   Lungs: clear to auscultation bilaterally  Heart: regular rate and rhythm, S1, S2 normal, no murmur, click, rub or gallop  Assessment/Plan     URI with Post Nasal Drip   Allencharissa was seen today for generalized body aches, sore throat and nasal congestion  Diagnoses and all orders for this visit:    Post-nasal drip  -     loratadine (CLARITIN) 10 mg tablet; Take 1 tablet (10 mg total) by mouth daily    Viral URI  -     loratadine (CLARITIN) 10 mg tablet;  Take 1 tablet (10 mg total) by mouth daily  -     sodium chloride (OCEAN) 0 65 % nasal spray; 1 spray into each nostril as needed for rhinitis    Sore throat  -     POCT rapid strepA    Chronic tonsillitis  Comments: Will refer to ENT per patient request  Orders:  -     Ambulatory Referral to Otolaryngology; Future  -     Cancel: Throat culture; Future    Strep pharyngitis  -     amoxicillin (AMOXIL) 500 mg capsule; Take 2 capsules (1,000 mg total) by mouth every 8 (eight) hours for 10 days    Need for influenza vaccination  -     Flu vaccine greater than or equal to 2yo preservative free IM        Worsening signs and symptoms discussed  Rest, fluids, acetaminophen, and humidification  Follow up as needed for persistent, worsening cough, or appearance of new symptoms

## 2019-11-04 NOTE — PATIENT INSTRUCTIONS
Encouraged fluids and rest  Length and course of illness discussed  Tylenol Motrin as per package  Saline nasal spray as per package  Follow-up in office not feeling better in a few days  Avoid kissing or sharing drinks/utensils  Replace toothbrush in 48

## 2019-11-11 DIAGNOSIS — G43.909 MIGRAINE WITHOUT STATUS MIGRAINOSUS, NOT INTRACTABLE, UNSPECIFIED MIGRAINE TYPE: ICD-10-CM

## 2019-11-11 RX ORDER — TOPIRAMATE 25 MG/1
50 CAPSULE, COATED PELLETS ORAL DAILY
Qty: 60 CAPSULE | Refills: 0 | Status: SHIPPED | OUTPATIENT
Start: 2019-11-11 | End: 2020-02-19 | Stop reason: SDUPTHER

## 2019-11-15 ENCOUNTER — OFFICE VISIT (OUTPATIENT)
Dept: PULMONOLOGY | Facility: CLINIC | Age: 24
End: 2019-11-15
Payer: COMMERCIAL

## 2019-11-15 VITALS
DIASTOLIC BLOOD PRESSURE: 68 MMHG | BODY MASS INDEX: 34.23 KG/M2 | HEART RATE: 71 BPM | HEIGHT: 62 IN | SYSTOLIC BLOOD PRESSURE: 102 MMHG | WEIGHT: 186 LBS | OXYGEN SATURATION: 98 %

## 2019-11-15 DIAGNOSIS — J45.30 MILD PERSISTENT ASTHMA WITHOUT COMPLICATION: Primary | ICD-10-CM

## 2019-11-15 DIAGNOSIS — Z72.0 TOBACCO ABUSE: ICD-10-CM

## 2019-11-15 DIAGNOSIS — J45.909 MODERATE ASTHMA, UNSPECIFIED WHETHER COMPLICATED, UNSPECIFIED WHETHER PERSISTENT: ICD-10-CM

## 2019-11-15 DIAGNOSIS — R09.89 ABNORMAL CHEST SOUNDS: ICD-10-CM

## 2019-11-15 PROCEDURE — 99244 OFF/OP CNSLTJ NEW/EST MOD 40: CPT | Performed by: INTERNAL MEDICINE

## 2019-11-15 PROCEDURE — 99407 BEHAV CHNG SMOKING > 10 MIN: CPT | Performed by: INTERNAL MEDICINE

## 2019-11-15 NOTE — ASSESSMENT & PLAN NOTE
Agree with management with p r n  Albuterol and sometimes p r n  Flovent as needed  Counseled patient to quit smoking

## 2019-11-15 NOTE — ASSESSMENT & PLAN NOTE
Popping sound from chest in intermittently, today I could not reproduce this sound with deep breaths while examining the patient  No ribs instability by palpating the sternum and ribs  I believe this is secondary to slight movement of one of the ribs cartilages and this has been going for years as per patient and not causing her any trouble  I reassured patient about this condition and told her to try to ignore knowing that this is a benign situation and most likely 1 cause her any trouble in the future

## 2019-11-15 NOTE — PROGRESS NOTES
Consultation - Pulmonary Medicine   Paulette Sam 25 y o  female MRN: [id number]        Physician Requesting Consult: Joanna Mack  Reason for Consult: Asthma, popping sound from chest    Abnormal chest sounds  Popping sound from chest in intermittently, today I could not reproduce this sound with deep breaths while examining the patient  No ribs instability by palpating the sternum and ribs  I believe this is secondary to slight movement of one of the ribs cartilages and this has been going for years as per patient and not causing her any trouble  I reassured patient about this condition and told her to try to ignore knowing that this is a benign situation and most likely 1 cause her any trouble in the future  Mild persistent asthma without complication  Agree with management with p r n  Albuterol and sometimes p r n  Flovent as needed  Counseled patient to quit smoking  Tobacco abuse  Counseled patient to quit smoking for more than 10 minutes, we discussed options of medications including Chantix and I explained all the side effects including depression/suicidal thoughts among other side effects, also we spoke about Wellbutrin that may decrease seizure threshold and patient had history of seizure with Abilify in the past, also nicotine patches which was tried by patient in the past and she felt that she had more craving to cigarettes on the nicotine  Also we spoke about setting a date and trying to quit with her  and to support each other, getting rid of cigarettes at home and do some modification in the environment to avoid craving    Patient will consider quitting smoking and also she will talk to her primary care physician about Chantix         ______________________________________________________________________    HPI:    Paulette Sam is a 25 y o  female who presents for evaluation of asthma and also the feeling of popping sensation/sound intermittently from her chest   Patient had asthma since she was born, she was 7 weeks prematurity states and had multiple respiratory infections growing up but her asthma improved later to worse adulthood  Currently she has exacerbation of her symptoms in the hot weather with humidity and also with the cold weather and when exposed to cold air  She uses p r n  Albuterol very infrequently, and she has also Flovent to use when she has worsening of her symptoms which is very infrequent as well  She denies frequent exacerbations requiring steroids, the last exacerbation of her asthma was more than 1 year ago when she was pregnant with her son  Patient all her life had this complained of hearing a popping sound from her chest specially when she is laying down and taking deep breath  She denies any chest pain or other complaints  At baseline she denies any shortness of breath or dyspnea, denies fever or chills or night sweats, denies weight loss, denies wheezing, denies cough or sputum production  She was evaluated by her pediatrician in the past who told her that she has "extra cartilage" and nothing to worry about  Otherwise patient denies any other complaints  She lives with her  and 2 children  She is a  at this time with no occupational exposure  She smokes cigarettes about 1 pack per day  Review of Systems:  Review of Systems   Constitutional: Negative  HENT: Negative  Eyes: Negative  Respiratory:        As HPI   Cardiovascular: Negative  Gastrointestinal: Negative  Endocrine: Negative  Genitourinary: Negative  Musculoskeletal: Negative  Skin: Negative  Allergic/Immunologic: Negative  Neurological: Negative  Hematological: Negative  Psychiatric/Behavioral: Negative          Current Medications:    Current Outpatient Medications:     albuterol (VENTOLIN HFA) 90 mcg/act inhaler, Inhale 2 puffs every 6 (six) hours as needed for wheezing, Disp: 1 Inhaler, Rfl: 0   ergocalciferol (VITAMIN D2) 50,000 units, Take 1 capsule (50,000 Units total) by mouth once a week, Disp: 4 capsule, Rfl: 0    levothyroxine 75 mcg tablet, Take 1 tablet (75 mcg total) by mouth daily before breakfast, Disp: 90 tablet, Rfl: 0    loratadine (CLARITIN) 10 mg tablet, Take 1 tablet (10 mg total) by mouth daily, Disp: 30 tablet, Rfl: 0    metFORMIN (GLUCOPHAGE) 500 mg tablet, Take 1 tablet (500 mg total) by mouth 2 (two) times a day with meals, Disp: 60 tablet, Rfl: 2    topiramate (TOPAMAX) 25 mg sprinkle capsule, Take 2 capsules (50 mg total) by mouth daily, Disp: 60 capsule, Rfl: 0    Historical Information   Past Medical History:   Diagnosis Date    Anxiety     Asthma     Depression     Disease of thyroid gland     Migraine     Mood disorder (HCC)     Pregnant     Psychiatric disorder     depression,anxiety, mood disorder     Past Surgical History:   Procedure Laterality Date    APPENDECTOMY      FRACTURE SURGERY Left     wrist     Social History   Social History     Tobacco Use   Smoking Status Current Some Day Smoker    Packs/day: 1 00    Years: 5 00    Pack years: 5 00    Types: Cigarettes   Smokeless Tobacco Never Used       Occupational history:  No occupational exposure    Family History:   Family History   Problem Relation Age of Onset    No Known Problems Mother     Thyroid disease Father     Hypertension Brother     Hypertension Maternal Grandmother     Thyroid disease Paternal Grandmother          PhysicalExamination:  Vitals:   /68 (BP Location: Left arm, Patient Position: Sitting)   Pulse 71   Ht 5' 2" (1 575 m)   Wt 84 4 kg (186 lb)   SpO2 98%   BMI 34 02 kg/m²     General: alert, not in acute distress  HEENT: PERRL, no icteric sclera or cyanosis, no thrush  Neck:  Supple, no lymphadenopathy or thyromegaly, no JVD  Lungs:  Equal breath sounds and clear auscultations bilaterally, no wheezing or crackles  Heart: S1S2 regular, no murmures or gallops  Abdomen: soft, non-tender, bowel sounds  present  Extrimities: no edema, no clubbing or cyanosis  Neuro: Alert and oriented x 3, no focal neurodeficits   Skin: intact, no rashes      Diagnostic Data:  Labs:   I personally reviewed the most recent laboratory data pertinent to today's visit    Lab Results   Component Value Date    WBC 9 43 10/14/2019    HGB 14 5 10/14/2019    HCT 44 7 10/14/2019    MCV 85 10/14/2019     10/14/2019     Lab Results   Component Value Date    CALCIUM 9 5 10/14/2019    K 3 8 10/14/2019    CO2 21 10/14/2019     (H) 10/14/2019    BUN 6 10/14/2019    CREATININE 0 94 10/14/2019     No results found for: IGE  Lab Results   Component Value Date    ALT 43 10/14/2019    AST 25 10/14/2019    ALKPHOS 115 10/14/2019         Imaging:  I personally reviewed the images on the [facility] system pertinent to today's visit  Chest x-ray reviewed on PACs:  Clear lungs, no abnormalities in sternum/ribs        Faheem Hernandez MD

## 2019-11-15 NOTE — ASSESSMENT & PLAN NOTE
Counseled patient to quit smoking for more than 10 minutes, we discussed options of medications including Chantix and I explained all the side effects including depression/suicidal thoughts among other side effects, also we spoke about Wellbutrin that may decrease seizure threshold and patient had history of seizure with Abilify in the past, also nicotine patches which was tried by patient in the past and she felt that she had more craving to cigarettes on the nicotine  Also we spoke about setting a date and trying to quit with her  and to support each other, getting rid of cigarettes at home and do some modification in the environment to avoid craving  Patient will consider quitting smoking and also she will talk to her primary care physician about Chantix

## 2019-11-15 NOTE — LETTER
November 15, 2019     MARGO Nguyen  [address]    Patient: Sussy Hou   YOB: 1995   Date of Visit: 11/15/2019       Dear Dr Kim Lam:    Thank you for referring Nahum Phillips to me for evaluation  Below are my notes for this consultation  If you have questions, please do not hesitate to call me  I look forward to following your patient along with you  Sincerely,        Marcela Christianson MD        CC: No Recipients  Marcela Christianson MD  11/15/2019 10:06 AM  Sign at close encounter    Consultation - Pulmonary Medicine   Sussy Hou 25 y o  female MRN: [id number]        Physician Requesting Consult: Marley Damon  Reason for Consult: Asthma, popping sound from chest    Abnormal chest sounds  Popping sound from chest in intermittently, today I could not reproduce this sound with deep breaths while examining the patient  No ribs instability by palpating the sternum and ribs  I believe this is secondary to slight movement of one of the ribs cartilages and this has been going for years as per patient and not causing her any trouble  I reassured patient about this condition and told her to try to ignore knowing that this is a benign situation and most likely 1 cause her any trouble in the future  Mild persistent asthma without complication  Agree with management with p r n  Albuterol and sometimes p r n  Flovent as needed  Counseled patient to quit smoking      Tobacco abuse  Counseled patient to quit smoking for more than 10 minutes, we discussed options of medications including Chantix and I explained all the side effects including depression/suicidal thoughts among other side effects, also we spoke about Wellbutrin that may decrease seizure threshold and patient had history of seizure with Abilify in the past, also nicotine patches which was tried by patient in the past and she felt that she had more craving to cigarettes on the nicotine  Also we spoke about setting a date and trying to quit with her  and to support each other, getting rid of cigarettes at home and do some modification in the environment to avoid craving  Patient will consider quitting smoking and also she will talk to her primary care physician about Chantix         ______________________________________________________________________    HPI:    Carlee May is a 25 y o  female who presents for evaluation of asthma and also the feeling of popping sensation/sound intermittently from her chest   Patient had asthma since she was born, she was 7 weeks prematurity states and had multiple respiratory infections growing up but her asthma improved later to worse adulthood  Currently she has exacerbation of her symptoms in the hot weather with humidity and also with the cold weather and when exposed to cold air  She uses p r n  Albuterol very infrequently, and she has also Flovent to use when she has worsening of her symptoms which is very infrequent as well  She denies frequent exacerbations requiring steroids, the last exacerbation of her asthma was more than 1 year ago when she was pregnant with her son  Patient all her life had this complained of hearing a popping sound from her chest specially when she is laying down and taking deep breath  She denies any chest pain or other complaints  At baseline she denies any shortness of breath or dyspnea, denies fever or chills or night sweats, denies weight loss, denies wheezing, denies cough or sputum production  She was evaluated by her pediatrician in the past who told her that she has "extra cartilage" and nothing to worry about  Otherwise patient denies any other complaints  She lives with her  and 2 children  She is a  at this time with no occupational exposure  She smokes cigarettes about 1 pack per day           Review of Systems:  Review of Systems Constitutional: Negative  HENT: Negative  Eyes: Negative  Respiratory:        As HPI   Cardiovascular: Negative  Gastrointestinal: Negative  Endocrine: Negative  Genitourinary: Negative  Musculoskeletal: Negative  Skin: Negative  Allergic/Immunologic: Negative  Neurological: Negative  Hematological: Negative  Psychiatric/Behavioral: Negative          Current Medications:    Current Outpatient Medications:     albuterol (VENTOLIN HFA) 90 mcg/act inhaler, Inhale 2 puffs every 6 (six) hours as needed for wheezing, Disp: 1 Inhaler, Rfl: 0    ergocalciferol (VITAMIN D2) 50,000 units, Take 1 capsule (50,000 Units total) by mouth once a week, Disp: 4 capsule, Rfl: 0    levothyroxine 75 mcg tablet, Take 1 tablet (75 mcg total) by mouth daily before breakfast, Disp: 90 tablet, Rfl: 0    loratadine (CLARITIN) 10 mg tablet, Take 1 tablet (10 mg total) by mouth daily, Disp: 30 tablet, Rfl: 0    metFORMIN (GLUCOPHAGE) 500 mg tablet, Take 1 tablet (500 mg total) by mouth 2 (two) times a day with meals, Disp: 60 tablet, Rfl: 2    topiramate (TOPAMAX) 25 mg sprinkle capsule, Take 2 capsules (50 mg total) by mouth daily, Disp: 60 capsule, Rfl: 0    Historical Information   Past Medical History:   Diagnosis Date    Anxiety     Asthma     Depression     Disease of thyroid gland     Migraine     Mood disorder (HCC)     Pregnant     Psychiatric disorder     depression,anxiety, mood disorder     Past Surgical History:   Procedure Laterality Date    APPENDECTOMY      FRACTURE SURGERY Left     wrist     Social History   Social History     Tobacco Use   Smoking Status Current Some Day Smoker    Packs/day: 1 00    Years: 5 00    Pack years: 5 00    Types: Cigarettes   Smokeless Tobacco Never Used       Occupational history:  No occupational exposure    Family History:   Family History   Problem Relation Age of Onset    No Known Problems Mother     Thyroid disease Father    Frank Hypertension Brother     Hypertension Maternal Grandmother     Thyroid disease Paternal Grandmother          PhysicalExamination:  Vitals:   /68 (BP Location: Left arm, Patient Position: Sitting)   Pulse 71   Ht 5' 2" (1 575 m)   Wt 84 4 kg (186 lb)   SpO2 98%   BMI 34 02 kg/m²      General: alert, not in acute distress  HEENT: PERRL, no icteric sclera or cyanosis, no thrush  Neck:  Supple, no lymphadenopathy or thyromegaly, no JVD  Lungs:  Equal breath sounds and clear auscultations bilaterally, no wheezing or crackles  Heart: S1S2 regular, no murmures or gallops  Abdomen: soft, non-tender, bowel sounds  present  Extrimities: no edema, no clubbing or cyanosis  Neuro: Alert and oriented x 3, no focal neurodeficits   Skin: intact, no rashes      Diagnostic Data:  Labs:   I personally reviewed the most recent laboratory data pertinent to today's visit    Lab Results   Component Value Date    WBC 9 43 10/14/2019    HGB 14 5 10/14/2019    HCT 44 7 10/14/2019    MCV 85 10/14/2019     10/14/2019     Lab Results   Component Value Date    CALCIUM 9 5 10/14/2019    K 3 8 10/14/2019    CO2 21 10/14/2019     (H) 10/14/2019    BUN 6 10/14/2019    CREATININE 0 94 10/14/2019     No results found for: IGE  Lab Results   Component Value Date    ALT 43 10/14/2019    AST 25 10/14/2019    ALKPHOS 115 10/14/2019         Imaging:  I personally reviewed the images on the [facility] system pertinent to today's visit  Chest x-ray reviewed on PACs:  Clear lungs, no abnormalities in sternum/ribs        Edmundo Lawson MD

## 2019-12-27 ENCOUNTER — OFFICE VISIT (OUTPATIENT)
Dept: URGENT CARE | Facility: CLINIC | Age: 24
End: 2019-12-27
Payer: COMMERCIAL

## 2019-12-27 VITALS
TEMPERATURE: 98.8 F | RESPIRATION RATE: 18 BRPM | OXYGEN SATURATION: 98 % | DIASTOLIC BLOOD PRESSURE: 59 MMHG | SYSTOLIC BLOOD PRESSURE: 103 MMHG | HEART RATE: 62 BPM

## 2019-12-27 DIAGNOSIS — J40 BRONCHITIS: ICD-10-CM

## 2019-12-27 DIAGNOSIS — J02.9 PHARYNGITIS, UNSPECIFIED ETIOLOGY: Primary | ICD-10-CM

## 2019-12-27 PROCEDURE — 99213 OFFICE O/P EST LOW 20 MIN: CPT | Performed by: PHYSICIAN ASSISTANT

## 2019-12-27 PROCEDURE — G0382 LEV 3 HOSP TYPE B ED VISIT: HCPCS | Performed by: PHYSICIAN ASSISTANT

## 2019-12-27 PROCEDURE — 99283 EMERGENCY DEPT VISIT LOW MDM: CPT | Performed by: PHYSICIAN ASSISTANT

## 2019-12-27 RX ORDER — ALBUTEROL SULFATE 90 UG/1
2 AEROSOL, METERED RESPIRATORY (INHALATION) EVERY 6 HOURS PRN
Qty: 6.7 G | Refills: 0 | Status: SHIPPED | OUTPATIENT
Start: 2019-12-27

## 2019-12-27 RX ORDER — AMOXICILLIN 500 MG/1
500 CAPSULE ORAL EVERY 8 HOURS SCHEDULED
Qty: 21 CAPSULE | Refills: 0 | Status: SHIPPED | OUTPATIENT
Start: 2019-12-27 | End: 2020-01-03

## 2019-12-27 NOTE — LETTER
December 27, 2019     Patient: Marylene Cease   YOB: 1995   Date of Visit: 12/27/2019       To Whom It May Concern: It is my medical opinion that [facility] may return to work on 12/29/2019  If you have any questions or concerns, please don't hesitate to call  Sincerely,        Nancy Potter PA-C    CC: Referral Self  [facility] ([employer]

## 2019-12-27 NOTE — PROGRESS NOTES
[facility]        NAME: Tracy Prasad is a 25 y o  female  : 1995    MRN: [id number]  DATE: 2019  TIME: 3:39 PM    Assessment and Plan   Pharyngitis, unspecified etiology [J02 9]  1  Pharyngitis, unspecified etiology  albuterol (PROVENTIL HFA) 90 mcg/act inhaler    amoxicillin (AMOXIL) 500 mg capsule   2  Bronchitis       Patient Instructions     Take medicine as prescribed  otc meds as needed for sxs control  Follow up with PCP in 3-5 days  Proceed to  ER if symptoms worsen  Chief Complaint     Chief Complaint   Patient presents with    Sore Throat     Pt c/o a sore throat and a cough for three days  History of Present Illness     Sore Throat    This is a new problem  The current episode started yesterday  The problem has been gradually worsening  Neither side of throat is experiencing more pain than the other  The maximum temperature recorded prior to her arrival was 100 4 - 100 9 F  The pain is moderate  Associated symptoms include congestion, coughing and swollen glands  Pertinent negatives include no abdominal pain, diarrhea, drooling, ear discharge, ear pain, plugged ear sensation, neck pain, shortness of breath, stridor, trouble swallowing or vomiting  She has had exposure to strep  She has tried nothing for the symptoms  Review of Systems   Review of Systems   HENT: Positive for congestion and sore throat  Negative for drooling, ear discharge, ear pain and trouble swallowing  Respiratory: Positive for cough  Negative for shortness of breath and stridor  Gastrointestinal: Negative for abdominal pain, diarrhea and vomiting  Musculoskeletal: Negative for neck pain           Current Medications       Current Outpatient Medications:     albuterol (PROVENTIL HFA) 90 mcg/act inhaler, Inhale 2 puffs every 6 (six) hours as needed for wheezing, Disp: 6 7 g, Rfl: 0    albuterol (VENTOLIN HFA) 90 mcg/act inhaler, Inhale 2 puffs every 6 (six) hours as needed for wheezing, Disp: 1 Inhaler, Rfl: 0    amoxicillin (AMOXIL) 500 mg capsule, Take 1 capsule (500 mg total) by mouth every 8 (eight) hours for 7 days, Disp: 21 capsule, Rfl: 0    ergocalciferol (VITAMIN D2) 50,000 units, Take 1 capsule (50,000 Units total) by mouth once a week, Disp: 4 capsule, Rfl: 0    levothyroxine 75 mcg tablet, Take 1 tablet (75 mcg total) by mouth daily before breakfast, Disp: 90 tablet, Rfl: 0    loratadine (CLARITIN) 10 mg tablet, Take 1 tablet (10 mg total) by mouth daily, Disp: 30 tablet, Rfl: 0    metFORMIN (GLUCOPHAGE) 500 mg tablet, Take 1 tablet (500 mg total) by mouth 2 (two) times a day with meals, Disp: 60 tablet, Rfl: 2    topiramate (TOPAMAX) 25 mg sprinkle capsule, Take 2 capsules (50 mg total) by mouth daily, Disp: 60 capsule, Rfl: 0    Current Allergies     Allergies as of 12/27/2019 - Reviewed 12/27/2019   Allergen Reaction Noted    Abilify [aripiprazole] Other (See Comments), Seizures, and Confusion 12/16/2015    Tessalon [benzonatate] Anxiety 04/01/2016    Zithromax [azithromycin] Rash, Hallucinations, and Hives 12/16/2015            The following portions of the patient's history were reviewed and updated as appropriate: allergies, current medications, past family history, past medical history, past social history, past surgical history and problem list      Past Medical History:   Diagnosis Date    Anxiety     Asthma     Depression     Disease of thyroid gland     Migraine     Mood disorder ([facility] [address] )     Pregnant     Psychiatric disorder     depression,anxiety, mood disorder       Past Surgical History:   Procedure Laterality Date    APPENDECTOMY      FRACTURE SURGERY Left     wrist       Family History   Problem Relation Age of Onset    No Known Problems Mother     Thyroid disease Father     Hypertension Brother     Hypertension Maternal Grandmother     Thyroid disease Paternal Grandmother      Medications have been verified      Objective   BP 103/59   Pulse 62   Temp 98 8 °F (37 1 °C)   Resp 18   SpO2 98%        Physical Exam     Physical Exam   Constitutional: She appears well-developed and well-nourished  HENT:   Nose: No mucosal edema or rhinorrhea  Mouth/Throat: Oropharyngeal exudate, posterior oropharyngeal edema and posterior oropharyngeal erythema present  Tonsils are 2+ on the right  Tonsils are 2+ on the left  No tonsillar exudate  Pulmonary/Chest: No stridor  She has no decreased breath sounds  She has wheezes  She has no rhonchi  She has no rales  Harsh dry cough   Lymphadenopathy:     She has cervical adenopathy

## 2019-12-27 NOTE — LETTER
December 27, 2019     Patient: Marylene Cease   YOB: 1995   Date of Visit: 12/27/2019       To Whom It May Concern: It is my medical opinion that [employer] may return to work on 02/29/2019  If you have any questions or concerns, please don't hesitate to call  Sincerely,        Nancy Potter PA-C    CC: Brigitte Whalen   [employer]

## 2020-01-03 ENCOUNTER — CONSULT (OUTPATIENT)
Dept: NEUROLOGY | Facility: CLINIC | Age: 25
End: 2020-01-03
Payer: COMMERCIAL

## 2020-01-03 ENCOUNTER — TELEPHONE (OUTPATIENT)
Dept: FAMILY MEDICINE CLINIC | Facility: HOME HEALTHCARE | Age: 25
End: 2020-01-03

## 2020-01-03 ENCOUNTER — TELEPHONE (OUTPATIENT)
Dept: NEUROLOGY | Facility: CLINIC | Age: 25
End: 2020-01-03

## 2020-01-03 ENCOUNTER — APPOINTMENT (OUTPATIENT)
Dept: LAB | Facility: HOSPITAL | Age: 25
End: 2020-01-03
Payer: COMMERCIAL

## 2020-01-03 VITALS
RESPIRATION RATE: 18 BRPM | BODY MASS INDEX: 35.3 KG/M2 | HEART RATE: 98 BPM | WEIGHT: 191.8 LBS | SYSTOLIC BLOOD PRESSURE: 110 MMHG | HEIGHT: 62 IN | DIASTOLIC BLOOD PRESSURE: 82 MMHG

## 2020-01-03 DIAGNOSIS — G43.909 MIGRAINE WITHOUT STATUS MIGRAINOSUS, NOT INTRACTABLE, UNSPECIFIED MIGRAINE TYPE: ICD-10-CM

## 2020-01-03 DIAGNOSIS — G43.009 MIGRAINE WITHOUT AURA AND WITHOUT STATUS MIGRAINOSUS, NOT INTRACTABLE: Primary | ICD-10-CM

## 2020-01-03 DIAGNOSIS — G43.009 MIGRAINE WITHOUT AURA AND WITHOUT STATUS MIGRAINOSUS, NOT INTRACTABLE: ICD-10-CM

## 2020-01-03 LAB
ALBUMIN SERPL BCP-MCNC: 3.3 G/DL (ref 3.5–5)
ALP SERPL-CCNC: 100 U/L (ref 46–116)
ALT SERPL W P-5'-P-CCNC: 32 U/L (ref 12–78)
ANION GAP SERPL CALCULATED.3IONS-SCNC: 10 MMOL/L (ref 4–13)
AST SERPL W P-5'-P-CCNC: 21 U/L (ref 5–45)
BASOPHILS # BLD AUTO: 0.07 THOUSANDS/ΜL (ref 0–0.1)
BASOPHILS NFR BLD AUTO: 1 % (ref 0–1)
BILIRUB SERPL-MCNC: 0.4 MG/DL (ref 0.2–1)
BUN SERPL-MCNC: 6 MG/DL (ref 5–25)
CALCIUM SERPL-MCNC: 8.5 MG/DL (ref 8.3–10.1)
CHLORIDE SERPL-SCNC: 104 MMOL/L (ref 100–108)
CO2 SERPL-SCNC: 23 MMOL/L (ref 21–32)
CREAT SERPL-MCNC: 0.82 MG/DL (ref 0.6–1.3)
EOSINOPHIL # BLD AUTO: 0.12 THOUSAND/ΜL (ref 0–0.61)
EOSINOPHIL NFR BLD AUTO: 1 % (ref 0–6)
ERYTHROCYTE [DISTWIDTH] IN BLOOD BY AUTOMATED COUNT: 13.1 % (ref 11.6–15.1)
GFR SERPL CREATININE-BSD FRML MDRD: 100 ML/MIN/1.73SQ M
GLUCOSE SERPL-MCNC: 119 MG/DL (ref 65–140)
HCT VFR BLD AUTO: 42.3 % (ref 34.8–46.1)
HGB BLD-MCNC: 13.9 G/DL (ref 11.5–15.4)
IMM GRANULOCYTES # BLD AUTO: 0.05 THOUSAND/UL (ref 0–0.2)
IMM GRANULOCYTES NFR BLD AUTO: 1 % (ref 0–2)
LYMPHOCYTES # BLD AUTO: 2.63 THOUSANDS/ΜL (ref 0.6–4.47)
LYMPHOCYTES NFR BLD AUTO: 30 % (ref 14–44)
MAGNESIUM SERPL-MCNC: 1.6 MG/DL (ref 1.6–2.6)
MCH RBC QN AUTO: 28.1 PG (ref 26.8–34.3)
MCHC RBC AUTO-ENTMCNC: 32.9 G/DL (ref 31.4–37.4)
MCV RBC AUTO: 86 FL (ref 82–98)
MONOCYTES # BLD AUTO: 0.66 THOUSAND/ΜL (ref 0.17–1.22)
MONOCYTES NFR BLD AUTO: 8 % (ref 4–12)
NEUTROPHILS # BLD AUTO: 5.2 THOUSANDS/ΜL (ref 1.85–7.62)
NEUTS SEG NFR BLD AUTO: 59 % (ref 43–75)
NRBC BLD AUTO-RTO: 0 /100 WBCS
PLATELET # BLD AUTO: 351 THOUSANDS/UL (ref 149–390)
PMV BLD AUTO: 9.6 FL (ref 8.9–12.7)
POTASSIUM SERPL-SCNC: 3.5 MMOL/L (ref 3.5–5.3)
PROT SERPL-MCNC: 7.2 G/DL (ref 6.4–8.2)
RBC # BLD AUTO: 4.94 MILLION/UL (ref 3.81–5.12)
SODIUM SERPL-SCNC: 137 MMOL/L (ref 136–145)
WBC # BLD AUTO: 8.73 THOUSAND/UL (ref 4.31–10.16)

## 2020-01-03 PROCEDURE — 83735 ASSAY OF MAGNESIUM: CPT

## 2020-01-03 PROCEDURE — 36415 COLL VENOUS BLD VENIPUNCTURE: CPT

## 2020-01-03 PROCEDURE — 85025 COMPLETE CBC W/AUTO DIFF WBC: CPT

## 2020-01-03 PROCEDURE — 80053 COMPREHEN METABOLIC PANEL: CPT

## 2020-01-03 PROCEDURE — 99243 OFF/OP CNSLTJ NEW/EST LOW 30: CPT | Performed by: PSYCHIATRY & NEUROLOGY

## 2020-01-03 NOTE — PROGRESS NOTES
Patient ID: Jean-Pierre Hirsch is a 25 y o  female  Assessment/Plan:    Migraine without aura and without status migrainosus, not intractable  Provides a history consistent with a diagnosis of migraine without aura  Current neurological examination unremarkable  Has experience some 5-6 major episodes over the past 6 months  However, with the initiation and slight increase to what is still low-dose topiramate, she has actually done very well with no headache recurrences of consequence over the past 5 or so weeks  Need question the possibility of her birth control implant, next plan and, creating the environment for some of the headache aggravation  Appears to have done well with the initiation of topiramate  However, difficult to use topiramate in higher doses in combination with metformin as result of the potential for developing an acidosis  At the present time, patient is satisfied with her migraine control   --given a series of handouts for her review which detail migraine, migraine triggers, dietary non dietary, over-the-counter agents helpful with headache control, analgesic overuse headache, etc   --for now she will continue her topiramate 25 mg taking 2 daily  --aware to be judicious in her use of analgesics  At this point in time ibuprofen does work for her   --she will maintain a headache diary  --I have asked that she speak with her gynecologist with regard to an alternative to the next plan in   --I will also be discussing with her primary care provider to the potential need for continued metformin here to allow a more liberal use of topiramate  --with her now on topiramate and metformin, will be checking CMP, attention CO2, along with CBC  In addition, will be checking serum magnesium, with the potential at some point for considering adding an oral magnesium supplement  She will follow up in 6-8 weeks      Subjective:    HPI  Patient, 25years of age and right-handed, presents for further evaluation regarding ongoing issues with headache  She was accompanied by her   Patient's 1st major headache occurred some 6 months ago and since then she has experience 5-6 additional   She describes the headaches as beginning either left or right hemicranial and spreading to a holocranial location  She describes the quality as pulsatile and the quantity as moderately severe to on several occasions quite severe  Her headaches are associated with light and sound sensitivity along with nausea and occasional vomiting  She describes no aura  Her initial headaches persisted for 1 day plus  Topiramate was instituted  The headaches then took on a much shorter presence, several hours  As per patient, 5-6 weeks ago or topiramate was increased to 50 mg daily and since then she has had no major headache issue  She does describe scattered tension-type headaches  She had the placement of next plan in in January of 2019  In review of her other medications as she is on low-dose metformin  She has had a couple of emergency room visits with her headaches  CT scan the brain was performed on 1 of those and was unremarkable  Additional blood work from several months back include CBC with hemoglobin 14 5, hematocrit 44 7, white count 9 43 and platelet count 483, CMP with creatinine 0 94, AST 25 and ALT 43 and a normal TSH 3 640      Past Medical History:   Diagnosis Date    Anxiety     Asthma     Depression     Disease of thyroid gland     Migraine     Mood disorder (HCC)     Pregnant     Psychiatric disorder     depression,anxiety, mood disorder     Past Surgical History:   Procedure Laterality Date    APPENDECTOMY      FRACTURE SURGERY Left     wrist     Social History     Socioeconomic History    Marital status: Single     Spouse name: None    Number of children: None    Years of education: None    Highest education level: None   Occupational History    None   Social Needs    Financial resource strain: None    Food insecurity:     Worry: None     Inability: None    Transportation needs:     Medical: None     Non-medical: None   Tobacco Use    Smoking status: Current Some Day Smoker     Packs/day: 1 00     Years: 5 00     Pack years: 5 00     Types: Cigarettes    Smokeless tobacco: Never Used   Substance and Sexual Activity    Alcohol use: No    Drug use: No    Sexual activity: None   Lifestyle    Physical activity:     Days per week: None     Minutes per session: None    Stress: None   Relationships    Social connections:     Talks on phone: None     Gets together: None     Attends Mos[employer] service: None     Active member of club or organization: None     Attends meetings of clubs or organizations: None     Relationship status: None    Intimate partner violence:     Fear of current or ex partner: None     Emotionally abused: None     Physically abused: None     Forced sexual activity: None   Other Topics Concern    None   Social History Narrative    None     Family History   Problem Relation Age of Onset    No Known Problems Mother     Thyroid disease Father     Hypertension Brother     Hypertension Maternal Grandmother     Thyroid disease Paternal Grandmother      Allergies   Allergen Reactions    Abilify [Aripiprazole] Other (See Comments), Seizures and Confusion     hallucinaTIONS  hallucination    Tessalon [Benzonatate] Anxiety     Other reaction(s): Psych complications  hallucinations  Other reaction(s): Psych complications  hallucinations    Zithromax [Azithromycin] Rash, Hallucinations and Hives       Current Outpatient Medications:     albuterol (PROVENTIL HFA) 90 mcg/act inhaler, Inhale 2 puffs every 6 (six) hours as needed for wheezing, Disp: 6 7 g, Rfl: 0    albuterol (VENTOLIN HFA) 90 mcg/act inhaler, Inhale 2 puffs every 6 (six) hours as needed for wheezing, Disp: 1 Inhaler, Rfl: 0    etonogestrel (NEXPLANON) subdermal implant, 68 mg by Subdermal route once, Disp: , Rfl:     levothyroxine 75 mcg tablet, Take 1 tablet (75 mcg total) by mouth daily before breakfast, Disp: 90 tablet, Rfl: 0    loratadine (CLARITIN) 10 mg tablet, Take 1 tablet (10 mg total) by mouth daily, Disp: 30 tablet, Rfl: 0    metFORMIN (GLUCOPHAGE) 500 mg tablet, Take 1 tablet (500 mg total) by mouth 2 (two) times a day with meals, Disp: 60 tablet, Rfl: 2    topiramate (TOPAMAX) 25 mg sprinkle capsule, Take 2 capsules (50 mg total) by mouth daily, Disp: 60 capsule, Rfl: 0    amoxicillin (AMOXIL) 500 mg capsule, Take 1 capsule (500 mg total) by mouth every 8 (eight) hours for 7 days (Patient not taking: Reported on 1/3/2020), Disp: 21 capsule, Rfl: 0    ergocalciferol (VITAMIN D2) 50,000 units, Take 1 capsule (50,000 Units total) by mouth once a week, Disp: 4 capsule, Rfl: 0    Objective:    Blood pressure 110/82, pulse 98, resp  rate 18, height 5' 2" (1 575 m), weight 87 kg (191 lb 12 8 oz), currently breastfeeding  Physical Exam  Head normocephalic  Eyes nonicteric  No audible head, ocular or anterior neck bruits  Lungs clear to auscultation  Rhythm regular  GI (abdomen) soft nontender  Bowel sounds present  No significant lower extremity edema  Neurological Exam  Alert  Fully oriented  Appropriately conversational   No dysarthria  Unremarkable spontaneous gait  Able to heel and toe stand bilaterally  Romberg maneuver performed unremarkably  Able to tandem walk  Cranial Nerves:   I: Olfactory sense intact bilaterally  II: Visual fields full to confrontation  Pupils equal, round, reactive to light with normal accomodation  Fundus: with bilaterally marginated discs with spontaneous venous pulsations  III,IV,VI: Extraocular muscles EOMI, no nystagmus  V: Masseter and pterygoid strength  Sensation in the V1 through V3 distributions intact to pinprick and light touch bilaterally  VII: Face is symmetric with no weakness noted     VIII: Audition intact to finger rub bilaterally  IX/X: Uvula midline  Soft palate elevation symmetric  XI: Trapezius and SCM strength 5/5 bilaterally  XII: Tongue midline with no atrophy or fasciculations with appropriate movement  Accurate with finger-to-nose and heel-to-shin maneuvers bilaterally  Full symmetrical strength throughout the 4 extremities with no upper extremity drift  Sensory testing grossly intact to pin, position and vibration throughout  No extinction with double simultaneous stimulation  Muscle stretch reflexes bilaterally 1 throughout the upper extremities, bilaterally 1+ at the knees and bilaterally 2 at the ankles  Toe response downgoing bilaterally  ROS:    Review of Systems   Constitutional: Positive for fatigue  Negative for appetite change and fever  Recent weight gain   HENT: Positive for sore throat  Negative for hearing loss, tinnitus, trouble swallowing and voice change  Eyes: Positive for pain  Negative for photophobia  Respiratory: Negative  Negative for shortness of breath  Cardiovascular: Negative  Negative for palpitations  Gastrointestinal: Negative  Negative for nausea and vomiting  Endocrine: Negative  Negative for cold intolerance and heat intolerance  Genitourinary: Negative for dysuria, frequency and urgency  Loss of sexual drive   Musculoskeletal: Negative  Negative for myalgias and neck pain  Pain with walking   Skin: Negative  Negative for rash  Allergic/Immunologic: Negative  Neurological: Positive for light-headedness, numbness (facial numbness) and headaches  Negative for dizziness, tremors, seizures, syncope, facial asymmetry, speech difficulty and weakness  Hematological: Bruises/bleeds easily  Mood swings   Psychiatric/Behavioral: Positive for sleep disturbance  Negative for confusion and hallucinations  The patient is nervous/anxious           Anxiety and depression, increase sleepiness, waking up at night, memory problems       I personally reviewed the ROS as entered by the medical assistant  *Please note this document was created using voice recognition software and may contain sound-alike word errors  *

## 2020-01-03 NOTE — ASSESSMENT & PLAN NOTE
Provides a history consistent with a diagnosis of migraine without aura  Current neurological examination unremarkable  Has experience some 5-6 major episodes over the past 6 months  However, with the initiation and slight increase to what is still low-dose topiramate, she has actually done very well with no headache recurrences of consequence over the past 5 or so weeks  Need question the possibility of her birth control implant, next plan and, creating the environment for some of the headache aggravation  Appears to have done well with the initiation of topiramate  However, difficult to use topiramate in higher doses in combination with metformin as result of the potential for developing an acidosis  At the present time, patient is satisfied with her migraine control   --given a series of handouts for her review which detail migraine, migraine triggers, dietary non dietary, over-the-counter agents helpful with headache control, analgesic overuse headache, etc   --for now she will continue her topiramate 25 mg taking 2 daily  --aware to be judicious in her use of analgesics  At this point in time ibuprofen does work for her   --she will maintain a headache diary  --I have asked that she speak with her gynecologist with regard to an alternative to the next plan in   --I will also be discussing with her primary care provider to the potential need for continued metformin here to allow a more liberal use of topiramate  --with her now on topiramate and metformin, will be checking CMP, attention CO2, along with CBC  In addition, will be checking serum magnesium, with the potential at some point for considering adding an oral magnesium supplement

## 2020-01-03 NOTE — PATIENT INSTRUCTIONS
Please speak with your primary care provider with regard to your absolute need for metformin  Sometimes a problem to use in combination with topiramate  Also talk with her gynecologist regarding a potential alternative for your Nexplanon  Please keep a diary of future migraine headaches  Please review your handouts on migraine, migraine triggers, etc     Please continue your topiramate unchanged for now

## 2020-01-03 NOTE — TELEPHONE ENCOUNTER
DR Marjan Tan CALLED TO SEE IF THERE WOULD BE ANY WAY YOU COULD CONSIDER TAKING VIKTOR OFF OF THE METFORMIN?  SHE IS DOING WELL ON THE TOPAMAX BUT HE SAID THERE IS AN ENHANCED RISK OF ACIDOSIS  COULD SOMEONE LET HIS OFFICE KNOW AND THE PATIENT PLEASE    DR Phillip Sites OFFICE NUMBER [phone]

## 2020-01-03 NOTE — LETTER
January 3, 2020     [address]  [facility] [id number]    Patient: Varinder Monzon   YOB: 1995   Date of Visit: 1/3/2020       Dear Dr Yary Spears:    Thank you for referring Whitney Jeffrey to me for evaluation  Below are my notes for this consultation  If you have questions, please do not hesitate to call me  I look forward to following your patient along with you  Sincerely,        Lulú Azul MD        CC: No Recipients  Lulú Azul MD  1/3/2020  9:43 AM  Sign at close encounter  Patient ID: Varinder Monzon is a 25 y o  female  Assessment/Plan:    Migraine without aura and without status migrainosus, not intractable  Provides a history consistent with a diagnosis of migraine without aura  Current neurological examination unremarkable  Has experience some 5-6 major episodes over the past 6 months  However, with the initiation and slight increase to what is still low-dose topiramate, she has actually done very well with no headache recurrences of consequence over the past 5 or so weeks  Need question the possibility of her birth control implant, next plan and, creating the environment for some of the headache aggravation  Appears to have done well with the initiation of topiramate  However, difficult to use topiramate in higher doses in combination with metformin as result of the potential for developing an acidosis  At the present time, patient is satisfied with her migraine control   --given a series of handouts for her review which detail migraine, migraine triggers, dietary non dietary, over-the-counter agents helpful with headache control, analgesic overuse headache, etc   --for now she will continue her topiramate 25 mg taking 2 daily  --aware to be judicious in her use of analgesics  At this point in time ibuprofen does work for her   --she will maintain a headache diary    --I have asked that she speak with her gynecologist with regard to an alternative to the next plan in   --I will also be discussing with her primary care provider to the potential need for continued metformin here to allow a more liberal use of topiramate  --with her now on topiramate and metformin, will be checking CMP, attention CO2, along with CBC  In addition, will be checking serum magnesium, with the potential at some point for considering adding an oral magnesium supplement  She will follow up in 6-8 weeks  Subjective:    HPI  Patient, 25years of age and right-handed, presents for further evaluation regarding ongoing issues with headache  She was accompanied by her   Patient's 1st major headache occurred some 6 months ago and since then she has experience 5-6 additional   She describes the headaches as beginning either left or right hemicranial and spreading to a holocranial location  She describes the quality as pulsatile and the quantity as moderately severe to on several occasions quite severe  Her headaches are associated with light and sound sensitivity along with nausea and occasional vomiting  She describes no aura  Her initial headaches persisted for 1 day plus  Topiramate was instituted  The headaches then took on a much shorter presence, several hours  As per patient, 5-6 weeks ago or topiramate was increased to 50 mg daily and since then she has had no major headache issue  She does describe scattered tension-type headaches  She had the placement of next plan in in January of 2019  In review of her other medications as she is on low-dose metformin  She has had a couple of emergency room visits with her headaches  CT scan the brain was performed on 1 of those and was unremarkable  Additional blood work from several months back include CBC with hemoglobin 14 5, hematocrit 44 7, white count 9 43 and platelet count 495, CMP with creatinine 0 94, AST 25 and ALT 43 and a normal TSH 3 640      Past Medical History:   Diagnosis Date    Anxiety     Asthma     Depression     Disease of thyroid gland     Migraine     Mood disorder (HCC)     Pregnant     Psychiatric disorder     depression,anxiety, mood disorder     Past Surgical History:   Procedure Laterality Date    APPENDECTOMY      FRACTURE SURGERY Left     wrist     Social History     Socioeconomic History    Marital status: Single     Spouse name: None    Number of children: None    Years of education: None    Highest education level: None   Occupational History    None   Social Needs    Financial resource strain: None    Food insecurity:     Worry: None     Inability: None    Transportation needs:     Medical: None     Non-medical: None   Tobacco Use    Smoking status: Current Some Day Smoker     Packs/day: 1 00     Years: 5 00     Pack years: 5 00     Types: Cigarettes    Smokeless tobacco: Never Used   Substance and Sexual Activity    Alcohol use: No    Drug use: No    Sexual activity: None   Lifestyle    Physical activity:     Days per week: None     Minutes per session: None    Stress: None   Relationships    Social connections:     Talks on phone: None     Gets together: None     Attends Scientology service: None     Active member of club or organization: None     Attends meetings of clubs or organizations: None     Relationship status: None    Intimate partner violence:     Fear of current or ex partner: None     Emotionally abused: None     Physically abused: None     Forced sexual activity: None   Other Topics Concern    None   Social History Narrative    None     Family History   Problem Relation Age of Onset    No Known Problems Mother     Thyroid disease Father     Hypertension Brother     Hypertension Maternal Grandmother     Thyroid disease Paternal Grandmother      Allergies   Allergen Reactions    Abilify [Aripiprazole] Other (See Comments), Seizures and Confusion     hallucinaTIONS  hallucination    Tessalon [Benzonatate] Anxiety     Other reaction(s): Psych complications  hallucinations  Other reaction(s): Psych complications  hallucinations    Zithromax [Azithromycin] Rash, Hallucinations and Hives       Current Outpatient Medications:     albuterol (PROVENTIL HFA) 90 mcg/act inhaler, Inhale 2 puffs every 6 (six) hours as needed for wheezing, Disp: 6 7 g, Rfl: 0    albuterol (VENTOLIN HFA) 90 mcg/act inhaler, Inhale 2 puffs every 6 (six) hours as needed for wheezing, Disp: 1 Inhaler, Rfl: 0    etonogestrel (NEXPLANON) subdermal implant, 68 mg by Subdermal route once, Disp: , Rfl:     levothyroxine 75 mcg tablet, Take 1 tablet (75 mcg total) by mouth daily before breakfast, Disp: 90 tablet, Rfl: 0    loratadine (CLARITIN) 10 mg tablet, Take 1 tablet (10 mg total) by mouth daily, Disp: 30 tablet, Rfl: 0    metFORMIN (GLUCOPHAGE) 500 mg tablet, Take 1 tablet (500 mg total) by mouth 2 (two) times a day with meals, Disp: 60 tablet, Rfl: 2    topiramate (TOPAMAX) 25 mg sprinkle capsule, Take 2 capsules (50 mg total) by mouth daily, Disp: 60 capsule, Rfl: 0    amoxicillin (AMOXIL) 500 mg capsule, Take 1 capsule (500 mg total) by mouth every 8 (eight) hours for 7 days (Patient not taking: Reported on 1/3/2020), Disp: 21 capsule, Rfl: 0    ergocalciferol (VITAMIN D2) 50,000 units, Take 1 capsule (50,000 Units total) by mouth once a week, Disp: 4 capsule, Rfl: 0    Objective:    Blood pressure 110/82, pulse 98, resp  rate 18, height 5' 2" (1 575 m), weight 87 kg (191 lb 12 8 oz), currently breastfeeding  Physical Exam  Head normocephalic  Eyes nonicteric  No audible head, ocular or anterior neck bruits  Lungs clear to auscultation  Rhythm regular  GI (abdomen) soft nontender  Bowel sounds present  No significant lower extremity edema  Neurological Exam  Alert  Fully oriented  Appropriately conversational   No dysarthria  Unremarkable spontaneous gait  Able to heel and toe stand bilaterally    Romberg maneuver performed unremarkably  Able to tandem walk  Cranial Nerves:   I: Olfactory sense intact bilaterally  II: Visual fields full to confrontation  Pupils equal, round, reactive to light with normal accomodation  Fundus: with bilaterally marginated discs with spontaneous venous pulsations  III,IV,VI: Extraocular muscles EOMI, no nystagmus  V: Masseter and pterygoid strength  Sensation in the V1 through V3 distributions intact to pinprick and light touch bilaterally  VII: Face is symmetric with no weakness noted  VIII: Audition intact to finger rub bilaterally  IX/X: Uvula midline  Soft palate elevation symmetric  XI: Trapezius and SCM strength 5/5 bilaterally  XII: Tongue midline with no atrophy or fasciculations with appropriate movement  Accurate with finger-to-nose and heel-to-shin maneuvers bilaterally  Full symmetrical strength throughout the 4 extremities with no upper extremity drift  Sensory testing grossly intact to pin, position and vibration throughout  No extinction with double simultaneous stimulation  Muscle stretch reflexes bilaterally 1 throughout the upper extremities, bilaterally 1+ at the knees and bilaterally 2 at the ankles  Toe response downgoing bilaterally  ROS:    Review of Systems   Constitutional: Positive for fatigue  Negative for appetite change and fever  Recent weight gain   HENT: Positive for sore throat  Negative for hearing loss, tinnitus, trouble swallowing and voice change  Eyes: Positive for pain  Negative for photophobia  Respiratory: Negative  Negative for shortness of breath  Cardiovascular: Negative  Negative for palpitations  Gastrointestinal: Negative  Negative for nausea and vomiting  Endocrine: Negative  Negative for cold intolerance and heat intolerance  Genitourinary: Negative for dysuria, frequency and urgency  Loss of sexual drive   Musculoskeletal: Negative  Negative for myalgias and neck pain  Pain with walking   Skin: Negative  Negative for rash  Allergic/Immunologic: Negative  Neurological: Positive for light-headedness, numbness (facial numbness) and headaches  Negative for dizziness, tremors, seizures, syncope, facial asymmetry, speech difficulty and weakness  Hematological: Bruises/bleeds easily  Mood swings   Psychiatric/Behavioral: Positive for sleep disturbance  Negative for confusion and hallucinations  The patient is nervous/anxious  Anxiety and depression, increase sleepiness, waking up at night, memory problems       I personally reviewed the ROS as entered by the medical assistant  *Please note this document was created using voice recognition software and may contain sound-alike word errors  *

## 2020-02-05 ENCOUNTER — HOSPITAL ENCOUNTER (EMERGENCY)
Facility: HOSPITAL | Age: 25
Discharge: HOME | End: 2020-02-05
Attending: EMERGENCY MEDICINE | Admitting: EMERGENCY MEDICINE
Payer: COMMERCIAL

## 2020-02-05 ENCOUNTER — APPOINTMENT (EMERGENCY)
Dept: RADIOLOGY | Facility: HOSPITAL | Age: 25
End: 2020-02-05
Payer: COMMERCIAL

## 2020-02-05 VITALS
TEMPERATURE: 97 F | HEIGHT: 62 IN | BODY MASS INDEX: 34.96 KG/M2 | OXYGEN SATURATION: 100 % | DIASTOLIC BLOOD PRESSURE: 55 MMHG | WEIGHT: 190 LBS | SYSTOLIC BLOOD PRESSURE: 106 MMHG | HEART RATE: 80 BPM | RESPIRATION RATE: 16 BRPM

## 2020-02-05 DIAGNOSIS — M25.531 RIGHT WRIST PAIN: Primary | ICD-10-CM

## 2020-02-05 LAB
EXT PREG TEST URINE: NEGATIVE
EXT. CONTROL ED NAV: NORMAL

## 2020-02-05 PROCEDURE — 99284 EMERGENCY DEPT VISIT MOD MDM: CPT

## 2020-02-05 PROCEDURE — 73110 X-RAY EXAM OF WRIST: CPT

## 2020-02-05 PROCEDURE — 81025 URINE PREGNANCY TEST: CPT | Performed by: PHYSICIAN ASSISTANT

## 2020-02-05 PROCEDURE — 99284 EMERGENCY DEPT VISIT MOD MDM: CPT | Performed by: PHYSICIAN ASSISTANT

## 2020-02-05 RX ORDER — NAPROXEN 500 MG/1
500 TABLET ORAL ONCE
Status: COMPLETED | OUTPATIENT
Start: 2020-02-05 | End: 2020-02-05

## 2020-02-05 RX ORDER — NAPROXEN 500 MG/1
500 TABLET ORAL 2 TIMES DAILY WITH MEALS
Qty: 30 TABLET | Refills: 0 | Status: SHIPPED | OUTPATIENT
Start: 2020-02-05 | End: 2020-07-14 | Stop reason: ALTCHOICE

## 2020-02-05 RX ADMIN — NAPROXEN 500 MG: 500 TABLET ORAL at 12:50

## 2020-02-05 NOTE — DISCHARGE INSTRUCTIONS
Rest, ice, compression, elevation  Take anti-inflammatory as directed with food  Can supplement with OTC tylenol  Schedule follow up with orthopedics for further evaluation and management

## 2020-02-05 NOTE — ED PROVIDER NOTES
History  Chief Complaint   Patient presents with    Wrist Pain     pt c/o right thumb/wrist pain for the past week  pt states she possibly may have injured it while working  25year old female presents for evaluation of right wrist pain  Symptoms started a few weeks ago but was occurring intermittently  Symptoms flared and worse over the past week  Has been more constant  Pain located around right wrist   Radiates into forearm and sometimes the thumb  Pain aggravated by movement, lifting her children and trying to text on her cell phone  Intermittent numbness noted in fingers but nothing consistent  Pt is right hand dominant  Works as a home health aid  Denies fever, chills  Denies swelling, warmth, color change  No other joint affected  Denies specific injury or trauma but notes she does a lot of lifting  History provided by:  Patient   used: No        Prior to Admission Medications   Prescriptions Last Dose Informant Patient Reported? Taking?    albuterol (PROVENTIL HFA) 90 mcg/act inhaler   No No   Sig: Inhale 2 puffs every 6 (six) hours as needed for wheezing   albuterol (VENTOLIN HFA) 90 mcg/act inhaler More than a month at Unknown time  No No   Sig: Inhale 2 puffs every 6 (six) hours as needed for wheezing   ergocalciferol (VITAMIN D2) 50,000 units Not Taking at Unknown time  No No   Sig: Take 1 capsule (50,000 Units total) by mouth once a week   Patient not taking: Reported on 2020   etonogestrel (NEXPLANON) subdermal implant Not Taking at Unknown time  Yes No   Si mg by Subdermal route once   levothyroxine 75 mcg tablet 2020 at Unknown time  No Yes   Sig: Take 1 tablet (75 mcg total) by mouth daily before breakfast   loratadine (CLARITIN) 10 mg tablet Not Taking at Unknown time  No No   Sig: Take 1 tablet (10 mg total) by mouth daily   Patient not taking: Reported on 2020   topiramate (TOPAMAX) 25 mg sprinkle capsule 2020 at Unknown time  No Yes Sig: Take 2 capsules (50 mg total) by mouth daily      Facility-Administered Medications: None       Past Medical History:   Diagnosis Date    Anxiety     Asthma     Depression     Disease of thyroid gland     Migraine     Mood disorder (HCC)     Pregnant     Psychiatric disorder     depression,anxiety, mood disorder       Past Surgical History:   Procedure Laterality Date    APPENDECTOMY      FRACTURE SURGERY Right     wrist       Family History   Problem Relation Age of Onset    No Known Problems Mother     Thyroid disease Father     Hypertension Brother     Hypertension Maternal Grandmother     Thyroid disease Paternal Grandmother      I have reviewed and agree with the history as documented  Social History     Tobacco Use    Smoking status: Current Every Day Smoker     Packs/day: 0 50     Years: 5 00     Pack years: 2 50     Types: Cigarettes    Smokeless tobacco: Never Used   Substance Use Topics    Alcohol use: No    Drug use: No        Review of Systems   Constitutional: Negative  Negative for chills, fatigue and fever  HENT: Negative  Negative for congestion, rhinorrhea and sore throat  Eyes: Negative  Negative for visual disturbance  Respiratory: Negative  Negative for cough, shortness of breath and wheezing  Cardiovascular: Negative  Negative for chest pain, palpitations and leg swelling  Gastrointestinal: Negative  Negative for abdominal pain, constipation, diarrhea, nausea and vomiting  Genitourinary: Negative  Negative for dysuria, flank pain, frequency and hematuria  Musculoskeletal: Positive for arthralgias  Negative for back pain, joint swelling, myalgias and neck pain  Skin: Negative  Negative for rash  Neurological: Positive for numbness  Negative for dizziness, light-headedness and headaches  Psychiatric/Behavioral: Negative  Negative for confusion  All other systems reviewed and are negative        Physical Exam  Physical Exam Constitutional: She is oriented to person, place, and time  She appears well-developed and well-nourished  No distress  HENT:   Head: Normocephalic and atraumatic  Right Ear: Hearing, tympanic membrane, external ear and ear canal normal    Left Ear: Hearing, tympanic membrane, external ear and ear canal normal    Nose: Nose normal    Mouth/Throat: Uvula is midline, oropharynx is clear and moist and mucous membranes are normal  No oropharyngeal exudate  Eyes: Pupils are equal, round, and reactive to light  Conjunctivae and EOM are normal  No scleral icterus  Neck: Trachea normal and normal range of motion  Neck supple  No tracheal deviation present  Cardiovascular: Normal rate, regular rhythm, normal heart sounds, intact distal pulses and normal pulses  No murmur heard  Pulmonary/Chest: Effort normal and breath sounds normal  No respiratory distress  She has no wheezes  She has no rhonchi  She has no rales  Abdominal: Soft  Bowel sounds are normal  There is no tenderness  There is no rebound, no guarding and no CVA tenderness  Musculoskeletal: Normal range of motion  She exhibits no edema  Right elbow: Normal She exhibits normal range of motion  No tenderness found  Right wrist: She exhibits tenderness  She exhibits normal range of motion, no bony tenderness, no swelling, no effusion, no deformity and no laceration  Arms:       Right hand: Normal  She exhibits normal range of motion, no bony tenderness, normal two-point discrimination, normal capillary refill, no deformity, no laceration and no swelling  Normal sensation noted  Normal strength noted  Neurological: She is alert and oriented to person, place, and time  She has normal strength and normal reflexes  No cranial nerve deficit or sensory deficit  She exhibits normal muscle tone  Coordination and gait normal     is equal and symmetric  Skin: Skin is warm and dry  Capillary refill takes less than 2 seconds   No rash noted  Psychiatric: She has a normal mood and affect  Her speech is normal and behavior is normal    Nursing note and vitals reviewed  Vital Signs  ED Triage Vitals [02/05/20 1208]   Temperature Pulse Respirations Blood Pressure SpO2   (!) 97 °F (36 1 °C) 88 16 146/78 100 %      Temp Source Heart Rate Source Patient Position - Orthostatic VS BP Location FiO2 (%)   Temporal Monitor Sitting Left arm --      Pain Score       7           Vitals:    02/05/20 1208 02/05/20 1325   BP: 146/78 106/55   Pulse: 88 80   Patient Position - Orthostatic VS: Sitting Lying         Visual Acuity      ED Medications  Medications   naproxen (NAPROSYN) tablet 500 mg (500 mg Oral Given 2/5/20 1250)       Diagnostic Studies  Results Reviewed     Procedure Component Value Units Date/Time    POCT pregnancy, urine [[id number]]  (Normal) Resulted:  02/05/20 1243    Lab Status:  Final result Updated:  02/05/20 1243     EXT PREG TEST UR (Ref: Negative) NEGATIVE     Control VALID                 XR wrist 3+ views RIGHT   Final Result by Prashant Borges MD (02/05 1250)      Normal right wrist             Workstation performed: [id number]                    Procedures  Splint application  Date/Time: 2/5/2020 1:15 PM  Performed by: Durrell Burkitt, PA-C  Authorized by: Durrell Burkitt, PA-C     Patient location:  Bedside  Performing Provider:  Rd Bartlett)  Other Assisting Provider: No    Consent:     Consent obtained:  Verbal    Consent given by:  Patient    Risks discussed:  Discoloration, numbness, pain and swelling    Alternatives discussed:  Alternative treatment and referral  Universal protocol:     Procedure explained and questions answered to patient or proxy's satisfaction: yes      Radiology Images displayed and confirmed    If images not available, report reviewed: yes      Patient identity confirmed:  Verbally with patient and arm band  Indication:     Indications: sprain/strain    Pre-procedure details:     Sensation: Normal    Skin color:  Pink  Procedure details:     Laterality:  Right    Location:  Wrist    Wrist:  R wrist    Strapping: no      Splint type:  Short arm splint, static (forearm to hand)    Supplies:  Cotton padding, elastic bandage and Ortho-Glass  Post-procedure details:     Pain:  Improved    Sensation:  Normal    Neurovascular Exam: skin pink, capillary refill <2 sec, normal pulses and skin intact, warm, and dry      Patient tolerance of procedure: Tolerated well, no immediate complications             ED Course  ED Course as of Feb 05 1431   Wed Feb 05, 2020   1245 PREGNANCY TEST URINE: NEGATIVE   1258 IMPRESSION:     Normal right wrist    XR wrist 3+ views RIGHT   1305 Findings reviewed with pt  Will splint for discomfort and refer to orthopedics for follow up       1323 Pt neurovascularly intact post splint application  Splint care reviewed  Discussed sprain vs tendinopathy vs carpal tunnel syndrome  Reviewed RICE therapy  Splint for discomfort; splint care discussed  Rx NSAID with food  Can supplement with OTC tylenol  Advised outpatient follow up with orthopedics and contact information provided  Advised outpatient follow up with orthopedics or return to ER for change in condition as outlined  Pt verbalized understanding and had no further questions  Pt left in stable, improved condition                              MDM  Number of Diagnoses or Management Options  Right wrist pain: new and requires workup     Amount and/or Complexity of Data Reviewed  Clinical lab tests: ordered and reviewed  Tests in the radiology section of CPT®: ordered and reviewed  Decide to obtain previous medical records or to obtain history from someone other than the patient: yes  Obtain history from someone other than the patient: yes  Review and summarize past medical records: yes  Independent visualization of images, tracings, or specimens: yes    Patient Progress  Patient progress: improved        Disposition  Final diagnoses:   Right wrist pain     Time reflects when diagnosis was documented in both MDM as applicable and the Disposition within this note     Time User Action Codes Description Comment    2/5/2020 12:59 PM Alyx Young Add [M25 531] Right wrist pain       ED Disposition     ED Disposition Condition Date/Time Comment    Discharge Stable Wed Feb 5, 2020 12:58 PM [place] [address] discharge to home/self care              Follow-up Information     Follow up With Specialties Details Why Contact Info Additional [address] [facility] Thierry Valenzuela Orthopedic Surgery Schedule an appointment as soon as possible for a visit   [address] [facility] [address]  [address] [facility] [place] [address] [facility], [place], [place], [address]    [facility] Emergency Department Emergency Medicine  As needed [address] [phone]  [phone] [place] ED, [address], [address]          Discharge Medication List as of 2/5/2020  1:12 PM      START taking these medications    Details   naproxen (NAPROSYN) 500 mg tablet Take 1 tablet (500 mg total) by mouth 2 (two) times a day with meals, Starting Wed 2/5/2020, Normal         CONTINUE these medications which have NOT CHANGED    Details   levothyroxine 75 mcg tablet Take 1 tablet (75 mcg total) by mouth daily before breakfast, Starting Tue 10/8/2019, Normal      topiramate (TOPAMAX) 25 mg sprinkle capsule Take 2 capsules (50 mg total) by mouth daily, Starting Mon 11/11/2019, Until Wed 2/5/2020, Normal      !! albuterol (PROVENTIL HFA) 90 mcg/act inhaler Inhale 2 puffs every 6 (six) hours as needed for wheezing, Starting Fri 12/27/2019, Normal      !! albuterol (VENTOLIN HFA) 90 mcg/act inhaler Inhale 2 puffs every 6 (six) hours as needed for wheezing, Starting Thu 8/8/2019, Normal      ergocalciferol (VITAMIN D2) 50,000 units Take 1 capsule (50,000 Units total) by mouth once a week, Starting Tue 10/22/2019, Until Thu 11/21/2019, Normal      etonogestrel (NEXPLANON) subdermal implant 68 mg by Subdermal route once, Historical Med      loratadine (CLARITIN) 10 mg tablet Take 1 tablet (10 mg total) by mouth daily, Starting Mon 11/4/2019, Until Fri 1/3/2020, Normal       !! - Potential duplicate medications found  Please discuss with provider  No discharge procedures on file      ED Provider  Electronically Signed by           Cody Buchanan PA-C  02/05/20 5530

## 2020-02-17 ENCOUNTER — TELEPHONE (OUTPATIENT)
Dept: OTHER | Facility: OTHER | Age: 25
End: 2020-02-17

## 2020-02-19 DIAGNOSIS — G43.909 MIGRAINE WITHOUT STATUS MIGRAINOSUS, NOT INTRACTABLE, UNSPECIFIED MIGRAINE TYPE: ICD-10-CM

## 2020-02-19 RX ORDER — TOPIRAMATE 25 MG/1
50 CAPSULE, COATED PELLETS ORAL DAILY
Qty: 180 CAPSULE | Refills: 0 | Status: SHIPPED | OUTPATIENT
Start: 2020-02-19 | End: 2021-05-07 | Stop reason: ALTCHOICE

## 2020-03-15 ENCOUNTER — OFFICE VISIT (OUTPATIENT)
Dept: URGENT CARE | Facility: CLINIC | Age: 25
End: 2020-03-15
Payer: COMMERCIAL

## 2020-03-15 ENCOUNTER — HOSPITAL ENCOUNTER (EMERGENCY)
Facility: HOSPITAL | Age: 25
Discharge: HOME | End: 2020-03-15
Attending: EMERGENCY MEDICINE
Payer: COMMERCIAL

## 2020-03-15 ENCOUNTER — APPOINTMENT (EMERGENCY)
Dept: RADIOLOGY | Facility: HOSPITAL | Age: 25
End: 2020-03-15
Payer: COMMERCIAL

## 2020-03-15 VITALS
TEMPERATURE: 100.2 F | DIASTOLIC BLOOD PRESSURE: 76 MMHG | HEART RATE: 85 BPM | WEIGHT: 199.74 LBS | RESPIRATION RATE: 18 BRPM | OXYGEN SATURATION: 100 % | SYSTOLIC BLOOD PRESSURE: 123 MMHG | BODY MASS INDEX: 36.53 KG/M2

## 2020-03-15 VITALS
RESPIRATION RATE: 18 BRPM | TEMPERATURE: 99.1 F | SYSTOLIC BLOOD PRESSURE: 123 MMHG | DIASTOLIC BLOOD PRESSURE: 76 MMHG | HEART RATE: 97 BPM | OXYGEN SATURATION: 98 %

## 2020-03-15 DIAGNOSIS — J18.9 COMMUNITY ACQUIRED PNEUMONIA: Primary | ICD-10-CM

## 2020-03-15 DIAGNOSIS — J02.9 SORE THROAT: ICD-10-CM

## 2020-03-15 DIAGNOSIS — J06.9 ACUTE URI: Primary | ICD-10-CM

## 2020-03-15 LAB
EXT PREG TEST URINE: NEGATIVE
EXT. CONTROL ED NAV: NORMAL
FLUAV RNA NPH QL NAA+PROBE: NORMAL
FLUBV RNA NPH QL NAA+PROBE: NORMAL
RSV RNA NPH QL NAA+PROBE: NORMAL
S PYO AG THROAT QL: NEGATIVE

## 2020-03-15 PROCEDURE — 81025 URINE PREGNANCY TEST: CPT | Performed by: EMERGENCY MEDICINE

## 2020-03-15 PROCEDURE — 87880 STREP A ASSAY W/OPTIC: CPT | Performed by: NURSE PRACTITIONER

## 2020-03-15 PROCEDURE — G0382 LEV 3 HOSP TYPE B ED VISIT: HCPCS | Performed by: NURSE PRACTITIONER

## 2020-03-15 PROCEDURE — 87070 CULTURE OTHR SPECIMN AEROBIC: CPT | Performed by: NURSE PRACTITIONER

## 2020-03-15 PROCEDURE — 99284 EMERGENCY DEPT VISIT MOD MDM: CPT | Performed by: EMERGENCY MEDICINE

## 2020-03-15 PROCEDURE — 99283 EMERGENCY DEPT VISIT LOW MDM: CPT

## 2020-03-15 PROCEDURE — 87631 RESP VIRUS 3-5 TARGETS: CPT | Performed by: EMERGENCY MEDICINE

## 2020-03-15 PROCEDURE — 71046 X-RAY EXAM CHEST 2 VIEWS: CPT

## 2020-03-15 PROCEDURE — 99283 EMERGENCY DEPT VISIT LOW MDM: CPT | Performed by: NURSE PRACTITIONER

## 2020-03-15 RX ORDER — PREDNISONE 20 MG/1
40 TABLET ORAL ONCE
Status: COMPLETED | OUTPATIENT
Start: 2020-03-15 | End: 2020-03-15

## 2020-03-15 RX ORDER — ACETAMINOPHEN 325 MG/1
650 TABLET ORAL ONCE
Status: COMPLETED | OUTPATIENT
Start: 2020-03-15 | End: 2020-03-15

## 2020-03-15 RX ORDER — PREDNISONE 20 MG/1
20 TABLET ORAL 2 TIMES DAILY WITH MEALS
Qty: 10 TABLET | Refills: 0 | Status: SHIPPED | OUTPATIENT
Start: 2020-03-15 | End: 2020-03-20 | Stop reason: ALTCHOICE

## 2020-03-15 RX ORDER — ONDANSETRON 4 MG/1
4 TABLET, ORALLY DISINTEGRATING ORAL EVERY 8 HOURS PRN
Qty: 20 TABLET | Refills: 0 | Status: SHIPPED | OUTPATIENT
Start: 2020-03-15 | End: 2020-07-14 | Stop reason: ALTCHOICE

## 2020-03-15 RX ORDER — ONDANSETRON 4 MG/1
4 TABLET, ORALLY DISINTEGRATING ORAL ONCE
Status: COMPLETED | OUTPATIENT
Start: 2020-03-15 | End: 2020-03-15

## 2020-03-15 RX ORDER — DOXYCYCLINE HYCLATE 100 MG/1
100 CAPSULE ORAL 2 TIMES DAILY
Qty: 20 CAPSULE | Refills: 0 | Status: SHIPPED | OUTPATIENT
Start: 2020-03-15 | End: 2020-03-25

## 2020-03-15 RX ORDER — DOXYCYCLINE HYCLATE 100 MG/1
100 CAPSULE ORAL ONCE
Status: COMPLETED | OUTPATIENT
Start: 2020-03-15 | End: 2020-03-15

## 2020-03-15 RX ADMIN — ACETAMINOPHEN 650 MG: 325 TABLET, FILM COATED ORAL at 19:27

## 2020-03-15 RX ADMIN — PREDNISONE 40 MG: 20 TABLET ORAL at 20:05

## 2020-03-15 RX ADMIN — ONDANSETRON 4 MG: 4 TABLET, ORALLY DISINTEGRATING ORAL at 19:22

## 2020-03-15 RX ADMIN — DOXYCYCLINE HYCLATE 100 MG: 100 CAPSULE ORAL at 20:06

## 2020-03-15 NOTE — PROGRESS NOTES
[facility]        NAME: Isaac Fraga is a 25 y o  female  : 1995    MRN: [id number]  DATE: March 15, 2020  TIME: 9:45 AM    Assessment and Plan   Acute URI [J06 9]  1  Acute URI  predniSONE 20 mg tablet   2  Sore throat  POCT rapid strepA    Throat culture         Patient Instructions     Patient Instructions       Take medication as directed  Rest and drink plenty of fluids  A cool mist humidifier can be helpful  If you develop a worsening cough, chest pain, shortness of breath, palpitations, coughing up blood, prolonged high fever, any new or concerning symptoms please return or proceed ER  Recommend following up with PCP in 3-5 days    saltwater gargles, lozenges, hot tea with honey  Tylenol or motrin for pain  If you develop a prolonged high fever, difficulty breathing, swallowing, managing secretions, decreased fluid intake, or urination, any new or concerning symptoms please return or proceed ER  Upper Respiratory Infection   WHAT YOU NEED TO KNOW:   An upper respiratory infection is also called the common cold  It is an infection that can affect your nose, throat, ears, and sinuses  For healthy people, the common cold is usually not serious and does not need special treatment  Cold symptoms are usually worst for the first 3 to 5 days  Most people get better in 7 to 14 days  You may continue to cough for 2 to 3 weeks  Colds are caused by viruses and do not get better with antibiotics  DISCHARGE INSTRUCTIONS:   Return to the emergency department if:   · You have chest pain or trouble breathing  Contact your healthcare provider if:   · You have a fever over 102ºF (39°C)  · Your sore throat gets worse or you see white or yellow spots in your throat  · Your symptoms get worse after 3 to 5 days or your cold is not better in 14 days  · You have a rash anywhere on your skin  · You have large, tender lumps in your neck      · You have thick, green or yellow drainage from your nose  · You cough up thick yellow, green, or bloody mucus  · You have vomiting for more than 24 hours and cannot keep fluids down  · You have a bad earache  · You have questions or concerns about your condition or care  Medicines: You may need any of the following:  · Decongestants  help reduce nasal congestion and help you breathe more easily  If you take decongestant pills, they may make you feel restless or cause problems with your sleep  Do not use decongestant sprays for more than a few days  · Cough suppressants  help reduce coughing  Ask your healthcare provider which type of cough medicine is best for you  · NSAIDs , such as ibuprofen, help decrease swelling, pain, and fever  NSAIDs can cause stomach bleeding or kidney problems in certain people  If you take blood thinner medicine, always ask your healthcare provider if NSAIDs are safe for you  Always read the medicine label and follow directions  · Acetaminophen  decreases pain and fever  It is available without a doctor's order  Ask how much to take and how often to take it  Follow directions  Read the labels of all other medicines you are using to see if they also contain acetaminophen, or ask your doctor or pharmacist  Acetaminophen can cause liver damage if not taken correctly  Do not use more than 4 grams (4,000 milligrams) total of acetaminophen in one day  · Take your medicine as directed  Contact your healthcare provider if you think your medicine is not helping or if you have side effects  Tell him or her if you are allergic to any medicine  Keep a list of the medicines, vitamins, and herbs you take  Include the amounts, and when and why you take them  Bring the list or the pill bottles to follow-up visits  Carry your medicine list with you in case of an emergency  Follow up with your healthcare provider as directed:  Write down your questions so you remember to ask them during your visits     Self-care: · Rest as much as possible  Slowly start to do more each day  · Drink more liquids as directed  Liquids will help thin and loosen mucus so you can cough it up  Liquids will also help prevent dehydration  Liquids that help prevent dehydration include water, fruit juice, and broth  Do not drink liquids that contain caffeine  Caffeine can increase your risk for dehydration  Ask your healthcare provider how much liquid to drink each day  · Soothe a sore throat  Gargle with warm salt water  This helps your sore throat feel better  Make salt water by dissolving ¼ teaspoon salt in 1 cup warm water  You may also suck on hard candy or throat lozenges  You may use a sore throat spray  · Use a humidifier or vaporizer  Use a cool mist humidifier or a vaporizer to increase air moisture in your home  This may make it easier for you to breathe and help decrease your cough  · Use saline nasal drops as directed  These help relieve congestion  · Apply petroleum-based jelly around the outside of your nostrils  This can decrease irritation from blowing your nose  · Do not smoke  Nicotine and other chemicals in cigarettes and cigars can make your symptoms worse  They can also cause infections such as bronchitis or pneumonia  Ask your healthcare provider for information if you currently smoke and need help to quit  E-cigarettes or smokeless tobacco still contain nicotine  Talk to your healthcare provider before you use these products  Prevent spreading your cold to others:   · Try to stay away from other people during the first 2 to 3 days of your cold when it is more easily spread  · Do not share food or drinks  · Do not share hand towels with household members  · Wash your hands often, especially after you blow your nose  Turn away from other people and cover your mouth and nose with a tissue when you sneeze or cough         © 2017 [employer] Information is for End User's use only and may not be sold, redistributed or otherwise used for commercial purposes  All illustrations and images included in CareNotes® are the copyrighted property of A D A M , Inc  or [employer]  The above information is an  only  It is not intended as medical advice for individual conditions or treatments  Talk to your doctor, nurse or pharmacist before following any medical regimen to see if it is safe and effective for you  Follow up with PCP in 3-5 days  Proceed to  ER if symptoms worsen  Chief Complaint     Chief Complaint   Patient presents with    Cough     Pt c/o a cough and a sore throat since Thursday  History of Present Illness       Patient is a 51-year-old female who presents with a 4 day history of cough and sore throat  Patient complaining of mild pain while swallowing but denies any difficulty breathing, swallowing, or managing secretions  Patient denies any fever, chills, or body aches  Patient notes history of asthma and has been using inhaler with some relief  Patient denies smoking  Patient denies any recent travel  Patient denies any recent sick contacts or known exposure to covid 19  Did take over-the-counter DayQuil with mild relief denies chest pain, shortness of breath, hemoptysis, or palpitations  Review of Systems   Review of Systems   Constitutional: Negative for chills, diaphoresis, fatigue and fever  HENT: Positive for congestion and sore throat  Negative for ear discharge, ear pain, facial swelling, mouth sores, postnasal drip, rhinorrhea, sinus pressure, sinus pain and trouble swallowing  Eyes: Negative for photophobia and visual disturbance  Respiratory: Positive for cough  Negative for chest tightness, shortness of breath, wheezing and stridor  Cardiovascular: Negative for chest pain and palpitations  Gastrointestinal: Negative for abdominal pain, diarrhea, nausea and vomiting  Genitourinary: Negative  Musculoskeletal: Negative for arthralgias, back pain, joint swelling, myalgias, neck pain and neck stiffness  Skin: Negative for rash  Neurological: Negative for dizziness, facial asymmetry, weakness, light-headedness, numbness and headaches           Current Medications       Current Outpatient Medications:     albuterol (PROVENTIL HFA) 90 mcg/act inhaler, Inhale 2 puffs every 6 (six) hours as needed for wheezing, Disp: 6 7 g, Rfl: 0    albuterol (VENTOLIN HFA) 90 mcg/act inhaler, Inhale 2 puffs every 6 (six) hours as needed for wheezing, Disp: 1 Inhaler, Rfl: 0    ergocalciferol (VITAMIN D2) 50,000 units, Take 1 capsule (50,000 Units total) by mouth once a week (Patient not taking: Reported on 2/5/2020), Disp: 4 capsule, Rfl: 0    etonogestrel (NEXPLANON) subdermal implant, 68 mg by Subdermal route once, Disp: , Rfl:     levothyroxine 75 mcg tablet, Take 1 tablet (75 mcg total) by mouth daily before breakfast, Disp: 90 tablet, Rfl: 0    loratadine (CLARITIN) 10 mg tablet, Take 1 tablet (10 mg total) by mouth daily (Patient not taking: Reported on 2/5/2020), Disp: 30 tablet, Rfl: 0    naproxen (NAPROSYN) 500 mg tablet, Take 1 tablet (500 mg total) by mouth 2 (two) times a day with meals, Disp: 30 tablet, Rfl: 0    predniSONE 20 mg tablet, Take 1 tablet (20 mg total) by mouth 2 (two) times a day with meals for 5 days, Disp: 10 tablet, Rfl: 0    topiramate (TOPAMAX) 25 mg sprinkle capsule, Take 2 capsules (50 mg total) by mouth daily, Disp: 180 capsule, Rfl: 0    Current Allergies     Allergies as of 03/15/2020 - Reviewed 03/15/2020   Allergen Reaction Noted    Abilify [aripiprazole] Other (See Comments), Seizures, and Confusion 12/16/2015    Tessalon [benzonatate] Anxiety 04/01/2016    Zithromax [azithromycin] Rash, Hallucinations, and Hives 12/16/2015            The following portions of the patient's history were reviewed and updated as appropriate: allergies, current medications, past family history, past medical history, past social history, past surgical history and problem list      Past Medical History:   Diagnosis Date    Anxiety     Asthma     Depression     Disease of thyroid gland     Migraine     Mood disorder ([address] )     Pregnant     Psychiatric disorder     depression,anxiety, mood disorder       Past Surgical History:   Procedure Laterality Date    APPENDECTOMY      FRACTURE SURGERY Right     wrist       Family History   Problem Relation Age of Onset    No Known Problems Mother     Thyroid disease Father     Hypertension Brother     Hypertension Maternal Grandmother     Thyroid disease Paternal Grandmother          Medications have been verified  Objective   /76   Pulse 97   Temp 99 1 °F (37 3 °C)   Resp 18   SpO2 98%        Physical Exam     Physical Exam   Constitutional: She is oriented to person, place, and time  She appears well-developed and well-nourished  No distress  HENT:   Head: Normocephalic and atraumatic  Right Ear: Hearing, tympanic membrane, external ear and ear canal normal    Left Ear: Hearing, tympanic membrane, external ear and ear canal normal    Nose: Rhinorrhea present  Right sinus exhibits no maxillary sinus tenderness and no frontal sinus tenderness  Left sinus exhibits no maxillary sinus tenderness and no frontal sinus tenderness  Mouth/Throat: Uvula is midline and mucous membranes are normal  Posterior oropharyngeal erythema present  No oropharyngeal exudate, posterior oropharyngeal edema or tonsillar abscesses  Tonsils are 1+ on the right  Tonsils are 1+ on the left  No tonsillar exudate  Eyes: Pupils are equal, round, and reactive to light  Conjunctivae are normal    Cardiovascular: Normal rate, regular rhythm, S1 normal, S2 normal, normal heart sounds, intact distal pulses and normal pulses  Pulmonary/Chest: Effort normal and breath sounds normal    Lymphadenopathy:     She has no cervical adenopathy     Neurological: She is alert and oriented to person, place, and time  Skin: Skin is warm and dry  Capillary refill takes less than 2 seconds  She is not diaphoretic

## 2020-03-15 NOTE — LETTER
March 15, 2020     Patient: Bill Singer   YOB: 1995   Date of Visit: 3/15/2020       To Whom it May Concern:    Sesar Tellez was seen in my clinic on 3/15/2020  She may return to work on 3/17/2020  If you have any questions or concerns, please don't hesitate to call  Sincerely,          MARGO Todd        CC: [facility]   [employer]

## 2020-03-15 NOTE — ED PROVIDER NOTES
History  Chief Complaint   Patient presents with    Flu Symptoms     pt states she has been having chest congestion, fevers, and body aches since thursday night     51-year-old female presents with 4 day history of chest congestion occasional productive cough she is not able to characterize the phlegm sore throat myalgias coryza fevers she just evaluated at the [facility] urgent care was evaluated evaluated for with a rapid strep which found be negative but is concerned she has influenza so she presents here for further evaluation  There is no history of cold mid exposure or travel she has had a headache but she does have chronic migraines her last menstrual period was 2 days ago she is urinating well she has occasional nausea no abdominal pain she has noted some wheezing did prescribe her some prednisone but the prescription is waiting for her at the closed pharmacy  There is no lower extremity edema no history of a rash  Prior to Admission Medications   Prescriptions Last Dose Informant Patient Reported? Taking?    albuterol (PROVENTIL HFA) 90 mcg/act inhaler   No No   Sig: Inhale 2 puffs every 6 (six) hours as needed for wheezing   albuterol (VENTOLIN HFA) 90 mcg/act inhaler   No No   Sig: Inhale 2 puffs every 6 (six) hours as needed for wheezing   ergocalciferol (VITAMIN D2) 50,000 units   No No   Sig: Take 1 capsule (50,000 Units total) by mouth once a week   Patient not taking: Reported on 2020   etonogestrel (NEXPLANON) subdermal implant   Yes No   Si mg by Subdermal route once   levothyroxine 75 mcg tablet   No No   Sig: Take 1 tablet (75 mcg total) by mouth daily before breakfast   loratadine (CLARITIN) 10 mg tablet   No No   Sig: Take 1 tablet (10 mg total) by mouth daily   Patient not taking: Reported on 2020   naproxen (NAPROSYN) 500 mg tablet   No No   Sig: Take 1 tablet (500 mg total) by mouth 2 (two) times a day with meals   predniSONE 20 mg tablet   No No   Sig: Take 1 tablet (20 mg total) by mouth 2 (two) times a day with meals for 5 days   topiramate (TOPAMAX) 25 mg sprinkle capsule   No No   Sig: Take 2 capsules (50 mg total) by mouth daily      Facility-Administered Medications: None       Past Medical History:   Diagnosis Date    Anxiety     Asthma     Depression     Disease of thyroid gland     Migraine     Mood disorder (HCC)     Pregnant     Psychiatric disorder     depression,anxiety, mood disorder       Past Surgical History:   Procedure Laterality Date    APPENDECTOMY      FRACTURE SURGERY Right     wrist       Family History   Problem Relation Age of Onset    No Known Problems Mother     Thyroid disease Father     Hypertension Brother     Hypertension Maternal Grandmother     Thyroid disease Paternal Grandmother      I have reviewed and agree with the history as documented  E-Cigarette/Vaping    E-Cigarette Use Never User      E-Cigarette/Vaping Substances     Social History     Tobacco Use    Smoking status: Current Every Day Smoker     Packs/day: 0 50     Years: 5 00     Pack years: 2 50     Types: Cigarettes    Smokeless tobacco: Never Used   Substance Use Topics    Alcohol use: No    Drug use: No       Review of Systems   Constitutional: Positive for activity change, appetite change and fever  Negative for chills and diaphoresis  HENT: Positive for congestion, ear pain (right) and sore throat  Negative for facial swelling, sinus pressure, sinus pain, trouble swallowing and voice change  Eyes: Negative for discharge  Respiratory: Positive for cough and wheezing  Negative for chest tightness and shortness of breath  Cardiovascular: Negative for chest pain and leg swelling  Gastrointestinal: Positive for nausea  Negative for abdominal pain  Genitourinary: Negative for decreased urine volume, difficulty urinating, dysuria and urgency  Musculoskeletal: Positive for arthralgias and myalgias  Negative for neck pain and neck stiffness  Neurological: Positive for headaches  Negative for dizziness, weakness, light-headedness and numbness  Hematological: Does not bruise/bleed easily  Psychiatric/Behavioral: Negative for confusion  All other systems reviewed and are negative  Physical Exam  Physical Exam   Constitutional: She is oriented to person, place, and time  She appears well-developed  No distress  HENT:   Head: Normocephalic  Right Ear: External ear normal    Left Ear: External ear normal    Nose: Nose normal    Mouth/Throat: No oropharyngeal exudate  Uvula midline slight erythema of the posterior pharynx but no evidence of exudates TMs are pale bilaterally   Eyes: Pupils are equal, round, and reactive to light  Conjunctivae and EOM are normal  Right eye exhibits no discharge  Left eye exhibits no discharge  Neck: Normal range of motion  Neck supple  Cardiovascular: Normal rate, regular rhythm and normal heart sounds  Pulmonary/Chest: Effort normal and breath sounds normal  No stridor  No respiratory distress  She has no wheezes  She has no rales  Abdominal: Soft  Bowel sounds are normal  She exhibits no distension and no mass  There is no tenderness  There is no rebound and no guarding  Back no midline or CVA tenderness   Musculoskeletal: Normal range of motion  She exhibits no edema, tenderness or deformity  Lymphadenopathy:     She has no cervical adenopathy  Neurological: She is alert and oriented to person, place, and time  No cranial nerve deficit or sensory deficit  She exhibits normal muscle tone  Coordination normal    Skin: Skin is warm and dry  Capillary refill takes less than 2 seconds  No rash noted  She is not diaphoretic  Psychiatric: She has a normal mood and affect  Vitals reviewed        Vital Signs  ED Triage Vitals   Temperature Pulse Respirations Blood Pressure SpO2   03/15/20 1731 03/15/20 1731 03/15/20 1731 03/15/20 1731 03/15/20 1731   99 2 °F (37 3 °C) (!) 106 18 123/76 100 % Temp Source Heart Rate Source Patient Position - Orthostatic VS BP Location FiO2 (%)   03/15/20 1731 03/15/20 1945 03/15/20 1731 03/15/20 1731 --   Temporal Monitor Sitting Left arm       Pain Score       --                  Vitals:    03/15/20 1731 03/15/20 1945   BP: 123/76    Pulse: (!) 106 85   Patient Position - Orthostatic VS: Sitting          Visual Acuity      ED Medications  Medications   ondansetron (ZOFRAN-ODT) dispersible tablet 4 mg (4 mg Oral Given 3/15/20 1922)   acetaminophen (TYLENOL) tablet 650 mg (650 mg Oral Given 3/15/20 1927)   doxycycline hyclate (VIBRAMYCIN) capsule 100 mg (100 mg Oral Given 3/15/20 2006)   predniSONE tablet 40 mg (40 mg Oral Given 3/15/20 2005)       Diagnostic Studies  Results Reviewed     Procedure Component Value Units Date/Time    Influenza A/B and RSV PCR [[id number]]  (Normal) Collected:  03/15/20 1849    Lab Status:  Final result Specimen:  Nasopharyngeal from Nose Updated:  03/15/20 1941     INFLUENZA A PCR None Detected     INFLUENZA B PCR None Detected     RSV PCR None Detected    POCT pregnancy, urine [[id number]]  (Normal) Resulted:  03/15/20 1925    Lab Status:  Final result Updated:  03/15/20 1925     EXT PREG TEST UR (Ref: Negative) negative     Control valid                 XR chest 2 views   Final Result by Kyle العلي MD (03/15 1904)      Patchy left lower lobe pneumonia  Workstation performed: [id number]                    Procedures  Procedures         ED Course  ED Course as of Mar 15 2135   Aston Nguyen Mar 15, 2020   8333 Reviewed results recommended plenty of fliuds ibuprofen/tylenol has rx for prednisone 40mg qd *5d at pharmacy will complete 10days of doxycyline has MDI and nebulizer supplies   Recheck with PMD  1 week                                    MDM  Number of Diagnoses or Management Options  Diagnosis management comments: Mdm:  Will check for influenza, check chest x-ray treat with some Zofran and re-evaluate        Disposition  Final diagnoses:   Community acquired pneumonia     Time reflects when diagnosis was documented in both MDM as applicable and the Disposition within this note     Time User Action Codes Description Comment    3/15/2020  7:57 PM Pansy Bath Add [J18 9] Community acquired pneumonia       ED Disposition     ED Disposition Condition Date/Time Comment    Discharge Stable [place] Mar 15, 2020  8:05 PM [place] discharge to home/self care              Follow-up Information     Follow up With Specialties Details Why Yany [place], CRNP Nurse Practitioner Call in 1 day follow up later in week [facility] [address] [place] [address]  [phone]            Discharge Medication List as of 3/15/2020  8:05 PM      START taking these medications    Details   doxycycline hyclate (VIBRAMYCIN) 100 mg capsule Take 1 capsule (100 mg total) by mouth 2 (two) times a day for 10 days, Starting Sun 3/15/2020, Until Wed 3/25/2020, Normal      ondansetron (ZOFRAN-ODT) 4 mg disintegrating tablet Take 1 tablet (4 mg total) by mouth every 8 (eight) hours as needed for nausea or vomiting for up to 20 doses, Starting Sun 3/15/2020, Normal         CONTINUE these medications which have NOT CHANGED    Details   !! albuterol (PROVENTIL HFA) 90 mcg/act inhaler Inhale 2 puffs every 6 (six) hours as needed for wheezing, Starting Fri 12/27/2019, Normal      !! albuterol (VENTOLIN HFA) 90 mcg/act inhaler Inhale 2 puffs every 6 (six) hours as needed for wheezing, Starting Thu 8/8/2019, Normal      ergocalciferol (VITAMIN D2) 50,000 units Take 1 capsule (50,000 Units total) by mouth once a week, Starting Tue 10/22/2019, Until Thu 11/21/2019, Normal      etonogestrel (NEXPLANON) subdermal implant 68 mg by Subdermal route once, Historical Med      levothyroxine 75 mcg tablet Take 1 tablet (75 mcg total) by mouth daily before breakfast, Starting Tue 10/8/2019, Normal      loratadine (CLARITIN) 10 mg tablet Take 1 tablet (10 mg total) by mouth daily, Starting Mon 11/4/2019, Until Fri 1/3/2020, Normal      naproxen (NAPROSYN) 500 mg tablet Take 1 tablet (500 mg total) by mouth 2 (two) times a day with meals, Starting Wed 2/5/2020, Normal      predniSONE 20 mg tablet Take 1 tablet (20 mg total) by mouth 2 (two) times a day with meals for 5 days, Starting Sun 3/15/2020, Until Fri 3/20/2020, Normal      topiramate (TOPAMAX) 25 mg sprinkle capsule Take 2 capsules (50 mg total) by mouth daily, Starting Wed 2/19/2020, Until Tue 5/19/2020, Normal       !! - Potential duplicate medications found  Please discuss with provider  No discharge procedures on file      PDMP Review       Value Time User    PDMP Reviewed  Yes 2/5/2020  1:11 PM Krista Momin PA-C          ED Provider  Electronically Signed by           Ignacio Castle MD  03/15/20 8510

## 2020-03-15 NOTE — PATIENT INSTRUCTIONS
Take medication as directed  Rest and drink plenty of fluids  A cool mist humidifier can be helpful  If you develop a worsening cough, chest pain, shortness of breath, palpitations, coughing up blood, prolonged high fever, any new or concerning symptoms please return or proceed ER  Recommend following up with PCP in 3-5 days    saltwater gargles, lozenges, hot tea with honey  Tylenol or motrin for pain  If you develop a prolonged high fever, difficulty breathing, swallowing, managing secretions, decreased fluid intake, or urination, any new or concerning symptoms please return or proceed ER  Upper Respiratory Infection   WHAT YOU NEED TO KNOW:   An upper respiratory infection is also called the common cold  It is an infection that can affect your nose, throat, ears, and sinuses  For healthy people, the common cold is usually not serious and does not need special treatment  Cold symptoms are usually worst for the first 3 to 5 days  Most people get better in 7 to 14 days  You may continue to cough for 2 to 3 weeks  Colds are caused by viruses and do not get better with antibiotics  DISCHARGE INSTRUCTIONS:   Return to the emergency department if:   · You have chest pain or trouble breathing  Contact your healthcare provider if:   · You have a fever over 102ºF (39°C)  · Your sore throat gets worse or you see white or yellow spots in your throat  · Your symptoms get worse after 3 to 5 days or your cold is not better in 14 days  · You have a rash anywhere on your skin  · You have large, tender lumps in your neck  · You have thick, green or yellow drainage from your nose  · You cough up thick yellow, green, or bloody mucus  · You have vomiting for more than 24 hours and cannot keep fluids down  · You have a bad earache  · You have questions or concerns about your condition or care  Medicines:   You may need any of the following:  · Decongestants  help reduce nasal congestion and help you breathe more easily  If you take decongestant pills, they may make you feel restless or cause problems with your sleep  Do not use decongestant sprays for more than a few days  · Cough suppressants  help reduce coughing  Ask your healthcare provider which type of cough medicine is best for you  · NSAIDs , such as ibuprofen, help decrease swelling, pain, and fever  NSAIDs can cause stomach bleeding or kidney problems in certain people  If you take blood thinner medicine, always ask your healthcare provider if NSAIDs are safe for you  Always read the medicine label and follow directions  · Acetaminophen  decreases pain and fever  It is available without a doctor's order  Ask how much to take and how often to take it  Follow directions  Read the labels of all other medicines you are using to see if they also contain acetaminophen, or ask your doctor or pharmacist  Acetaminophen can cause liver damage if not taken correctly  Do not use more than 4 grams (4,000 milligrams) total of acetaminophen in one day  · Take your medicine as directed  Contact your healthcare provider if you think your medicine is not helping or if you have side effects  Tell him or her if you are allergic to any medicine  Keep a list of the medicines, vitamins, and herbs you take  Include the amounts, and when and why you take them  Bring the list or the pill bottles to follow-up visits  Carry your medicine list with you in case of an emergency  Follow up with your healthcare provider as directed:  Write down your questions so you remember to ask them during your visits  Self-care:   · Rest as much as possible  Slowly start to do more each day  · Drink more liquids as directed  Liquids will help thin and loosen mucus so you can cough it up  Liquids will also help prevent dehydration  Liquids that help prevent dehydration include water, fruit juice, and broth  Do not drink liquids that contain caffeine   Caffeine can increase your risk for dehydration  Ask your healthcare provider how much liquid to drink each day  · Soothe a sore throat  Gargle with warm salt water  This helps your sore throat feel better  Make salt water by dissolving ¼ teaspoon salt in 1 cup warm water  You may also suck on hard candy or throat lozenges  You may use a sore throat spray  · Use a humidifier or vaporizer  Use a cool mist humidifier or a vaporizer to increase air moisture in your home  This may make it easier for you to breathe and help decrease your cough  · Use saline nasal drops as directed  These help relieve congestion  · Apply petroleum-based jelly around the outside of your nostrils  This can decrease irritation from blowing your nose  · Do not smoke  Nicotine and other chemicals in cigarettes and cigars can make your symptoms worse  They can also cause infections such as bronchitis or pneumonia  Ask your healthcare provider for information if you currently smoke and need help to quit  E-cigarettes or smokeless tobacco still contain nicotine  Talk to your healthcare provider before you use these products  Prevent spreading your cold to others:   · Try to stay away from other people during the first 2 to 3 days of your cold when it is more easily spread  · Do not share food or drinks  · Do not share hand towels with household members  · Wash your hands often, especially after you blow your nose  Turn away from other people and cover your mouth and nose with a tissue when you sneeze or cough  © 2017 [employer] Information is for End User's use only and may not be sold, redistributed or otherwise used for commercial purposes  All illustrations and images included in CareNotes® are the copyrighted property of [employer]  or [employer]  The above information is an  only  It is not intended as medical advice for individual conditions or treatments   Talk to your doctor, nurse or pharmacist before following any medical regimen to see if it is safe and effective for you

## 2020-03-16 NOTE — DISCHARGE INSTRUCTIONS
Finish 10 days of doxycyline avoids tanning or deliberate exposure to sunshine or taking medication with calium (cheese, dairy or antacids)  Fill script for prednisone take with food  Continue flovent as previously prescribed rinse mouth after use  Albuterol mdi/neb 4 times a day for 3 days and every 4 hours as needed then use as needed  Return with increasing shortness of breath needing albuterol more often than every 4 hours or any new or worsening symptoms  Zofran as needed for nausea    Drink 2-3 liters (quarts per day)  Tylenol 650mg every 6 hours as needed for pain, fever (max 3000mg in 24 hours)   Aleve 2 tabs twice daily with food OR ibuprofen 200-800mg every 8 hours with food as needed for pain

## 2020-03-17 LAB — BACTERIA THROAT CULT: NORMAL

## 2020-03-19 ENCOUNTER — TELEPHONE (OUTPATIENT)
Dept: ADMINISTRATIVE | Facility: OTHER | Age: 25
End: 2020-03-19

## 2020-03-19 NOTE — TELEPHONE ENCOUNTER
Upon review of the In Basket request we were able to locate, review, and update the patient chart as requested for HIV  Any additional questions or concerns should be emailed to the Practice Liaisons via [email or web address] email, please do not reply via In Basket      Thank you  Avi Gan

## 2020-03-19 NOTE — TELEPHONE ENCOUNTER
----- Message from Stephan Salinas sent at 3/19/2020  9:09 AM EDT -----  03/19/20 9:09 AM    Hello, our patient [facility] has had HIV completed/performed  Please assist in updating the patient chart by pulling the Care Everywhere (CE) document  The date of service is 04/11/2016  It is listed under "infectious disease testing"      Thank you,  David Montoya MA  [facility]

## 2020-03-20 ENCOUNTER — OFFICE VISIT (OUTPATIENT)
Dept: FAMILY MEDICINE CLINIC | Facility: HOME HEALTHCARE | Age: 25
End: 2020-03-20
Payer: COMMERCIAL

## 2020-03-20 VITALS
TEMPERATURE: 97.8 F | DIASTOLIC BLOOD PRESSURE: 82 MMHG | HEART RATE: 88 BPM | HEIGHT: 62 IN | OXYGEN SATURATION: 98 % | SYSTOLIC BLOOD PRESSURE: 122 MMHG | BODY MASS INDEX: 36.84 KG/M2 | WEIGHT: 200.2 LBS | RESPIRATION RATE: 18 BRPM

## 2020-03-20 DIAGNOSIS — Z23 NEED FOR HPV VACCINATION: Primary | ICD-10-CM

## 2020-03-20 DIAGNOSIS — Z20.828 EXPOSURE TO SARS-ASSOCIATED CORONAVIRUS: ICD-10-CM

## 2020-03-20 DIAGNOSIS — Z12.4 SCREENING FOR CERVICAL CANCER: ICD-10-CM

## 2020-03-20 DIAGNOSIS — R06.02 SOB (SHORTNESS OF BREATH): ICD-10-CM

## 2020-03-20 PROCEDURE — T1015 CLINIC SERVICE: HCPCS | Performed by: FAMILY MEDICINE

## 2020-03-20 PROCEDURE — 99213 OFFICE O/P EST LOW 20 MIN: CPT | Performed by: FAMILY MEDICINE

## 2020-03-20 NOTE — PROGRESS NOTES
[address]       NAME: Marylene Cease is a 25 y o  female  : 1995    MRN: [id number]  DATE: 2020  TIME: 12:31 PM    Assessment and Plan   Diagnoses and all orders for this visit:    Need for HPV vaccination  -     Cancel: HPV VACCINE 9 VALENT IM    Screening for cervical cancer  -     Cancel: Ambulatory referral to Obstetrics / Gynecology; Future    SOB (shortness of breath)  -     MISC COVID-19 TEST- Collected at Office    Exposure to SARS-associated coronavirus  -     MISC COVID-19 TEST- Collected at Office        No problem-specific Assessment & Plan notes found for this encounter  Patient Instructions           Chief Complaint     Chief Complaint   Patient presents with    Follow-up     dx with pneumonia at ER and can't go back to work until she is released by PCP  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 is here for follow-up, was diagnosed with pneumonia in the ER about a week ago and was requesting work release to return  She continues to complain of dry cough with chest tightness and dyspnea on exertion  Reports low-grade fevers  Also complains of bilateral ear pain, worse on the right and admits that she has been putting peroxide in the ear  Denies any recent travel out of the country, to [place], [address] [place] [address], or [place]  Has recently been in the [facility] area  Does complain of headache  Eating and drinking well  But does have fatigue and body aches  Review of Systems   Review of Systems   Constitutional: Positive for chills and fatigue  HENT: Positive for congestion, ear pain and sore throat  Respiratory: Positive for cough, shortness of breath and wheezing  Cardiovascular: Negative  Gastrointestinal: Negative  Musculoskeletal: Negative  Neurological: Positive for headaches  Psychiatric/Behavioral: Negative            Current Medications       Current Outpatient Medications:     albuterol (PROVENTIL HFA) 90 mcg/act inhaler, Inhale 2 puffs every 6 (six) hours as needed for wheezing, Disp: 6 7 g, Rfl: 0    albuterol (VENTOLIN HFA) 90 mcg/act inhaler, Inhale 2 puffs every 6 (six) hours as needed for wheezing, Disp: 1 Inhaler, Rfl: 0    doxycycline hyclate (VIBRAMYCIN) 100 mg capsule, Take 1 capsule (100 mg total) by mouth 2 (two) times a day for 10 days, Disp: 20 capsule, Rfl: 0    levothyroxine 75 mcg tablet, Take 1 tablet (75 mcg total) by mouth daily before breakfast, Disp: 90 tablet, Rfl: 0    loratadine (CLARITIN) 10 mg tablet, Take 1 tablet (10 mg total) by mouth daily (Patient taking differently: Take 10 mg by mouth daily as needed ), Disp: 30 tablet, Rfl: 0    naproxen (NAPROSYN) 500 mg tablet, Take 1 tablet (500 mg total) by mouth 2 (two) times a day with meals, Disp: 30 tablet, Rfl: 0    ondansetron (ZOFRAN-ODT) 4 mg disintegrating tablet, Take 1 tablet (4 mg total) by mouth every 8 (eight) hours as needed for nausea or vomiting for up to 20 doses, Disp: 20 tablet, Rfl: 0    topiramate (TOPAMAX) 25 mg sprinkle capsule, Take 2 capsules (50 mg total) by mouth daily, Disp: 180 capsule, Rfl: 0    Current Allergies     Allergies as of 03/20/2020 - Reviewed 03/20/2020   Allergen Reaction Noted    Abilify [aripiprazole] Other (See Comments), Seizures, and Confusion 12/16/2015    Tessalon [benzonatate] Anxiety 04/01/2016    Zithromax [azithromycin] Rash, Hallucinations, and Hives 12/16/2015            The following portions of the patient's history were reviewed and updated as appropriate: allergies, current medications, past family history, past medical history, past social history, past surgical history and problem list      Past Medical History:   Diagnosis Date    Anxiety     Asthma     Depression     Disease of thyroid gland     Migraine     Mood disorder ([address] )     Pregnant     Psychiatric disorder     depression,anxiety, mood disorder       Past Surgical History:   Procedure Laterality Date    APPENDECTOMY      FRACTURE SURGERY Right     wrist       Family History   Problem Relation Age of Onset    No Known Problems Mother     Thyroid disease Father     Hypertension Brother     Hypertension Maternal Grandmother     Thyroid disease Paternal Grandmother          Medications have been verified  Objective   /82 (BP Location: Left arm, Patient Position: Sitting, Cuff Size: Adult)   Pulse 88   Temp 97 8 °F (36 6 °C) (Temporal)   Resp 18   Ht 5' 2" (1 575 m)   Wt 90 8 kg (200 lb 3 2 oz)   LMP 03/09/2020 (Exact Date)   SpO2 98%   BMI 36 62 kg/m²        Physical Exam     Physical Exam   Constitutional: She is oriented to person, place, and time  She appears well-developed and well-nourished  HENT:   Right Ear: Hearing, tympanic membrane and ear canal normal    Left Ear: Hearing, tympanic membrane and ear canal normal    Mouth/Throat: Mucous membranes are normal    Eyes: Pupils are equal, round, and reactive to light  EOM are normal    Neck: Normal range of motion  Neck supple  Cardiovascular: Normal rate, regular rhythm, normal heart sounds and intact distal pulses  Pulmonary/Chest: Effort normal  No respiratory distress  She has decreased breath sounds in the right lower field and the left lower field  She has wheezes in the right lower field and the left lower field  She has rhonchi in the right lower field, the left middle field and the left lower field  Abdominal: Soft  Lymphadenopathy:     She has no cervical adenopathy  Neurological: She is alert and oriented to person, place, and time  Skin: Skin is warm and dry   Capillary refill takes less than 2 seconds  Psychiatric: She has a normal mood and affect  Nursing note and vitals reviewed  COVID-19 Virtual Visit     This virtual check-in was done via Patient arrive to office  Encounter provider MARGO Marlow    Provider located at [address] [address]  [phone]    Recent Visits  No visits were found meeting these conditions  Showing recent visits within past 7 days and meeting all other requirements     Today's Visits  Date Type Provider Dept   03/20/20 Office Visit MARGO Marlow [address] [facility] today's visits and meeting all other requirements     Future Appointments  No visits were found meeting these conditions  Showing future appointments within next 150 days and meeting all other requirements          Marylene Cease is a 25 y o  female who is concerned about COVID-19  She reports fever, cough and shortness of breath  She has not traveled outside the [place]  within the last 14 days    She has not had contact with a person who is under investigation for or who is positive for COVID-19 within the last 14 days  She has not been hospitalized recently for fever and/or lower respiratory symptoms      Past Medical History:   Diagnosis Date    Anxiety     Asthma     Depression     Disease of thyroid gland     Migraine     Mood disorder (HCC)     Pregnant     Psychiatric disorder     depression,anxiety, mood disorder       Past Surgical History:   Procedure Laterality Date    APPENDECTOMY      FRACTURE SURGERY Right     wrist       Current Outpatient Medications   Medication Sig Dispense Refill    albuterol (PROVENTIL HFA) 90 mcg/act inhaler Inhale 2 puffs every 6 (six) hours as needed for wheezing 6 7 g 0    albuterol (VENTOLIN HFA) 90 mcg/act inhaler Inhale 2 puffs every 6 (six) hours as needed for wheezing 1 Inhaler 0    doxycycline hyclate (VIBRAMYCIN) 100 mg capsule Take 1 capsule (100 mg total) by mouth 2 (two) times a day for 10 days 20 capsule 0    levothyroxine 75 mcg tablet Take 1 tablet (75 mcg total) by mouth daily before breakfast 90 tablet 0    loratadine (CLARITIN) 10 mg tablet Take 1 tablet (10 mg total) by mouth daily (Patient taking differently: Take 10 mg by mouth daily as needed ) 30 tablet 0    naproxen (NAPROSYN) 500 mg tablet Take 1 tablet (500 mg total) by mouth 2 (two) times a day with meals 30 tablet 0    ondansetron (ZOFRAN-ODT) 4 mg disintegrating tablet Take 1 tablet (4 mg total) by mouth every 8 (eight) hours as needed for nausea or vomiting for up to 20 doses 20 tablet 0    topiramate (TOPAMAX) 25 mg sprinkle capsule Take 2 capsules (50 mg total) by mouth daily 180 capsule 0     No current facility-administered medications for this visit  Allergies   Allergen Reactions    Abilify [Aripiprazole] Other (See Comments), Seizures and Confusion     hallucinaTIONS  hallucination    Tessalon [Benzonatate] Anxiety     Other reaction(s): Psych complications  hallucinations  Other reaction(s): Psych complications  hallucinations    Zithromax [Azithromycin] Rash, Hallucinations and Hives       Video Exam    [facility] appears alert, pale  Disposition:      Nasal pharyngeal swab done in office, advised to self isolate pending results  I spent 25 minutes with the patient during this virtual check-in visit

## 2020-03-20 NOTE — PATIENT INSTRUCTIONS
Self isolation recommended, frequent hand hygiene  Will call with covid results  Encourage rest and fluids

## 2020-03-30 ENCOUNTER — TELEMEDICINE (OUTPATIENT)
Dept: FAMILY MEDICINE CLINIC | Facility: HOME HEALTHCARE | Age: 25
End: 2020-03-30
Payer: COMMERCIAL

## 2020-03-30 DIAGNOSIS — Z72.0 TOBACCO ABUSE: ICD-10-CM

## 2020-03-30 DIAGNOSIS — R05.8 POST-VIRAL COUGH SYNDROME: Primary | ICD-10-CM

## 2020-03-30 PROCEDURE — G0071 COMM SVCS BY RHC/FQHC 5 MIN: HCPCS | Performed by: FAMILY MEDICINE

## 2020-03-30 NOTE — PATIENT INSTRUCTIONS
Smoking cessation advised  Reduction of personal exposure to occupation dusts, fumes, and gases  Reduction to indoor and outdoor air pollutants

## 2020-03-30 NOTE — PROGRESS NOTES
Virtual Brief Visit    Problem List Items Addressed This Visit        Other    Tobacco abuse      Other Visit Diagnoses     Post-viral cough syndrome    -  Primary                Reason for visit is follow up/ results    Encounter provider MARGO Magaña    Provider located at [address] [address]  [phone]      Recent Visits  No visits were found meeting these conditions  Showing recent visits within past 7 days and meeting all other requirements     Today's Visits  Date Type Provider Dept   03/30/20 61 MARGO Elliott [address] [facility] today's visits and meeting all other requirements     Future Appointments  No visits were found meeting these conditions  Showing future appointments within next 150 days and meeting all other requirements        After connecting through telephone, the patient was identified by name and date of birth  Marce Carter was informed that this is a telemedicine visit and that the visit is being conducted through telephone  My office door was closed  No one else was in the room  She acknowledged consent and understanding of privacy and security of the video platform  The patient has agreed to participate and understands they can discontinue the visit at any time  Patient is aware this is a billable service  Agustina Lawson is a 25 y o  female who presents via telemedicine for follow-up on upper respiratory symptoms and recent covid  She reports feeling much better, she does continue with a cough but attributes this to smoking  She does declines smoking cessation at this time  No further fevers  No more congestion  Denies wheezing  Eating and sleeping well  Would like a note to return to work        Past Medical History:   Diagnosis Date    Anxiety     Asthma     Depression     Disease of thyroid gland     Migraine     Mood disorder ([facility] [address]     Pregnant     Psychiatric disorder     depression,anxiety, mood disorder       Past Surgical History:   Procedure Laterality Date    APPENDECTOMY      FRACTURE SURGERY Right     wrist       Current Outpatient Medications   Medication Sig Dispense Refill    albuterol (PROVENTIL HFA) 90 mcg/act inhaler Inhale 2 puffs every 6 (six) hours as needed for wheezing 6 7 g 0    albuterol (VENTOLIN HFA) 90 mcg/act inhaler Inhale 2 puffs every 6 (six) hours as needed for wheezing 1 Inhaler 0    levothyroxine 75 mcg tablet Take 1 tablet (75 mcg total) by mouth daily before breakfast 90 tablet 0    loratadine (CLARITIN) 10 mg tablet Take 1 tablet (10 mg total) by mouth daily (Patient taking differently: Take 10 mg by mouth daily as needed ) 30 tablet 0    naproxen (NAPROSYN) 500 mg tablet Take 1 tablet (500 mg total) by mouth 2 (two) times a day with meals 30 tablet 0    ondansetron (ZOFRAN-ODT) 4 mg disintegrating tablet Take 1 tablet (4 mg total) by mouth every 8 (eight) hours as needed for nausea or vomiting for up to 20 doses 20 tablet 0    topiramate (TOPAMAX) 25 mg sprinkle capsule Take 2 capsules (50 mg total) by mouth daily 180 capsule 0     No current facility-administered medications for this visit  Allergies   Allergen Reactions    Abilify [Aripiprazole] Other (See Comments), Seizures and Confusion     hallucinaTIONS  hallucination    Tessalon [Benzonatate] Anxiety     Other reaction(s): Psych complications  hallucinations  Other reaction(s): Psych complications  hallucinations    Zithromax [Azithromycin] Rash, Hallucinations and Hives       Review of Systems   Constitutional: Negative  HENT: Negative  Negative for congestion  Respiratory: Positive for cough  Negative for wheezing  Gastrointestinal: Negative  Musculoskeletal: Negative  Psychiatric/Behavioral: Negative  I spent 15 minutes with the patient during this visit

## 2020-05-08 ENCOUNTER — APPOINTMENT (OUTPATIENT)
Dept: LAB | Facility: HOSPITAL | Age: 25
End: 2020-05-08
Payer: COMMERCIAL

## 2020-05-08 ENCOUNTER — TELEMEDICINE (OUTPATIENT)
Dept: FAMILY MEDICINE CLINIC | Facility: HOME HEALTHCARE | Age: 25
End: 2020-05-08
Payer: COMMERCIAL

## 2020-05-08 DIAGNOSIS — J02.9 SORE THROAT: Primary | ICD-10-CM

## 2020-05-08 DIAGNOSIS — J02.9 SORE THROAT: ICD-10-CM

## 2020-05-08 LAB — S PYO DNA THROAT QL NAA+PROBE: DETECTED

## 2020-05-08 PROCEDURE — 87651 STREP A DNA AMP PROBE: CPT

## 2020-05-08 PROCEDURE — 99213 OFFICE O/P EST LOW 20 MIN: CPT | Performed by: FAMILY MEDICINE

## 2020-05-08 PROCEDURE — T1015 CLINIC SERVICE: HCPCS | Performed by: FAMILY MEDICINE

## 2020-05-11 ENCOUNTER — TELEPHONE (OUTPATIENT)
Dept: FAMILY MEDICINE CLINIC | Facility: HOME HEALTHCARE | Age: 25
End: 2020-05-11

## 2020-05-11 DIAGNOSIS — J02.0 STREP PHARYNGITIS: Primary | ICD-10-CM

## 2020-05-11 RX ORDER — AMOXICILLIN 500 MG/1
1000 CAPSULE ORAL EVERY 8 HOURS SCHEDULED
Qty: 60 CAPSULE | Refills: 0 | Status: SHIPPED | OUTPATIENT
Start: 2020-05-11 | End: 2020-05-21

## 2020-05-15 ENCOUNTER — TELEPHONE (OUTPATIENT)
Dept: FAMILY MEDICINE CLINIC | Facility: HOME HEALTHCARE | Age: 25
End: 2020-05-15

## 2020-07-05 ENCOUNTER — HOSPITAL ENCOUNTER (EMERGENCY)
Facility: HOSPITAL | Age: 25
Discharge: HOME | End: 2020-07-05
Attending: EMERGENCY MEDICINE | Admitting: EMERGENCY MEDICINE
Payer: COMMERCIAL

## 2020-07-05 VITALS
SYSTOLIC BLOOD PRESSURE: 129 MMHG | DIASTOLIC BLOOD PRESSURE: 71 MMHG | WEIGHT: 188 LBS | RESPIRATION RATE: 16 BRPM | HEART RATE: 90 BPM | BODY MASS INDEX: 34.6 KG/M2 | OXYGEN SATURATION: 96 % | HEIGHT: 62 IN | TEMPERATURE: 97.4 F

## 2020-07-05 DIAGNOSIS — J45.901 ASTHMA EXACERBATION: Primary | ICD-10-CM

## 2020-07-05 PROCEDURE — 94640 AIRWAY INHALATION TREATMENT: CPT

## 2020-07-05 PROCEDURE — 99283 EMERGENCY DEPT VISIT LOW MDM: CPT | Performed by: EMERGENCY MEDICINE

## 2020-07-05 PROCEDURE — 99284 EMERGENCY DEPT VISIT MOD MDM: CPT

## 2020-07-05 RX ORDER — PREDNISONE 50 MG/1
50 TABLET ORAL DAILY
Qty: 5 TABLET | Refills: 0 | Status: SHIPPED | OUTPATIENT
Start: 2020-07-05 | End: 2020-07-10

## 2020-07-05 RX ORDER — PREDNISONE 20 MG/1
60 TABLET ORAL ONCE
Status: COMPLETED | OUTPATIENT
Start: 2020-07-05 | End: 2020-07-05

## 2020-07-05 RX ORDER — CEFPODOXIME PROXETIL 200 MG/1
200 TABLET, FILM COATED ORAL ONCE
Status: COMPLETED | OUTPATIENT
Start: 2020-07-05 | End: 2020-07-05

## 2020-07-05 RX ORDER — ASPIRIN 81 MG/1
162 TABLET, CHEWABLE ORAL DAILY
COMMUNITY
End: 2021-05-07 | Stop reason: ALTCHOICE

## 2020-07-05 RX ORDER — IPRATROPIUM BROMIDE AND ALBUTEROL SULFATE 2.5; .5 MG/3ML; MG/3ML
3 SOLUTION RESPIRATORY (INHALATION) ONCE
Status: COMPLETED | OUTPATIENT
Start: 2020-07-05 | End: 2020-07-05

## 2020-07-05 RX ORDER — CEFPODOXIME PROXETIL 100 MG/1
100 TABLET, FILM COATED ORAL 2 TIMES DAILY
Qty: 14 TABLET | Refills: 0 | Status: SHIPPED | OUTPATIENT
Start: 2020-07-05 | End: 2020-07-12

## 2020-07-05 RX ADMIN — PREDNISONE 60 MG: 20 TABLET ORAL at 19:42

## 2020-07-05 RX ADMIN — CEFPODOXIME PROXETIL 200 MG: 200 TABLET, FILM COATED ORAL at 20:26

## 2020-07-05 RX ADMIN — IPRATROPIUM BROMIDE AND ALBUTEROL SULFATE 3 ML: 2.5; .5 SOLUTION RESPIRATORY (INHALATION) at 19:49
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
## 2020-07-06 NOTE — ED NOTES
States she feels a little better after treatment "im wheezing a little more more but that's because it got things moving"   Awaiting re toyin Fajardo RN  07/05/20 5484

## 2020-07-06 NOTE — DISCHARGE INSTRUCTIONS
RETURN IF WORSE IN ANY WAY: CHEST PAIN, SHORTNESS OF BREATH, INCREASED PAIN, FEVER OR FLU LIKE SYMPTOMS, OR NEW AND CONCERNING SYMPTOMS SIGNS OR SYMPTOMS:      PLEASE CALL YOUR PRIMARY DOCTOR or OB doctor to be seen in follow up this week

## 2020-07-14 ENCOUNTER — OFFICE VISIT (OUTPATIENT)
Dept: FAMILY MEDICINE CLINIC | Facility: HOME HEALTHCARE | Age: 25
End: 2020-07-14
Payer: COMMERCIAL

## 2020-07-14 VITALS
BODY MASS INDEX: 34.01 KG/M2 | RESPIRATION RATE: 18 BRPM | HEIGHT: 62 IN | DIASTOLIC BLOOD PRESSURE: 70 MMHG | WEIGHT: 184.8 LBS | TEMPERATURE: 98.3 F | HEART RATE: 125 BPM | SYSTOLIC BLOOD PRESSURE: 112 MMHG | OXYGEN SATURATION: 97 %

## 2020-07-14 DIAGNOSIS — E03.8 HYPOTHYROIDISM DUE TO HASHIMOTO'S THYROIDITIS: ICD-10-CM

## 2020-07-14 DIAGNOSIS — J45.30 MILD PERSISTENT ASTHMA WITHOUT COMPLICATION: ICD-10-CM

## 2020-07-14 DIAGNOSIS — D72.829 LEUKOCYTOSIS, UNSPECIFIED TYPE: ICD-10-CM

## 2020-07-14 DIAGNOSIS — K21.9 GASTROESOPHAGEAL REFLUX DISEASE, ESOPHAGITIS PRESENCE NOT SPECIFIED: ICD-10-CM

## 2020-07-14 DIAGNOSIS — Z76.89 ENCOUNTER FOR SUPPORT AND COORDINATION OF TRANSITION OF CARE: ICD-10-CM

## 2020-07-14 DIAGNOSIS — E06.3 HYPOTHYROIDISM DUE TO HASHIMOTO'S THYROIDITIS: ICD-10-CM

## 2020-07-14 DIAGNOSIS — Z91.89 NEED FOR DENTAL CARE: Primary | ICD-10-CM

## 2020-07-14 PROCEDURE — 3008F BODY MASS INDEX DOCD: CPT | Performed by: PSYCHIATRY & NEUROLOGY

## 2020-07-14 PROCEDURE — 99213 OFFICE O/P EST LOW 20 MIN: CPT | Performed by: FAMILY MEDICINE

## 2020-07-14 PROCEDURE — T1015 CLINIC SERVICE: HCPCS | Performed by: FAMILY MEDICINE

## 2020-07-14 RX ORDER — ALBUTEROL SULFATE 2.5 MG/3ML
2.5 SOLUTION RESPIRATORY (INHALATION) EVERY 4 HOURS PRN
COMMUNITY
End: 2022-04-02 | Stop reason: SDUPTHER

## 2020-07-14 RX ORDER — LEVOTHYROXINE SODIUM 0.07 MG/1
75 TABLET ORAL
Qty: 90 TABLET | Refills: 0 | Status: SHIPPED | OUTPATIENT
Start: 2020-07-14 | End: 2021-02-24

## 2020-07-14 RX ORDER — OMEPRAZOLE 20 MG/1
20 CAPSULE, DELAYED RELEASE ORAL DAILY
Qty: 30 CAPSULE | Refills: 2 | Status: SHIPPED | OUTPATIENT
Start: 2020-07-14 | End: 2021-05-07 | Stop reason: ALTCHOICE

## 2020-07-14 RX ORDER — MONTELUKAST SODIUM 10 MG/1
10 TABLET ORAL
COMMUNITY
Start: 2020-07-09 | End: 2021-05-07 | Stop reason: ALTCHOICE

## 2020-07-14 RX ORDER — PREDNISONE 10 MG/1
TABLET ORAL
COMMUNITY
Start: 2020-07-09 | End: 2021-05-07 | Stop reason: ALTCHOICE

## 2020-07-14 NOTE — PATIENT INSTRUCTIONS
Encouraged fluids and rest  Length and course of illness discussed  Tylenol Motrin as per package  Saline nasal spray as per package  Follow-up in office not feeling better in a few days  Smoking cessation advised  Reduction of personal exposure to occupation dusts, fumes, and gases  Reduction to indoor and outdoor air pollutants  Advised influenza vaccine  Pneumococcal vaccine advised    Will start omeprazole for reflux, follow-up with OB  Avoid known triggers such as caffeine, chocolate, fatty foods, acidic foods, peppermint, or smoking

## 2020-07-14 NOTE — PROGRESS NOTES
[address]       NAME: Duane Crooks is a 25 y o  female  : 1995    MRN: [id number]  DATE: 2020  TIME: 2:45 PM    Assessment and Plan   Diagnoses and all orders for this visit:    Need for dental care  -     Ambulatory referral to Dentistry; Future    Hypothyroidism due to Hashimoto's thyroiditis  -     levothyroxine 75 mcg tablet; Take 1 tablet (75 mcg total) by mouth daily before breakfast    Leukocytosis, unspecified type  -     CBC and differential; Future    Gastroesophageal reflux disease, esophagitis presence not specified  -     omeprazole (PriLOSEC) 20 mg delayed release capsule; Take 1 capsule (20 mg total) by mouth daily    Encounter for support and coordination of transition of care    Mild persistent asthma without complication    Other orders  -     fluticasone-salmeterol (ADVAIR, WIXELA) 250-50 mcg/dose inhaler; Inhale 1 puff 2 (two) times a day  -     predniSONE 10 mg tablet  -     albuterol (2 5 mg/3 mL) 0 083 % nebulizer solution; Inhale 2 5 mg every 4 (four) hours as needed  -     montelukast (SINGULAIR) 10 mg tablet; Take 10 mg by mouth daily at bedtime   -     Prenatal Vit-Fe Fumarate-FA (PRENATAL PO); Take 1 tablet by mouth daily        No problem-specific Assessment & Plan notes found for this encounter  Patient Instructions           Chief Complaint     Chief Complaint   Patient presents with    Follow-up     after recently hospital admission  Tested negative for COVID  ED told pt it was asthma exacerbation  Gave pt steroid script  Pt is 16 weeks pregnant  Did get "very sick" during both other pregnancies, once ending up in the hospital on oxygen  History of Present Illness       [facility] is here for follow-up after recent hospital admission for asthma exacerbation  She did test negative for covid  Review of chart reveals chest x-ray was normal   Did have slight leukocytosis, denies fever    Unfortunately she continues to smoke, reports cutting down gradually  I encouraged total smoking cessation  She is currently on oral steroids and did see pulmonology recently who have started her on albuterol and wixella  She reports cough that is worse in the morning and when laying down at night  Continues with wheezing  Denies any acute shortness of breath but does have some dyspnea on exertion  She is approximately 16 weeks pregnant and does follow-up with OB  She has complained of reflux since starting her inhalers  Denies any decrease in urine output  She offers no other complaints today        Review of Systems   Review of Systems   HENT: Negative  Respiratory: Positive for cough and wheezing  Negative for shortness of breath  Some sharma  Continues to smoke but reports " I am cutting down and almost ready to quit"   Gastrointestinal:        + epigastric discomfort with reflux   Genitourinary: Negative  Musculoskeletal: Negative  Neurological: Negative  Psychiatric/Behavioral: Negative            Current Medications       Current Outpatient Medications:     albuterol (2 5 mg/3 mL) 0 083 % nebulizer solution, Inhale 2 5 mg every 4 (four) hours as needed, Disp: , Rfl:     albuterol (PROVENTIL HFA) 90 mcg/act inhaler, Inhale 2 puffs every 6 (six) hours as needed for wheezing, Disp: 6 7 g, Rfl: 0    aspirin 81 mg chewable tablet, Chew 162 mg daily , Disp: , Rfl:     fluticasone-salmeterol (ADVAIR, WIXELA) 250-50 mcg/dose inhaler, Inhale 1 puff 2 (two) times a day, Disp: , Rfl:     levothyroxine 75 mcg tablet, Take 1 tablet (75 mcg total) by mouth daily before breakfast, Disp: 90 tablet, Rfl: 0    montelukast (SINGULAIR) 10 mg tablet, Take 10 mg by mouth daily at bedtime , Disp: , Rfl:     predniSONE 10 mg tablet, , Disp: , Rfl:     Prenatal Vit-Fe Fumarate-FA (PRENATAL PO), Take 1 tablet by mouth daily, Disp: , Rfl:     loratadine (CLARITIN) 10 mg tablet, Take 1 tablet (10 mg total) by mouth daily (Patient taking differently: Take 10 mg by mouth daily as needed ), Disp: 30 tablet, Rfl: 0    omeprazole (PriLOSEC) 20 mg delayed release capsule, Take 1 capsule (20 mg total) by mouth daily, Disp: 30 capsule, Rfl: 2    topiramate (TOPAMAX) 25 mg sprinkle capsule, Take 2 capsules (50 mg total) by mouth daily (Patient not taking: Reported on 7/14/2020), Disp: 180 capsule, Rfl: 0    Current Allergies     Allergies as of 07/14/2020 - Reviewed 07/14/2020   Allergen Reaction Noted    Abilify [aripiprazole] Other (See Comments), Seizures, and Confusion 12/16/2015    Tessalon [benzonatate] Anxiety 04/01/2016    Zithromax [azithromycin] Rash, Hallucinations, and Hives 12/16/2015            The following portions of the patient's history were reviewed and updated as appropriate: allergies, current medications, past family history, past medical history, past social history, past surgical history and problem list      Past Medical History:   Diagnosis Date    Anxiety     Asthma     Depression     Disease of thyroid gland     Migraine     Mood disorder ([facility] )     Pregnant     Psychiatric disorder     depression,anxiety, mood disorder       Past Surgical History:   Procedure Laterality Date    APPENDECTOMY      FRACTURE SURGERY Right     wrist       Family History   Problem Relation Age of Onset    No Known Problems Mother     Thyroid disease Father     Hypertension Brother     Hypertension Maternal Grandmother     Thyroid disease Paternal Grandmother          Medications have been verified  Objective   /70 (BP Location: Left arm, Patient Position: Sitting, Cuff Size: Adult)   Pulse (!) 125   Temp 98 3 °F (36 8 °C) (Temporal)   Resp 18   Ht 5' 2" (1 575 m)   Wt 83 8 kg (184 lb 12 8 oz)   LMP 03/09/2020 (Approximate)   SpO2 97%   BMI 33 80 kg/m²        Physical Exam     Physical Exam   Constitutional: She is oriented to person, place, and time  She appears well-developed and well-nourished     HENT: Mouth/Throat: Oropharynx is clear and moist    Eyes: Pupils are equal, round, and reactive to light  Conjunctivae and EOM are normal    Neck: Normal range of motion  Neck supple  Cardiovascular: Regular rhythm and normal heart sounds  Tachycardic, denies chest pain or sensation of palpitations  Pulmonary/Chest: Effort normal  No respiratory distress  She has wheezes  Musculoskeletal: Normal range of motion  Neurological: She is alert and oriented to person, place, and time  Skin: Skin is warm and dry  Capillary refill takes less than 2 seconds  Psychiatric: She has a normal mood and affect  Her behavior is normal  Judgment and thought content normal    Nursing note and vitals reviewed

## 2020-09-21 ENCOUNTER — OFFICE VISIT (OUTPATIENT)
Dept: URGENT CARE | Facility: CLINIC | Age: 25
End: 2020-09-21
Payer: COMMERCIAL

## 2020-09-21 VITALS
SYSTOLIC BLOOD PRESSURE: 123 MMHG | TEMPERATURE: 97.9 F | DIASTOLIC BLOOD PRESSURE: 71 MMHG | HEIGHT: 62 IN | WEIGHT: 184.4 LBS | BODY MASS INDEX: 33.93 KG/M2 | HEART RATE: 90 BPM | OXYGEN SATURATION: 99 % | RESPIRATION RATE: 18 BRPM

## 2020-09-21 DIAGNOSIS — Z11.59 SPECIAL SCREENING EXAMINATION FOR UNSPECIFIED VIRAL DISEASE: ICD-10-CM

## 2020-09-21 DIAGNOSIS — J02.9 SORE THROAT: Primary | ICD-10-CM

## 2020-09-21 LAB — S PYO AG THROAT QL: NEGATIVE

## 2020-09-21 PROCEDURE — 87070 CULTURE OTHR SPECIMN AEROBIC: CPT | Performed by: NURSE PRACTITIONER

## 2020-09-21 PROCEDURE — U0003 INFECTIOUS AGENT DETECTION BY NUCLEIC ACID (DNA OR RNA); SEVERE ACUTE RESPIRATORY SYNDROME CORONAVIRUS 2 (SARS-COV-2) (CORONAVIRUS DISEASE [COVID-19]), AMPLIFIED PROBE TECHNIQUE, MAKING USE OF HIGH THROUGHPUT TECHNOLOGIES AS DESCRIBED BY CMS-2020-01-R: HCPCS | Performed by: NURSE PRACTITIONER

## 2020-09-21 PROCEDURE — 87147 CULTURE TYPE IMMUNOLOGIC: CPT | Performed by: NURSE PRACTITIONER

## 2020-09-21 PROCEDURE — 99213 OFFICE O/P EST LOW 20 MIN: CPT | Performed by: NURSE PRACTITIONER

## 2020-09-21 PROCEDURE — G0382 LEV 3 HOSP TYPE B ED VISIT: HCPCS | Performed by: NURSE PRACTITIONER

## 2020-09-21 PROCEDURE — 99283 EMERGENCY DEPT VISIT LOW MDM: CPT | Performed by: NURSE PRACTITIONER

## 2020-09-21 RX ORDER — AMOXICILLIN 500 MG/1
500 CAPSULE ORAL EVERY 8 HOURS SCHEDULED
Qty: 30 CAPSULE | Refills: 0 | Status: SHIPPED | OUTPATIENT
Start: 2020-09-21 | End: 2020-10-01

## 2020-09-21 NOTE — PROGRESS NOTES
[facility]        NAME: Chu Lynn is a 22 y o  female  : 1995    MRN: [id number]  DATE: 2020  TIME: 11:40 AM    Assessment and Plan   Sore throat [J02 9]  1  Sore throat  amoxicillin (AMOXIL) 500 mg capsule   2  Special screening examination for unspecified viral disease  Novel Coronavirus (COVID-19), PCR [facility] - Office Collection    Throat culture    POCT rapid strepA         Patient Instructions     Patient Instructions     Rapid strep was negative  We will send the other swab for culture and will call you if it is positive  Take the amoxicillin as ordered until completed  Eat yogurt or take a probiotic to restore good bacteria to your gut; this helps prevent stomach irritation/diarrhea while on an antibiotic  Treat yourself as if you have covid-19 until told otherwise  We do call results, but using [employer] to view results is often the easiest and fastest way (you can set up email notifications for new results)--see the handout to register  If you need to seek additional care (PCP, ER, 911), call ahead/call from parking lot and let them know you have been tested (this lets them wear more protection and take you directly to the exam room once ready)  Novel Coronavirus   AMBULATORY CARE:   The novel coronavirus (nCoV)  is a new strain (type) of coronavirus  Coronaviruses often cause mild respiratory (lung) infections, such as the common cold  They can also cause more severe infections  Examples include acute respiratory syndrome (SARS), Middle East respiratory syndrome (MERS), pneumonia, and bronchitis  The nCoV can cause more severe symptoms than earlier coronaviruses  The nCoV was first found in individuals in part of [place] at the end of   The virus is spreading as people who are infected travel to other parts of the world    Signs and symptoms of nCoV  can take 2 to 14 days to develop and range from mild to severe:  · Fever    · Cough    · Shortness of breath or trouble breathing    · Pneumonia (in severe cases)    Call your local emergency number (911 in the [address]) for any of the following:  Tell the  you may have nCoV  This will help anyone in the ambulance stay safe, and to call ahead to the hospital   · You have sudden shortness of breath  · You have a fast heartbeat or chest pain  Seek care immediately if:   · You feel so dizzy that you have trouble standing up  · Your lips and fingernails turn blue  Call your doctor if:   · You have a fever over 102ºF (39ºC)  · Your sore throat gets worse or you see white or yellow spots in your throat  · Your symptoms get worse after 3 to 5 days or your cold is not better in 14 days  · You have a rash anywhere on your skin  · You have large, tender lumps in your neck  · You have thick, green, or yellow drainage from your nose  · You cough up thick yellow, green, or bloody mucus  · You are vomiting for more than 24 hours and cannot keep fluids down  · You have a bad earache  · You have questions or concerns about your condition or care  How nCoV spreads: It is not known for sure how the virus spreads  The virus may spread in droplets when an infected person coughs or sneezes  Others become infected by breathing in the virus or getting the virus in their eyes  The virus can also possibly spread if a person touches certain animals, such as in a live market  If you develop symptoms of nCoV,  you may need to be checked  Call your doctor if you traveled within the past 14 days to an area where nCoV is active  Call your doctor if you have close contact with someone else who did  Your doctor will tell you what to do  He or she will need to take precautions in the office to protect staff members and other patients  Your doctor will take samples from your airway  The samples have to be sent to the Centers for Disease Control and Prevention (CDC) to be tested    What to do if you have nCoV:  No treatment is available for nCoV  Medicine may be given to help relieve your cough or fever, or to help you breathe more easily  Severe nCoV may need to be treated in the hospital  In the hospital, you may need breathing treatment or support, such as extra oxygen  The following can help you prevent spreading nCoV to others  Have anyone you are caring for who has nCoV also use the guidelines  · Limit close contact with others  Stay in a different room, or sleep in a separate bed  Remind others to stay at least 6 feet (2 meters) away from you while you are sick  Only go out of your house if you are going to a medical appointment  While you are recovering, always call the office of any healthcare provider you seeing  The provider will need to prepare for your arrival to keep others safe  · Wear a medical mask when you are around others  A medical mask will help prevent you from spreading the virus  You can ask others around you to wear a medical mask if you are not able to wear one  · Cover your mouth and nose to sneeze or cough  Sneeze and cough into a tissue that covers your mouth and nose  Use the bend of our arm if you do not have a tissue  Throw the tissue away in a trash can right away  Then wash your hands well with soap and water  Use hand  that contains alcohol if you cannot wash your hands  · Wash your hands often  You and everyone in your home must wash your hands throughout the day  Use soap and water  Use germ-killing gel if soap and water are not available  Do not touch your eyes or mouth if you have not washed your hands  · Do not share items with other people  Do not share dishes, towels, or other items with anyone  Always wash items after you use them  · Clean frequently touched surfaces often  Use household  or bleach diluted with water to clean counters, doorknobs, toilet seats, and other surfaces  · Do not handle live animals    You may be able to spread nCoV to animals, including pets  Until more is known, it is best not to touch, play with, or handle live animals while you are sick  Help relieve mild symptoms:   · Drink more liquids as directed  Liquids will help thin and loosen mucus so you can cough it up  Liquids will also help prevent dehydration  Liquids that help prevent dehydration include water, fruit juice, and broth  Do not drink liquids that contain caffeine  Caffeine can increase your risk for dehydration  Ask your healthcare provider how much liquid to drink each day  · Soothe a sore throat  Gargle with warm salt water  This helps your sore throat feel better  Make salt water by dissolving ¼ teaspoon salt in 1 cup warm water  You may also suck on hard candy or throat lozenges  You may use a sore throat spray  · Use a humidifier or vaporizer  Use a cool mist humidifier or a vaporizer to increase air moisture in your home  This may make it easier for you to breathe and help decrease your cough  · Use saline nasal drops as directed  These help relieve congestion  · Apply petroleum-based jelly around the outside of your nostrils  This can decrease irritation from blowing your nose  · Do not smoke  Nicotine and other chemicals in cigarettes and cigars can make your symptoms worse  They can also cause infections such as bronchitis or pneumonia  Ask your healthcare provider for information if you currently smoke and need help to quit  E-cigarettes or smokeless tobacco still contain nicotine  Talk to your healthcare provider before you use these products  Follow up with your doctor as directed:  Write down your questions so you remember to ask them during your visits  © Copyright [address] Information is for End User's use only and may not be sold, redistributed or otherwise used for commercial purposes   All illustrations and images included in CareNotes® are the copyrighted property of [employer]  or [address]  The above information is an  only  It is not intended as medical advice for individual conditions or treatments  Talk to your doctor, nurse or pharmacist before following any medical regimen to see if it is safe and effective for you  Sore Throat, Ambulatory Care   GENERAL INFORMATION:   A sore throat  is often caused by a cold or flu virus  A sore throat may also be caused by bacteria such as strep  Other causes include smoking, a runny nose, allergies, or acid reflux  Seek immediate care for the following symptoms:   · Trouble breathing or swallowing because your throat is swollen or sore    · Drooling because it hurts too much to swallow    · A painful lump in your throat that does not go away after 5 days    · A fever higher than 102? F (39?C) or lasts longer than 3 days    · Confusion    · Blood in your throat or ear  Treatment for a sore throat  will depend on the cause how severe it is  A sore throat cause by a virus will go away on its own without treatment  You will need antibiotics if your sore throat is caused by bacteria  Your sore throat should start to feel better within 3 to 5 days for both viral and bacterial infections  Care for your sore throat:   · Gargle with salt water  Mix ¼ teaspoon salt in a glass of warm water and gargle  This may help reduce swelling in your throat  · Take ibuprofen or acetaminophen:  These medicines decrease pain and fever  They are available without a doctor's order  Ask your healthcare provider which medicine is best for you  Ask how much to take and how often to take it  · Drink more liquids  Cold or warm drinks may help soothe your sore throat  Drinking liquids can also help prevent dehydration  · Use a cool-steam humidifier  to help moisten the air in your room and reduce your throat pain  · Use lozenges, ice, soft foods, or popsicles  to soothe your throat  · Rest your throat as much as possible    Try not to use your voice  This may irritate your throat and worsen your symptoms  Follow up with your healthcare provider as directed:  Write down your questions so you remember to ask them during your visits  CARE AGREEMENT:   You have the right to help plan your care  Learn about your health condition and how it may be treated  Discuss treatment options with your caregivers to decide what care you want to receive  You always have the right to refuse treatment  The above information is an  only  It is not intended as medical advice for individual conditions or treatments  Talk to your doctor, nurse or pharmacist before following any medical regimen to see if it is safe and effective for you  © 2014 [address] is for End User's use only and may not be sold, redistributed or otherwise used for commercial purposes  All illustrations and images included in CareNotes® are the copyrighted property of A [employer] A M [employer]  or [employer]  Follow up with PCP in 3-5 days  Proceed to  ER if symptoms worsen  Chief Complaint     Chief Complaint   Patient presents with    Sore Throat     Patient c/o sore throat x 1 week  Patient also c/o cough and nasal congestion that started today  History of Present Illness       Patient presents accompanied by significant other  She reports that she has has sore throat x1 week that is not improving, rather getting slightly worse  She reports that she also has begun to have congestion and occasional cough today  She notes that she is 27 weeks pregnant  She also notes that she has had strep throat in the past       Review of Systems   Review of Systems   HENT: Positive for congestion, sore throat and trouble swallowing  Respiratory: Positive for cough  All other systems reviewed and are negative          Current Medications       Current Outpatient Medications:     aspirin 81 mg chewable tablet, Chew 162 mg daily , Disp: , Rfl:     levothyroxine 75 mcg tablet, Take 1 tablet (75 mcg total) by mouth daily before breakfast, Disp: 90 tablet, Rfl: 0    albuterol (2 5 mg/3 mL) 0 083 % nebulizer solution, Inhale 2 5 mg every 4 (four) hours as needed, Disp: , Rfl:     albuterol (PROVENTIL HFA) 90 mcg/act inhaler, Inhale 2 puffs every 6 (six) hours as needed for wheezing (Patient not taking: Reported on 9/21/2020), Disp: 6 7 g, Rfl: 0    amoxicillin (AMOXIL) 500 mg capsule, Take 1 capsule (500 mg total) by mouth every 8 (eight) hours for 10 days, Disp: 30 capsule, Rfl: 0    fluticasone-salmeterol (ADVAIR, WIXELA) 250-50 mcg/dose inhaler, Inhale 1 puff 2 (two) times a day, Disp: , Rfl:     loratadine (CLARITIN) 10 mg tablet, Take 1 tablet (10 mg total) by mouth daily (Patient not taking: Reported on 9/21/2020), Disp: 30 tablet, Rfl: 0    montelukast (SINGULAIR) 10 mg tablet, Take 10 mg by mouth daily at bedtime , Disp: , Rfl:     omeprazole (PriLOSEC) 20 mg delayed release capsule, Take 1 capsule (20 mg total) by mouth daily (Patient not taking: Reported on 9/21/2020), Disp: 30 capsule, Rfl: 2    predniSONE 10 mg tablet, , Disp: , Rfl:     Prenatal Vit-Fe Fumarate-FA (PRENATAL PO), Take 1 tablet by mouth daily, Disp: , Rfl:     topiramate (TOPAMAX) 25 mg sprinkle capsule, Take 2 capsules (50 mg total) by mouth daily (Patient not taking: Reported on 7/14/2020), Disp: 180 capsule, Rfl: 0    Current Allergies     Allergies as of 09/21/2020 - Reviewed 09/21/2020   Allergen Reaction Noted    Abilify [aripiprazole] Other (See Comments), Seizures, and Confusion 12/16/2015    Tessalon [benzonatate] Anxiety 04/01/2016    Zithromax [azithromycin] Rash, Hallucinations, and Hives 12/16/2015            The following portions of the patient's history were reviewed and updated as appropriate: allergies, current medications, past family history, past medical history, past social history, past surgical history and problem list      Past Medical History:   Diagnosis Date    Anxiety     Asthma     Depression     Disease of thyroid gland     Migraine     Mood disorder (HCC)     Pregnant     Psychiatric disorder     depression,anxiety, mood disorder       Past Surgical History:   Procedure Laterality Date    APPENDECTOMY      FRACTURE SURGERY Right     wrist       Family History   Problem Relation Age of Onset    No Known Problems Mother     Thyroid disease Father     Hypertension Brother     Hypertension Maternal Grandmother     Thyroid disease Paternal Grandmother          Medications have been verified  Objective   /71   Pulse 90   Temp 97 9 °F (36 6 °C) (Temporal)   Resp 18   Ht 5' 2" (1 575 m)   Wt 83 6 kg (184 lb 6 4 oz)   LMP 03/09/2020 (Approximate)   SpO2 99%   BMI 33 73 kg/m²        Physical Exam     Physical Exam  Vitals signs and nursing note reviewed  Constitutional:       General: She is not in acute distress  Appearance: She is well-developed  She is ill-appearing  She is not toxic-appearing or diaphoretic  HENT:      Head: Normocephalic and atraumatic  Right Ear: Hearing, tympanic membrane, ear canal and external ear normal       Left Ear: Hearing, tympanic membrane, ear canal and external ear normal       Nose: Mucosal edema, congestion and rhinorrhea present  No nasal tenderness  Rhinorrhea is clear  Mouth/Throat:      Pharynx: Uvula midline  Posterior oropharyngeal erythema present  No pharyngeal swelling, oropharyngeal exudate or uvula swelling  Tonsils: No tonsillar exudate or tonsillar abscesses  2+ on the right  2+ on the left  Eyes:      Pupils: Pupils are equal, round, and reactive to light  Neck:      Musculoskeletal: Normal range of motion and neck supple  Cardiovascular:      Rate and Rhythm: Normal rate and regular rhythm  Heart sounds: Normal heart sounds     Pulmonary:      Effort: Pulmonary effort is normal  No tachypnea, bradypnea, accessory muscle usage or respiratory distress  Breath sounds: Normal breath sounds  No stridor  No decreased breath sounds, wheezing, rhonchi or rales  Abdominal:      General: Bowel sounds are normal  There is no distension (Typical for 27 weeks gestation)  Palpations: Abdomen is soft  Tenderness: There is no abdominal tenderness  Musculoskeletal: Normal range of motion  Lymphadenopathy:      Cervical: Cervical adenopathy present  Skin:     General: Skin is warm and dry  Capillary Refill: Capillary refill takes less than 2 seconds  Neurological:      Mental Status: She is alert and oriented to person, place, and time  Psychiatric:         Behavior: Behavior normal          Thought Content:  Thought content normal          Judgment: Judgment normal

## 2020-09-21 NOTE — PATIENT INSTRUCTIONS
Rapid strep was negative  We will send the other swab for culture and will call you if it is positive  Take the amoxicillin as ordered until completed  Eat yogurt or take a probiotic to restore good bacteria to your gut; this helps prevent stomach irritation/diarrhea while on an antibiotic  Treat yourself as if you have covid-19 until told otherwise  We do call results, but using [employer] to view results is often the easiest and fastest way (you can set up email notifications for new results)--see the handout to register  If you need to seek additional care (PCP, ER, 911), call ahead/call from parking lot and let them know you have been tested (this lets them wear more protection and take you directly to the exam room once ready)  Novel Coronavirus   AMBULATORY CARE:   The novel coronavirus (nCoV)  is a new strain (type) of coronavirus  Coronaviruses often cause mild respiratory (lung) infections, such as the common cold  They can also cause more severe infections  Examples include acute respiratory syndrome (SARS), Middle East respiratory syndrome (MERS), pneumonia, and bronchitis  The nCoV can cause more severe symptoms than earlier coronaviruses  The nCoV was first found in individuals in part of [place] at the end of 2019  The virus is spreading as people who are infected travel to other parts of the world  Signs and symptoms of nCoV  can take 2 to 14 days to develop and range from mild to severe:  · Fever    · Cough    · Shortness of breath or trouble breathing    · Pneumonia (in severe cases)    Call your local emergency number (911 in the [address] [place] [address],[address]) for any of the following:  Tell the  you may have nCoV  This will help anyone in the ambulance stay safe, and to call ahead to the hospital   · You have sudden shortness of breath  · You have a fast heartbeat or chest pain  Seek care immediately if:   · You feel so dizzy that you have trouble standing up      · Your lips and fingernails turn blue     Call your doctor if:   · You have a fever over 102ºF (39ºC)  · Your sore throat gets worse or you see white or yellow spots in your throat  · Your symptoms get worse after 3 to 5 days or your cold is not better in 14 days  · You have a rash anywhere on your skin  · You have large, tender lumps in your neck  · You have thick, green, or yellow drainage from your nose  · You cough up thick yellow, green, or bloody mucus  · You are vomiting for more than 24 hours and cannot keep fluids down  · You have a bad earache  · You have questions or concerns about your condition or care  How nCoV spreads: It is not known for sure how the virus spreads  The virus may spread in droplets when an infected person coughs or sneezes  Others become infected by breathing in the virus or getting the virus in their eyes  The virus can also possibly spread if a person touches certain animals, such as in a live market  If you develop symptoms of nCoV,  you may need to be checked  Call your doctor if you traveled within the past 14 days to an area where nCoV is active  Call your doctor if you have close contact with someone else who did  Your doctor will tell you what to do  He or she will need to take precautions in the office to protect staff members and other patients  Your doctor will take samples from your airway  The samples have to be sent to the Centers for Disease Control and Prevention (CDC) to be tested  What to do if you have nCoV:  No treatment is available for nCoV  Medicine may be given to help relieve your cough or fever, or to help you breathe more easily  Severe nCoV may need to be treated in the hospital  In the hospital, you may need breathing treatment or support, such as extra oxygen  The following can help you prevent spreading nCoV to others  Have anyone you are caring for who has nCoV also use the guidelines  · Limit close contact with others    Stay in a different room, or sleep in a separate bed  Remind others to stay at least 6 feet (2 meters) away from you while you are sick  Only go out of your house if you are going to a medical appointment  While you are recovering, always call the office of any healthcare provider you seeing  The provider will need to prepare for your arrival to keep others safe  · Wear a medical mask when you are around others  A medical mask will help prevent you from spreading the virus  You can ask others around you to wear a medical mask if you are not able to wear one  · Cover your mouth and nose to sneeze or cough  Sneeze and cough into a tissue that covers your mouth and nose  Use the bend of our arm if you do not have a tissue  Throw the tissue away in a trash can right away  Then wash your hands well with soap and water  Use hand  that contains alcohol if you cannot wash your hands  · Wash your hands often  You and everyone in your home must wash your hands throughout the day  Use soap and water  Use germ-killing gel if soap and water are not available  Do not touch your eyes or mouth if you have not washed your hands  · Do not share items with other people  Do not share dishes, towels, or other items with anyone  Always wash items after you use them  · Clean frequently touched surfaces often  Use household  or bleach diluted with water to clean counters, doorknobs, toilet seats, and other surfaces  · Do not handle live animals  You may be able to spread nCoV to animals, including pets  Until more is known, it is best not to touch, play with, or handle live animals while you are sick  Help relieve mild symptoms:   · Drink more liquids as directed  Liquids will help thin and loosen mucus so you can cough it up  Liquids will also help prevent dehydration  Liquids that help prevent dehydration include water, fruit juice, and broth  Do not drink liquids that contain caffeine   Caffeine can increase your risk for dehydration  Ask your healthcare provider how much liquid to drink each day  · Soothe a sore throat  Gargle with warm salt water  This helps your sore throat feel better  Make salt water by dissolving ¼ teaspoon salt in 1 cup warm water  You may also suck on hard candy or throat lozenges  You may use a sore throat spray  · Use a humidifier or vaporizer  Use a cool mist humidifier or a vaporizer to increase air moisture in your home  This may make it easier for you to breathe and help decrease your cough  · Use saline nasal drops as directed  These help relieve congestion  · Apply petroleum-based jelly around the outside of your nostrils  This can decrease irritation from blowing your nose  · Do not smoke  Nicotine and other chemicals in cigarettes and cigars can make your symptoms worse  They can also cause infections such as bronchitis or pneumonia  Ask your healthcare provider for information if you currently smoke and need help to quit  E-cigarettes or smokeless tobacco still contain nicotine  Talk to your healthcare provider before you use these products  Follow up with your doctor as directed:  Write down your questions so you remember to ask them during your visits  © Copyright [address] [place] [address] Information is for End User's use only and may not be sold, redistributed or otherwise used for commercial purposes  All illustrations and images included in CareNotes® are the copyrighted property of A [employer] A M [employer] Inc  or [address] [place], [place] [address]bart Reed   The above information is an  only  It is not intended as medical advice for individual conditions or treatments  Talk to your doctor, nurse or pharmacist before following any medical regimen to see if it is safe and effective for you  Sore Throat, Ambulatory Care   GENERAL INFORMATION:   A sore throat  is often caused by a cold or flu virus  A sore throat may also be caused by bacteria such as strep   Other causes include smoking, a runny nose, allergies, or acid reflux  Seek immediate care for the following symptoms:   · Trouble breathing or swallowing because your throat is swollen or sore    · Drooling because it hurts too much to swallow    · A painful lump in your throat that does not go away after 5 days    · A fever higher than 102? F (39?C) or lasts longer than 3 days    · Confusion    · Blood in your throat or ear  Treatment for a sore throat  will depend on the cause how severe it is  A sore throat cause by a virus will go away on its own without treatment  You will need antibiotics if your sore throat is caused by bacteria  Your sore throat should start to feel better within 3 to 5 days for both viral and bacterial infections  Care for your sore throat:   · Gargle with salt water  Mix ¼ teaspoon salt in a glass of warm water and gargle  This may help reduce swelling in your throat  · Take ibuprofen or acetaminophen:  These medicines decrease pain and fever  They are available without a doctor's order  Ask your healthcare provider which medicine is best for you  Ask how much to take and how often to take it  · Drink more liquids  Cold or warm drinks may help soothe your sore throat  Drinking liquids can also help prevent dehydration  · Use a cool-steam humidifier  to help moisten the air in your room and reduce your throat pain  · Use lozenges, ice, soft foods, or popsicles  to soothe your throat  · Rest your throat as much as possible  Try not to use your voice  This may irritate your throat and worsen your symptoms  Follow up with your healthcare provider as directed:  Write down your questions so you remember to ask them during your visits  CARE AGREEMENT:   You have the right to help plan your care  Learn about your health condition and how it may be treated  Discuss treatment options with your caregivers to decide what care you want to receive  You always have the right to refuse treatment   The above information is an  only  It is not intended as medical advice for individual conditions or treatments  Talk to your doctor, nurse or pharmacist before following any medical regimen to see if it is safe and effective for you  © 2014 [address] is for End User's use only and may not be sold, redistributed or otherwise used for commercial purposes  All illustrations and images included in CareNotes® are the copyrighted property of A D A M , Inc  or [employer]

## 2020-09-21 NOTE — LETTER
September 21, 2020     Patient: Imtiaz Olivares   YOB: 1995   Date of Visit: 9/21/2020       To Whom It May Concern: It is my medical opinion that [facility] should remain out of work until [employer] received  As long as results are negative, patient may then return to work immediately and without restrictions  If you have any questions or concerns, please don't hesitate to call           Sincerely,        MARGO Aguilar    CC: No Recipients

## 2020-09-23 ENCOUNTER — OFFICE VISIT (OUTPATIENT)
Dept: URGENT CARE | Facility: CLINIC | Age: 25
End: 2020-09-23
Payer: COMMERCIAL

## 2020-09-23 VITALS
TEMPERATURE: 97.8 F | SYSTOLIC BLOOD PRESSURE: 120 MMHG | WEIGHT: 184 LBS | BODY MASS INDEX: 33.86 KG/M2 | OXYGEN SATURATION: 96 % | HEART RATE: 110 BPM | RESPIRATION RATE: 18 BRPM | HEIGHT: 62 IN | DIASTOLIC BLOOD PRESSURE: 70 MMHG

## 2020-09-23 DIAGNOSIS — R05.9 COUGH: Primary | ICD-10-CM

## 2020-09-23 LAB — SARS-COV-2 RNA SPEC QL NAA+PROBE: NOT DETECTED

## 2020-09-23 PROCEDURE — G0382 LEV 3 HOSP TYPE B ED VISIT: HCPCS | Performed by: PHYSICIAN ASSISTANT

## 2020-09-23 PROCEDURE — 99283 EMERGENCY DEPT VISIT LOW MDM: CPT | Performed by: PHYSICIAN ASSISTANT

## 2020-09-23 PROCEDURE — 99213 OFFICE O/P EST LOW 20 MIN: CPT | Performed by: PHYSICIAN ASSISTANT

## 2020-09-23 NOTE — PROGRESS NOTES
[facility]        NAME: Giovani Linn is a 22 y o  female  : 1995    MRN: [id number]  DATE: 2020  TIME: 11:42 AM    Assessment and Plan   Cough [R05]  1  Cough         Patient is 26 weeks pregnant  Purchase medications with pharmacist  Inform them that you are pregnant  Patient verbalized understanding  Patient Instructions     May take benadryl daily (do not drive or operate heavy machinery after taking benadryl)  Over the counter dextromethorphan   Take over the counter plain Mucinex (guafenisen) during the day  Fluids and rest (Warm water with honey and lemon)  Tylenol as needed   It is not recommended to smoke during pregnancy as this has been associated with fetal harm  Follow up with PCP in 3-5 days  Proceed to  ER if symptoms worsen  Chief Complaint     Chief Complaint   Patient presents with    Cold Like Symptoms     Patient c/o cough keeping her up at night and urinary incontinence  Patient was seen 2 days ago here and feels like she is getting worse  History of Present Illness       Patient's COVID test came back negative  She was tested on Monday  Tx with amoxicillin for sore throat  Throat culture pending  Denies surgery in past 4 weeks, immobilization over 3 days, calf pain/swelling, hemoptysis, prior DVT/PE, clotting disorder, or current CA diagnosis/tx  Patient 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"  States she thinks her symptoms may be due to allergies  States she hasn't experienced fatigue like she normally does when she is sick  Cough   This is a new problem  The current episode started in the past 7 days  The problem has been gradually worsening  The problem occurs every few minutes  The cough is non-productive  Associated symptoms include postnasal drip, rhinorrhea and a sore throat (resolved)   Pertinent negatives include no chills, ear pain, fever, headaches, hemoptysis, myalgias, rash, shortness of breath or wheezing  Treatments tried: benadryl; albuterol; cough drops  The treatment provided no relief  Her past medical history is significant for asthma  Review of Systems   Review of Systems   Constitutional: Negative for activity change, appetite change, chills, fatigue and fever  HENT: Positive for postnasal drip, rhinorrhea and sore throat (resolved)  Negative for congestion, ear discharge, ear pain, sinus pressure, sinus pain, sneezing and trouble swallowing  Respiratory: Positive for cough  Negative for hemoptysis, chest tightness, shortness of breath and wheezing  Gastrointestinal: Negative for abdominal pain, diarrhea, nausea and vomiting  Musculoskeletal: Negative for myalgias  Skin: Negative for rash  Neurological: Negative for light-headedness and headaches           Current Medications       Current Outpatient Medications:     albuterol (2 5 mg/3 mL) 0 083 % nebulizer solution, Inhale 2 5 mg every 4 (four) hours as needed, Disp: , Rfl:     amoxicillin (AMOXIL) 500 mg capsule, Take 1 capsule (500 mg total) by mouth every 8 (eight) hours for 10 days, Disp: 30 capsule, Rfl: 0    aspirin 81 mg chewable tablet, Chew 162 mg daily , Disp: , Rfl:     fluticasone-salmeterol (ADVAIR, WIXELA) 250-50 mcg/dose inhaler, Inhale 1 puff 2 (two) times a day, Disp: , Rfl:     levothyroxine 75 mcg tablet, Take 1 tablet (75 mcg total) by mouth daily before breakfast, Disp: 90 tablet, Rfl: 0    montelukast (SINGULAIR) 10 mg tablet, Take 10 mg by mouth daily at bedtime , Disp: , Rfl:     albuterol (PROVENTIL HFA) 90 mcg/act inhaler, Inhale 2 puffs every 6 (six) hours as needed for wheezing (Patient not taking: Reported on 9/23/2020), Disp: 6 7 g, Rfl: 0    loratadine (CLARITIN) 10 mg tablet, Take 1 tablet (10 mg total) by mouth daily (Patient not taking: Reported on 9/21/2020), Disp: 30 tablet, Rfl: 0    omeprazole (PriLOSEC) 20 mg delayed release capsule, Take 1 capsule (20 mg total) by mouth daily (Patient not taking: Reported on 9/21/2020), Disp: 30 capsule, Rfl: 2    predniSONE 10 mg tablet, , Disp: , Rfl:     Prenatal Vit-Fe Fumarate-FA (PRENATAL PO), Take 1 tablet by mouth daily, Disp: , Rfl:     topiramate (TOPAMAX) 25 mg sprinkle capsule, Take 2 capsules (50 mg total) by mouth daily (Patient not taking: Reported on 7/14/2020), Disp: 180 capsule, Rfl: 0    Current Allergies     Allergies as of 09/23/2020 - Reviewed 09/23/2020   Allergen Reaction Noted    Abilify [aripiprazole] Other (See Comments), Seizures, and Confusion 12/16/2015    Tessalon [benzonatate] Anxiety 04/01/2016    Zithromax [azithromycin] Rash, Hallucinations, and Hives 12/16/2015            The following portions of the patient's history were reviewed and updated as appropriate: allergies, current medications, past family history, past medical history, past social history, past surgical history and problem list      Past Medical History:   Diagnosis Date    Anxiety     Asthma     Depression     Disease of thyroid gland     Migraine     Mood disorder ([facility] [address] )     Pregnant     Psychiatric disorder     depression,anxiety, mood disorder       Past Surgical History:   Procedure Laterality Date    APPENDECTOMY      FRACTURE SURGERY Right     wrist       Family History   Problem Relation Age of Onset    No Known Problems Mother     Thyroid disease Father     Hypertension Brother     Hypertension Maternal Grandmother     Thyroid disease Paternal Grandmother          Medications have been verified  Objective   /70   Pulse (!) 110   Temp 97 8 °F (36 6 °C) (Temporal)   Resp 18   Ht 5' 2" (1 575 m)   Wt 83 5 kg (184 lb)   LMP 03/09/2020 (Approximate)   SpO2 96%   BMI 33 65 kg/m²        Physical Exam     Physical Exam  Vitals signs reviewed  Constitutional:       General: She is not in acute distress  Appearance: She is well-developed  She is not diaphoretic     HENT:      Head: Normocephalic  Right Ear: Tympanic membrane and external ear normal       Left Ear: Tympanic membrane and external ear normal       Nose: Nose normal       Mouth/Throat:      Pharynx: No oropharyngeal exudate or posterior oropharyngeal erythema  Eyes:      Conjunctiva/sclera: Conjunctivae normal    Cardiovascular:      Rate and Rhythm: Normal rate and regular rhythm  Heart sounds: Normal heart sounds  No murmur  No friction rub  No gallop  Pulmonary:      Effort: Pulmonary effort is normal  No respiratory distress  Breath sounds: Normal breath sounds  No wheezing or rales  Chest:      Chest wall: No tenderness  Lymphadenopathy:      Cervical: No cervical adenopathy  Skin:     General: Skin is warm  Neurological:      Mental Status: She is alert and oriented to person, place, and time  Psychiatric:         Behavior: Behavior normal          Thought Content:  Thought content normal          Judgment: Judgment normal

## 2020-09-23 NOTE — PATIENT INSTRUCTIONS
May take benadryl daily (do not drive or operate heavy machinery after taking benadryl)  Over the counter dextromethorphan   Take over the counter plain Mucinex (guafenisen) during the day  Fluids and rest (Warm water with honey and lemon)  Tylenol as needed   Albuterol inhaler as needed   It is not recommended to smoke during pregnancy as this has been associated with fetal harm  Follow up with PCP in 3-5 days  Proceed to  ER if symptoms worsen  Acute Bronchitis   WHAT YOU NEED TO KNOW:   Acute bronchitis is swelling and irritation in the air passages of your lungs  This irritation may cause you to cough or have other breathing problems  Acute bronchitis often starts because of another illness, such as a cold or the flu  The illness spreads from your nose and throat to your windpipe and airways  Bronchitis is often called a chest cold  Acute bronchitis lasts about 3 to 6 weeks and is usually not a serious illness  Your cough can last for several weeks  DISCHARGE INSTRUCTIONS:   Return to the emergency department if:   · You cough up blood  · Your lips or fingernails turn blue  · You feel like you are not getting enough air when you breathe  Contact your healthcare provider if:   · You have a fever  · Your breathing problems do not go away or get worse  · Your cough does not get better within 4 weeks  · You have questions or concerns about your condition or care  Self-care:   · Get more rest   Rest helps your body to heal  Slowly start to do more each day  Rest when you feel it is needed  · Avoid irritants in the air  Avoid chemicals, fumes, and dust  Wear a face mask if you must work around dust or fumes  Stay inside on days when air pollution levels are high  If you have allergies, stay inside when pollen counts are high  Do not use aerosol products, such as spray-on deodorant, bug spray, and hair spray  · Do not smoke or be around others who smoke    Nicotine and other chemicals in cigarettes and cigars damages the cilia that move mucus out of your lungs  Ask your healthcare provider for information if you currently smoke and need help to quit  E-cigarettes or smokeless tobacco still contain nicotine  Talk to your healthcare provider before you use these products  · Drink liquids as directed  Liquids help keep your air passages moist and help you cough up mucus  You may need to drink more liquids when you have acute bronchitis  Ask how much liquid to drink each day and which liquids are best for you  · Use a humidifier or vaporizer  Use a cool mist humidifier or a vaporizer to increase air moisture in your home  This may make it easier for you to breathe and help decrease your cough  Decrease risk for acute bronchitis:   · Get the vaccinations you need  Ask your healthcare provider if you should get vaccinated against the flu or pneumonia  · Prevent the spread of germs  You can decrease your risk of acute bronchitis and other illnesses by doing the following:     [facility] your hands often with soap and water  Carry germ-killing hand lotion or gel with you  You can use the lotion or gel to clean your hands when soap and water are not available  ¨ Do not touch your eyes, nose, or mouth unless you have washed your hands first     ¨ Always cover your mouth when you cough to prevent the spread of germs  It is best to cough into a tissue or your shirt sleeve instead of into your hand  Ask those around you cover their mouths when they cough  ¨ Try to avoid people who have a cold or the flu  If you are sick, stay away from others as much as possible  Medicines: Your healthcare provider may  give you any of the following:  · Ibuprofen or acetaminophen  are medicines that help lower your fever  They are available without a doctor's order  Ask your healthcare provider which medicine is right for you  Ask how much to take and how often to take it  Follow directions   These medicines can cause stomach bleeding if not taken correctly  Ibuprofen can cause kidney damage  Do not take ibuprofen if you have kidney disease, an ulcer, or allergies to aspirin  Acetaminophen can cause liver damage  Do not take more than 4,000 milligrams in 24 hours  · Decongestants  help loosen mucus in your lungs and make it easier to cough up  This can help you breathe easier  · Cough suppressants  decrease your urge to cough  If your cough produces mucus, do not take a cough suppressant unless your healthcare provider tells you to  Your healthcare provider may suggest that you take a cough suppressant at night so you can rest     · Inhalers  may be given  Your healthcare provider may give you one or more inhalers to help you breathe easier and cough less  An inhaler gives your medicine to open your airways  Ask your healthcare provider to show you how to use your inhaler correctly  · Take your medicine as directed  Contact your healthcare provider if you think your medicine is not helping or if you have side effects  Tell him of her if you are allergic to any medicine  Keep a list of the medicines, vitamins, and herbs you take  Include the amounts, and when and why you take them  Bring the list or the pill bottles to follow-up visits  Carry your medicine list with you in case of an emergency  Follow up with your healthcare provider as directed:  Write down questions you have so you will remember to ask them during your follow-up visits  © 2017 [employer] Information is for End User's use only and may not be sold, redistributed or otherwise used for commercial purposes  All illustrations and images included in CareNotes® are the copyrighted property of [employer]  or [employer]  The above information is an  only  It is not intended as medical advice for individual conditions or treatments   Talk to your doctor, nurse or pharmacist before following any medical regimen to see if it is safe and effective for you  Allergies   WHAT YOU NEED TO KNOW:   Allergies are an immune system reaction to a substance called an allergen  Your immune system sees the allergen as harmful and attacks it  An allergic reaction can be mild or life-threatening  A life-threatening reaction is called anaphylaxis  Anaphylaxis is a sudden, life-threatening reaction that needs immediate treatment  DISCHARGE INSTRUCTIONS:   Call 911 for signs or symptoms of anaphylaxis,  such as trouble breathing, swelling in your mouth or throat, or wheezing  You may also have itching, a rash, hives, or feel like you are going to faint  Return to the emergency department if:   · You have tingling in your hands or feet  · Your skin is red or flushed  Contact your healthcare provider if:   · You have questions or concerns about your condition or care  Medicines:   · Antihistamines  help decrease itching, sneezing, and swelling  You may take them as a pill or use drops in your nose or eyes  · Decongestants  help your nose feel less stuffy  · Topical treatments  help decrease itching or swelling  You also may be given nasal sprays or eyedrops  · Epinephrine  is used to treat a severe allergic reaction such as anaphylaxis  · Take your medicine as directed  Contact your healthcare provider if you think your medicine is not helping or if you have side effects  Tell him of her if you are allergic to any medicine  Keep a list of the medicines, vitamins, and herbs you take  Include the amounts, and when and why you take them  Bring the list or the pill bottles to follow-up visits  Carry your medicine list with you in case of an emergency  Steps to take for signs or symptoms of anaphylaxis:   · Immediately  give 1 shot of epinephrine only into the outer thigh muscle  · Leave the shot in place  as directed   Your healthcare provider may recommend you leave it in place for up to 10 seconds before you remove it  This helps make sure all of the epinephrine is delivered  · Call 911 and go to the emergency department,  even if the shot improved symptoms  Do not drive yourself  Bring the used epinephrine shot with you  Safety precautions to take if you are at risk for anaphylaxis:   · Keep 2 shots of epinephrine with you at all times  You may need a second shot, because epinephrine only works for about 20 minutes and symptoms may return  Your healthcare provider can show you and family members how to give the shot  Check the expiration date every month and replace it before it expires  · Create an action plan  Your healthcare provider can help you create a written plan that explains the allergy and an emergency plan to treat a reaction  The plan explains when to give a second epinephrine shot if symptoms return or do not improve after the first  Give copies of the action plan and emergency instructions to family members, work and school staff, and  providers  Show them how to give a shot of epinephrine  · Be careful when you exercise  If you have had exercise-induced anaphylaxis, do not exercise right after you eat  Stop exercising right away if you start to develop any signs or symptoms of anaphylaxis  You may first feel tired, warm, or have itchy skin  Hives, swelling, and severe breathing problems may develop if you continue to exercise  · Carry medical alert identification  Wear medical alert jewelry or carry a card that explains the allergy  Ask your healthcare provider where to get these items  · Inform all healthcare providers of the allergy  This includes dentists, nurses, doctors, and surgeons  Manage allergies:   · Use nasal rinses as directed  Rinse with a saline solution daily to help clear your nose of allergens  · Do not smoke  Allergy symptoms may decrease if you are not around smoke   Nicotine and other chemicals in cigarettes and cigars can cause lung damage  Ask your healthcare provider for information if you currently smoke and need help to quit  E-cigarettes or smokeless tobacco still contain nicotine  Talk to your healthcare provider before you use these products  Prevent allergic reactions:   · Do not go outside when pollen counts are high if you have seasonal allergies  Your symptoms may be better if you go outside only in the morning or evening  Use your air conditioner, and change air filters often  · Avoid dust, fur, and mold  Dust and vacuum your home often  You may want to wear a mask when you vacuum  Keep pets in certain rooms, and bathe them often  Use a dehumidifier (machine that decreases moisture) to help prevent mold  · Do not use products that contain latex if you have a latex allergy  Use nonlatex gloves if you work in healthcare or in food preparation  Always tell healthcare providers about a latex allergy  · Avoid areas that attract insects if you have an insect bite or sting allergy  Areas include trash cans, gardens, and picnics  Do not wear bright clothing or strong scents when you will be outside  Follow up with your healthcare provider as directed:  Write down your questions so you remember to ask them during your visits  When you have an allergic reaction, write down everything you were exposed to in the 2 hours before the reaction  Take that information to your next visit  © 2017 [employer]  Information is for End User's use only and may not be sold, redistributed or otherwise used for commercial purposes  All illustrations and images included in CareNotes® are the copyrighted property of [employer]  or [employer]  The above information is an  only  It is not intended as medical advice for individual conditions or treatments  Talk to your doctor, nurse or pharmacist before following any medical regimen to see if it is safe and effective for you

## 2020-09-24 ENCOUNTER — TELEMEDICINE (OUTPATIENT)
Dept: FAMILY MEDICINE CLINIC | Facility: HOME HEALTHCARE | Age: 25
End: 2020-09-24

## 2020-09-24 DIAGNOSIS — Z53.21 PROC/TRTMT NOT CRD OUT D/T PT LV BEF SEEN BY HLTH CARE PROV: Primary | ICD-10-CM

## 2020-09-24 LAB — BACTERIA THROAT CULT: ABNORMAL

## 2020-09-24 NOTE — PROGRESS NOTES
It was my intent to do a video tele visit with [facility] today for follow-up after being seen in the urgent care yesterday  She is not currently available for the appointment, is at another provider's office for an appointment, I did speak with her briefly  She reports that her breathing has worsened since yesterday, she feels that she is wheezing  She unfortunately admits that she continues to smoke  She is requesting oral steroids  She is currently 26 weeks pregnant  No recent chest x-ray  Reports " there is something wrong with the baby",  And states that she is seeing OBGYN at this exact moment  I did advise her that I am not able to get an accurate assessment via the limited time she had available for virtual visit, nor do I have any vital signs or the ability to  Auscultate her lungs  I advised her that she will need to go to the emergency room if her symptoms have worsened  Will reach out and try to get her in to the office for an appointment  Recent covid negative    She verbalizes agreement Latonia

## 2020-09-26 ENCOUNTER — TELEPHONE (OUTPATIENT)
Dept: URGENT CARE | Facility: CLINIC | Age: 25
End: 2020-09-26

## 2020-09-26 NOTE — TELEPHONE ENCOUNTER
Called and informed patient of positive strep throat culture  Directed to continue the amoxicillin  Patient asked some general questions and share that she may be delivering her baby within the week at 27 weeks  Directed patient to ask her OBGYN about over-the-counter medication use if it was not on the list provided to her as being safe  Directed her that the amoxicillin is definitely stay for her and baby, and that she should continue taking it until completed

## 2020-11-03 ENCOUNTER — NURSE TRIAGE (OUTPATIENT)
Dept: OTHER | Facility: OTHER | Age: 25
End: 2020-11-03

## 2020-11-03 DIAGNOSIS — U07.1 COVID-19 DETERMINED BY CLINICAL DIAGNOSTIC CRITERIA: Primary | ICD-10-CM

## 2020-11-04 DIAGNOSIS — U07.1 COVID-19 DETERMINED BY CLINICAL DIAGNOSTIC CRITERIA: ICD-10-CM

## 2020-11-04 PROCEDURE — U0003 INFECTIOUS AGENT DETECTION BY NUCLEIC ACID (DNA OR RNA); SEVERE ACUTE RESPIRATORY SYNDROME CORONAVIRUS 2 (SARS-COV-2) (CORONAVIRUS DISEASE [COVID-19]), AMPLIFIED PROBE TECHNIQUE, MAKING USE OF HIGH THROUGHPUT TECHNOLOGIES AS DESCRIBED BY CMS-2020-01-R: HCPCS | Performed by: NURSE PRACTITIONER

## 2020-11-05 LAB — SARS-COV-2 RNA SPEC QL NAA+PROBE: NOT DETECTED

## 2021-01-30 NOTE — OB LABOR/OXYTOCIN SAFETY PROGRESS
Labor Progress Note - Jennifer Bass 21 y o  female MRN: [id number]    Unit/Bed#: L&D 322-01 Encounter: [id number]    Obstetric History       T1      L1     SAB0   TAB0   Ectopic0   Multiple0   Live Births1      Gestational Age: 34w0d     Contraction Frequency (minutes): 2-4  Contraction Quality: Mild  Tachysystole: No   Dilation: 1-2        Effacement (%): 30  Station: -2  Baseline Rate: 130 bpm  Fetal Heart Rate: 130 BPM             Notes/comments:      Patient reporting contractions worsening severity, unsure frequency  Exam 1-2/30/-2, intact  Patient is hungry and desires to eat  Will order regular diet  FHT's 125bpm reactive, neg decels  [place] cxtns q2-4min  Plan diet, cEFM, 2nd BTM tomorrow then IOL  D/W Dr Felicia Latif MD 10/12/2018 5:33 PM Controlled with current regime

## 2021-02-24 DIAGNOSIS — E03.8 HYPOTHYROIDISM DUE TO HASHIMOTO'S THYROIDITIS: ICD-10-CM

## 2021-02-24 DIAGNOSIS — E06.3 HYPOTHYROIDISM DUE TO HASHIMOTO'S THYROIDITIS: ICD-10-CM

## 2021-02-24 DIAGNOSIS — E06.3 HASHIMOTO'S THYROIDITIS: Primary | ICD-10-CM

## 2021-02-24 RX ORDER — LEVOTHYROXINE SODIUM 0.07 MG/1
TABLET ORAL
Qty: 90 TABLET | Refills: 0 | Status: SHIPPED | OUTPATIENT
Start: 2021-02-24 | End: 2021-06-10 | Stop reason: SDUPTHER

## 2021-02-26 ENCOUNTER — TELEPHONE (OUTPATIENT)
Dept: ADMINISTRATIVE | Facility: OTHER | Age: 26
End: 2021-02-26

## 2021-02-26 NOTE — TELEPHONE ENCOUNTER
Upon review of the In Basket request we were able to locate, review, and update the patient chart as requested for Pap Smear (HPV) aka Cervical Cancer Screening  Any additional questions or concerns should be emailed to the Practice Liaisons via [email or web address] email, please do not reply via In Basket      Thank you  Roney Sesay

## 2021-02-26 NOTE — TELEPHONE ENCOUNTER
----- Message from Taco Conte PA-C sent at 2/25/2021  1:58 PM EST -----  Regarding: Care Gap Request  02/25/21 1:58 PM    Hello, our patient [facility] has had Pap Smear (HPV) aka Cervical Cancer Screening completed/performed  Please assist in updating the patient chart by pulling the Care Everywhere (CE) document  The date of service is 1/6/2021       Thank you,  Taco Conte PA-C   [facility]

## 2021-03-17 ENCOUNTER — TELEPHONE (OUTPATIENT)
Dept: FAMILY MEDICINE CLINIC | Facility: HOME HEALTHCARE | Age: 26
End: 2021-03-17

## 2021-03-17 NOTE — TELEPHONE ENCOUNTER
Returned pt's message to reschedule her appt  that was cancelled due to provider being out of office  I offered an appt for 3/24/2021  Pt then stated her  needs an appt also I offered a later date which she was upset about and hung up on me

## 2021-04-14 NOTE — PROGRESS NOTES
Detail Level: Detailed Please refer to the [facility] ultrasound report in Ob Procedures for additional information regarding the visit to the [facility]  today  Number Of Curettages: 3 Size Of Lesion In Cm: 0.8 Size Of Lesion After Curettage: 1 Add Intralesional Injection: No Concentration (Mg/Ml Or Millions Of Plaque Forming Units/Cc): 0.01 Anesthesia Type: 1% lidocaine with epinephrine Cautery Type: electrodesiccation What Was Performed First?: Curettage Final Size Statement: The size of the lesion after curettage was Additional Information: (Optional): The wound was cleaned, and a pressure dressing was applied.  The patient received detailed post-op instructions. Consent was obtained from the patient. The risks, benefits and alternatives to therapy were discussed in detail. Specifically, the risks of infection, scarring, bleeding, prolonged wound healing, nerve injury, incomplete removal, allergy to anesthesia and recurrence were addressed. Alternatives to ED&C, such as: surgical removal and XRT were also discussed.  Prior to the procedure, the treatment site was clearly identified and confirmed by the patient. All components of Universal Protocol/PAUSE Rule completed. Post-Care Instructions: I reviewed with the patient in detail post-care instructions. Patient is to keep the area dry for 48 hours, and not to engage in any swimming until the area is healed. Should the patient develop any fevers, chills, bleeding, severe pain patient will contact the office immediately. Bill As A Line Item Or As Units: Line Item Size Of Lesion In Cm: 0.6

## 2021-05-07 ENCOUNTER — OFFICE VISIT (OUTPATIENT)
Dept: FAMILY MEDICINE CLINIC | Facility: CLINIC | Age: 26
End: 2021-05-07
Payer: COMMERCIAL

## 2021-05-07 VITALS
SYSTOLIC BLOOD PRESSURE: 112 MMHG | TEMPERATURE: 98 F | WEIGHT: 198 LBS | HEIGHT: 62 IN | HEART RATE: 95 BPM | OXYGEN SATURATION: 97 % | BODY MASS INDEX: 36.44 KG/M2 | DIASTOLIC BLOOD PRESSURE: 72 MMHG

## 2021-05-07 DIAGNOSIS — Z00.00 ANNUAL PHYSICAL EXAM: Primary | ICD-10-CM

## 2021-05-07 DIAGNOSIS — G43.909 MIGRAINE WITHOUT STATUS MIGRAINOSUS, NOT INTRACTABLE, UNSPECIFIED MIGRAINE TYPE: ICD-10-CM

## 2021-05-07 DIAGNOSIS — E03.9 ACQUIRED HYPOTHYROIDISM: ICD-10-CM

## 2021-05-07 PROBLEM — G43.009 MIGRAINE WITHOUT AURA AND WITHOUT STATUS MIGRAINOSUS, NOT INTRACTABLE: Status: RESOLVED | Noted: 2020-01-03 | Resolved: 2021-05-07

## 2021-05-07 PROCEDURE — 99395 PREV VISIT EST AGE 18-39: CPT | Performed by: FAMILY MEDICINE

## 2021-05-07 PROCEDURE — T1015 CLINIC SERVICE: HCPCS | Performed by: FAMILY MEDICINE

## 2021-05-07 RX ORDER — TOPIRAMATE 25 MG/1
50 CAPSULE, COATED PELLETS ORAL DAILY
Qty: 180 CAPSULE | Refills: 1 | Status: SHIPPED | OUTPATIENT
Start: 2021-05-07 | End: 2021-12-22 | Stop reason: SDUPTHER

## 2021-05-07 RX ORDER — LEVOTHYROXINE SODIUM 0.07 MG/1
75 TABLET ORAL DAILY
COMMUNITY
End: 2021-05-07 | Stop reason: SDUPTHER

## 2021-05-07 NOTE — PATIENT INSTRUCTIONS

## 2021-05-07 NOTE — PROGRESS NOTES
ADULT ANNUAL PHYSICAL  [address]    NAME: Lizbeth Rivera  AGE: 22 y o  SEX: female  : 1995     DATE: 2021     Assessment and Plan:     Problem List Items Addressed This Visit        Endocrine    Acquired hypothyroidism       Cardiovascular and Mediastinum    Migraine without status migrainosus, not intractable    Relevant Medications    topiramate (TOPAMAX) 25 mg sprinkle capsule      Other Visit Diagnoses     Annual physical exam    -  Primary        - Will restart Topamax for migraines as patient is now s/p tubal  - Continue levothyroxine and repeat TSH as previously ordered    Immunizations and preventive care screenings were discussed with patient today  Appropriate education was printed on patient's after visit summary  Counseling:  Alcohol/drug use: discussed moderation in alcohol intake, the recommendations for healthy alcohol use, and avoidance of illicit drug use  Dental Health: discussed importance of regular tooth brushing, flossing, and dental visits  Injury prevention: discussed safety/seat belts, safety helmets, smoke detectors, carbon dioxide detectors, and smoking near bedding or upholstery  Sexual health: discussed sexually transmitted diseases, partner selection, use of condoms, avoidance of unintended pregnancy, and contraceptive alternatives  · Exercise: the importance of regular exercise/physical activity was discussed  Recommend exercise 3-5 times per week for at least 30 minutes  Tobacco Cessation Counseling: Tobacco cessation counseling was provided  The patient is sincerely urged to quit consumption of tobacco  She is ready to quit tobacco  Medication options not discussed  Patient is currently smoking 1 ppd  She is interested in quitting and has been making some habitual changes  Return in about 6 months (around 2021) for Next scheduled follow up       Chief Complaint: Chief Complaint   Patient presents with    Physical Exam      History of Present Illness:     Adult Annual Physical   Patient here for a comprehensive physical exam  The patient reports no problems  Patient has hypothyroidism managed with levothyroxine 75 mcg daily  Last TSH from 10/2020 WNL  History of migraines, previously managed with Topamax with good control  Patient stopped this medication while pregnant  She is now s/p tubal with a 11 month old  She is not breast feeding  Would like to restart Topamax  Diet and Physical Activity  · Diet/Nutrition: well balanced diet, limited junk food and limited fruits/vegetables  · Exercise: moderate cardiovascular exercise and less than 30 minutes on average  Depression Screening  PHQ-9 Depression Screening    PHQ-9:   Frequency of the following problems over the past two weeks:      Little interest or pleasure in doing things: 0 - not at all  Feeling down, depressed, or hopeless: 0 - not at all  Trouble falling or staying asleep, or sleeping too much: 0 - not at all  Feeling tired or having little energy: 1 - several days  Poor appetite or overeatin - not at all  Feeling bad about yourself - or that you are a failure or have let yourself or your family down: 1 - several days  Trouble concentrating on things, such as reading the newspaper or watching television: 0 - not at all  Moving or speaking so slowly that other people could have noticed  Or the opposite - being so fidgety or restless that you have been moving around a lot more than usual: 0 - not at all  Thoughts that you would be better off dead, or of hurting yourself in some way: 0 - not at all  PHQ-2 Score: 0  PHQ-9 Score: 2       General Health  · Sleep: sleeps well and gets 4-6 hours of sleep on average  · Hearing: normal - bilateral   · Vision: most recent eye exam >1 year ago and wears glasses     · Dental: regular dental visits, brushes teeth twice daily and flosses teeth occasionally  /GYN Health  · Last menstrual period: 4/16/2021  · Contraceptive method: s/p tubal removal   · History of STDs?: no      Review of Systems:     Review of Systems   Constitutional: Negative for chills, diaphoresis and fever  HENT: Negative for congestion, ear pain, postnasal drip, rhinorrhea, sinus pressure and sore throat  Eyes: Negative for photophobia, pain, discharge, redness, itching and visual disturbance  Respiratory: Negative for cough, chest tightness, shortness of breath and wheezing  Cardiovascular: Negative for chest pain, palpitations and leg swelling  Gastrointestinal: Negative for abdominal pain, blood in stool, constipation, diarrhea, nausea and vomiting  Genitourinary: Negative for difficulty urinating, dysuria, frequency, hematuria and urgency  Musculoskeletal: Negative for arthralgias, back pain, gait problem, joint swelling, myalgias, neck pain and neck stiffness  Skin: Negative for rash and wound  Neurological: Negative for dizziness, syncope, weakness, light-headedness and headaches  Psychiatric/Behavioral: Negative for decreased concentration, dysphoric mood, self-injury, sleep disturbance and suicidal ideas  The patient is not nervous/anxious         Past Medical History:     Past Medical History:   Diagnosis Date    Anxiety     Asthma     Depression     Disease of thyroid gland     Migraine     Mood disorder ([facility] [address] )     Pregnant     Psychiatric disorder     depression,anxiety, mood disorder      Past Surgical History:     Past Surgical History:   Procedure Laterality Date    APPENDECTOMY      FRACTURE SURGERY Right     wrist      Social History:     E-Cigarette/Vaping    E-Cigarette Use Never User      E-Cigarette/Vaping Substances    Nicotine No     THC No     CBD No     Flavoring No     Other No     Unknown No      Social History     Socioeconomic History    Marital status: Single     Spouse name: None    Number of children: None  Years of education: None    Highest education level: None   Occupational History    None   Social Needs    Financial resource strain: None    Food insecurity     Worry: None     Inability: None    Transportation needs     Medical: None     Non-medical: None   Tobacco Use    Smoking status: Current Every Day Smoker     Packs/day: 1 00     Types: Cigarettes    Smokeless tobacco: Never Used   Substance and Sexual Activity    Alcohol use: No    Drug use: No    Sexual activity: Yes     Partners: Male   Lifestyle    Physical activity     Days per week: None     Minutes per session: None    Stress: None   Relationships    Social connections     Talks on phone: None     Gets together: None     Attends [employer] service: None     Active member of club or organization: None     Attends meetings of clubs or organizations: None     Relationship status: None    Intimate partner violence     Fear of current or ex partner: None     Emotionally abused: None     Physically abused: None     Forced sexual activity: None   Other Topics Concern    None   Social History Narrative    None      Family History:     Family History   Problem Relation Age of Onset    No Known Problems Mother     Thyroid disease Father     Hypertension Brother     Hypertension Maternal Grandmother     Thyroid disease Paternal Grandmother       Current Medications:     Current Outpatient Medications   Medication Sig Dispense Refill    albuterol (2 5 mg/3 mL) 0 083 % nebulizer solution Inhale 2 5 mg every 4 (four) hours as needed      albuterol (PROVENTIL HFA) 90 mcg/act inhaler Inhale 2 puffs every 6 (six) hours as needed for wheezing 6 7 g 0    levothyroxine 75 mcg tablet take 1 tablet by mouth once daily before breakfast 90 tablet 0    loratadine (CLARITIN) 10 mg tablet Take 1 tablet (10 mg total) by mouth daily 30 tablet 0    topiramate (TOPAMAX) 25 mg sprinkle capsule Take 2 capsules (50 mg total) by mouth daily 180 capsule 1 No current facility-administered medications for this visit  Allergies: Allergies   Allergen Reactions    Aripiprazole Other (See Comments), Seizures and Confusion     hallucinaTIONS  hallucination  Severe "fight or flight"; "no control of body"    Tessalon [Benzonatate] Anxiety     Other reaction(s): Psych complications  hallucinations  Other reaction(s): Psych complications  hallucinations    Zithromax [Azithromycin] Rash, Hallucinations and Hives      Physical Exam:     /72   Pulse 95   Temp 98 °F (36 7 °C)   Ht 5' 2" (1 575 m)   Wt 89 8 kg (198 lb)   LMP 04/16/2021 (Approximate)   SpO2 97%   Breastfeeding No   BMI 36 21 kg/m²     Physical Exam  Vitals signs and nursing note reviewed  Constitutional:       General: She is not in acute distress  Appearance: Normal appearance  HENT:      Head: Normocephalic and atraumatic  Right Ear: Tympanic membrane, ear canal and external ear normal       Left Ear: Tympanic membrane, ear canal and external ear normal       Nose: Nose normal       Mouth/Throat:      Mouth: Mucous membranes are moist       Pharynx: Oropharynx is clear  No oropharyngeal exudate  Eyes:      Extraocular Movements: Extraocular movements intact  Conjunctiva/sclera: Conjunctivae normal       Pupils: Pupils are equal, round, and reactive to light  Neck:      Musculoskeletal: Normal range of motion and neck supple  Cardiovascular:      Rate and Rhythm: Normal rate and regular rhythm  Heart sounds: Normal heart sounds  No murmur  Pulmonary:      Effort: Pulmonary effort is normal  No respiratory distress  Breath sounds: Normal breath sounds  No wheezing  Musculoskeletal: Normal range of motion  Right lower leg: No edema  Left lower leg: No edema  Lymphadenopathy:      Cervical: No cervical adenopathy  Skin:     General: Skin is warm and dry  Neurological:      General: No focal deficit present        Mental Status: She is alert and oriented to person, place, and time  Cranial Nerves: No cranial nerve deficit  Motor: No weakness     Psychiatric:         Mood and Affect: Mood normal          Behavior: Behavior normal           Warner Merlin, [facility]

## 2021-05-10 ENCOUNTER — APPOINTMENT (OUTPATIENT)
Dept: LAB | Facility: HOSPITAL | Age: 26
End: 2021-05-10
Payer: COMMERCIAL

## 2021-05-10 DIAGNOSIS — E06.3 HASHIMOTO'S THYROIDITIS: ICD-10-CM

## 2021-05-10 LAB
T4 FREE SERPL-MCNC: 1.07 NG/DL (ref 0.76–1.46)
TSH SERPL DL<=0.05 MIU/L-ACNC: 4.65 UIU/ML (ref 0.36–3.74)

## 2021-05-10 PROCEDURE — 36415 COLL VENOUS BLD VENIPUNCTURE: CPT

## 2021-05-10 PROCEDURE — 84439 ASSAY OF FREE THYROXINE: CPT

## 2021-05-10 PROCEDURE — 84443 ASSAY THYROID STIM HORMONE: CPT

## 2021-05-13 ENCOUNTER — TELEPHONE (OUTPATIENT)
Dept: FAMILY MEDICINE CLINIC | Facility: HOME HEALTHCARE | Age: 26
End: 2021-05-13

## 2021-05-13 NOTE — TELEPHONE ENCOUNTER
Please make patient aware her TSH was slightly elevated with normal t4   Recommend continuing current levothyroxine dose and recheck in 4-6 weeks   Ensure that she is taking this in the morning on an empty stomach and not with other medications

## 2021-05-13 NOTE — TELEPHONE ENCOUNTER
Patient called about her recent lab results  Can you please review for her thank you  She is not sure if she is to have a med change or not   Please advise

## 2021-05-13 NOTE — TELEPHONE ENCOUNTER
Attempted to call patient, left VM to call office  ----- Message from Melinda Aparicio PA-C sent at 5/13/2021 11:00 AM EDT -----  Please make patient aware her TSH was slightly elevated with normal t4  Recommend continuing current levothyroxine dose and recheck in 4-6 weeks  Ensure that she is taking this in the morning on an empty stomach and not with other medications

## 2021-05-14 ENCOUNTER — IMMUNIZATIONS (OUTPATIENT)
Dept: FAMILY MEDICINE CLINIC | Facility: HOSPITAL | Age: 26
End: 2021-05-14

## 2021-05-14 DIAGNOSIS — Z23 ENCOUNTER FOR IMMUNIZATION: Primary | ICD-10-CM

## 2021-05-14 PROCEDURE — 91301 SARS-COV-2 / COVID-19 MRNA VACCINE (MODERNA) 100 MCG: CPT

## 2021-05-14 PROCEDURE — 0011A SARS-COV-2 / COVID-19 MRNA VACCINE (MODERNA) 100 MCG: CPT

## 2021-05-18 ENCOUNTER — HOSPITAL ENCOUNTER (EMERGENCY)
Facility: HOSPITAL | Age: 26
Discharge: HOME | End: 2021-05-18
Attending: EMERGENCY MEDICINE
Payer: COMMERCIAL

## 2021-05-18 ENCOUNTER — APPOINTMENT (EMERGENCY)
Dept: RADIOLOGY | Facility: HOSPITAL | Age: 26
End: 2021-05-18
Payer: COMMERCIAL

## 2021-05-18 VITALS
TEMPERATURE: 97.6 F | WEIGHT: 198 LBS | HEART RATE: 66 BPM | SYSTOLIC BLOOD PRESSURE: 107 MMHG | BODY MASS INDEX: 36.21 KG/M2 | RESPIRATION RATE: 18 BRPM | OXYGEN SATURATION: 96 % | DIASTOLIC BLOOD PRESSURE: 62 MMHG

## 2021-05-18 DIAGNOSIS — M79.672 LEFT FOOT PAIN: Primary | ICD-10-CM

## 2021-05-18 DIAGNOSIS — S93.602A SPRAIN OF LEFT FOOT, INITIAL ENCOUNTER: ICD-10-CM

## 2021-05-18 PROCEDURE — 73630 X-RAY EXAM OF FOOT: CPT

## 2021-05-18 PROCEDURE — 99284 EMERGENCY DEPT VISIT MOD MDM: CPT | Performed by: EMERGENCY MEDICINE

## 2021-05-18 PROCEDURE — 99283 EMERGENCY DEPT VISIT LOW MDM: CPT

## 2021-05-18 RX ORDER — ACETAMINOPHEN 325 MG/1
975 TABLET ORAL ONCE
Status: COMPLETED | OUTPATIENT
Start: 2021-05-18 | End: 2021-05-18

## 2021-05-18 RX ORDER — IBUPROFEN 400 MG/1
400 TABLET ORAL ONCE
Status: COMPLETED | OUTPATIENT
Start: 2021-05-18 | End: 2021-05-18

## 2021-05-18 RX ADMIN — ACETAMINOPHEN 975 MG: 325 TABLET ORAL at 19:03

## 2021-05-18 RX ADMIN — IBUPROFEN 400 MG: 400 TABLET ORAL at 19:04

## 2021-05-18 NOTE — ED PROVIDER NOTES
History  Chief Complaint   Patient presents with    Foot Pain     twisted left foot 2 weeks ago having pain and swelling since         77-year-old female with no pertinent past medical history who is presenting with left foot pain  Patient reports that approximately 2 weeks ago, she was running and tripped on a curb    Since that time, she has had pain in her left foot  Over the past 3 days, the pain has been getting worse  The patient has been intermittently taking ibuprofen and Tylenol with some relief of her pain  The pain is primarily located at the lateral aspect of the left foot with radiation into the left heel  When the patient attempts to bear weight, the pain radiates proximally into her left lower leg  The pain is worse with weight-bearing  No numbness or tingling  Patient denies any other injuries  No other complaints on review of systems  Prior to Admission Medications   Prescriptions Last Dose Informant Patient Reported? Taking?    albuterol (2 5 mg/3 mL) 0 083 % nebulizer solution   Yes No   Sig: Inhale 2 5 mg every 4 (four) hours as needed   albuterol (PROVENTIL HFA) 90 mcg/act inhaler   No No   Sig: Inhale 2 puffs every 6 (six) hours as needed for wheezing   levothyroxine 75 mcg tablet 5/17/2021 at Unknown time  No Yes   Sig: take 1 tablet by mouth once daily before breakfast   loratadine (CLARITIN) 10 mg tablet   No No   Sig: Take 1 tablet (10 mg total) by mouth daily   topiramate (TOPAMAX) 25 mg sprinkle capsule 5/17/2021 at Unknown time  No Yes   Sig: Take 2 capsules (50 mg total) by mouth daily      Facility-Administered Medications: None       Past Medical History:   Diagnosis Date    Anxiety     Asthma     Depression     Disease of thyroid gland     Migraine     Mood disorder ([facility] [address] )     Pregnant     Psychiatric disorder     depression,anxiety, mood disorder       Past Surgical History:   Procedure Laterality Date    APPENDECTOMY      FRACTURE SURGERY Right     wrist Family History   Problem Relation Age of Onset    No Known Problems Mother     Thyroid disease Father     Hypertension Brother     Hypertension Maternal Grandmother     Thyroid disease Paternal Grandmother      I have reviewed and agree with the history as documented  E-Cigarette/Vaping    E-Cigarette Use Never User      E-Cigarette/Vaping Substances    Nicotine No     THC No     CBD No     Flavoring No     Other No     Unknown No      Social History     Tobacco Use    Smoking status: Current Every Day Smoker     Packs/day: 1 00     Types: Cigarettes    Smokeless tobacco: Never Used   Substance Use Topics    Alcohol use: No    Drug use: No       Review of Systems   Constitutional: Negative for diaphoresis, fever and unexpected weight change  HENT: Negative for congestion, rhinorrhea and sore throat  Eyes: Negative for pain, discharge and visual disturbance  Respiratory: Negative for cough, shortness of breath and wheezing  Cardiovascular: Negative for chest pain, palpitations and leg swelling  Gastrointestinal: Negative for abdominal pain, blood in stool, constipation, diarrhea, nausea and vomiting  Genitourinary: Negative for dysuria, flank pain and hematuria  Musculoskeletal: Positive for arthralgias (left foot)  Negative for joint swelling  Skin: Negative for rash and wound  Allergic/Immunologic: Negative for environmental allergies and food allergies  Neurological: Negative for dizziness, seizures, weakness and numbness  Hematological: Negative for adenopathy  Psychiatric/Behavioral: Negative for confusion and hallucinations  Physical Exam  Physical Exam  Vitals signs and nursing note reviewed  Constitutional:       General: She is not in acute distress  Appearance: She is well-developed  HENT:      Head: Normocephalic and atraumatic        Right Ear: External ear normal       Left Ear: External ear normal    Eyes:      Conjunctiva/sclera: Conjunctivae normal       Pupils: Pupils are equal, round, and reactive to light  Cardiovascular:      Rate and Rhythm: Normal rate and regular rhythm  Pulmonary:      Effort: Pulmonary effort is normal  No respiratory distress  Musculoskeletal: Normal range of motion  General: Tenderness present  No swelling or deformity  Comments: No appreciable swelling of the left foot  DP pulse is palpable  Capillary refill under 2 seconds  Sensation intact  Patient has tenderness to palpation of the lateral aspect of her left foot, overlying the 5th metatarsal   She has some tenderness of the calcaneus as well  No medial or lateral malleolar tenderness  No tenderness anterior ankle line  No tenderness to palpation of the lower leg or knee  Skin:     General: Skin is warm and dry  Capillary Refill: Capillary refill takes less than 2 seconds  Neurological:      Mental Status: She is alert and oriented to person, place, and time  Comments: No gross motor deficits noted  Cranial nerves II-XII are intact  Speech is normal, without dysarthria or aphasia     Psychiatric:         Mood and Affect: Mood normal          Behavior: Behavior normal          Vital Signs  ED Triage Vitals [05/18/21 1841]   Temperature Pulse Respirations Blood Pressure SpO2   97 6 °F (36 4 °C) 66 18 107/62 96 %      Temp Source Heart Rate Source Patient Position - Orthostatic VS BP Location FiO2 (%)   Temporal Monitor Sitting Right arm --      Pain Score       8           Vitals:    05/18/21 1841   BP: 107/62   Pulse: 66   Patient Position - Orthostatic VS: Sitting         Visual Acuity      ED Medications  Medications   acetaminophen (TYLENOL) tablet 975 mg (975 mg Oral Given 5/18/21 1903)   ibuprofen (MOTRIN) tablet 400 mg (400 mg Oral Given 5/18/21 1904)       Diagnostic Studies  Results Reviewed     None                 XR foot 3+ views LEFT   ED Interpretation by Abeba Herrera MD (05/18 1922)   X-ray interpreted by me  Formal Radiology interpretation to follow  No fracture or dislocation  Normal alignment of the tarsal bones and metatarsals  Procedures  Procedures         ED Course  ED Course as of May 18 1937   Tue May 18, 2021   1929 Updated patient on x-ray results  I informed her that no fracture, dislocation, or subluxation was seen on the x-ray  Recommended continued conservative treatment at home  SBIRT 22yo+      Most Recent Value   SBIRT (22 yo +)   In order to provide better care to our patients, we are screening all of our patients for alcohol and drug use  Would it be okay to ask you these screening questions? Yes Filed at: 05/18/2021 1905   Initial Alcohol Screen: US AUDIT-C    1  How often do you have a drink containing alcohol?  0 Filed at: 05/18/2021 1905   2  How many drinks containing alcohol do you have on a typical day you are drinking? 0 Filed at: 05/18/2021 1905   3a  Male UNDER 65: How often do you have five or more drinks on one occasion? 0 Filed at: 05/18/2021 1905   3b  FEMALE Any Age, or MALE 65+: How often do you have 4 or more drinks on one occassion? 0 Filed at: 05/18/2021 1905   Audit-C Score  0 Filed at: 05/18/2021 1905   ELIZABETH: How many times in the past year have you    Used an illegal drug or used a prescription medication for non-medical reasons? Never Filed at: 05/18/2021 1905                    MDM  Number of Diagnoses or Management Options  Left foot pain: established and worsening  Sprain of left foot, initial encounter: established and worsening  Diagnosis management comments:     Physical examination demonstrated tenderness to palpation of the left lateral foot and left heel  No swelling, ecchymosis, or deformity seen  Left foot neurovascularly intact  X-ray did not demonstrate any abnormalities on my reading  The patient was instructed to continue with conservative treatment at home    Recommended PCP follow-up as needed  Return precautions were provided  Patient verbalized understanding of instructions  Amount and/or Complexity of Data Reviewed  Tests in the radiology section of CPT®: ordered and reviewed  Decide to obtain previous medical records or to obtain history from someone other than the patient: yes  Review and summarize past medical records: yes  Independent visualization of images, tracings, or specimens: yes    Risk of Complications, Morbidity, and/or Mortality  Presenting problems: low  Diagnostic procedures: minimal  Management options: minimal    Patient Progress  Patient progress: improved      Disposition  Final diagnoses:   Left foot pain   Sprain of left foot, initial encounter     Time reflects when diagnosis was documented in both MDM as applicable and the Disposition within this note     Time User Action Codes Description Comment    5/18/2021  7:30 PM Kyra Levi [M97 [id number]] Left foot pain     5/18/2021  7:30 PM Kyra Levi [S93 602A] Sprain of left foot, initial encounter       ED Disposition     ED Disposition Condition Date/Time Comment    Discharge Good Tue May 18, 2021  7:30 PM Roxann Bautista discharge to home/self care  Follow-up Information     Follow up With Specialties Details Why Contact Info Additional [address], CRNP Nurse Practitioner Call  As needed  [facility] Emergency Department Emergency Medicine Go to  If symptoms worsen  [facility] [id number] [address] [facility] Emergency Department, [address], [address]          Patient's Medications   Discharge Prescriptions    No medications on file     No discharge procedures on file      PDMP Review       Value Time User    PDMP Reviewed  Yes 2/5/2020  1:11 PM Refugio Mckeon PA-C          ED Provider  Electronically Signed by           Samantha Hampton MD  05/18/21 0546

## 2021-05-18 NOTE — TELEPHONE ENCOUNTER
I called and spoke with patient, she was already made aware of JESSICA recommendations   Patient was pleasant and appreciative of the call

## 2021-05-18 NOTE — DISCHARGE INSTRUCTIONS
As we discussed, your x-rays were normal on my reading  The Radiologist will read the x-rays and if there are any discrepancies in the interpretations, we will call you  Your pain is likely from a sprain in the foot  Take Tylenol 1000 mg every 6-8 hours  If it is safe for you to take NSAIDs, take ibuprofen 400 mg every 6 hours in addition to Tylenol  You may take naproxen instead of ibuprofen but do not take both at the same time  In general, you should not take NSAIDs if you have chronic kidney disease, cardiac disease, history of GI bleeding, previous bariatric surgery, or history of intracranial hemorrhage  Keep your foot elevated when possible  Apply ice for 20 minutes at a time  Follow-up with your PCP as needed  Return to the ER with new numbness, tingling, or weakness of your left foot  Return if your foot becomes swollen, red/warm to the touch, or if you develop any other concerning symptoms

## 2021-06-10 ENCOUNTER — APPOINTMENT (OUTPATIENT)
Dept: LAB | Facility: HOSPITAL | Age: 26
End: 2021-06-10
Payer: COMMERCIAL

## 2021-06-10 ENCOUNTER — IMMUNIZATIONS (OUTPATIENT)
Dept: FAMILY MEDICINE CLINIC | Facility: HOSPITAL | Age: 26
End: 2021-06-10

## 2021-06-10 DIAGNOSIS — E06.3 HASHIMOTO'S THYROIDITIS: Primary | ICD-10-CM

## 2021-06-10 DIAGNOSIS — E06.3 HASHIMOTO'S THYROIDITIS: ICD-10-CM

## 2021-06-10 DIAGNOSIS — D72.829 LEUKOCYTOSIS, UNSPECIFIED TYPE: ICD-10-CM

## 2021-06-10 DIAGNOSIS — Z23 ENCOUNTER FOR IMMUNIZATION: Primary | ICD-10-CM

## 2021-06-10 DIAGNOSIS — E06.3 HYPOTHYROIDISM DUE TO HASHIMOTO'S THYROIDITIS: ICD-10-CM

## 2021-06-10 DIAGNOSIS — E03.8 HYPOTHYROIDISM DUE TO HASHIMOTO'S THYROIDITIS: ICD-10-CM

## 2021-06-10 LAB
BASOPHILS # BLD AUTO: 0.09 THOUSANDS/ΜL (ref 0–0.1)
BASOPHILS NFR BLD AUTO: 1 % (ref 0–1)
EOSINOPHIL # BLD AUTO: 0.16 THOUSAND/ΜL (ref 0–0.61)
EOSINOPHIL NFR BLD AUTO: 2 % (ref 0–6)
ERYTHROCYTE [DISTWIDTH] IN BLOOD BY AUTOMATED COUNT: 13.8 % (ref 11.6–15.1)
HCT VFR BLD AUTO: 41.9 % (ref 34.8–46.1)
HGB BLD-MCNC: 13.7 G/DL (ref 11.5–15.4)
IMM GRANULOCYTES # BLD AUTO: 0.02 THOUSAND/UL (ref 0–0.2)
IMM GRANULOCYTES NFR BLD AUTO: 0 % (ref 0–2)
LYMPHOCYTES # BLD AUTO: 2.27 THOUSANDS/ΜL (ref 0.6–4.47)
LYMPHOCYTES NFR BLD AUTO: 28 % (ref 14–44)
MCH RBC QN AUTO: 27.3 PG (ref 26.8–34.3)
MCHC RBC AUTO-ENTMCNC: 32.7 G/DL (ref 31.4–37.4)
MCV RBC AUTO: 84 FL (ref 82–98)
MONOCYTES # BLD AUTO: 0.73 THOUSAND/ΜL (ref 0.17–1.22)
MONOCYTES NFR BLD AUTO: 9 % (ref 4–12)
NEUTROPHILS # BLD AUTO: 4.96 THOUSANDS/ΜL (ref 1.85–7.62)
NEUTS SEG NFR BLD AUTO: 60 % (ref 43–75)
NRBC BLD AUTO-RTO: 0 /100 WBCS
PLATELET # BLD AUTO: 380 THOUSANDS/UL (ref 149–390)
PMV BLD AUTO: 9.5 FL (ref 8.9–12.7)
RBC # BLD AUTO: 5.02 MILLION/UL (ref 3.81–5.12)
T4 FREE SERPL-MCNC: 1 NG/DL (ref 0.76–1.46)
T4 FREE SERPL-MCNC: 1.01 NG/DL (ref 0.76–1.46)
TSH SERPL DL<=0.05 MIU/L-ACNC: 5.26 UIU/ML (ref 0.36–3.74)
WBC # BLD AUTO: 8.23 THOUSAND/UL (ref 4.31–10.16)

## 2021-06-10 PROCEDURE — 85025 COMPLETE CBC W/AUTO DIFF WBC: CPT

## 2021-06-10 PROCEDURE — 91301 SARS-COV-2 / COVID-19 MRNA VACCINE (MODERNA) 100 MCG: CPT

## 2021-06-10 PROCEDURE — 84443 ASSAY THYROID STIM HORMONE: CPT

## 2021-06-10 PROCEDURE — 0012A SARS-COV-2 / COVID-19 MRNA VACCINE (MODERNA) 100 MCG: CPT

## 2021-06-10 PROCEDURE — 36415 COLL VENOUS BLD VENIPUNCTURE: CPT

## 2021-06-10 PROCEDURE — 84439 ASSAY OF FREE THYROXINE: CPT

## 2021-06-10 RX ORDER — LEVOTHYROXINE SODIUM 88 UG/1
88 TABLET ORAL
Qty: 30 TABLET | Refills: 2 | Status: SHIPPED | OUTPATIENT
Start: 2021-06-10 | End: 2021-08-30 | Stop reason: SDUPTHER

## 2021-06-10 NOTE — PROGRESS NOTES
Pt called office stating BW orders are not in for her repeat BW  Provider plan confirmed in last result note made by Jose Ying PA-C  Orders placed based on providers written plan as pt is at lab now for this BW

## 2021-08-30 ENCOUNTER — OFFICE VISIT (OUTPATIENT)
Dept: FAMILY MEDICINE CLINIC | Facility: HOME HEALTHCARE | Age: 26
End: 2021-08-30
Payer: COMMERCIAL

## 2021-08-30 VITALS
SYSTOLIC BLOOD PRESSURE: 118 MMHG | HEART RATE: 80 BPM | OXYGEN SATURATION: 98 % | RESPIRATION RATE: 18 BRPM | BODY MASS INDEX: 36.47 KG/M2 | DIASTOLIC BLOOD PRESSURE: 60 MMHG | WEIGHT: 198.2 LBS | TEMPERATURE: 98.7 F | HEIGHT: 62 IN

## 2021-08-30 DIAGNOSIS — G43.909 MIGRAINE WITHOUT STATUS MIGRAINOSUS, NOT INTRACTABLE, UNSPECIFIED MIGRAINE TYPE: ICD-10-CM

## 2021-08-30 DIAGNOSIS — J45.40 MODERATE PERSISTENT ASTHMA WITHOUT COMPLICATION: Primary | ICD-10-CM

## 2021-08-30 DIAGNOSIS — E03.8 HYPOTHYROIDISM DUE TO HASHIMOTO'S THYROIDITIS: ICD-10-CM

## 2021-08-30 DIAGNOSIS — Z28.21 HUMAN PAPILLOMA VIRUS (HPV) VACCINATION DECLINED: ICD-10-CM

## 2021-08-30 DIAGNOSIS — E06.3 HYPOTHYROIDISM DUE TO HASHIMOTO'S THYROIDITIS: ICD-10-CM

## 2021-08-30 PROCEDURE — T1015 CLINIC SERVICE: HCPCS | Performed by: FAMILY MEDICINE

## 2021-08-30 PROCEDURE — 99213 OFFICE O/P EST LOW 20 MIN: CPT | Performed by: FAMILY MEDICINE

## 2021-08-30 RX ORDER — LEVOTHYROXINE SODIUM 88 UG/1
88 TABLET ORAL
Qty: 30 TABLET | Refills: 5 | Status: SHIPPED | OUTPATIENT
Start: 2021-08-30 | End: 2022-06-01

## 2021-08-30 RX ORDER — BUDESONIDE AND FORMOTEROL FUMARATE DIHYDRATE 160; 4.5 UG/1; UG/1
2 AEROSOL RESPIRATORY (INHALATION) 2 TIMES DAILY
Qty: 10.2 G | Refills: 5 | Status: SHIPPED | OUTPATIENT
Start: 2021-08-30 | End: 2022-08-07 | Stop reason: ALTCHOICE

## 2021-08-30 NOTE — PROGRESS NOTES
[address]       NAME: Anali Bee is a 32 y o  female  : 1995    MRN: [id number]  DATE: 2021  TIME: 12:24 PM    Assessment and Plan   Diagnoses and all orders for this visit:    Moderate persistent asthma without complication  -     budesonide-formoterol (SYMBICORT) 160-4 5 mcg/act inhaler; Inhale 2 puffs 2 (two) times a day Rinse mouth after use  Hypothyroidism due to Hashimoto's thyroiditis  -     levothyroxine 88 mcg tablet; Take 1 tablet (88 mcg total) by mouth daily in the early morning  -     TSH, 3rd generation with Free T4 reflex; Future    Migraine without status migrainosus, not intractable, unspecified migraine type    BMI 36 0-36 9,adult    Human papilloma virus (HPV) vaccination declined        No problem-specific Assessment & Plan notes found for this encounter  plan     Patient levothyroxine increased to 88 mcg in  or July timeframe reviewed will recheck level in 1 month  Patient still some persistent asthma symptoms with a was daily wheezing and cough  States was on Advair in the past however did not like the dry powder  Will start Symbicort 160 2 inhalations b i d  patient will rinse her mouth  Patient declined HPV vaccine  Patient states migraines are well controlled on Topamax  Next annual visit due May 2022        Chief Complaint     Chief Complaint   Patient presents with    Annual Exam         History of Present Illness       HPI   66-year-old female presents today for routine follow-up visit  Patient states her migraines are well controlled on Topamax  Patient still have persistent asthma symptoms with cough and wheezing  Patient was started on increased dose of levothyroxine around  or July  Will recheck TSH level  Review of Systems   Review of Systems   Constitutional: Negative  HENT: Negative  Eyes: Negative  Respiratory: Positive for cough and wheezing  Cardiovascular: Negative      Gastrointestinal: Negative  Genitourinary: Negative  Skin: Negative for rash  Neurological: Negative  Hematological: Negative  Psychiatric/Behavioral: Negative  All other systems reviewed and are negative          Current Medications       Current Outpatient Medications:     albuterol (2 5 mg/3 mL) 0 083 % nebulizer solution, Inhale 2 5 mg every 4 (four) hours as needed, Disp: , Rfl:     albuterol (PROVENTIL HFA) 90 mcg/act inhaler, Inhale 2 puffs every 6 (six) hours as needed for wheezing, Disp: 6 7 g, Rfl: 0    levothyroxine 88 mcg tablet, Take 1 tablet (88 mcg total) by mouth daily in the early morning, Disp: 30 tablet, Rfl: 5    loratadine (CLARITIN) 10 mg tablet, Take 1 tablet (10 mg total) by mouth daily, Disp: 30 tablet, Rfl: 0    topiramate (TOPAMAX) 25 mg sprinkle capsule, Take 2 capsules (50 mg total) by mouth daily, Disp: 180 capsule, Rfl: 1    budesonide-formoterol (SYMBICORT) 160-4 5 mcg/act inhaler, Inhale 2 puffs 2 (two) times a day Rinse mouth after use , Disp: 10 2 g, Rfl: 5    Current Allergies     Allergies as of 08/30/2021 - Reviewed 08/30/2021   Allergen Reaction Noted    Aripiprazole Other (See Comments), Confusion, and Seizures 12/16/2015    Tessalon [benzonatate] Anxiety and Other (See Comments) 04/01/2016    Zithromax [azithromycin] Rash, Hallucinations, and Hives 12/16/2015            The following portions of the patient's history were reviewed and updated as appropriate: allergies, current medications, past family history, past medical history, past social history, past surgical history and problem list      Past Medical History:   Diagnosis Date    Anxiety     Asthma     Depression     Disease of thyroid gland     Migraine     Mood disorder ([facility] [address] )     Pregnant     Psychiatric disorder     depression,anxiety, mood disorder       Past Surgical History:   Procedure Laterality Date    APPENDECTOMY      FRACTURE SURGERY Right     wrist       Family History   Problem Relation Age of Onset    No Known Problems Mother     Thyroid disease Father     Hypertension Brother     Hypertension Maternal Grandmother     Thyroid disease Paternal Grandmother          Medications have been verified  Objective   /60   Pulse 80   Temp 98 7 °F (37 1 °C) (Tympanic)   Resp 18   Ht 5' 2" (1 575 m)   Wt 89 9 kg (198 lb 3 2 oz)   SpO2 98%   BMI 36 25 kg/m²        Physical Exam     Physical Exam  Vitals and nursing note reviewed  Constitutional:       General: She is not in acute distress  Appearance: She is well-developed  She is obese  She is not ill-appearing, toxic-appearing or diaphoretic  HENT:      Head: Normocephalic and atraumatic  Nose: Nose normal       Mouth/Throat:      Mouth: Mucous membranes are moist       Pharynx: Oropharynx is clear  No oropharyngeal exudate  Eyes:      General: No scleral icterus  Conjunctiva/sclera: Conjunctivae normal       Pupils: Pupils are equal, round, and reactive to light  Neck:      Trachea: No tracheal deviation  Cardiovascular:      Rate and Rhythm: Normal rate and regular rhythm  Pulses: Normal pulses  Heart sounds: Normal heart sounds  No murmur heard  Pulmonary:      Effort: Pulmonary effort is normal  No respiratory distress  Breath sounds: Normal breath sounds  No wheezing or rales  Abdominal:      General: Bowel sounds are normal       Palpations: Abdomen is soft  Tenderness: There is no abdominal tenderness  Musculoskeletal:         General: No tenderness  Cervical back: Normal range of motion and neck supple  Right lower leg: No edema  Left lower leg: No edema  Lymphadenopathy:      Cervical: No cervical adenopathy  Skin:     General: Skin is warm and dry  Capillary Refill: Capillary refill takes less than 2 seconds  Findings: No rash  Neurological:      General: No focal deficit present        Mental Status: She is alert and oriented to person, place, and time  Motor: No abnormal muscle tone  Coordination: Coordination normal    Psychiatric:         Mood and Affect: Mood normal        BMI Counseling: Body mass index is 36 25 kg/m²  The BMI is above normal  Nutrition recommendations include reducing portion sizes, decreasing overall calorie intake, 3-5 servings of fruits/vegetables daily, reducing fast food intake, consuming healthier snacks, decreasing soda and/or juice intake, moderation in carbohydrate intake, increasing intake of lean protein, reducing intake of saturated fat and trans fat and reducing intake of cholesterol  Exercise recommendations include exercising 3-5 times per week and strength training exercises

## 2021-09-14 ENCOUNTER — TELEPHONE (OUTPATIENT)
Dept: FAMILY MEDICINE CLINIC | Facility: CLINIC | Age: 26
End: 2021-09-14

## 2021-09-14 NOTE — TELEPHONE ENCOUNTER
Pt called stating her son was exposed to covid this past Friday 9/10, I explained to her that even though her son was tested on 9/13 the test may be inaccurate due to the fact that she should wait 5 days past exposure to get tested  I also explained that due to needing to quarantine for 7 days after exposure she should cancel her dental appts for tomorrow   Pt expressed understanding

## 2021-09-30 ENCOUNTER — OFFICE VISIT (OUTPATIENT)
Dept: DENTISTRY | Facility: CLINIC | Age: 26
End: 2021-09-30
Payer: COMMERCIAL

## 2021-09-30 DIAGNOSIS — K02.9 TOOTH DECAY: Primary | ICD-10-CM

## 2021-09-30 PROCEDURE — D2391 RESIN-BASED COMPOSITE - 1 SURFACE, POSTERIOR: HCPCS | Performed by: STUDENT IN AN ORGANIZED HEALTH CARE EDUCATION/TRAINING PROGRAM

## 2021-09-30 PROCEDURE — D2335 RESIN-BASED COMPOSITE - 4 OR MORE SURFACES OR INVOLVING INCISAL ANGLE (ANTERIOR): HCPCS | Performed by: STUDENT IN AN ORGANIZED HEALTH CARE EDUCATION/TRAINING PROGRAM

## 2021-09-30 NOTE — PROGRESS NOTES
Composite Filling    [facility] Junious Lines presents for composite filling  PMH reviewed, no changes  Discussed with patient need for RCT if pulp exposure occurs or in future if pulp is inflamed  Pt understands and consents  Applied topical benzocaine, administered 1 carps 2% lido 1:100k epi via ianb and long buccal  and 1/2 carps 4% articaine 1:100k epi via infiltration    Prepped tooth #18, 19, 20, 22 with 556 carbide on high speed  Caries removed with round carbide on slow speed  Caries deep on #19, limelight placed on deepest part of cavity prep and cured  Placed clear matrix  Isolation with cotton rolls and dri-angles    Etch with 37% H2PO4, rinse, dry  Applied Adhese with 20 second scrub once, gentle air dry and light cured for 10s  Restored with Tetric bulk ailin shade A2 and flowable resin shade universal and light cured  Refined with finishing burs, polished with enhance point  Verified occlusion and contacts  Pt left satisfied  NV: Resins      Spoke to pt on the phone  Pt is currently using colgate that contains fluoride and has no adverse reactions from it  Told patient I will override the "allergy" warning and to discontinue toothpaste if she feels unwell  PT agreed and understood all information presented

## 2021-11-22 ENCOUNTER — OFFICE VISIT (OUTPATIENT)
Dept: FAMILY MEDICINE CLINIC | Facility: HOME HEALTHCARE | Age: 26
End: 2021-11-22
Payer: COMMERCIAL

## 2021-11-22 VITALS
HEART RATE: 98 BPM | HEIGHT: 62 IN | DIASTOLIC BLOOD PRESSURE: 62 MMHG | OXYGEN SATURATION: 99 % | BODY MASS INDEX: 36.95 KG/M2 | SYSTOLIC BLOOD PRESSURE: 112 MMHG | WEIGHT: 200.8 LBS | RESPIRATION RATE: 16 BRPM | TEMPERATURE: 97.6 F

## 2021-11-22 DIAGNOSIS — F17.200 TOBACCO USE DISORDER: ICD-10-CM

## 2021-11-22 DIAGNOSIS — J31.0 RHINITIS, UNSPECIFIED TYPE: Primary | ICD-10-CM

## 2021-11-22 DIAGNOSIS — J02.9 SORE THROAT: ICD-10-CM

## 2021-11-22 DIAGNOSIS — E03.9 ACQUIRED HYPOTHYROIDISM: ICD-10-CM

## 2021-11-22 PROCEDURE — 87880 STREP A ASSAY W/OPTIC: CPT | Performed by: FAMILY MEDICINE

## 2021-11-22 PROCEDURE — T1015 CLINIC SERVICE: HCPCS | Performed by: FAMILY MEDICINE

## 2021-11-22 PROCEDURE — 99213 OFFICE O/P EST LOW 20 MIN: CPT | Performed by: FAMILY MEDICINE

## 2021-11-22 RX ORDER — FLUTICASONE PROPIONATE 50 MCG
2 SPRAY, SUSPENSION (ML) NASAL DAILY
Qty: 11.1 ML | Refills: 0 | Status: SHIPPED | OUTPATIENT
Start: 2021-11-22

## 2021-11-29 LAB — S PYO AG THROAT QL: NEGATIVE

## 2021-12-21 ENCOUNTER — OFFICE VISIT (OUTPATIENT)
Dept: URGENT CARE | Facility: CLINIC | Age: 26
End: 2021-12-21
Payer: COMMERCIAL

## 2021-12-21 VITALS — HEART RATE: 90 BPM | TEMPERATURE: 97.8 F | RESPIRATION RATE: 18 BRPM | OXYGEN SATURATION: 98 %

## 2021-12-21 DIAGNOSIS — J06.9 ACUTE URI: Primary | ICD-10-CM

## 2021-12-21 PROCEDURE — 99213 OFFICE O/P EST LOW 20 MIN: CPT

## 2021-12-21 PROCEDURE — 87636 SARSCOV2 & INF A&B AMP PRB: CPT

## 2021-12-22 DIAGNOSIS — G43.909 MIGRAINE WITHOUT STATUS MIGRAINOSUS, NOT INTRACTABLE, UNSPECIFIED MIGRAINE TYPE: ICD-10-CM

## 2021-12-22 RX ORDER — TOPIRAMATE 25 MG/1
50 CAPSULE, COATED PELLETS ORAL DAILY
Qty: 180 CAPSULE | Refills: 1 | Status: SHIPPED | OUTPATIENT
Start: 2021-12-22

## 2021-12-23 LAB
FLUAV RNA RESP QL NAA+PROBE: NEGATIVE
FLUBV RNA RESP QL NAA+PROBE: NEGATIVE
SARS-COV-2 RNA RESP QL NAA+PROBE: NEGATIVE

## 2022-01-12 DIAGNOSIS — Z20.822 EXPOSURE TO COVID-19 VIRUS: Primary | ICD-10-CM

## 2022-01-12 PROCEDURE — 87636 SARSCOV2 & INF A&B AMP PRB: CPT | Performed by: PHYSICIAN ASSISTANT

## 2022-01-14 LAB
FLUAV RNA RESP QL NAA+PROBE: NEGATIVE
FLUBV RNA RESP QL NAA+PROBE: NEGATIVE
SARS-COV-2 RNA RESP QL NAA+PROBE: POSITIVE

## 2022-01-25 NOTE — TELEPHONE ENCOUNTER
PATIENT CALLED TO CANCELED HER APPT W/ DR Sly Prather ON TUES 02/18 DUE TO HER OVER BOOKING HER APPTS    SHE WILL CALL BACK TO RESCHEDULE Performed

## 2022-02-09 ENCOUNTER — OFFICE VISIT (OUTPATIENT)
Dept: DENTISTRY | Facility: CLINIC | Age: 27
End: 2022-02-09
Payer: COMMERCIAL

## 2022-02-09 DIAGNOSIS — K03.6 ACCRETIONS ON TEETH: Primary | ICD-10-CM

## 2022-02-09 PROCEDURE — D0190 SCREENING OF A PATIENT: HCPCS | Performed by: DENTAL HYGIENIST

## 2022-02-09 PROCEDURE — D1110 PROPHYLAXIS - ADULT: HCPCS | Performed by: DENTAL HYGIENIST

## 2022-02-09 PROCEDURE — D1330 ORAL HYGIENE INSTRUCTIONS: HCPCS | Performed by: DENTAL HYGIENIST

## 2022-02-09 NOTE — PROGRESS NOTES
Adult Prophy  Chief Complaint   Patient presents with    Routine Oral Cleaning   Patient claims her anterior teeth get inflamed at times regardless of how much she flosses  Patient would like Dr Iman Isaac to check margins at the next appt and see if they could be tapered more  Patient did have some sub annette along marians  Patient also complains that she recently has had some trouble flossing in general  It may be because of her being over due for a prophy  I had not trouble flossing the patients teeth  Method Used:  · Prophy Method Used: Ultrasonic Scaling  · Hand Scaling  · Polished  · Flossed  Fluoride    Radiographs Taken in Dexis: (Taken to assess periodontal health)  · None    Intra/Extra Oral Cancer Screening:  · Within normal limits    Oral Hygiene:  · Fair    Plaque:  · Generalized  · Moderate    Calculus:  · Generalized  · Light    Bleeding:  · Generalized  · Light  Patient is a smoker     Stain:  · Generalized  · Light  · Tobacco    Periodontal Charting:   · Full probing    Periodontal Classification:  · Generalized  · Mild  · Periodontal Disease    Oral Hygiene Instruction:    Kalyn Precise over daily routine and c-shaped flossing  Recommended a daily Fl2 rinse  Discussed Oral systemic link and role of plaque in gum disease  No orders of the defined types were placed in this encounter  Next Visit:  6 Month prophy  Dentist visit- I schedule 1 5 hours so that we can update BWX (not due today), periodic eval, finish resins and make referral for 3rd molars

## 2022-02-19 ENCOUNTER — HOSPITAL ENCOUNTER (EMERGENCY)
Facility: HOSPITAL | Age: 27
Discharge: HOME | End: 2022-02-19
Attending: EMERGENCY MEDICINE | Admitting: EMERGENCY MEDICINE
Payer: COMMERCIAL

## 2022-02-19 VITALS
HEART RATE: 74 BPM | BODY MASS INDEX: 36.65 KG/M2 | DIASTOLIC BLOOD PRESSURE: 51 MMHG | SYSTOLIC BLOOD PRESSURE: 91 MMHG | OXYGEN SATURATION: 98 % | TEMPERATURE: 96.1 F | RESPIRATION RATE: 18 BRPM | WEIGHT: 200.4 LBS

## 2022-02-19 DIAGNOSIS — G43.909 MIGRAINE: Primary | ICD-10-CM

## 2022-02-19 PROCEDURE — 96361 HYDRATE IV INFUSION ADD-ON: CPT

## 2022-02-19 PROCEDURE — 99283 EMERGENCY DEPT VISIT LOW MDM: CPT

## 2022-02-19 PROCEDURE — 99284 EMERGENCY DEPT VISIT MOD MDM: CPT | Performed by: EMERGENCY MEDICINE

## 2022-02-19 PROCEDURE — 96375 TX/PRO/DX INJ NEW DRUG ADDON: CPT

## 2022-02-19 PROCEDURE — 96374 THER/PROPH/DIAG INJ IV PUSH: CPT

## 2022-02-19 RX ORDER — KETOROLAC TROMETHAMINE 30 MG/ML
15 INJECTION, SOLUTION INTRAMUSCULAR; INTRAVENOUS ONCE
Status: COMPLETED | OUTPATIENT
Start: 2022-02-19 | End: 2022-02-19

## 2022-02-19 RX ORDER — METOCLOPRAMIDE HYDROCHLORIDE 5 MG/ML
10 INJECTION INTRAMUSCULAR; INTRAVENOUS ONCE
Status: COMPLETED | OUTPATIENT
Start: 2022-02-19 | End: 2022-02-19

## 2022-02-19 RX ORDER — DEXAMETHASONE SODIUM PHOSPHATE 4 MG/ML
10 INJECTION, SOLUTION INTRA-ARTICULAR; INTRALESIONAL; INTRAMUSCULAR; INTRAVENOUS; SOFT TISSUE ONCE
Status: COMPLETED | OUTPATIENT
Start: 2022-02-19 | End: 2022-02-19

## 2022-02-19 RX ORDER — DIPHENHYDRAMINE HYDROCHLORIDE 50 MG/ML
25 INJECTION INTRAMUSCULAR; INTRAVENOUS ONCE
Status: COMPLETED | OUTPATIENT
Start: 2022-02-19 | End: 2022-02-19

## 2022-02-19 RX ADMIN — SODIUM CHLORIDE 1000 ML: 0.9 INJECTION, SOLUTION INTRAVENOUS at 11:14

## 2022-02-19 RX ADMIN — KETOROLAC TROMETHAMINE 15 MG: 30 INJECTION, SOLUTION INTRAMUSCULAR at 11:17

## 2022-02-19 RX ADMIN — DIPHENHYDRAMINE HYDROCHLORIDE 25 MG: 50 INJECTION, SOLUTION INTRAMUSCULAR; INTRAVENOUS at 11:17

## 2022-02-19 RX ADMIN — METOCLOPRAMIDE HYDROCHLORIDE 10 MG: 5 INJECTION INTRAMUSCULAR; INTRAVENOUS at 11:17

## 2022-02-19 RX ADMIN — DEXAMETHASONE SODIUM PHOSPHATE 10 MG: 4 INJECTION, SOLUTION INTRAMUSCULAR; INTRAVENOUS at 11:15

## 2022-02-19 NOTE — ED PROVIDER NOTES
History  Chief Complaint   Patient presents with    Headache     headache vomiting ran out of topamax 1 week ago and feelslike withdrawling     59-year-old female presents for migraine  Patient has history of migraines and is maintained on Topamax, she states her prescription ran out 2 weeks ago she was unable to get it filled until yesterday  Patient notes she was a woken early this morning by 1 of her children and was able to fall asleep for about an hour before her family woke her up for the day  Patient believes her headache started gradually and ramp to where it is now, she feels a throbbing sensation in her forehead and crown of head, this is typical sensation and location for her migraines  She does have photosensitivity, nausea with small vomiting episodes  Patient states this is also typical of her migraines, denies abdominal pain, constipation, diarrhea, symptoms of dysuria  With her migraine she often gets a warning sign of black spots in her vision, occurred today  She did try taking ibuprofen, Topamax at home however had vomiting episodes  She denies any recent illnesses, change to her eating or sleep habits  She notes history of tubal ligation  Prior to Admission Medications   Prescriptions Last Dose Informant Patient Reported? Taking? Sodium Fluoride 1 1 % PSTE   No No   Sig: Apply 1 Squirt to teeth daily at bedtime   Patient not taking: Reported on 2021    albuterol (2 5 mg/3 mL) 0 083 % nebulizer solution   Yes Yes   Sig: Inhale 2 5 mg every 4 (four) hours as needed   albuterol (PROVENTIL HFA) 90 mcg/act inhaler   No Yes   Sig: Inhale 2 puffs every 6 (six) hours as needed for wheezing   budesonide-formoterol (SYMBICORT) 160-4 5 mcg/act inhaler Not Taking at Unknown time  No No   Sig: Inhale 2 puffs 2 (two) times a day Rinse mouth after use     Patient not taking: Reported on 2022    fluticasone (FLONASE) 50 mcg/act nasal spray   No Yes   Si sprays into each nostril daily   levothyroxine 88 mcg tablet   No Yes   Sig: Take 1 tablet (88 mcg total) by mouth daily in the early morning   loratadine (CLARITIN) 10 mg tablet   No Yes   Sig: Take 1 tablet (10 mg total) by mouth daily   topiramate (TOPAMAX) 25 mg sprinkle capsule   No Yes   Sig: Take 2 capsules (50 mg total) by mouth daily   Patient taking differently: Take 50 mg by mouth daily        Facility-Administered Medications: None       Past Medical History:   Diagnosis Date    Allergic     Anxiety     Asthma     Depression     Disease of thyroid gland     Migraine     Mood disorder (HCC)     Pregnant     Psychiatric disorder     depression,anxiety, mood disorder       Past Surgical History:   Procedure Laterality Date    APPENDECTOMY      FRACTURE SURGERY Right     wrist       Family History   Problem Relation Age of Onset    No Known Problems Mother     Thyroid disease Father     Hypertension Brother     Hypertension Maternal Grandmother     Thyroid disease Paternal Grandmother      I have reviewed and agree with the history as documented  E-Cigarette/Vaping    E-Cigarette Use Never User      E-Cigarette/Vaping Substances    Nicotine No     THC No     CBD No     Flavoring No     Other No     Unknown No      Social History     Tobacco Use    Smoking status: Current Every Day Smoker     Packs/day: 1 00     Types: Cigarettes    Smokeless tobacco: Never Used    Tobacco comment: trying to cut back but feels she is unable to quit at this time per pt   Vaping Use    Vaping Use: Never used   Substance Use Topics    Alcohol use: No    Drug use: No       Review of Systems   Constitutional: Negative for activity change, appetite change, chills and fatigue  Eyes: Positive for photophobia  Respiratory: Negative for shortness of breath  Cardiovascular: Negative for chest pain  Gastrointestinal: Positive for nausea and vomiting  Negative for abdominal pain, constipation and diarrhea     Genitourinary: Negative for dysuria and menstrual problem  Neurological: Positive for headaches  Negative for weakness and numbness  All other systems reviewed and are negative  Physical Exam  Physical Exam  Vitals reviewed  Constitutional:       General: She is not in acute distress  Appearance: Normal appearance  She is not ill-appearing, toxic-appearing or diaphoretic  HENT:      Head: Normocephalic and atraumatic  Right Ear: External ear normal       Left Ear: External ear normal    Eyes:      General:         Right eye: No discharge  Left eye: No discharge  Extraocular Movements: Extraocular movements intact  Pupils: Pupils are equal, round, and reactive to light  Cardiovascular:      Rate and Rhythm: Normal rate and regular rhythm  Pulmonary:      Effort: Pulmonary effort is normal  No respiratory distress  Breath sounds: Normal breath sounds  Musculoskeletal:         General: No deformity or signs of injury  Skin:     General: Skin is warm  Coloration: Skin is not jaundiced or pale  Neurological:      General: No focal deficit present  Mental Status: She is alert  Mental status is at baseline  Cranial Nerves: No cranial nerve deficit  Sensory: No sensory deficit  Motor: No weakness        Gait: Gait normal          Vital Signs  ED Triage Vitals   Temperature Pulse Respirations Blood Pressure SpO2   02/19/22 1044 02/19/22 1046 02/19/22 1044 02/19/22 1046 02/19/22 1046   (!) 96 1 °F (35 6 °C) 84 18 113/76 98 %      Temp Source Heart Rate Source Patient Position - Orthostatic VS BP Location FiO2 (%)   02/19/22 1044 02/19/22 1046 02/19/22 1046 02/19/22 1046 --   Temporal Monitor Sitting Left arm       Pain Score       02/19/22 1044       8           Vitals:    02/19/22 1046 02/19/22 1130 02/19/22 1133 02/19/22 1200   BP: 113/76  93/51 91/51   Pulse: 84 75 82 74   Patient Position - Orthostatic VS: Sitting            Visual Acuity      ED Medications  Medications   sodium chloride 0 9 % bolus 1,000 mL (0 mL Intravenous Stopped 2/19/22 1248)   ketorolac (TORADOL) injection 15 mg (15 mg Intravenous Given 2/19/22 1117)   metoclopramide (REGLAN) injection 10 mg (10 mg Intravenous Given 2/19/22 1117)   diphenhydrAMINE (BENADRYL) injection 25 mg (25 mg Intravenous Given 2/19/22 1117)   dexamethasone (DECADRON) injection 10 mg (10 mg Intravenous Given 2/19/22 1115)       Diagnostic Studies  Results Reviewed     None                 No orders to display              Procedures  Procedures         ED Course  ED Course as of 02/20/22 2223   Sat Feb 19, 2022   1215 Sleeping   1253 Reports relief in symptoms                                             MDM  Number of Diagnoses or Management Options  Migraine  Diagnosis management comments: 26-year-old female presents for evaluation of migraine  Patient has history of such, she does states this headache is more intense than usual however she has had several migraines of the same intensity  Patient notes she ran out of her Topamax about 2 weeks ago  Her symptoms today are similar to previous migraine symptoms including precipitous black spots in vision, pain quality and location, photosensitivity, nausea vomiting  She has no focal neurological deficits, received CT head imaging in 2019  Will attempt to treat with migraine cocktail  Disposition  Final diagnoses:   Migraine     Time reflects when diagnosis was documented in both MDM as applicable and the Disposition within this note     Time User Action Codes Description Comment    2/19/2022 12:53 PM Rhett Hart Add [I69 [id number]] Migraine       ED Disposition     ED Disposition Condition Date/Time Comment    Discharge Stable Sat Feb 19, 2022 12:53 PM Angeles Bernard discharge to home/self care              Follow-up Information     Follow up With Specialties Details Why Contact Info Additional 99 Will Khalil, PA-C Physician Assistant, Family Medicine   [address] [place] [address] [facility] [address] [facility]   [address] [facility] [address] [address]  [address] [place] [address] [facility] [address] [facility], [address] [facility] [address], [address] [facility]          Discharge Medication List as of 2/19/2022 12:54 PM      CONTINUE these medications which have NOT CHANGED    Details   albuterol (2 5 mg/3 mL) 0 083 % nebulizer solution Inhale 2 5 mg every 4 (four) hours as needed, Historical Med      albuterol (PROVENTIL HFA) 90 mcg/act inhaler Inhale 2 puffs every 6 (six) hours as needed for wheezing, Starting Fri 12/27/2019, Normal      fluticasone (FLONASE) 50 mcg/act nasal spray 2 sprays into each nostril daily, Starting Mon 11/22/2021, Normal      levothyroxine 88 mcg tablet Take 1 tablet (88 mcg total) by mouth daily in the early morning, Starting Mon 8/30/2021, Normal      loratadine (CLARITIN) 10 mg tablet Take 1 tablet (10 mg total) by mouth daily, Starting Mon 11/4/2019, Until Sat 2/19/2022, Normal      topiramate (TOPAMAX) 25 mg sprinkle capsule Take 2 capsules (50 mg total) by mouth daily, Starting Wed 12/22/2021, Normal      budesonide-formoterol (SYMBICORT) 160-4 5 mcg/act inhaler Inhale 2 puffs 2 (two) times a day Rinse mouth after use , Starting Mon 8/30/2021, Normal      Sodium Fluoride 1 1 % PSTE Apply 1 Squirt to teeth daily at bedtime, Starting Thu 9/30/2021, Normal             No discharge procedures on file      PDMP Review       Value Time User    PDMP Reviewed  Yes 2/5/2020  1:11 PM Kallie Barba PA-C          ED Provider  Electronically Signed by           Yfn Prajapati DO  02/20/22 1856

## 2022-02-19 NOTE — ED NOTES
Pt medicated, fluid bolus hung  Pt did have episode of emesis   Call lauren given      Jeana Rojas RN  02/19/22 1364

## 2022-04-02 ENCOUNTER — OFFICE VISIT (OUTPATIENT)
Dept: URGENT CARE | Facility: CLINIC | Age: 27
End: 2022-04-02
Payer: COMMERCIAL

## 2022-04-02 VITALS
OXYGEN SATURATION: 99 % | RESPIRATION RATE: 18 BRPM | TEMPERATURE: 98.1 F | WEIGHT: 200 LBS | BODY MASS INDEX: 36.58 KG/M2 | HEART RATE: 87 BPM

## 2022-04-02 DIAGNOSIS — J20.9 ACUTE BRONCHITIS, UNSPECIFIED ORGANISM: Primary | ICD-10-CM

## 2022-04-02 LAB
FLUAV RNA RESP QL NAA+PROBE: POSITIVE
FLUBV RNA RESP QL NAA+PROBE: NEGATIVE
SARS-COV-2 RNA RESP QL NAA+PROBE: NEGATIVE

## 2022-04-02 PROCEDURE — 87636 SARSCOV2 & INF A&B AMP PRB: CPT | Performed by: NURSE PRACTITIONER

## 2022-04-02 PROCEDURE — 99213 OFFICE O/P EST LOW 20 MIN: CPT | Performed by: NURSE PRACTITIONER

## 2022-04-02 RX ORDER — AMOXICILLIN AND CLAVULANATE POTASSIUM 875; 125 MG/1; MG/1
1 TABLET, FILM COATED ORAL EVERY 12 HOURS SCHEDULED
Qty: 20 TABLET | Refills: 0 | Status: SHIPPED | OUTPATIENT
Start: 2022-04-02 | End: 2022-04-12

## 2022-04-02 RX ORDER — PREDNISONE 20 MG/1
20 TABLET ORAL 2 TIMES DAILY WITH MEALS
Qty: 10 TABLET | Refills: 0 | Status: SHIPPED | OUTPATIENT
Start: 2022-04-02 | End: 2022-04-07

## 2022-04-02 RX ORDER — ALBUTEROL SULFATE 2.5 MG/3ML
2.5 SOLUTION RESPIRATORY (INHALATION) EVERY 4 HOURS PRN
Qty: 180 ML | Refills: 1 | Status: SHIPPED | OUTPATIENT
Start: 2022-04-02

## 2022-04-02 RX ORDER — ALBUTEROL SULFATE 90 UG/1
2 AEROSOL, METERED RESPIRATORY (INHALATION) EVERY 4 HOURS PRN
Qty: 36 G | Refills: 2 | Status: SHIPPED | OUTPATIENT
Start: 2022-04-02

## 2022-04-02 NOTE — PROGRESS NOTES
[facility]        NAME: Kurtis De La O is a 32 y o  female  : 1995    MRN: [id number]  DATE: 2022  TIME: 1:21 PM      Assessment and Plan     Acute bronchitis, unspecified organism [J20 [id number]]  1  Acute bronchitis, unspecified organism  albuterol (2 5 mg/3 mL) 0 083 % nebulizer solution    albuterol (Ventolin HFA) 90 mcg/act inhaler    amoxicillin-clavulanate (AUGMENTIN) 875-125 mg per tablet    predniSONE 20 mg tablet    Cov/Flu-Collected at [facility]         Patient Instructions     Patient Instructions     Take the Augmentin (antibiotic) and prednisone (steroid) as ordered until completed  Use the albuterol nebulizer OR  inhaler every 4-6 hours as needed for shortness of breath, chest tightness, wheezing or persistent cough  Eat yogurt or take a probiotic to restore good bacteria to your gut; this helps prevent stomach irritation/diarrhea while on an antibiotic  Acute Bronchitis   AMBULATORY CARE:   Acute bronchitis  is swelling and irritation in your lungs  It is usually caused by a virus and most often happens in the winter  Bronchitis may also be caused by bacteria or by a chemical irritant, such as smoke  Common symptoms include the following:   · Cough that lasts up to 3 weeks, stuffy nose    · Hoarseness, sore throat    · A fever and chills    · Feeling more tired than usual, and body aches    · Wheezing or pain when you breathe or cough    Seek care immediately if:   · You cough up blood  · Your lips or fingernails turn blue  · You feel like you are not getting enough air when you breathe  Call your doctor if:   · Your symptoms do not go away or get worse, even after treatment  · Your cough does not get better within 4 weeks  · You have questions or concerns about your condition or care  Medicines: You may  need any of the following:  · Cough suppressants  decrease your urge to cough      · Decongestants  help loosen mucus in your lungs and make it easier to cough up  This can help you breathe easier  · Inhalers  may be given  Your healthcare provider may give you one or more inhalers to help you breathe easier and cough less  An inhaler gives your medicine to open your airways  Ask your healthcare provider to show you how to use your inhaler correctly  · Antibiotics  may be given for up to 5 days if your bronchitis is caused by bacteria  · Acetaminophen  decreases pain and fever  It is available without a doctor's order  Ask how much to take and how often to take it  Follow directions  Read the labels of all other medicines you are using to see if they also contain acetaminophen, or ask your doctor or pharmacist  Acetaminophen can cause liver damage if not taken correctly  Do not use more than 4 grams (4,000 milligrams) total of acetaminophen in one day  · NSAIDs  help decrease swelling and pain or fever  This medicine is available with or without a doctor's order  NSAIDs can cause stomach bleeding or kidney problems in certain people  If you take blood thinner medicine, always ask your healthcare provider if NSAIDs are safe for you  Always read the medicine label and follow directions  · Take your medicine as directed  Contact your healthcare provider if you think your medicine is not helping or if you have side effects  Tell him of her if you are allergic to any medicine  Keep a list of the medicines, vitamins, and herbs you take  Include the amounts, and when and why you take them  Bring the list or the pill bottles to follow-up visits  Carry your medicine list with you in case of an emergency  Self-care:   · Drink liquids as directed  You may need to drink more liquids than usual to stay hydrated  Ask how much liquid to drink each day and which liquids are best for you  · Use a cool mist humidifier  to increase air moisture in your home   This may make it easier for you to breathe and help decrease your cough     · Get more rest   Rest helps your body to heal  Slowly start to do more each day  Rest when you feel it is needed  · Avoid irritants in the air  Avoid chemicals, fumes, and dust  Wear a face mask if you must work around dust or fumes  Stay inside on days when air pollution levels are high  If you have allergies, stay inside when pollen counts are high  Do not use aerosol products, such as spray-on deodorant, bug spray, and hair spray  · Do not smoke or be around others who are smoking  Nicotine and other chemicals in cigarettes and cigars can cause lung damage  Ask your healthcare provider for information if you currently smoke and need help to quit  E-cigarettes or smokeless tobacco still contain nicotine  Talk to your healthcare provider before you use these products  Prevent acute bronchitis:       · Ask about vaccines you may need  Get a flu vaccine each year as soon as recommended, usually in September or October  Ask your healthcare provider if you should also get a pneumonia or COVID-19 vaccine  Your healthcare provider can tell you if you should also get other vaccines, and when to get them  · Prevent the spread of germs  You can decrease your risk for acute bronchitis and other illnesses by doing the following:    ? Wash your hands often with soap and water  Carry germ-killing hand lotion or gel with you  You can use the lotion or gel to clean your hands when soap and water are not available  ? Do not touch your eyes, nose, or mouth unless you have washed your hands first     ? Always cover your mouth when you cough to prevent the spread of germs  It is best to cough into a tissue or your shirt sleeve instead of into your hand  Ask those around you to cover their mouths when they cough  ? Try to avoid people who have a cold or the flu  If you are sick, stay away from others as much as possible      Follow up with your doctor as directed:  Write down questions you have so you will remember to ask them during your follow-up visits  © Copyright [employer] 2022 Information is for End User's use only and may not be sold, redistributed or otherwise used for commercial purposes  All illustrations and images included in CareNotes® are the copyrighted property of [employer]  or [employer]  The above information is an  only  It is not intended as medical advice for individual conditions or treatments  Talk to your doctor, nurse or pharmacist before following any medical regimen to see if it is safe and effective for you  Influenza   AMBULATORY CARE:   Influenza  (the flu) is an infection caused by the influenza virus  The flu is easily spread when an infected person coughs, sneezes, or has close contact with others  You may be able to spread the flu to others for 1 week or longer after signs or symptoms appear  Common signs and symptoms include the following:   · Fever and chills    · Headaches, body aches, and muscle or joint pain    · Cough, runny nose, and sore throat    · Loss of appetite, nausea, vomiting, or diarrhea    · Tiredness    · Trouble breathing    Call your local emergency number (911 in the [address] [facility]) if:   · You have trouble breathing, and your lips look purple or blue  · You have a seizure  Seek care immediately if:   · You are dizzy, or you are urinating less or not at all  · You have a headache with a stiff neck, and you feel tired or confused  · You have new pain or pressure in your chest     · Your symptoms, such as shortness of breath, vomiting, or diarrhea, get worse  · Your symptoms, such as fever and coughing, seem to get better, but then get worse  Call your doctor if:   · You have new muscle pain or weakness  · You have questions or concerns about your condition or care  Treatment for influenza  may include any of the following:  · Acetaminophen  decreases pain and fever   It is available without a doctor's order  Ask how much to take and how often to take it  Follow directions  Read the labels of all other medicines you are using to see if they also contain acetaminophen, or ask your doctor or pharmacist  Acetaminophen can cause liver damage if not taken correctly  Do not use more than 4 grams (4,000 milligrams) total of acetaminophen in one day  · NSAIDs , such as ibuprofen, help decrease swelling, pain, and fever  This medicine is available with or without a doctor's order  NSAIDs can cause stomach bleeding or kidney problems in certain people  If you take blood thinner medicine, always ask your healthcare provider if NSAIDs are safe for you  Always read the medicine label and follow directions  · Antivirals  help fight a viral infection  Manage your symptoms:   · Rest  as much as you can to help you recover  · Drink liquids as directed  to help prevent dehydration  Ask how much liquid to drink each day and which liquids are best for you  Prevent the spread of germs:       · Wash your hands often  Wash your hands several times each day  Wash after you use the bathroom, change a child's diaper, and before you prepare or eat food  Use soap and water every time  Rub your soapy hands together, lacing your fingers  Wash the front and back of your hands, and in between your fingers  Use the fingers of one hand to scrub under the fingernails of the other hand  Wash for at least 20 seconds  Rinse with warm, running water for several seconds  Then dry your hands with a clean towel or paper towel  Use hand  that contains alcohol if soap and water are not available  Do not touch your eyes, nose, or mouth without washing your hands first          · Cover a sneeze or cough  Use a tissue that covers your mouth and nose  Throw the tissue away in a trash can right away  Use the bend of your arm if a tissue is not available  Wash your hands well with soap and water or use a hand       · Stay away from others while you are sick  Avoid crowds as much as possible  · Ask about vaccines you may need  Talk to your healthcare provider about your vaccine history  He or she will tell you which vaccines you need, and when to get them  ? Get the influenza (flu) vaccine as soon as it is available  Flu viruses change, so it is important to get a flu vaccine every year  ? Get the pneumonia vaccine if recommended  This vaccine is usually recommended every 5 years  Your provider will tell you when to get this vaccine, if needed  Follow up with your doctor as directed:  Write down your questions so you remember to ask them during your visits  © Copyright [employer] 2022 Information is for End User's use only and may not be sold, redistributed or otherwise used for commercial purposes  All illustrations and images included in CareNotes® are the copyrighted property of [employer] M [employer] Inc  or [facility] Jaquan Reed   The above information is an  only  It is not intended as medical advice for individual conditions or treatments  Talk to your doctor, nurse or pharmacist before following any medical regimen to see if it is safe and effective for you  Follow up with PCP in 3-5 days  Proceed to  ER if symptoms worsen  Chief Complaint     Chief Complaint   Patient presents with    Cough     x 1 day    Cold Like Symptoms         History of Present Illness     Patient had onset of worsening asthma symptoms 3-4 days ago but attributed this to running and working as a   Fever onset was 2 days ago w/ Tmax 101  Son was recently ill with similar symptoms, covid negative x 2, not tested for flu and just returned to school  Patient is out of her albuterol and has been using son's for now but needs a refill  Has a hx seasonal asthma  Smokes but barely lately due to her lung symptoms  Patient has tried robitussin--no relief, ibuprofen--helps fever, dayquil--has not helped        Review of Systems     Review of Systems   Constitutional: Positive for chills and fever  HENT: Positive for congestion  Negative for ear pain, sinus pressure, sinus pain and sore throat  Respiratory: Positive for cough, chest tightness, shortness of breath and wheezing  Musculoskeletal: Positive for myalgias (mild)  All other systems reviewed and are negative  Current Medications       Current Outpatient Medications:     albuterol (2 5 mg/3 mL) 0 083 % nebulizer solution, Take 3 mL (2 5 mg total) by nebulization every 4 (four) hours as needed for wheezing or shortness of breath, Disp: 180 mL, Rfl: 1    albuterol (PROVENTIL HFA) 90 mcg/act inhaler, Inhale 2 puffs every 6 (six) hours as needed for wheezing, Disp: 6 7 g, Rfl: 0    fluticasone (FLONASE) 50 mcg/act nasal spray, 2 sprays into each nostril daily, Disp: 11 1 mL, Rfl: 0    levothyroxine 88 mcg tablet, Take 1 tablet (88 mcg total) by mouth daily in the early morning, Disp: 30 tablet, Rfl: 5    topiramate (TOPAMAX) 25 mg sprinkle capsule, Take 2 capsules (50 mg total) by mouth daily (Patient taking differently: Take 50 mg by mouth daily  ), Disp: 180 capsule, Rfl: 1    albuterol (Ventolin HFA) 90 mcg/act inhaler, Inhale 2 puffs every 4 (four) hours as needed for wheezing or shortness of breath, Disp: 36 g, Rfl: 2    amoxicillin-clavulanate (AUGMENTIN) 875-125 mg per tablet, Take 1 tablet by mouth every 12 (twelve) hours for 10 days, Disp: 20 tablet, Rfl: 0    budesonide-formoterol (SYMBICORT) 160-4 5 mcg/act inhaler, Inhale 2 puffs 2 (two) times a day Rinse mouth after use   (Patient not taking: Reported on 2/19/2022 ), Disp: 10 2 g, Rfl: 5    loratadine (CLARITIN) 10 mg tablet, Take 1 tablet (10 mg total) by mouth daily, Disp: 30 tablet, Rfl: 0    predniSONE 20 mg tablet, Take 1 tablet (20 mg total) by mouth 2 (two) times a day with meals for 5 days, Disp: 10 tablet, Rfl: 0    Sodium Fluoride 1 1 % PSTE, Apply 1 Squirt to teeth daily at bedtime (Patient not taking: Reported on 12/21/2021 ), Disp: 100 mL, Rfl: 0    Current Allergies     Allergies as of 04/02/2022 - Reviewed 04/02/2022   Allergen Reaction Noted    Aripiprazole Other (See Comments), Confusion, and Seizures 12/16/2015    Tessalon [benzonatate] Anxiety and Other (See Comments) 04/01/2016    Zithromax [azithromycin] Rash, Hallucinations, and Hives 12/16/2015              The following portions of the patient's history were reviewed and updated as appropriate: allergies, current medications, past family history, past medical history, past social history, past surgical history and problem list      Past Medical History:   Diagnosis Date    Allergic     Anxiety     Asthma     Depression     Disease of thyroid gland     Migraine     Mood disorder ([facility] [address] )     Pregnant     Psychiatric disorder     depression,anxiety, mood disorder       Past Surgical History:   Procedure Laterality Date    APPENDECTOMY      FRACTURE SURGERY Right     wrist       Family History   Problem Relation Age of Onset    No Known Problems Mother     Thyroid disease Father     Hypertension Brother     Hypertension Maternal Grandmother     Thyroid disease Paternal Grandmother          Medications have been verified  Objective     Pulse 87   Temp 98 1 °F (36 7 °C)   Resp 18   Wt 90 7 kg (200 lb)   SpO2 99%   BMI 36 58 kg/m²   No LMP recorded  Physical Exam     Physical Exam  Vitals and nursing note reviewed  Constitutional:       General: She is not in acute distress  Appearance: Normal appearance  She is well-developed and well-groomed  She is ill-appearing  She is not toxic-appearing or diaphoretic  HENT:      Head: Normocephalic and atraumatic  Nose: Mucosal edema and congestion present  No nasal tenderness  Right Sinus: No maxillary sinus tenderness or frontal sinus tenderness  Left Sinus: No maxillary sinus tenderness or frontal sinus tenderness  Mouth/Throat:      Mouth: Mucous membranes are moist       Pharynx: Oropharynx is clear  Uvula midline  No oropharyngeal exudate or posterior oropharyngeal erythema  Eyes:      Pupils: Pupils are equal, round, and reactive to light  Cardiovascular:      Rate and Rhythm: Normal rate and regular rhythm  Heart sounds: Normal heart sounds  Pulmonary:      Effort: Pulmonary effort is normal  No tachypnea, bradypnea, accessory muscle usage or respiratory distress  Breath sounds: No stridor  Wheezing (mod I&E wheezes) present  No decreased breath sounds, rhonchi or rales  Abdominal:      General: Bowel sounds are normal  There is no distension  Palpations: Abdomen is soft  Tenderness: There is no abdominal tenderness  Musculoskeletal:         General: Normal range of motion  Cervical back: Normal range of motion and neck supple  Skin:     General: Skin is warm and dry  Capillary Refill: Capillary refill takes less than 2 seconds  Neurological:      General: No focal deficit present  Mental Status: She is alert and oriented to person, place, and time  Psychiatric:         Mood and Affect: Mood normal          Behavior: Behavior normal  Behavior is cooperative  Thought Content:  Thought content normal          Judgment: Judgment normal

## 2022-04-02 NOTE — PATIENT INSTRUCTIONS
Take the Augmentin (antibiotic) and prednisone (steroid) as ordered until completed  Use the albuterol nebulizer OR  inhaler every 4-6 hours as needed for shortness of breath, chest tightness, wheezing or persistent cough  Eat yogurt or take a probiotic to restore good bacteria to your gut; this helps prevent stomach irritation/diarrhea while on an antibiotic  Acute Bronchitis   AMBULATORY CARE:   Acute bronchitis  is swelling and irritation in your lungs  It is usually caused by a virus and most often happens in the winter  Bronchitis may also be caused by bacteria or by a chemical irritant, such as smoke  Common symptoms include the following:   · Cough that lasts up to 3 weeks, stuffy nose    · Hoarseness, sore throat    · A fever and chills    · Feeling more tired than usual, and body aches    · Wheezing or pain when you breathe or cough    Seek care immediately if:   · You cough up blood  · Your lips or fingernails turn blue  · You feel like you are not getting enough air when you breathe  Call your doctor if:   · Your symptoms do not go away or get worse, even after treatment  · Your cough does not get better within 4 weeks  · You have questions or concerns about your condition or care  Medicines: You may  need any of the following:  · Cough suppressants  decrease your urge to cough  · Decongestants  help loosen mucus in your lungs and make it easier to cough up  This can help you breathe easier  · Inhalers  may be given  Your healthcare provider may give you one or more inhalers to help you breathe easier and cough less  An inhaler gives your medicine to open your airways  Ask your healthcare provider to show you how to use your inhaler correctly  · Antibiotics  may be given for up to 5 days if your bronchitis is caused by bacteria  · Acetaminophen  decreases pain and fever  It is available without a doctor's order  Ask how much to take and how often to take it  Follow directions  Read the labels of all other medicines you are using to see if they also contain acetaminophen, or ask your doctor or pharmacist  Acetaminophen can cause liver damage if not taken correctly  Do not use more than 4 grams (4,000 milligrams) total of acetaminophen in one day  · NSAIDs  help decrease swelling and pain or fever  This medicine is available with or without a doctor's order  NSAIDs can cause stomach bleeding or kidney problems in certain people  If you take blood thinner medicine, always ask your healthcare provider if NSAIDs are safe for you  Always read the medicine label and follow directions  · Take your medicine as directed  Contact your healthcare provider if you think your medicine is not helping or if you have side effects  Tell him of her if you are allergic to any medicine  Keep a list of the medicines, vitamins, and herbs you take  Include the amounts, and when and why you take them  Bring the list or the pill bottles to follow-up visits  Carry your medicine list with you in case of an emergency  Self-care:   · Drink liquids as directed  You may need to drink more liquids than usual to stay hydrated  Ask how much liquid to drink each day and which liquids are best for you  · Use a cool mist humidifier  to increase air moisture in your home  This may make it easier for you to breathe and help decrease your cough  · Get more rest   Rest helps your body to heal  Slowly start to do more each day  Rest when you feel it is needed  · Avoid irritants in the air  Avoid chemicals, fumes, and dust  Wear a face mask if you must work around dust or fumes  Stay inside on days when air pollution levels are high  If you have allergies, stay inside when pollen counts are high  Do not use aerosol products, such as spray-on deodorant, bug spray, and hair spray  · Do not smoke or be around others who are smoking    Nicotine and other chemicals in cigarettes and cigars can cause lung damage  Ask your healthcare provider for information if you currently smoke and need help to quit  E-cigarettes or smokeless tobacco still contain nicotine  Talk to your healthcare provider before you use these products  Prevent acute bronchitis:       · Ask about vaccines you may need  Get a flu vaccine each year as soon as recommended, usually in September or October  Ask your healthcare provider if you should also get a pneumonia or COVID-19 vaccine  Your healthcare provider can tell you if you should also get other vaccines, and when to get them  · Prevent the spread of germs  You can decrease your risk for acute bronchitis and other illnesses by doing the following:    ? Wash your hands often with soap and water  Carry germ-killing hand lotion or gel with you  You can use the lotion or gel to clean your hands when soap and water are not available  ? Do not touch your eyes, nose, or mouth unless you have washed your hands first     ? Always cover your mouth when you cough to prevent the spread of germs  It is best to cough into a tissue or your shirt sleeve instead of into your hand  Ask those around you to cover their mouths when they cough  ? Try to avoid people who have a cold or the flu  If you are sick, stay away from others as much as possible  Follow up with your doctor as directed:  Write down questions you have so you will remember to ask them during your follow-up visits  © Copyright "[employer] 2022 Information is for End User's use only and may not be sold, redistributed or otherwise used for commercial purposes  All illustrations and images included in CareNotes® are the copyrighted property of [employer]  or [employer]  The above information is an  only  It is not intended as medical advice for individual conditions or treatments   Talk to your doctor, nurse or pharmacist before following any medical regimen to see if it is safe and effective for you  Influenza   AMBULATORY CARE:   Influenza  (the flu) is an infection caused by the influenza virus  The flu is easily spread when an infected person coughs, sneezes, or has close contact with others  You may be able to spread the flu to others for 1 week or longer after signs or symptoms appear  Common signs and symptoms include the following:   · Fever and chills    · Headaches, body aches, and muscle or joint pain    · Cough, runny nose, and sore throat    · Loss of appetite, nausea, vomiting, or diarrhea    · Tiredness    · Trouble breathing    Call your local emergency number (911 in the [address] [facility]) if:   · You have trouble breathing, and your lips look purple or blue  · You have a seizure  Seek care immediately if:   · You are dizzy, or you are urinating less or not at all  · You have a headache with a stiff neck, and you feel tired or confused  · You have new pain or pressure in your chest     · Your symptoms, such as shortness of breath, vomiting, or diarrhea, get worse  · Your symptoms, such as fever and coughing, seem to get better, but then get worse  Call your doctor if:   · You have new muscle pain or weakness  · You have questions or concerns about your condition or care  Treatment for influenza  may include any of the following:  · Acetaminophen  decreases pain and fever  It is available without a doctor's order  Ask how much to take and how often to take it  Follow directions  Read the labels of all other medicines you are using to see if they also contain acetaminophen, or ask your doctor or pharmacist  Acetaminophen can cause liver damage if not taken correctly  Do not use more than 4 grams (4,000 milligrams) total of acetaminophen in one day  · NSAIDs , such as ibuprofen, help decrease swelling, pain, and fever  This medicine is available with or without a doctor's order  NSAIDs can cause stomach bleeding or kidney problems in certain people   If you take blood thinner medicine, always ask your healthcare provider if NSAIDs are safe for you  Always read the medicine label and follow directions  · Antivirals  help fight a viral infection  Manage your symptoms:   · Rest  as much as you can to help you recover  · Drink liquids as directed  to help prevent dehydration  Ask how much liquid to drink each day and which liquids are best for you  Prevent the spread of germs:       · Wash your hands often  Wash your hands several times each day  Wash after you use the bathroom, change a child's diaper, and before you prepare or eat food  Use soap and water every time  Rub your soapy hands together, lacing your fingers  Wash the front and back of your hands, and in between your fingers  Use the fingers of one hand to scrub under the fingernails of the other hand  Wash for at least 20 seconds  Rinse with warm, running water for several seconds  Then dry your hands with a clean towel or paper towel  Use hand  that contains alcohol if soap and water are not available  Do not touch your eyes, nose, or mouth without washing your hands first          · Cover a sneeze or cough  Use a tissue that covers your mouth and nose  Throw the tissue away in a trash can right away  Use the bend of your arm if a tissue is not available  Wash your hands well with soap and water or use a hand   · Stay away from others while you are sick  Avoid crowds as much as possible  · Ask about vaccines you may need  Talk to your healthcare provider about your vaccine history  He or she will tell you which vaccines you need, and when to get them  ? Get the influenza (flu) vaccine as soon as it is available  Flu viruses change, so it is important to get a flu vaccine every year  ? Get the pneumonia vaccine if recommended  This vaccine is usually recommended every 5 years  Your provider will tell you when to get this vaccine, if needed      Follow up with your doctor as directed:  Write down your questions so you remember to ask them during your visits  © Copyright [employer] 2022 Information is for End User's use only and may not be sold, redistributed or otherwise used for commercial purposes  All illustrations and images included in CareNotes® are the copyrighted property of [employer]  or [employer]  The above information is an  only  It is not intended as medical advice for individual conditions or treatments  Talk to your doctor, nurse or pharmacist before following any medical regimen to see if it is safe and effective for you

## 2022-07-19 ENCOUNTER — OFFICE VISIT (OUTPATIENT)
Dept: FAMILY MEDICINE CLINIC | Facility: HOME HEALTHCARE | Age: 27
End: 2022-07-19
Payer: COMMERCIAL

## 2022-07-19 VITALS
RESPIRATION RATE: 18 BRPM | OXYGEN SATURATION: 98 % | HEIGHT: 62 IN | TEMPERATURE: 97.8 F | DIASTOLIC BLOOD PRESSURE: 76 MMHG | WEIGHT: 193.4 LBS | BODY MASS INDEX: 35.59 KG/M2 | SYSTOLIC BLOOD PRESSURE: 110 MMHG | HEART RATE: 85 BPM

## 2022-07-19 DIAGNOSIS — E06.3 HYPOTHYROIDISM DUE TO HASHIMOTO'S THYROIDITIS: ICD-10-CM

## 2022-07-19 DIAGNOSIS — M62.830 MUSCLE SPASM OF BACK: Primary | ICD-10-CM

## 2022-07-19 DIAGNOSIS — E03.8 HYPOTHYROIDISM DUE TO HASHIMOTO'S THYROIDITIS: ICD-10-CM

## 2022-07-19 DIAGNOSIS — S29.012A UPPER BACK STRAIN, INITIAL ENCOUNTER: ICD-10-CM

## 2022-07-19 PROCEDURE — 99213 OFFICE O/P EST LOW 20 MIN: CPT | Performed by: FAMILY MEDICINE

## 2022-07-19 PROCEDURE — T1015 CLINIC SERVICE: HCPCS | Performed by: FAMILY MEDICINE

## 2022-07-19 RX ORDER — CYCLOBENZAPRINE HCL 10 MG
10 TABLET ORAL 2 TIMES DAILY PRN
Qty: 14 TABLET | Refills: 0 | Status: SHIPPED | OUTPATIENT
Start: 2022-07-19

## 2022-07-19 RX ORDER — KETOROLAC TROMETHAMINE 30 MG/ML
30 INJECTION, SOLUTION INTRAMUSCULAR; INTRAVENOUS ONCE
Status: COMPLETED | OUTPATIENT
Start: 2022-07-19 | End: 2022-07-19

## 2022-07-19 RX ADMIN — KETOROLAC TROMETHAMINE 30 MG: 30 INJECTION, SOLUTION INTRAMUSCULAR; INTRAVENOUS at 13:15

## 2022-07-19 NOTE — PROGRESS NOTES
[address]       NAME: Su Rodriguez is a 32 y o  female  : 1995    MRN: [id number]  DATE: 2022  TIME: 1:29 PM    Assessment and Plan   Diagnoses and all orders for this visit:    Muscle spasm of back  -     cyclobenzaprine (FLEXERIL) 10 mg tablet; Take 1 tablet (10 mg total) by mouth 2 (two) times a day as needed for muscle spasms  -     ketorolac (TORADOL) injection 30 mg    Upper back strain, initial encounter  -     cyclobenzaprine (FLEXERIL) 10 mg tablet; Take 1 tablet (10 mg total) by mouth 2 (two) times a day as needed for muscle spasms  -     ketorolac (TORADOL) injection 30 mg    Hypothyroidism due to Hashimoto's thyroiditis  -     TSH, 3rd generation with Free T4 reflex; Future      Patient will have TSH completed which was ordered last year  Will adjust levothyroxine if needed  Toradol 30 mg IM x1  Recommended heating pad  Flexeril as needed for muscle spasms  Patient is aware not to drive while on this medication  Also patient does not drink alcohol  Patient instructed to call me with any questions or concerns or persistent symptoms  Patient will follow-up in approximately 1 month for her annual physical   Chief Complaint     Chief Complaint   Patient presents with    Back Pain     Left shoulder and back pain         History of Present Illness       HPI    61-year-old female presents today complaining of muscle spasms and muscle aches upper back for approximately 1 week  Located central scapular region  Patient denies any trauma  Patient states her  gave her massage in next day she developed symptoms of aching with occasional spasms  Has been using combination of Tylenol, ibuprofen and aspirin without much relief  Patient denies any falls or trauma  Pain is waxing waning in nature  Sometimes difficulty with sleeping    Review of Systems   Review of Systems    As   Per HPI all other systems negative    Current Medications       Current Outpatient Medications:     albuterol (2 5 mg/3 mL) 0 083 % nebulizer solution, Take 3 mL (2 5 mg total) by nebulization every 4 (four) hours as needed for wheezing or shortness of breath, Disp: 180 mL, Rfl: 1    albuterol (PROVENTIL HFA) 90 mcg/act inhaler, Inhale 2 puffs every 6 (six) hours as needed for wheezing, Disp: 6 7 g, Rfl: 0    cyclobenzaprine (FLEXERIL) 10 mg tablet, Take 1 tablet (10 mg total) by mouth 2 (two) times a day as needed for muscle spasms, Disp: 14 tablet, Rfl: 0    fluticasone (FLONASE) 50 mcg/act nasal spray, 2 sprays into each nostril daily, Disp: 11 1 mL, Rfl: 0    levothyroxine 88 mcg tablet, take 1 tablet by mouth every morning, Disp: 30 tablet, Rfl: 2    loratadine (CLARITIN) 10 mg tablet, Take 1 tablet (10 mg total) by mouth daily, Disp: 30 tablet, Rfl: 0    topiramate (TOPAMAX) 25 mg sprinkle capsule, Take 2 capsules (50 mg total) by mouth daily, Disp: 180 capsule, Rfl: 1    albuterol (Ventolin HFA) 90 mcg/act inhaler, Inhale 2 puffs every 4 (four) hours as needed for wheezing or shortness of breath (Patient not taking: Reported on 7/19/2022), Disp: 36 g, Rfl: 2    budesonide-formoterol (SYMBICORT) 160-4 5 mcg/act inhaler, Inhale 2 puffs 2 (two) times a day Rinse mouth after use  (Patient not taking: No sig reported), Disp: 10 2 g, Rfl: 5    Sodium Fluoride 1 1 % PSTE, Apply 1 Squirt to teeth daily at bedtime (Patient not taking: No sig reported), Disp: 100 mL, Rfl: 0  No current facility-administered medications for this visit      Current Allergies     Allergies as of 07/19/2022 - Reviewed 07/19/2022   Allergen Reaction Noted    Aripiprazole Other (See Comments), Confusion, and Seizures 12/16/2015    Tessalon [benzonatate] Anxiety and Other (See Comments) 04/01/2016    Zithromax [azithromycin] Rash, Hallucinations, and Hives 12/16/2015            The following portions of the patient's history were reviewed and updated as appropriate: allergies, current medications, past family history, past medical history, past social history, past surgical history and problem list      Past Medical History:   Diagnosis Date    Allergic     Anxiety     Asthma     Depression     Disease of thyroid gland     Migraine     Mood disorder ([address] )     Pregnant     Psychiatric disorder     depression,anxiety, mood disorder       Past Surgical History:   Procedure Laterality Date    APPENDECTOMY      FRACTURE SURGERY Right     wrist       Family History   Problem Relation Age of Onset    No Known Problems Mother     Thyroid disease Father     Hypertension Brother     Hypertension Maternal Grandmother     Thyroid disease Paternal Grandmother          Medications have been verified  Objective   /76 (BP Location: Left arm, Patient Position: Sitting, Cuff Size: Adult)   Pulse 85   Temp 97 8 °F (36 6 °C) (Temporal)   Resp 18   Ht 5' 2" (1 575 m)   Wt 87 7 kg (193 lb 6 4 oz)   SpO2 98%   BMI 35 37 kg/m²        Physical Exam     Physical Exam  Vitals and nursing note reviewed  Constitutional:       General: She is not in acute distress  Appearance: She is not ill-appearing, toxic-appearing or diaphoretic  HENT:      Head: Normocephalic  Eyes:      General: No scleral icterus  Conjunctiva/sclera: Conjunctivae normal    Cardiovascular:      Rate and Rhythm: Normal rate and regular rhythm  Pulmonary:      Effort: Pulmonary effort is normal       Breath sounds: Normal breath sounds  Musculoskeletal:      Cervical back: Normal range of motion and neck supple  No rigidity or tenderness  Right lower leg: No edema  Left lower leg: No edema  Lymphadenopathy:      Cervical: No cervical adenopathy  Neurological:      General: No focal deficit present  Mental Status: She is alert and oriented to person, place, and time     Psychiatric:         Mood and Affect: Mood normal

## 2022-08-07 ENCOUNTER — OFFICE VISIT (OUTPATIENT)
Dept: URGENT CARE | Facility: MEDICAL CENTER | Age: 27
End: 2022-08-07
Payer: COMMERCIAL

## 2022-08-07 VITALS
HEART RATE: 98 BPM | TEMPERATURE: 98.4 F | BODY MASS INDEX: 35.15 KG/M2 | OXYGEN SATURATION: 98 % | WEIGHT: 191 LBS | HEIGHT: 62 IN | RESPIRATION RATE: 18 BRPM

## 2022-08-07 DIAGNOSIS — Z87.09 HISTORY OF ASTHMA: ICD-10-CM

## 2022-08-07 DIAGNOSIS — U07.1 COVID: Primary | ICD-10-CM

## 2022-08-07 LAB
SARS-COV-2 AG UPPER RESP QL IA: POSITIVE
VALID CONTROL: ABNORMAL

## 2022-08-07 PROCEDURE — 99213 OFFICE O/P EST LOW 20 MIN: CPT | Performed by: NURSE PRACTITIONER

## 2022-08-07 PROCEDURE — 87811 SARS-COV-2 COVID19 W/OPTIC: CPT | Performed by: NURSE PRACTITIONER

## 2022-08-07 RX ORDER — PSEUDOEPHEDRINE HCL 30 MG
60 TABLET ORAL EVERY 6 HOURS PRN
Qty: 30 TABLET | Refills: 0 | Status: SHIPPED | OUTPATIENT
Start: 2022-08-07

## 2022-08-07 RX ORDER — BUDESONIDE 90 UG/1
2 AEROSOL, POWDER RESPIRATORY (INHALATION) 2 TIMES DAILY
Qty: 1 EACH | Refills: 1 | Status: SHIPPED | OUTPATIENT
Start: 2022-08-07 | End: 2022-08-08

## 2022-08-07 RX ORDER — ALBUTEROL SULFATE 90 UG/1
2 AEROSOL, METERED RESPIRATORY (INHALATION) EVERY 4 HOURS PRN
Qty: 8.5 G | Refills: 1 | Status: SHIPPED | OUTPATIENT
Start: 2022-08-07

## 2022-08-07 NOTE — PROGRESS NOTES
[facility]        NAME: Juwan Mahan is a 32 y o  female  : 1995    MRN: [id number]  DATE: 2022  TIME: 11:02 AM      Assessment and Plan     COVID [U07 1]  1  COVID  Poct Covid 19 Rapid Antigen Test    albuterol (ProAir HFA) 90 mcg/act inhaler    budesonide (Pulmicort Flexhaler) 90 MCG/ACT inhaler    pseudoephedrine (SUDAFED) 30 mg tablet   2  History of asthma  albuterol (ProAir HFA) 90 mcg/act inhaler    budesonide (Pulmicort Flexhaler) 90 MCG/ACT inhaler         Patient Instructions     Patient Instructions     Eat well, drink water, rest when you need it, take a multivitamin, vitamin C 1,000 mg BID, vitamin D 2,000 IU daily  Use over the counter medications to treat symptoms  Follow-up with your PCP  Isolation is Sat afternoon -Thurs afternoon  5 days strict masking is Thurs afternoon- afternoon inclusive  I refilled your albuterol inhaler  If you start experiencing any lung tightness, start the Pulmicort inhaler (rinse and spit after use) and take it steadily for several weeks or as directed by your PCP  COVID-19 and Chronic Health Conditions   AMBULATORY CARE:   What you need to know about coronavirus disease 2019 (COVID-19) and chronic health conditions: Your chronic condition may increase your risk for COVID-19 or serious problems it can cause  Healthcare providers might need to make changes that affect how you usually manage your chronic health condition  Providers may change hours of operation or not have patients come in to be seen  You may not be able to make appointments to get blood drawn or to have tests or procedures  This may continue until the virus that causes COVID-19 is controlled  Until then, you can take steps to manage your condition  The steps will also lower your risk for COVID-19 or the serious problems it causes  If you do develop COVID-19, healthcare providers will tell you when it is okay to be around others after you recover   This depends on your chronic condition, any symptoms of COVID-19 that developed, and how severe the symptoms were  What you need to know about serious problems from the virus:  You may develop long-term health problems caused by the virus  Your risk is higher if you are 65 or older  A weak immune system, obesity, diabetes, chronic kidney disease, or a heart or lung condition can also increase your risk  Your risk is also higher if you are a current or former cigarette smoker  COVID-19 can lead to any of the following:  · Multisymptom inflammatory syndrome in adults (MIS-A) or in children (MIS-C), causing inflammation in the heart, digestive system, skin, or brain    · Shortness of breath, serious lower respiratory conditions, such as pneumonia or acute respiratory distress syndrome (ARDS)    · Blood clots or blood vessel damage    · Organ damage from a lack of oxygen or from blood clots    · Sleep problems    · Problems thinking clearly, remembering information, or concentrating    · Mood changes, depression, or anxiety    · Long-term problems tasting or smelling    · Loss of appetite and weight loss    · Nerve pain    · Fatigue (feeling mentally and physically tired)    Call your local emergency number (911 in the [address] [facility]) if:   · You have trouble breathing or shortness of breath  · You have chest pain or pressure that lasts longer than 5 minutes  · You become confused or hard to wake  · Your lips or face are blue  Seek care immediately if:   · You have a fever of 104°F (40°C) or higher  Call your doctor or healthcare provider if:   · You have symptoms of COVID-19  · You have questions or concerns about COVID-19 or your chronic condition  How the 2019 coronavirus spreads: The virus spreads quickly and easily  The virus can be passed starting 2 to 3 days before symptoms begin or before a positive test if symptoms never begin  · Droplets are the main way all coronaviruses spread    The virus travels in droplets that form when a person talks, sings, coughs, or sneezes  The droplets can also float in the air for minutes or hours  Infection happens when you breathe in the droplets or get them in your eyes or nose  Close personal contact with an infected person increases your risk for infection  This means being within 6 feet (2 meters) of the person for at least 15 minutes over 24 hours  · Person-to-person contact can spread the virus  For example, a person with the virus on his or her hands can spread it by shaking hands with someone  · The virus can stay on objects and surfaces for up to 3 days  You may become infected by touching the object or surface and then touching your eyes or mouth  Manage your chronic health condition during this time:  If you do not have a regular healthcare provider, experts recommend you contact a local [facility] health center or health department  The following can help you manage your condition and prevent COVID-19:  1  Get emergency care for your condition if needed  Talk to your healthcare providers about symptoms of your chronic condition that need immediate care  Your providers can help you create a plan or add exacerbation management to your plan  The plan will include when to go to an emergency department and when to call your local emergency number  This will depend on where you live and the services that are available during this time  2  Go to dialysis appointments as scheduled  It is important to stay on schedule  You will need to have enough food to be able to follow the emergency diet plan if you must miss a session  The emergency diet needs to be part of the management plan for your condition  3  Reschedule any upcoming appointments as needed  Medical facilities may be closed until the coronavirus is better controlled  This means you may need to reschedule a surgery, procedure, or check-up appointment   If you cannot have a phone or video appointment, you will need to make a new appointment  Some providers may be scheduling appointments several months in advance  Some surgeries and procedures will happen as scheduled  This depends on the medical condition and the reason for the surgery or procedure  You may need to have extra testing for COVID-19 several days before  4  Follow any regular management plan you use  Your healthcare provider will tell you if you need to make any changes to your regular management plan  For example, if you have asthma, continue to follow your asthma action plan  If you have diabetes, you may need to check your blood sugar level more often  Stress and illness can make blood sugar levels go up  You may need to adjust medicine such as insulin  If you have a heart condition or high blood pressure, you may need to check your blood pressure more often  Stress and illness can also raise your blood pressure  5  Talk to your healthcare providers about your medicines  You may be able to get more than 1 month of medicine at a time  This will lower the number of times you need to go to a pharmacy to get your medicines  Make sure you have enough medicine if you have a condition that can lead to an emergency  Examples include asthma medicines, insulin, or an epinephrine pen  Check the expiration dates on the medicines you currently have  Ask for refills as soon as possible, if needed  If it is not time to refill prescriptions, you may be able to get an emergency supply of some medicines  Medicine plans vary, so ask your healthcare provider or pharmacist for options  6  Have supplies available in your home  If possible, get extra supplies you use regularly  Examples include absorbent pads, syringes, and wound cleaning solutions  This will limit the number of trips out of your home to get supplies  7  Know the signs and symptoms of COVID-19  Signs and symptoms usually start about 5 days after infection but can take 2 to 14 days   Signs and symptoms range from mild to severe  You may feel like you have the flu or a bad cold  Your chronic health condition may cause some of the same symptoms COVID-19 causes  This can make it hard to know if a symptom is from COVID-19 or your chronic condition  Keep a record of any new or worsening symptom you have  This is especially important if you have a condition that often causes shortness of breath  Your provider can tell you if you should be tested for COVID-19  Tell your healthcare provider if you think you were infected but develop signs or symptoms not listed below:    ? A cough    ? Shortness of breath or trouble breathing that may become severe    ? A fever of at least 100 4°F, or 38°C (may be lower in adults 65 or older)    ? Chills that might include shaking    ? Muscle pain, body aches, or a headache    ? A sore throat    ? Suddenly not being able to taste or smell anything    ? Feeling very tired (fatigue)    ? Congestion (stuffy head and nose), or a runny nose    ? Diarrhea, nausea, or vomiting    What you can do to prevent having to go out of your home during this time:   · Ask your healthcare provider for other ways to have appointments  Some providers offer phone, video, or other types of appointments  · Have food, medicines, and other supplies delivered  Some pharmacies can send certain medicines to you through the mail  Grocery stores and restaurants may be able to deliver food and other items  If possible, have delivered items placed somewhere  Try not to have someone hand you an item  You will be so close to the person that the virus can spread between you  · Ask someone to get items you need  The person can get groceries, medicines, or other needed items for you  Choose a person who does not have signs or symptoms of COVID-19 or has tested negative for it  The person should not be waiting for test results   He or she should not have a condition that increases the risk for COVID-19 or serious problems it causes  What you need to know about COVID-19 vaccines: Your healthcare provider can give you more information about what to expect, depending on your chronic condition  You are considered fully vaccinated against COVID-19 two weeks after the final dose of any COVID-19 vaccine  Let your healthcare provider know when you have received the final dose of the vaccine  Make a copy of your vaccination card  Keep the original with you in case you need to show it  Keep the copy in a safe place  1  COVID-19 vaccines are given as a shot in 1 or 2 doses  Vaccination is recommended for everyone 5 years or older  1  One 2-dose vaccine is fully approved  for those 16 years or older  This vaccine also has an emergency use authorization (EUA) for children 11to 13years old  No vaccine is currently available for children younger than 5 years  2  A booster (additional) dose  is given to help the immune system continue to protect against severe COVID-19     1  A booster is recommended for all adults 18 or older  The booster can be a different brand of the COVID-19 vaccine than you originally received  The timing for the booster depends on the type of vaccine you received:    § 1-dose vaccine: The booster is given at least 2 months after you received the vaccine  § 2-dose vaccine: The booster is given at least 5 or 6 months after the second dose  2  A booster can be given to adolescents 15to 16years old  Only 1 COVID-19 vaccine has this EUA  The booster is given at least 5 months after the second dose of the original vaccine series  3  A booster is recommended for immunocompromised children 11to 6years old  Only 1 COVID-19 vaccine has this EUA  The booster is given 28 days after the second dose  Continue social distancing and other measures, even after you get the vaccine  Although it is not common, you can become infected after you get the vaccine   You may also be able to pass the virus to others without knowing you are infected  After you get the vaccine, check local, national, and international travel rules  You may need to be tested before you travel  Some countries require proof of a negative test before you travel  You may also need to quarantine after you return  Medicine may be given to prevent infection  The medicine can be given if you are at high risk for infection and cannot get the vaccine  It can also be given if your immune system does not respond well to the vaccine  Lower your risk for COVID-19:   · Wash your hands often throughout the day  Use soap and water  Rub your soapy hands together, lacing your fingers, for at least 20 seconds  Rinse with warm, running water  Dry your hands with a clean towel or paper towel  Use hand  that contains alcohol if soap and water are not available  Teach children how to wash their hands and use hand   · Cover sneezes and coughs  Turn your face away and cover your mouth and nose with a tissue  Throw the tissue away  Use the bend of your arm if a tissue is not available  Then wash your hands with soap and water or use hand   Teach children how to cover a cough or sneeze  · Follow worldwide, national, and local social distancing guidelines  Keep at least 6 feet (2 meters) between you and others  · Wear a face covering (mask) around anyone who does not live in your home  Use a cloth covering with at least 2 layers  You can also create layers by putting a cloth covering over a disposable non-medical mask  Cover your mouth and your nose  · Try not to touch your face  If you get the virus on your hands, you can transfer it to your eyes, nose, or mouth and become infected  You can also transfer it to objects, surfaces, or people  · Clean and disinfect high-touch surfaces and objects in your home often    Use disinfecting wipes, or make a solution by mixing 4 teaspoons of bleach with 1 quart (4 cups) of water  Do not  use any cleaning or disinfecting products that can trigger an asthma attack or other breathing problems  Open windows or have circulating air as you clean  Do not  mix ammonia with bleach  This will create toxic fumes  How to follow social distancing guidelines:  National and local social distancing rules vary  Rules and restrictions may change over time as restrictions are lifted  The following are general guidelines:  · Stay home if you are sick or think you may have COVID-19  It is important to stay home if you are waiting for a testing appointment or for test results  · Avoid close physical contact with anyone who does not live in your home  Do not shake hands with, hug, or kiss a person as a greeting  If you must use public transportation (such as a bus or subway), try to sit or stand away from others  Wear your face covering  · Avoid in-person gatherings and crowds  Attend virtually if possible  Help strengthen your immune system:   1  Ask about other vaccines you may need  Get the influenza (flu) vaccine as soon as recommended each year, usually starting in September or October  Get the pneumonia vaccine if recommended  Your healthcare provider can tell you if you should also get other vaccines, and when to get them  2  Do not smoke  Nicotine and other chemicals in cigarettes and cigars can increase your risk for infection and for serious COVID-19 effects  Ask your healthcare provider for information if you currently smoke and need help to quit  E-cigarettes or smokeless tobacco still contain nicotine  Talk to your healthcare provider before you use these products  3  Eat a variety of healthy foods  Examples include vegetables, fruits, whole-grain breads and cereals, lean meats and poultry, fish, low-fat dairy products, and cooked beans  Healthy foods contain nutrients that help keep your immune system strong           4  Find ways to manage stress  You may be feeling more stressed than usual because of the COVID-19 outbreak  The situation is very stressful to many people  Talk to your healthcare providers about ways to manage stress during this time  Stress can lead to breathing problems or make the problems worse  Stress can trigger an attack or exacerbation of many health conditions  It is important to do things that help you feel more relaxed, such as the following:     ? Pick 1 or 2 times a day to watch the news  Constant news watching can increase your stress levels  ? Talk to a friend on the phone or through a video chat  ? Take a warm, soothing bath  ? Listen to music  ? Exercise can also help relieve stress  This may be hard if your regular gym or outdoor exercise area is closed  If you do not have exercise equipment at home, try walking inside your home  You can walk quickly or turn on music and dance  Follow up with your doctor or healthcare provider as directed: Your providers will tell you when you can come in for tests, procedures, or check-ups  Bring your symptom record with you to all appointments  Write down your questions so you remember to ask them during your visits  For more information:   · Centers for Disease Control and Prevention  [address] , [address]  Phone: 4- 456 - 9984995  Phone: 1- 182 - 4602967  Web Address: [employer] com br    © Copyright [address] 2022 Information is for End User's use only and may not be sold, redistributed or otherwise used for commercial purposes  All illustrations and images included in CareNotes® are the copyrighted property of A D A M , Inc  or [facility] Jaquan Reed   The above information is an  only  It is not intended as medical advice for individual conditions or treatments  Talk to your doctor, nurse or pharmacist before following any medical regimen to see if it is safe and effective for you  Follow up with PCP in 3-5 days    Proceed to  ER if symptoms worsen  Chief Complaint     Chief Complaint   Patient presents with    Cold Like Symptoms     Ears clogged, nasal congestion and sneezing that started yesterday         History of Present Illness     Patient presents along with 1year old son  Son became ill early yesterday; patient started with "head cold" like symptoms out of nowhere early afternoon  Her brother was in [facility] then at rehab in [facility]  He just came home 2-3 days ago and has cold-like symptoms  Patient's symptoms are nasal congestion, clogged ears, sneezing, PND, sore throat  No lung symptoms (has a hx asthma)  No longer uses symbicort daily  Uses rescue inhaler as needed--has not needed to use it in a long time  Patient did have confirmed covid last fall and is vaccinated  Review of Systems     Review of Systems   HENT: Positive for congestion, ear pain, postnasal drip, sneezing and sore throat  Respiratory: Negative  Musculoskeletal: Positive for myalgias  All other systems reviewed and are negative          Current Medications       Current Outpatient Medications:     albuterol (2 5 mg/3 mL) 0 083 % nebulizer solution, Take 3 mL (2 5 mg total) by nebulization every 4 (four) hours as needed for wheezing or shortness of breath, Disp: 180 mL, Rfl: 1    albuterol (ProAir HFA) 90 mcg/act inhaler, Inhale 2 puffs every 4 (four) hours as needed for wheezing or shortness of breath, Disp: 8 5 g, Rfl: 1    albuterol (PROVENTIL HFA) 90 mcg/act inhaler, Inhale 2 puffs every 6 (six) hours as needed for wheezing, Disp: 6 7 g, Rfl: 0    budesonide (Pulmicort Flexhaler) 90 MCG/ACT inhaler, Inhale 2 puffs 2 (two) times a day Rinse mouth after use , Disp: 1 each, Rfl: 1    cyclobenzaprine (FLEXERIL) 10 mg tablet, Take 1 tablet (10 mg total) by mouth 2 (two) times a day as needed for muscle spasms, Disp: 14 tablet, Rfl: 0    fluticasone (FLONASE) 50 mcg/act nasal spray, 2 sprays into each nostril daily, Disp: 11 1 mL, Rfl: 0    levothyroxine 88 mcg tablet, take 1 tablet by mouth every morning, Disp: 30 tablet, Rfl: 2    pseudoephedrine (SUDAFED) 30 mg tablet, Take 2 tablets (60 mg total) by mouth every 6 (six) hours as needed for congestion, Disp: 30 tablet, Rfl: 0    topiramate (TOPAMAX) 25 mg sprinkle capsule, Take 2 capsules (50 mg total) by mouth daily, Disp: 180 capsule, Rfl: 1    albuterol (Ventolin HFA) 90 mcg/act inhaler, Inhale 2 puffs every 4 (four) hours as needed for wheezing or shortness of breath (Patient not taking: Reported on 7/19/2022), Disp: 36 g, Rfl: 2    loratadine (CLARITIN) 10 mg tablet, Take 1 tablet (10 mg total) by mouth daily, Disp: 30 tablet, Rfl: 0    Sodium Fluoride 1 1 % PSTE, Apply 1 Squirt to teeth daily at bedtime (Patient not taking: No sig reported), Disp: 100 mL, Rfl: 0    Current Allergies     Allergies as of 08/07/2022 - Reviewed 08/07/2022   Allergen Reaction Noted    Aripiprazole Other (See Comments), Confusion, and Seizures 12/16/2015    Tessalon [benzonatate] Anxiety and Other (See Comments) 04/01/2016    Zithromax [azithromycin] Rash, Hallucinations, and Hives 12/16/2015              The following portions of the patient's history were reviewed and updated as appropriate: allergies, current medications, past family history, past medical history, past social history, past surgical history and problem list      Past Medical History:   Diagnosis Date    Allergic     Anxiety     Asthma     Depression     Disease of thyroid gland     Migraine     Mood disorder ([facility] [address] )     Pregnant     Psychiatric disorder     depression,anxiety, mood disorder       Past Surgical History:   Procedure Laterality Date    APPENDECTOMY      FRACTURE SURGERY Right     wrist       Family History   Problem Relation Age of Onset    No Known Problems Mother     Thyroid disease Father     Hypertension Brother     Hypertension Maternal Grandmother     Thyroid disease Paternal Grandmother          Medications have been verified  Objective     Pulse 98   Temp 98 4 °F (36 9 °C)   Resp 18   Ht 5' 2" (1 575 m)   Wt 86 6 kg (191 lb)   LMP 07/26/2022   SpO2 98%   BMI 34 93 kg/m²   Patient's last menstrual period was 07/26/2022  Physical Exam     Physical Exam  Vitals and nursing note reviewed  Constitutional:       General: She is not in acute distress  Appearance: Normal appearance  She is well-developed and well-groomed  She is ill-appearing (mild)  She is not toxic-appearing or diaphoretic  HENT:      Head: Normocephalic and atraumatic  Right Ear: Hearing, ear canal and external ear normal  Tenderness present  A middle ear effusion is present  Tympanic membrane is not injected or erythematous  Left Ear: Hearing, ear canal and external ear normal  Tenderness present  A middle ear effusion is present  Tympanic membrane is not injected or erythematous  Nose: Mucosal edema and congestion present  No nasal tenderness  Right Sinus: No maxillary sinus tenderness or frontal sinus tenderness  Left Sinus: No maxillary sinus tenderness or frontal sinus tenderness  Mouth/Throat:      Mouth: Mucous membranes are moist       Pharynx: Oropharynx is clear  Uvula midline  No oropharyngeal exudate or posterior oropharyngeal erythema  Eyes:      Pupils: Pupils are equal, round, and reactive to light  Cardiovascular:      Rate and Rhythm: Normal rate and regular rhythm  No extrasystoles are present  Heart sounds: Normal heart sounds, S1 normal and S2 normal  No murmur heard  No friction rub  No gallop  Pulmonary:      Effort: Pulmonary effort is normal  No tachypnea, bradypnea, accessory muscle usage, prolonged expiration, respiratory distress or retractions  Breath sounds: Normal breath sounds and air entry  No stridor or decreased air movement  No decreased breath sounds, wheezing, rhonchi or rales     Abdominal:      General: Bowel sounds are normal  There is no distension  Palpations: Abdomen is soft  Tenderness: There is no abdominal tenderness  Musculoskeletal:         General: Normal range of motion  Cervical back: Normal range of motion and neck supple  Lymphadenopathy:      Cervical: Cervical adenopathy present  Skin:     General: Skin is warm and dry  Capillary Refill: Capillary refill takes less than 2 seconds  Neurological:      Mental Status: She is alert and oriented to person, place, and time  Psychiatric:         Behavior: Behavior normal  Behavior is cooperative  Thought Content:  Thought content normal          Judgment: Judgment normal

## 2022-08-07 NOTE — PATIENT INSTRUCTIONS
Eat well, drink water, rest when you need it, take a multivitamin, vitamin C 1,000 mg BID, vitamin D 2,000 IU daily  Use over the counter medications to treat symptoms  Follow-up with your PCP  Isolation is Sat afternoon -Thurs afternoon  5 days strict masking is Thurs afternoon-Tues 8/16 afternoon inclusive  I refilled your albuterol inhaler  If you start experiencing any lung tightness, start the Pulmicort inhaler (rinse and spit after use) and take it steadily for several weeks or as directed by your PCP  COVID-19 and Chronic Health Conditions   AMBULATORY CARE:   What you need to know about coronavirus disease 2019 (COVID-19) and chronic health conditions: Your chronic condition may increase your risk for COVID-19 or serious problems it can cause  Healthcare providers might need to make changes that affect how you usually manage your chronic health condition  Providers may change hours of operation or not have patients come in to be seen  You may not be able to make appointments to get blood drawn or to have tests or procedures  This may continue until the virus that causes COVID-19 is controlled  Until then, you can take steps to manage your condition  The steps will also lower your risk for COVID-19 or the serious problems it causes  If you do develop COVID-19, healthcare providers will tell you when it is okay to be around others after you recover  This depends on your chronic condition, any symptoms of COVID-19 that developed, and how severe the symptoms were  What you need to know about serious problems from the virus:  You may develop long-term health problems caused by the virus  Your risk is higher if you are 65 or older  A weak immune system, obesity, diabetes, chronic kidney disease, or a heart or lung condition can also increase your risk  Your risk is also higher if you are a current or former cigarette smoker   COVID-19 can lead to any of the following:  Multisymptom inflammatory syndrome in adults (MIS-A) or in children (MIS-C), causing inflammation in the heart, digestive system, skin, or brain    Shortness of breath, serious lower respiratory conditions, such as pneumonia or acute respiratory distress syndrome (ARDS)    Blood clots or blood vessel damage    Organ damage from a lack of oxygen or from blood clots    Sleep problems    Problems thinking clearly, remembering information, or concentrating    Mood changes, depression, or anxiety    Long-term problems tasting or smelling    Loss of appetite and weight loss    Nerve pain    Fatigue (feeling mentally and physically tired)    Call your local emergency number (911 in the [address]) if:   You have trouble breathing or shortness of breath  You have chest pain or pressure that lasts longer than 5 minutes  You become confused or hard to wake  Your lips or face are blue  Seek care immediately if:   You have a fever of 104°F (40°C) or higher  Call your doctor or healthcare provider if:   You have symptoms of COVID-19  You have questions or concerns about COVID-19 or your chronic condition  How the 2019 coronavirus spreads: The virus spreads quickly and easily  The virus can be passed starting 2 to 3 days before symptoms begin or before a positive test if symptoms never begin  Droplets are the main way all coronaviruses spread  The virus travels in droplets that form when a person talks, sings, coughs, or sneezes  The droplets can also float in the air for minutes or hours  Infection happens when you breathe in the droplets or get them in your eyes or nose  Close personal contact with an infected person increases your risk for infection  This means being within 6 feet (2 meters) of the person for at least 15 minutes over 24 hours  Person-to-person contact can spread the virus  For example, a person with the virus on his or her hands can spread it by shaking hands with someone      The virus can stay on objects and surfaces for up to 3 days  You may become infected by touching the object or surface and then touching your eyes or mouth  Manage your chronic health condition during this time:  If you do not have a regular healthcare provider, experts recommend you contact a local [facility] health center or health department  The following can help you manage your condition and prevent COVID-19:  Get emergency care for your condition if needed  Talk to your healthcare providers about symptoms of your chronic condition that need immediate care  Your providers can help you create a plan or add exacerbation management to your plan  The plan will include when to go to an emergency department and when to call your local emergency number  This will depend on where you live and the services that are available during this time  Go to dialysis appointments as scheduled  It is important to stay on schedule  You will need to have enough food to be able to follow the emergency diet plan if you must miss a session  The emergency diet needs to be part of the management plan for your condition  Reschedule any upcoming appointments as needed  Medical facilities may be closed until the coronavirus is better controlled  This means you may need to reschedule a surgery, procedure, or check-up appointment  If you cannot have a phone or video appointment, you will need to make a new appointment  Some providers may be scheduling appointments several months in advance  Some surgeries and procedures will happen as scheduled  This depends on the medical condition and the reason for the surgery or procedure  You may need to have extra testing for COVID-19 several days before  Follow any regular management plan you use  Your healthcare provider will tell you if you need to make any changes to your regular management plan  For example, if you have asthma, continue to follow your asthma action plan   If you have diabetes, you may need to check your blood sugar level more often  Stress and illness can make blood sugar levels go up  You may need to adjust medicine such as insulin  If you have a heart condition or high blood pressure, you may need to check your blood pressure more often  Stress and illness can also raise your blood pressure  Talk to your healthcare providers about your medicines  You may be able to get more than 1 month of medicine at a time  This will lower the number of times you need to go to a pharmacy to get your medicines  Make sure you have enough medicine if you have a condition that can lead to an emergency  Examples include asthma medicines, insulin, or an epinephrine pen  Check the expiration dates on the medicines you currently have  Ask for refills as soon as possible, if needed  If it is not time to refill prescriptions, you may be able to get an emergency supply of some medicines  Medicine plans vary, so ask your healthcare provider or pharmacist for options  Have supplies available in your home  If possible, get extra supplies you use regularly  Examples include absorbent pads, syringes, and wound cleaning solutions  This will limit the number of trips out of your home to get supplies  Know the signs and symptoms of COVID-19  Signs and symptoms usually start about 5 days after infection but can take 2 to 14 days  Signs and symptoms range from mild to severe  You may feel like you have the flu or a bad cold  Your chronic health condition may cause some of the same symptoms COVID-19 causes  This can make it hard to know if a symptom is from COVID-19 or your chronic condition  Keep a record of any new or worsening symptom you have  This is especially important if you have a condition that often causes shortness of breath  Your provider can tell you if you should be tested for COVID-19   Tell your healthcare provider if you think you were infected but develop signs or symptoms not listed below:    A cough    Shortness of breath or trouble breathing that may become severe    A fever of at least 100 4°F, or 38°C (may be lower in adults 65 or older)    Chills that might include shaking    Muscle pain, body aches, or a headache    A sore throat    Suddenly not being able to taste or smell anything    Feeling very tired (fatigue)    Congestion (stuffy head and nose), or a runny nose    Diarrhea, nausea, or vomiting    What you can do to prevent having to go out of your home during this time:   Ask your healthcare provider for other ways to have appointments  Some providers offer phone, video, or other types of appointments  Have food, medicines, and other supplies delivered  Some pharmacies can send certain medicines to you through the mail  Grocery stores and restaurants may be able to deliver food and other items  If possible, have delivered items placed somewhere  Try not to have someone hand you an item  You will be so close to the person that the virus can spread between you  Ask someone to get items you need  The person can get groceries, medicines, or other needed items for you  Choose a person who does not have signs or symptoms of COVID-19 or has tested negative for it  The person should not be waiting for test results  He or she should not have a condition that increases the risk for COVID-19 or serious problems it causes  What you need to know about COVID-19 vaccines: Your healthcare provider can give you more information about what to expect, depending on your chronic condition  You are considered fully vaccinated against COVID-19 two weeks after the final dose of any COVID-19 vaccine  Let your healthcare provider know when you have received the final dose of the vaccine  Make a copy of your vaccination card  Keep the original with you in case you need to show it  Keep the copy in a safe place  COVID-19 vaccines are given as a shot in 1 or 2 doses  Vaccination is recommended for everyone 5 years or older      One 2-dose vaccine is fully approved  for those 16 years or older  This vaccine also has an emergency use authorization (EUA) for children 11to 13years old  No vaccine is currently available for children younger than 5 years  A booster (additional) dose  is given to help the immune system continue to protect against severe COVID-19  A booster is recommended for all adults 18 or older  The booster can be a different brand of the COVID-19 vaccine than you originally received  The timing for the booster depends on the type of vaccine you received:    1-dose vaccine: The booster is given at least 2 months after you received the vaccine  2-dose vaccine: The booster is given at least 5 or 6 months after the second dose  A booster can be given to adolescents 15to 16years old  Only 1 COVID-19 vaccine has this EUA  The booster is given at least 5 months after the second dose of the original vaccine series  A booster is recommended for immunocompromised children 11to 6years old  Only 1 COVID-19 vaccine has this EUA  The booster is given 28 days after the second dose  Continue social distancing and other measures, even after you get the vaccine  Although it is not common, you can become infected after you get the vaccine  You may also be able to pass the virus to others without knowing you are infected  After you get the vaccine, check local, national, and international travel rules  You may need to be tested before you travel  Some countries require proof of a negative test before you travel  You may also need to quarantine after you return  Medicine may be given to prevent infection  The medicine can be given if you are at high risk for infection and cannot get the vaccine  It can also be given if your immune system does not respond well to the vaccine  Lower your risk for COVID-19:   Wash your hands often throughout the day  Use soap and water   Rub your soapy hands together, lacing your fingers, for at least 20 seconds  Rinse with warm, running water  Dry your hands with a clean towel or paper towel  Use hand  that contains alcohol if soap and water are not available  Teach children how to wash their hands and use hand   Cover sneezes and coughs  Turn your face away and cover your mouth and nose with a tissue  Throw the tissue away  Use the bend of your arm if a tissue is not available  Then wash your hands with soap and water or use hand   Teach children how to cover a cough or sneeze  Follow worldwide, national, and local social distancing guidelines  Keep at least 6 feet (2 meters) between you and others  Wear a face covering (mask) around anyone who does not live in your home  Use a cloth covering with at least 2 layers  You can also create layers by putting a cloth covering over a disposable non-medical mask  Cover your mouth and your nose  Try not to touch your face  If you get the virus on your hands, you can transfer it to your eyes, nose, or mouth and become infected  You can also transfer it to objects, surfaces, or people  Clean and disinfect high-touch surfaces and objects in your home often  Use disinfecting wipes, or make a solution by mixing 4 teaspoons of bleach with 1 quart (4 cups) of water  Do not  use any cleaning or disinfecting products that can trigger an asthma attack or other breathing problems  Open windows or have circulating air as you clean  Do not  mix ammonia with bleach  This will create toxic fumes  How to follow social distancing guidelines:  National and local social distancing rules vary  Rules and restrictions may change over time as restrictions are lifted  The following are general guidelines:  Stay home if you are sick or think you may have COVID-19  It is important to stay home if you are waiting for a testing appointment or for test results      Avoid close physical contact with anyone who does not live in your home  Do not shake hands with, hug, or kiss a person as a greeting  If you must use public transportation (such as a bus or subway), try to sit or stand away from others  Wear your face covering  Avoid in-person gatherings and crowds  Attend virtually if possible  Help strengthen your immune system:   Ask about other vaccines you may need  Get the influenza (flu) vaccine as soon as recommended each year, usually starting in September or October  Get the pneumonia vaccine if recommended  Your healthcare provider can tell you if you should also get other vaccines, and when to get them  Do not smoke  Nicotine and other chemicals in cigarettes and cigars can increase your risk for infection and for serious COVID-19 effects  Ask your healthcare provider for information if you currently smoke and need help to quit  E-cigarettes or smokeless tobacco still contain nicotine  Talk to your healthcare provider before you use these products  Eat a variety of healthy foods  Examples include vegetables, fruits, whole-grain breads and cereals, lean meats and poultry, fish, low-fat dairy products, and cooked beans  Healthy foods contain nutrients that help keep your immune system strong  Find ways to manage stress  You may be feeling more stressed than usual because of the COVID-19 outbreak  The situation is very stressful to many people  Talk to your healthcare providers about ways to manage stress during this time  Stress can lead to breathing problems or make the problems worse  Stress can trigger an attack or exacerbation of many health conditions  It is important to do things that help you feel more relaxed, such as the following:     Pick 1 or 2 times a day to watch the news  Constant news watching can increase your stress levels  Talk to a friend on the phone or through a video chat  Take a warm, soothing bath  Listen to music  Exercise can also help relieve stress   This may be hard if your regular gym or outdoor exercise area is closed  If you do not have exercise equipment at home, try walking inside your home  You can walk quickly or turn on music and dance  Follow up with your doctor or healthcare provider as directed: Your providers will tell you when you can come in for tests, procedures, or check-ups  Bring your symptom record with you to all appointments  Write down your questions so you remember to ask them during your visits  For more information:   [facility]  [address] [place] , [address]  Phone: 3- 024 - 2547639  Phone: 7- 047 - 8836676  Web Address: [employer] com br    © Copyright [employer] 2022 Information is for End User's use only and may not be sold, redistributed or otherwise used for commercial purposes  All illustrations and images included in CareNotes® are the copyrighted property of [employer]  or [facility] Jaquan Reed   The above information is an  only  It is not intended as medical advice for individual conditions or treatments  Talk to your doctor, nurse or pharmacist before following any medical regimen to see if it is safe and effective for you

## 2022-08-08 DIAGNOSIS — Z87.09 HISTORY OF ASTHMA: ICD-10-CM

## 2022-08-08 DIAGNOSIS — U07.1 COVID: ICD-10-CM

## 2022-08-08 RX ORDER — BUDESONIDE 90 UG/1
1 AEROSOL, POWDER RESPIRATORY (INHALATION) 2 TIMES DAILY
Qty: 1 EACH | Refills: 1 | Status: SHIPPED | OUTPATIENT
Start: 2022-08-08

## 2022-08-20 DIAGNOSIS — G43.909 MIGRAINE WITHOUT STATUS MIGRAINOSUS, NOT INTRACTABLE, UNSPECIFIED MIGRAINE TYPE: ICD-10-CM

## 2022-08-23 ENCOUNTER — APPOINTMENT (OUTPATIENT)
Dept: LAB | Facility: HOSPITAL | Age: 27
End: 2022-08-23
Payer: COMMERCIAL

## 2022-08-23 DIAGNOSIS — E06.3 HYPOTHYROIDISM DUE TO HASHIMOTO'S THYROIDITIS: ICD-10-CM

## 2022-08-23 DIAGNOSIS — E03.8 HYPOTHYROIDISM DUE TO HASHIMOTO'S THYROIDITIS: ICD-10-CM

## 2022-08-23 LAB — TSH SERPL DL<=0.05 MIU/L-ACNC: 3.55 UIU/ML (ref 0.45–4.5)

## 2022-08-23 PROCEDURE — 36415 COLL VENOUS BLD VENIPUNCTURE: CPT

## 2022-08-23 PROCEDURE — 84443 ASSAY THYROID STIM HORMONE: CPT

## 2022-08-23 RX ORDER — TOPIRAMATE 25 MG/1
CAPSULE, COATED PELLETS ORAL
Qty: 180 CAPSULE | Refills: 3 | Status: SHIPPED | OUTPATIENT
Start: 2022-08-23

## 2022-09-17 ENCOUNTER — OFFICE VISIT (OUTPATIENT)
Dept: URGENT CARE | Facility: MEDICAL CENTER | Age: 27
End: 2022-09-17
Payer: COMMERCIAL

## 2022-09-17 ENCOUNTER — APPOINTMENT (OUTPATIENT)
Dept: RADIOLOGY | Facility: MEDICAL CENTER | Age: 27
End: 2022-09-17
Payer: COMMERCIAL

## 2022-09-17 VITALS
SYSTOLIC BLOOD PRESSURE: 122 MMHG | WEIGHT: 195 LBS | HEIGHT: 62 IN | DIASTOLIC BLOOD PRESSURE: 70 MMHG | HEART RATE: 81 BPM | BODY MASS INDEX: 35.88 KG/M2 | OXYGEN SATURATION: 99 % | TEMPERATURE: 98.6 F | RESPIRATION RATE: 18 BRPM

## 2022-09-17 DIAGNOSIS — M79.671 RIGHT FOOT PAIN: Primary | ICD-10-CM

## 2022-09-17 DIAGNOSIS — M79.671 RIGHT FOOT PAIN: ICD-10-CM

## 2022-09-17 PROCEDURE — 73630 X-RAY EXAM OF FOOT: CPT

## 2022-09-17 PROCEDURE — 99212 OFFICE O/P EST SF 10 MIN: CPT | Performed by: PHYSICIAN ASSISTANT

## 2022-09-17 RX ORDER — METHYLPREDNISOLONE 4 MG/1
TABLET ORAL
Qty: 1 EACH | Refills: 0 | Status: SHIPPED | OUTPATIENT
Start: 2022-09-17

## 2022-09-17 NOTE — PROGRESS NOTES
[facility]        NAME: Lida Farrar is a 32 y o  female  : 1995    MRN: [id number]  DATE: 2022  TIME: 12:21 PM    Assessment and Plan   Right foot pain [M79 [id number]]  1  Right foot pain  XR foot 3+ vw right    methylPREDNISolone 4 MG tablet therapy pack         Patient Instructions       Follow up with PCP in 3-5 days  Proceed to  ER if symptoms worsen  Chief Complaint     Chief Complaint   Patient presents with    Foot Pain     Right foot pain x 1 week is unsure if injured foot          History of Present Illness       Patient presents with a one-week history of right foot and ankle pain  No known injury  She was wearing rain boots all day and noticed increased pain after wearing the boot  Yesterday she had an increased swelling and difficulty bearing weight  No known injury  Review of Systems   Review of Systems   Constitutional: Negative for fever  Respiratory: Negative for cough  Musculoskeletal:        Right foot and ankle pain   Skin: Negative for rash           Current Medications       Current Outpatient Medications:     albuterol (2 5 mg/3 mL) 0 083 % nebulizer solution, Take 3 mL (2 5 mg total) by nebulization every 4 (four) hours as needed for wheezing or shortness of breath, Disp: 180 mL, Rfl: 1    albuterol (ProAir HFA) 90 mcg/act inhaler, Inhale 2 puffs every 4 (four) hours as needed for wheezing or shortness of breath, Disp: 8 5 g, Rfl: 1    albuterol (PROVENTIL HFA) 90 mcg/act inhaler, Inhale 2 puffs every 6 (six) hours as needed for wheezing, Disp: 6 7 g, Rfl: 0    albuterol (Ventolin HFA) 90 mcg/act inhaler, Inhale 2 puffs every 4 (four) hours as needed for wheezing or shortness of breath, Disp: 36 g, Rfl: 2    budesonide (Pulmicort Flexhaler) 90 MCG/ACT inhaler, Inhale 1 puff 2 (two) times a day, Disp: 1 each, Rfl: 1    cyclobenzaprine (FLEXERIL) 10 mg tablet, Take 1 tablet (10 mg total) by mouth 2 (two) times a day as needed for muscle spasms, Disp: 14 tablet, Rfl: 0    fluticasone (FLONASE) 50 mcg/act nasal spray, 2 sprays into each nostril daily, Disp: 11 1 mL, Rfl: 0    levothyroxine 88 mcg tablet, take 1 tablet by mouth every morning, Disp: 30 tablet, Rfl: 2    methylPREDNISolone 4 MG tablet therapy pack, Use as directed on package, Disp: 1 each, Rfl: 0    pseudoephedrine (SUDAFED) 30 mg tablet, Take 2 tablets (60 mg total) by mouth every 6 (six) hours as needed for congestion, Disp: 30 tablet, Rfl: 0    Sodium Fluoride 1 1 % PSTE, Apply 1 Squirt to teeth daily at bedtime, Disp: 100 mL, Rfl: 0    topiramate (TOPAMAX) 25 mg sprinkle capsule, take 2 capsules by mouth daily, Disp: 180 capsule, Rfl: 3    loratadine (CLARITIN) 10 mg tablet, Take 1 tablet (10 mg total) by mouth daily, Disp: 30 tablet, Rfl: 0    Current Allergies     Allergies as of 09/17/2022 - Reviewed 09/17/2022   Allergen Reaction Noted    Aripiprazole Other (See Comments), Confusion, and Seizures 12/16/2015    Tessalon [benzonatate] Anxiety and Other (See Comments) 04/01/2016    Zithromax [azithromycin] Rash, Hallucinations, and Hives 12/16/2015            The following portions of the patient's history were reviewed and updated as appropriate: allergies, current medications, past family history, past medical history, past social history, past surgical history and problem list      Past Medical History:   Diagnosis Date    Allergic     Anxiety     Asthma     Depression     Disease of thyroid gland     Migraine     Mood disorder (HCC)     Pregnant     Psychiatric disorder     depression,anxiety, mood disorder       Past Surgical History:   Procedure Laterality Date    APPENDECTOMY      FRACTURE SURGERY Right     wrist       Family History   Problem Relation Age of Onset    No Known Problems Mother     Thyroid disease Father     Hypertension Brother     Hypertension Maternal Grandmother     Thyroid disease Paternal Grandmother          Medications have been verified  Objective   /70   Pulse 81   Temp 98 6 °F (37 °C)   Resp 18   Ht 5' 2" (1 575 m)   Wt 88 5 kg (195 lb)   SpO2 99%   BMI 35 67 kg/m²   No LMP recorded  Physical Exam     Physical Exam  Vitals and nursing note reviewed  Constitutional:       Appearance: Normal appearance  HENT:      Head: Normocephalic and atraumatic  Cardiovascular:      Rate and Rhythm: Normal rate and regular rhythm  Pulmonary:      Effort: Pulmonary effort is normal    Musculoskeletal:      Comments: Right foot without swelling ecchymosis rashes or wounds  Tenderness over the lateral aspect of the right heel through the lateral aspect of the 5th metatarsal   Diminished range of motion secondary to pain  No increased pain with hyperextension of the toes  Skin:     General: Skin is warm  Neurological:      Mental Status: She is alert  Right foot x-ray- No acute bony abnormality

## 2022-09-26 DIAGNOSIS — E03.8 HYPOTHYROIDISM DUE TO HASHIMOTO'S THYROIDITIS: ICD-10-CM

## 2022-09-26 DIAGNOSIS — E06.3 HYPOTHYROIDISM DUE TO HASHIMOTO'S THYROIDITIS: ICD-10-CM

## 2022-09-28 RX ORDER — LEVOTHYROXINE SODIUM 88 UG/1
TABLET ORAL
Qty: 30 TABLET | Refills: 2 | Status: SHIPPED | OUTPATIENT
Start: 2022-09-28

## 2022-11-18 ENCOUNTER — OFFICE VISIT (OUTPATIENT)
Dept: DENTISTRY | Facility: CLINIC | Age: 27
End: 2022-11-18

## 2022-11-18 VITALS — SYSTOLIC BLOOD PRESSURE: 116 MMHG | HEART RATE: 82 BPM | DIASTOLIC BLOOD PRESSURE: 79 MMHG

## 2022-11-18 DIAGNOSIS — K03.6 ACCRETIONS ON TEETH: Primary | ICD-10-CM

## 2022-11-18 NOTE — PROGRESS NOTES
Dental procedures in this visit   •  - SCREENING OF A PATIENT (Completed)     [facility]     Billing [facility]: [facility] [address] [facility]   •  - BITEWINGS - 4 RADIOGRAPHIC IMAGES (Completed)     [facility] [facility]     Billing provider: Danay Melvin RDH, PHDHP   •  - PROPHYLAXIS - ADULT (Completed)     Service provider: Mauri [address]ira [address] [facility]     Billing provider: Danay Melvin RDH, PHDHP   •  - NUTRITIONAL COUNSELING FOR CONTROL OF DENTAL DISEASE (Completed)     [facility] [facility]     Billing provider: Danay Melvin RDH, PHDHP   •  - ORAL HYGIENE INSTRUCTIONS (Completed)     [facility] [facility]     Billing provider: Danay Melvin RDH, PHDHP     Subjective   Patient ID: Ac Ball is a 32 y o  female  Chief Complaint   Patient presents with   • Routine Oral Cleaning     Prophy  Chief Complaint   Patient presents with   • Routine Oral Cleaning          Method Used:  · Prophy Method Used: Hand Scaling  · Polished  · Flossed      Radiographs Taken in Dexis: (Taken to assess periodontal health)  · Bitewings x4      Intra/Extra Oral Cancer Screening:  · Within normal limits    Oral Hygiene:  · Fair    Plaque:  · Generalized    Calculus:  · Light    Bleeding:  · Generalized  · Light    Stain:  · Generalized  · Light  · Coffee/Tea    Periodontal Charting:   · Spot probing    Periodontal Classification:  · Generalized  · Mild  · Moderate  · Gingivitis    Nutritional Counseling:  · Discussed dietary habits and suggested better food choices  · Discussed pH and the role it plays in decay    Oral Hygiene Instruction:    Kusum Grayson over daily routine - discussed flossing - pt states uses "the picks" but she ran out of them, so she hasn't been flossing lately           Next Visit:  Dentist visit - resins, and periodic exam due  6 mo prophy due MAY 2023

## 2023-01-29 NOTE — ASSESSMENT & PLAN NOTE
"[facility] ATHLETICS  Genevieve ATC follow-up note  Date of service performed: 12/8/2022    Concern/injury: Concussion    Assessment/plan: Maida is asymptomatic again today and will complete the SCAT5 and ImPACT test. I consulted with Dr. Mukherjee about the retests and continuing the return to play progression.     SCAT5 RE-TEST 12/8/2022    Symptom Scale None 1 2 3 4 5 6   Headache x         Pressure in head x         Neck Pain x         Nausea and/or vomiting x         Dizziness x         Blurred vision x         Balance problems x         Sensitivity to light x         Sensitivity to noise x         Felling slowed down x         Feeling like \"in a fog\" x         \"Don't feel right\" x         Difficulty concentrating x         Difficulty remembering x         Fatigue or low energy x         Confusion x         Drowsiness x         Trouble falling asleep (if applicable) x         More emotional x         Irritability x         Sadness x         Nervous or Anxious x           Symptom score (max 22): 0  Symptom severity score (add all numbers max is 22 x 6 = 132) 0  Symptoms worse with physical activity? No  Symptoms worse with mental activity?   No    Cognitive assessment  Standardized Assessment of Concussion (SAC)  Orientation (1 point for each correct answer)  What month is it?1  What is the date today?1  What is the day of the week? 1  What year is it? 1  What time is it right now? (within 1 hour) 1  Orientation score 5of 5    Immediate memory  List         Trial 1   Trial 2    Trial 3    Alternative word lists   elbow       1            1            1             candle      baby        finger  apple        1           1            1           paper       monkey    ravindra  carpet       1            1            1            sugar        perfume   blanket  saddle      0            1            1           sandwich sunset      lemon  bubble      1            1            1              wagon      iron       " · Patient with mild intermittent asthma  · Mild acute exacerbation secondary to URI  · S/P 125 mg IV steroids in ER, albuterol/atovent neb at [facility]  · Plan for PO prednisone 40 mg tomorrow with tapering dose of 10 mg q48hrs until off if remains clinically stable and no further wheezing  · Xopenex TID  · Respiratory protocol      insect  Total  Immediate memory score 14 of 15    Concentration  Digits Backward:                                                       Alternative digit lists   5-2-6                   1                         6-2-9              5-2-6                4-1-5  1-7-9-5                1                        3-2-7-9          1-7-9-5             4-9-6-8  3-8-5-2-7             0                        1-5-2-8-6        3-8-5-2-7         6-1-8-4-3  8-3-1-9-6-4          0                       5-3-9-1-4-8    8-3-1-9-6-4      7-2-4-8-5-6    Months in Reverse Order:  1 pt. for entire sequence correct  Dec-Nov-Oct-Sept-Aug-Jul--May-Apr-Mar-Feb-   1  Concentration score  3 of 5      Balance examination  Which foot was tested: left  (i.e. which is the non-dominant foot)    Condition Total errors  Double Leg Stance (feet together) 0 of 10  Single leg stance (non-dominant foot) 1 of 10  Tandem stance (non-dominant foot at back) 0 of 10  Balance examination score (30 minus total errors) 29 of 30  Coordination examination    Upper limb coordination    Which arm was tested: right  Scorin correct repetitions in < 4 seconds = 1  Coordination score 1 of 1    Cognitive assessment  Standardized Assessment of Concussion (SAC)  Delayed recall  Denote each word correctly recalled. Total score equals number of words recalled.  List                        Alternative word lists   elbow                    candle       baby        finger     No  apple                     paper        monkey   ravindra    Yes  carpet                   sugar        perfume   blanket   No  saddle                  sandwich  sunset      lemon     Yes  bubble                  wagon       iron          insect     Yes  Delayed recall score 3 of 5    Overall score  Symptom score 0 of 22  Balance examination score 29 of 30  Coordination score 1 of 1    Orientation score 5 of 5  Immediate memory score 14 of 15  Concentration score 3 of 5  Delayed recall score 3  of 5  SAC subtotal 29 of 30    Katerine Tello ATC

## 2023-01-30 DIAGNOSIS — E03.8 HYPOTHYROIDISM DUE TO HASHIMOTO'S THYROIDITIS: ICD-10-CM

## 2023-01-30 DIAGNOSIS — E06.3 HYPOTHYROIDISM DUE TO HASHIMOTO'S THYROIDITIS: ICD-10-CM

## 2023-01-30 RX ORDER — LEVOTHYROXINE SODIUM 88 UG/1
TABLET ORAL
Qty: 30 TABLET | Refills: 2 | Status: SHIPPED | OUTPATIENT
Start: 2023-01-30

## 2023-03-31 ENCOUNTER — OFFICE VISIT (OUTPATIENT)
Dept: URGENT CARE | Facility: MEDICAL CENTER | Age: 28
End: 2023-03-31

## 2023-03-31 VITALS
HEIGHT: 62 IN | BODY MASS INDEX: 38.13 KG/M2 | RESPIRATION RATE: 20 BRPM | TEMPERATURE: 98.2 F | WEIGHT: 207.2 LBS | OXYGEN SATURATION: 98 % | SYSTOLIC BLOOD PRESSURE: 120 MMHG | DIASTOLIC BLOOD PRESSURE: 88 MMHG | HEART RATE: 84 BPM

## 2023-03-31 DIAGNOSIS — T78.40XA ALLERGY, INITIAL ENCOUNTER: Primary | ICD-10-CM

## 2023-03-31 NOTE — PROGRESS NOTES
[facility]        NAME: Leighann Nayak is a 32 y o  female  : 1995    MRN: [id number]  DATE: 2023  TIME: 3:51 PM    Assessment and Plan   Allergy, initial encounter Elliott Chavez  Allergy, initial encounter              Patient Instructions       Follow up with PCP in 3-5 days  Proceed to  ER if symptoms worsen  Chief Complaint     Chief Complaint   Patient presents with   • Cold Like Symptoms     Cough, head, tired, at home covid test (-) today x 3 days  OTC medication taken for symptoms  History of Present Illness       Presents with several day history of sinus pressure intermittent cough runny nose no nausea and headaches  Took allergy medicine which got rid of the 75% of her symptoms but she could not find the medication today  No fevers  Home COVID test negative  Review of Systems   Review of Systems   Constitutional: Negative for fever  HENT: Positive for rhinorrhea  Negative for congestion and sore throat  Respiratory: Positive for cough  Gastrointestinal: Positive for nausea  Negative for diarrhea and vomiting  Musculoskeletal: Negative for myalgias  Neurological: Positive for headaches           Current Medications       Current Outpatient Medications:   •  albuterol (2 5 mg/3 mL) 0 083 % nebulizer solution, Take 3 mL (2 5 mg total) by nebulization every 4 (four) hours as needed for wheezing or shortness of breath, Disp: 180 mL, Rfl: 1  •  albuterol (ProAir HFA) 90 mcg/act inhaler, Inhale 2 puffs every 4 (four) hours as needed for wheezing or shortness of breath, Disp: 8 5 g, Rfl: 1  •  albuterol (PROVENTIL HFA) 90 mcg/act inhaler, Inhale 2 puffs every 6 (six) hours as needed for wheezing, Disp: 6 7 g, Rfl: 0  •  albuterol (Ventolin HFA) 90 mcg/act inhaler, Inhale 2 puffs every 4 (four) hours as needed for wheezing or shortness of breath, Disp: 36 g, Rfl: 2  •  budesonide (Pulmicort Flexhaler) 90 MCG/ACT inhaler, Inhale 1 puff 2 (two) times a day, Disp: 1 each, Rfl: 1  •  levothyroxine 88 mcg tablet, take 1 tablet by mouth every morning, Disp: 30 tablet, Rfl: 2  •  topiramate (TOPAMAX) 25 mg sprinkle capsule, take 2 capsules by mouth daily, Disp: 180 capsule, Rfl: 3  •  cyclobenzaprine (FLEXERIL) 10 mg tablet, Take 1 tablet (10 mg total) by mouth 2 (two) times a day as needed for muscle spasms (Patient not taking: Reported on 3/31/2023), Disp: 14 tablet, Rfl: 0  •  fluticasone (FLONASE) 50 mcg/act nasal spray, 2 sprays into each nostril daily (Patient not taking: Reported on 3/31/2023), Disp: 11 1 mL, Rfl: 0  •  loratadine (CLARITIN) 10 mg tablet, Take 1 tablet (10 mg total) by mouth daily, Disp: 30 tablet, Rfl: 0  •  methylPREDNISolone 4 MG tablet therapy pack, Use as directed on package (Patient not taking: Reported on 3/31/2023), Disp: 1 each, Rfl: 0  •  pseudoephedrine (SUDAFED) 30 mg tablet, Take 2 tablets (60 mg total) by mouth every 6 (six) hours as needed for congestion (Patient not taking: Reported on 3/31/2023), Disp: 30 tablet, Rfl: 0  •  Sodium Fluoride 1 1 % PSTE, Apply 1 Squirt to teeth daily at bedtime (Patient not taking: Reported on 3/31/2023), Disp: 100 mL, Rfl: 0    Current Allergies     Allergies as of 03/31/2023 - Reviewed 03/31/2023   Allergen Reaction Noted   • Aripiprazole Other (See Comments), Confusion, and Seizures 12/16/2015   • Tessalon [benzonatate] Anxiety and Other (See Comments) 04/01/2016   • Zithromax [azithromycin] Rash, Hallucinations, and Hives 12/16/2015            The following portions of the patient's history were reviewed and updated as appropriate: allergies, current medications, past family history, past medical history, past social history, past surgical history and problem list      Past Medical History:   Diagnosis Date   • Allergic    • Anxiety    • Asthma    • Depression    • Disease of thyroid gland    • Migraine    • Mood disorder ([facility] [address] )    • Pregnant    • Psychiatric disorder "depression,anxiety, mood disorder       Past Surgical History:   Procedure Laterality Date   • APPENDECTOMY     • FRACTURE SURGERY Right     wrist       Family History   Problem Relation Age of Onset   • No Known Problems Mother    • Thyroid disease Father    • Hypertension Brother    • Hypertension Maternal Grandmother    • Thyroid disease Paternal Grandmother          Medications have been verified  Objective   /88   Pulse 84   Temp 98 2 °F (36 8 °C)   Resp 20   Ht 5' 2\" (1 575 m)   Wt 94 kg (207 lb 3 2 oz)   SpO2 98%   BMI 37 90 kg/m²   No LMP recorded  Physical Exam     Physical Exam  Vitals and nursing note reviewed  Constitutional:       Appearance: Normal appearance  HENT:      Head: Normocephalic and atraumatic  Right Ear: Tympanic membrane normal       Left Ear: Tympanic membrane normal       Mouth/Throat:      Mouth: Mucous membranes are moist       Pharynx: Oropharynx is clear  Eyes:      Conjunctiva/sclera: Conjunctivae normal    Cardiovascular:      Rate and Rhythm: Normal rate and regular rhythm  Heart sounds: Normal heart sounds  Pulmonary:      Effort: Pulmonary effort is normal    Musculoskeletal:      Cervical back: Neck supple  Lymphadenopathy:      Cervical: No cervical adenopathy  Skin:     General: Skin is warm  Neurological:      Mental Status: She is alert                     "

## 2023-04-18 PROBLEM — R09.89 ABNORMAL CHEST SOUNDS: Status: RESOLVED | Noted: 2019-11-15 | Resolved: 2023-04-18

## 2023-04-22 ENCOUNTER — APPOINTMENT (OUTPATIENT)
Dept: LAB | Facility: HOSPITAL | Age: 28
End: 2023-04-22

## 2023-04-22 DIAGNOSIS — Z13.220 SCREENING FOR LIPID DISORDERS: ICD-10-CM

## 2023-04-22 DIAGNOSIS — E03.9 ACQUIRED HYPOTHYROIDISM: ICD-10-CM

## 2023-04-22 LAB
ALBUMIN SERPL BCP-MCNC: 4.1 G/DL (ref 3.5–5)
ALP SERPL-CCNC: 97 U/L (ref 34–104)
ALT SERPL W P-5'-P-CCNC: 22 U/L (ref 7–52)
ANION GAP SERPL CALCULATED.3IONS-SCNC: 9 MMOL/L (ref 4–13)
AST SERPL W P-5'-P-CCNC: 18 U/L (ref 13–39)
BASOPHILS # BLD AUTO: 0.08 THOUSANDS/ΜL (ref 0–0.1)
BASOPHILS NFR BLD AUTO: 1 % (ref 0–1)
BILIRUB SERPL-MCNC: 0.7 MG/DL (ref 0.2–1)
BUN SERPL-MCNC: 13 MG/DL (ref 5–25)
CALCIUM SERPL-MCNC: 9.2 MG/DL (ref 8.4–10.2)
CHLORIDE SERPL-SCNC: 107 MMOL/L (ref 96–108)
CHOLEST SERPL-MCNC: 151 MG/DL
CO2 SERPL-SCNC: 21 MMOL/L (ref 21–32)
CREAT SERPL-MCNC: 0.83 MG/DL (ref 0.6–1.3)
EOSINOPHIL # BLD AUTO: 0.26 THOUSAND/ΜL (ref 0–0.61)
EOSINOPHIL NFR BLD AUTO: 3 % (ref 0–6)
ERYTHROCYTE [DISTWIDTH] IN BLOOD BY AUTOMATED COUNT: 13.8 % (ref 11.6–15.1)
GFR SERPL CREATININE-BSD FRML MDRD: 96 ML/MIN/1.73SQ M
GLUCOSE P FAST SERPL-MCNC: 93 MG/DL (ref 65–99)
HCT VFR BLD AUTO: 42 % (ref 34.8–46.1)
HDLC SERPL-MCNC: 37 MG/DL
HGB BLD-MCNC: 13.8 G/DL (ref 11.5–15.4)
IMM GRANULOCYTES # BLD AUTO: 0.06 THOUSAND/UL (ref 0–0.2)
IMM GRANULOCYTES NFR BLD AUTO: 1 % (ref 0–2)
LDLC SERPL CALC-MCNC: 85 MG/DL (ref 0–100)
LYMPHOCYTES # BLD AUTO: 2.65 THOUSANDS/ΜL (ref 0.6–4.47)
LYMPHOCYTES NFR BLD AUTO: 26 % (ref 14–44)
MCH RBC QN AUTO: 27.5 PG (ref 26.8–34.3)
MCHC RBC AUTO-ENTMCNC: 32.9 G/DL (ref 31.4–37.4)
MCV RBC AUTO: 84 FL (ref 82–98)
MONOCYTES # BLD AUTO: 0.96 THOUSAND/ΜL (ref 0.17–1.22)
MONOCYTES NFR BLD AUTO: 9 % (ref 4–12)
NEUTROPHILS # BLD AUTO: 6.32 THOUSANDS/ΜL (ref 1.85–7.62)
NEUTS SEG NFR BLD AUTO: 60 % (ref 43–75)
NONHDLC SERPL-MCNC: 114 MG/DL
NRBC BLD AUTO-RTO: 0 /100 WBCS
PLATELET # BLD AUTO: 406 THOUSANDS/UL (ref 149–390)
PMV BLD AUTO: 9.4 FL (ref 8.9–12.7)
POTASSIUM SERPL-SCNC: 3.5 MMOL/L (ref 3.5–5.3)
PROT SERPL-MCNC: 7.2 G/DL (ref 6.4–8.4)
RBC # BLD AUTO: 5.02 MILLION/UL (ref 3.81–5.12)
SODIUM SERPL-SCNC: 137 MMOL/L (ref 135–147)
TRIGL SERPL-MCNC: 144 MG/DL
TSH SERPL DL<=0.05 MIU/L-ACNC: 4.38 UIU/ML (ref 0.45–4.5)
WBC # BLD AUTO: 10.33 THOUSAND/UL (ref 4.31–10.16)

## 2023-05-26 ENCOUNTER — OFFICE VISIT (OUTPATIENT)
Dept: DENTISTRY | Facility: CLINIC | Age: 28
End: 2023-05-26

## 2023-05-26 VITALS — HEART RATE: 69 BPM | SYSTOLIC BLOOD PRESSURE: 101 MMHG | DIASTOLIC BLOOD PRESSURE: 71 MMHG

## 2023-05-26 DIAGNOSIS — K03.6 ACCRETIONS ON TEETH: Primary | ICD-10-CM

## 2023-05-26 NOTE — PROGRESS NOTES
Dental procedures in this visit   •  - SCREENING OF A PATIENT (Completed)     [facility]     Billing provider: Risa Negrete RDH, PHDHP   •  - PROPHYLAXIS - ADULT (Completed)     Service provider: Mauri [address] [facility]     Billing provider: Risa Negrete RDH, PHDHP   •  - NUTRITIONAL COUNSELING FOR CONTROL OF DENTAL DISEASE (Completed)     Service provider: Mauri [address] [facility]     Billing provider: Risa Negrete RDH, PHDHP   •  - ORAL HYGIENE INSTRUCTIONS (Completed)     Service provider: Mauri [address] [facility]     Billing provider: Risa Negrete RDH, PHDHP     Subjective   Patient ID: Familia Dowell is a 32 y o  female  Chief Complaint   Patient presents with   • Routine Oral Cleaning     Prophy  Chief Complaint   Patient presents with   • Routine Oral Cleaning          Method Used:  · Prophy Method Used: Hand Scaling  · Polished  · Flossed      Radiographs Taken in Dexis: (Taken to assess periodontal health)  · None      Intra/Extra Oral Cancer Screening:  · Within normal limits    Oral Hygiene:  · Fair    Plaque:  · Generalized  · Light    Calculus:  · Light  · Lower anteriors  · Posteriors    Bleeding:  · Generalized  · Light    Stain:  · Generalized  · Light  · Coffee/Tea  · Tobacco    Periodontal Charting:   · Spot probing    Periodontal Classification:  · Generalized  · Mild  · Moderate  · Gingivitis    Nutritional Counseling:  · Discussed dietary habits and suggested better food choices  · Discussed pH and the role it plays in decay    Oral Hygiene Instruction:    Florinda riley daily routine - discussed water-pik and proper use    No orders of the defined types were placed in this encounter         Next Visit:  Dentist visit - resins  6 month prophy due on or after 23

## 2023-06-12 DIAGNOSIS — E03.8 HYPOTHYROIDISM DUE TO HASHIMOTO'S THYROIDITIS: ICD-10-CM

## 2023-06-12 DIAGNOSIS — E06.3 HYPOTHYROIDISM DUE TO HASHIMOTO'S THYROIDITIS: ICD-10-CM

## 2023-06-12 RX ORDER — LEVOTHYROXINE SODIUM 88 UG/1
TABLET ORAL
Qty: 30 TABLET | Refills: 2 | Status: SHIPPED | OUTPATIENT
Start: 2023-06-12

## 2023-06-26 ENCOUNTER — OFFICE VISIT (OUTPATIENT)
Dept: DENTISTRY | Facility: CLINIC | Age: 28
End: 2023-06-26

## 2023-06-26 VITALS — DIASTOLIC BLOOD PRESSURE: 77 MMHG | HEART RATE: 84 BPM | SYSTOLIC BLOOD PRESSURE: 113 MMHG

## 2023-06-26 DIAGNOSIS — S02.5XXB OPEN FRACTURE OF TOOTH, INITIAL ENCOUNTER: Primary | ICD-10-CM

## 2023-06-26 NOTE — PROGRESS NOTES
Pt presents for limited exam/periodic exam  PMH reviewed, no changes    CC: Tooth on the lower right broke off while eating a thin  Crust pizza  Haven't brushed teeth in a week because afraid tooth might crack off  Extraoral exam: No extraoral swellng  Intraoral Exam:  Fractured #30 due to decay   Seems restorable    Xrays Taken: 4 BWs       NV:resins

## 2023-06-30 ENCOUNTER — OFFICE VISIT (OUTPATIENT)
Dept: URGENT CARE | Facility: CLINIC | Age: 28
End: 2023-06-30
Payer: COMMERCIAL

## 2023-06-30 VITALS
BODY MASS INDEX: 36.51 KG/M2 | SYSTOLIC BLOOD PRESSURE: 107 MMHG | TEMPERATURE: 98.1 F | HEIGHT: 62 IN | HEART RATE: 78 BPM | OXYGEN SATURATION: 98 % | WEIGHT: 198.4 LBS | DIASTOLIC BLOOD PRESSURE: 59 MMHG | RESPIRATION RATE: 20 BRPM

## 2023-06-30 DIAGNOSIS — N39.0 ACUTE UTI: Primary | ICD-10-CM

## 2023-06-30 DIAGNOSIS — R80.9 PROTEINURIA, UNSPECIFIED TYPE: ICD-10-CM

## 2023-06-30 LAB
SL AMB  POCT GLUCOSE, UA: ABNORMAL
SL AMB LEUKOCYTE ESTERASE,UA: ABNORMAL
SL AMB POCT BILIRUBIN,UA: ABNORMAL
SL AMB POCT BLOOD,UA: ABNORMAL
SL AMB POCT CLARITY,UA: ABNORMAL
SL AMB POCT COLOR,UA: ABNORMAL
SL AMB POCT KETONES,UA: ABNORMAL
SL AMB POCT NITRITE,UA: ABNORMAL
SL AMB POCT PH,UA: 5
SL AMB POCT SPECIFIC GRAVITY,UA: 1.02
SL AMB POCT URINE HCG: NORMAL
SL AMB POCT URINE PROTEIN: 300
SL AMB POCT UROBILINOGEN: 0.2

## 2023-06-30 PROCEDURE — 87086 URINE CULTURE/COLONY COUNT: CPT | Performed by: PHYSICIAN ASSISTANT

## 2023-06-30 PROCEDURE — 81025 URINE PREGNANCY TEST: CPT | Performed by: PHYSICIAN ASSISTANT

## 2023-06-30 PROCEDURE — 99213 OFFICE O/P EST LOW 20 MIN: CPT | Performed by: PHYSICIAN ASSISTANT

## 2023-06-30 PROCEDURE — 87077 CULTURE AEROBIC IDENTIFY: CPT | Performed by: PHYSICIAN ASSISTANT

## 2023-06-30 PROCEDURE — 81002 URINALYSIS NONAUTO W/O SCOPE: CPT | Performed by: PHYSICIAN ASSISTANT

## 2023-06-30 PROCEDURE — 87186 SC STD MICRODIL/AGAR DIL: CPT | Performed by: PHYSICIAN ASSISTANT

## 2023-06-30 RX ORDER — CEPHALEXIN 500 MG/1
500 CAPSULE ORAL EVERY 12 HOURS SCHEDULED
Qty: 14 CAPSULE | Refills: 0 | Status: SHIPPED | OUTPATIENT
Start: 2023-06-30 | End: 2023-07-07

## 2023-06-30 NOTE — PROGRESS NOTES
[facility]        NAME: Aamir Cohen is a 32 y o  female  : 1995    MRN: [id number]  DATE: 2023  TIME: 6:08 PM    Assessment and Plan   Acute UTI [N39 0]  1  Acute UTI  Urine culture    POCT urine dip    POCT urine HCG    cephalexin (KEFLEX) 500 mg capsule      2  Proteinuria, unspecified type              Patient Instructions       Follow up with PCP in 3-5 days  Proceed to  ER if symptoms worsen  Chief Complaint     Chief Complaint   Patient presents with   • Possible UTI     Burning on urination since           History of Present Illness       Patient is a 26 y/o/f [facility] with burning upon urination  Patient reports symptoms began 5 days ago  Associated symptoms include urinary frequency  Pt did drink cranberry juice which temporarily helped symptoms  Urinary Tract Infection   This is a new problem  The current episode started in the past 7 days  The problem occurs every urination  The problem has been gradually worsening  The quality of the pain is described as burning  There has been no fever  Associated symptoms include frequency  Pertinent negatives include no chills, hematuria or vomiting  Review of Systems   Review of Systems   Constitutional: Negative for chills and fever  HENT: Negative for ear pain and sore throat  Eyes: Negative for pain and visual disturbance  Respiratory: Negative for cough and shortness of breath  Cardiovascular: Negative for chest pain and palpitations  Gastrointestinal: Negative for abdominal pain and vomiting  Genitourinary: Positive for dysuria and frequency  Negative for hematuria  Musculoskeletal: Negative for arthralgias and back pain  Skin: Negative for color change and rash  Neurological: Negative for seizures and syncope  All other systems reviewed and are negative          Current Medications       Current Outpatient Medications:   •  albuterol (2 5 mg/3 mL) 0 083 % nebulizer solution, Take 3 mL (2 5 mg total) by nebulization every 4 (four) hours as needed for wheezing or shortness of breath, Disp: 180 mL, Rfl: 1  •  albuterol (ProAir HFA) 90 mcg/act inhaler, Inhale 2 puffs every 4 (four) hours as needed for wheezing or shortness of breath, Disp: 8 5 g, Rfl: 1  •  cephalexin (KEFLEX) 500 mg capsule, Take 1 capsule (500 mg total) by mouth every 12 (twelve) hours for 7 days, Disp: 14 capsule, Rfl: 0  •  levothyroxine 88 mcg tablet, take 1 tablet by mouth every morning, Disp: 30 tablet, Rfl: 2  •  topiramate (TOPAMAX) 25 mg sprinkle capsule, take 2 capsules by mouth daily, Disp: 180 capsule, Rfl: 3  •  budesonide (Pulmicort Flexhaler) 90 MCG/ACT inhaler, Inhale 1 puff 2 (two) times a day (Patient not taking: Reported on 5/26/2023), Disp: 1 each, Rfl: 1  •  loratadine (CLARITIN) 10 mg tablet, Take 1 tablet (10 mg total) by mouth daily, Disp: 30 tablet, Rfl: 0    Current Allergies     Allergies as of 06/30/2023 - Reviewed 06/30/2023   Allergen Reaction Noted   • Aripiprazole Other (See Comments), Confusion, and Seizures 12/16/2015   • Tessalon [benzonatate] Anxiety and Other (See Comments) 04/01/2016   • Zithromax [azithromycin] Rash, Hallucinations, and Hives 12/16/2015            The following portions of the patient's history were reviewed and updated as appropriate: allergies, current medications, past family history, past medical history, past social history, past surgical history and problem list      Past Medical History:   Diagnosis Date   • Allergic    • Anxiety    • Asthma    • Depression    • Disease of thyroid gland    • Migraine    • Mood disorder (HCC)    • Pregnant    • Psychiatric disorder     depression,anxiety, mood disorder       Past Surgical History:   Procedure Laterality Date   • APPENDECTOMY     • FRACTURE SURGERY Right     wrist       Family History   Problem Relation Age of Onset   • No Known Problems Mother    • Thyroid disease Father    • Hypertension Brother    • "Hypertension Maternal Grandmother    • Thyroid disease Paternal Grandmother          Medications have been verified  Objective   /59   Pulse 78   Temp 98 1 °F (36 7 °C)   Resp 20   Ht 5' 2\" (1 575 m)   Wt 90 kg (198 lb 6 4 oz)   SpO2 98%   BMI 36 29 kg/m²   No LMP recorded  Physical Exam     Physical Exam  Constitutional:       Appearance: Normal appearance  HENT:      Head: Normocephalic and atraumatic  Nose: Nose normal       Mouth/Throat:      Mouth: Mucous membranes are moist    Eyes:      Extraocular Movements: Extraocular movements intact  Conjunctiva/sclera: Conjunctivae normal       Pupils: Pupils are equal, round, and reactive to light  Cardiovascular:      Rate and Rhythm: Normal rate  Pulmonary:      Effort: Pulmonary effort is normal    Musculoskeletal:         General: Normal range of motion  Cervical back: Normal range of motion and neck supple  Skin:     General: Skin is warm and dry  Capillary Refill: Capillary refill takes less than 2 seconds  Neurological:      General: No focal deficit present  Mental Status: She is alert and oriented to person, place, and time     Psychiatric:         Mood and Affect: Mood normal          Behavior: Behavior normal                    "

## 2023-07-02 LAB — BACTERIA UR CULT: ABNORMAL

## 2023-09-28 ENCOUNTER — OFFICE VISIT (OUTPATIENT)
Dept: URGENT CARE | Facility: MEDICAL CENTER | Age: 28
End: 2023-09-28
Payer: COMMERCIAL

## 2023-09-28 VITALS
OXYGEN SATURATION: 98 % | BODY MASS INDEX: 36.44 KG/M2 | DIASTOLIC BLOOD PRESSURE: 70 MMHG | WEIGHT: 198 LBS | TEMPERATURE: 97.7 F | RESPIRATION RATE: 18 BRPM | HEIGHT: 62 IN | HEART RATE: 98 BPM | SYSTOLIC BLOOD PRESSURE: 140 MMHG

## 2023-09-28 DIAGNOSIS — J06.9 ACUTE URI: Primary | ICD-10-CM

## 2023-09-28 LAB
SARS-COV-2 AG UPPER RESP QL IA: NEGATIVE
VALID CONTROL: NORMAL

## 2023-09-28 PROCEDURE — 99213 OFFICE O/P EST LOW 20 MIN: CPT | Performed by: PHYSICIAN ASSISTANT

## 2023-09-28 PROCEDURE — 87811 SARS-COV-2 COVID19 W/OPTIC: CPT | Performed by: PHYSICIAN ASSISTANT

## 2023-09-28 RX ORDER — ONDANSETRON 4 MG/1
4 TABLET, FILM COATED ORAL EVERY 8 HOURS PRN
Qty: 20 TABLET | Refills: 0 | Status: SHIPPED | OUTPATIENT
Start: 2023-09-28

## 2023-09-28 RX ORDER — DOXYCYCLINE 100 MG/1
100 TABLET ORAL 2 TIMES DAILY
Qty: 14 TABLET | Refills: 0 | Status: SHIPPED | OUTPATIENT
Start: 2023-09-28 | End: 2023-10-05

## 2023-09-28 NOTE — PROGRESS NOTES
[facility]        NAME: Jo Ann Anderson is a 29 y.o. female  : 1995    MRN: [id number]  DATE: 2023  TIME: 1:31 PM    Assessment and Plan   Acute URI [J06.9]  1. Acute URI  Poct Covid 19 Rapid Antigen Test    doxycycline (ADOXA) 100 MG tablet    ondansetron (ZOFRAN) 4 mg tablet            Patient Instructions       Follow up with PCP in 3-5 days. Proceed to  ER if symptoms worsen. Chief Complaint     Chief Complaint   Patient presents with   • Cold Like Symptoms     Cough and runny nose, head ache, and chest burning when coughing x 1 week ago          History of Present Illness       Patient is a 28 y/o/f presenting to [facility] with headache, chest congestion, chest burning and rhinorrhea. Patient reports symptoms began 1 week ago. Pt has not been taking anything for cough or congestion. Symptoms worsened this morning w/ headache and nausea. Cough  This is a new problem. The current episode started in the past 7 days. The problem has been gradually worsening. The problem occurs every few minutes. The cough is productive of sputum. Associated symptoms include nasal congestion, postnasal drip and rhinorrhea. Pertinent negatives include no chest pain, chills, ear pain, fever, rash, sore throat or shortness of breath. Review of Systems   Review of Systems   Constitutional: Negative for chills and fever. HENT: Positive for congestion, postnasal drip, rhinorrhea, sinus pressure and sinus pain. Negative for ear pain and sore throat. Eyes: Negative for pain and visual disturbance. Respiratory: Positive for cough. Negative for shortness of breath. Cardiovascular: Negative for chest pain and palpitations. Gastrointestinal: Negative for abdominal pain and vomiting. Genitourinary: Negative for dysuria and hematuria. Musculoskeletal: Negative for arthralgias and back pain. Skin: Negative for color change and rash.    Neurological: Negative for seizures and syncope. All other systems reviewed and are negative.         Current Medications       Current Outpatient Medications:   •  albuterol (2.5 mg/3 mL) 0.083 % nebulizer solution, Take 3 mL (2.5 mg total) by nebulization every 4 (four) hours as needed for wheezing or shortness of breath, Disp: 180 mL, Rfl: 1  •  albuterol (ProAir HFA) 90 mcg/act inhaler, Inhale 2 puffs every 4 (four) hours as needed for wheezing or shortness of breath, Disp: 8.5 g, Rfl: 1  •  doxycycline (ADOXA) 100 MG tablet, Take 1 tablet (100 mg total) by mouth 2 (two) times a day for 7 days, Disp: 14 tablet, Rfl: 0  •  levothyroxine 88 mcg tablet, take 1 tablet by mouth every morning, Disp: 30 tablet, Rfl: 2  •  ondansetron (ZOFRAN) 4 mg tablet, Take 1 tablet (4 mg total) by mouth every 8 (eight) hours as needed for nausea or vomiting, Disp: 20 tablet, Rfl: 0  •  topiramate (TOPAMAX) 25 mg sprinkle capsule, take 2 capsules by mouth daily, Disp: 180 capsule, Rfl: 3  •  budesonide (Pulmicort Flexhaler) 90 MCG/ACT inhaler, Inhale 1 puff 2 (two) times a day (Patient not taking: Reported on 9/28/2023), Disp: 1 each, Rfl: 1  •  loratadine (CLARITIN) 10 mg tablet, Take 1 tablet (10 mg total) by mouth daily, Disp: 30 tablet, Rfl: 0    Current Allergies     Allergies as of 09/28/2023 - Reviewed 09/28/2023   Allergen Reaction Noted   • Aripiprazole Other (See Comments), Confusion, and Seizures 12/16/2015   • Tessalon [benzonatate] Anxiety and Other (See Comments) 04/01/2016   • Zithromax [azithromycin] Rash, Hallucinations, and Hives 12/16/2015            The following portions of the patient's history were reviewed and updated as appropriate: allergies, current medications, past family history, past medical history, past social history, past surgical history and problem list.     Past Medical History:   Diagnosis Date   • Allergic    • Anxiety    • Asthma    • Depression    • Disease of thyroid gland    • Migraine    • Mood disorder (HCC)    • Pregnant • Psychiatric disorder     depression,anxiety, mood disorder       Past Surgical History:   Procedure Laterality Date   • APPENDECTOMY     • FRACTURE SURGERY Right     wrist       Family History   Problem Relation Age of Onset   • No Known Problems Mother    • Thyroid disease Father    • Hypertension Brother    • Hypertension Maternal Grandmother    • Thyroid disease Paternal Grandmother          Medications have been verified. Objective   /70   Pulse 98   Temp 97.7 °F (36.5 °C)   Resp 18   Ht 5' 2" (1.575 m)   Wt 89.8 kg (198 lb)   SpO2 98%   BMI 36.21 kg/m²   No LMP recorded. Physical Exam     Physical Exam  Constitutional:       Appearance: Normal appearance. HENT:      Head: Normocephalic and atraumatic. Nose: Congestion present. Mouth/Throat:      Mouth: Mucous membranes are moist.   Eyes:      Extraocular Movements: Extraocular movements intact. Conjunctiva/sclera: Conjunctivae normal.      Pupils: Pupils are equal, round, and reactive to light. Cardiovascular:      Rate and Rhythm: Normal rate and regular rhythm. Heart sounds: No murmur heard. No friction rub. No gallop. Pulmonary:      Effort: Pulmonary effort is normal.      Breath sounds: No wheezing, rhonchi or rales. Musculoskeletal:         General: Normal range of motion. Cervical back: Normal range of motion and neck supple. Skin:     General: Skin is warm and dry. Capillary Refill: Capillary refill takes less than 2 seconds. Neurological:      General: No focal deficit present. Mental Status: She is alert and oriented to person, place, and time.    Psychiatric:         Mood and Affect: Mood normal.         Behavior: Behavior normal.

## 2023-10-16 DIAGNOSIS — E03.8 HYPOTHYROIDISM DUE TO HASHIMOTO'S THYROIDITIS: ICD-10-CM

## 2023-10-16 DIAGNOSIS — G43.909 MIGRAINE WITHOUT STATUS MIGRAINOSUS, NOT INTRACTABLE, UNSPECIFIED MIGRAINE TYPE: ICD-10-CM

## 2023-10-16 DIAGNOSIS — E06.3 HYPOTHYROIDISM DUE TO HASHIMOTO'S THYROIDITIS: ICD-10-CM

## 2023-10-16 RX ORDER — TOPIRAMATE 25 MG/1
CAPSULE, COATED PELLETS ORAL
Qty: 180 CAPSULE | Refills: 0 | Status: SHIPPED | OUTPATIENT
Start: 2023-10-16

## 2023-10-16 RX ORDER — LEVOTHYROXINE SODIUM 88 UG/1
88 TABLET ORAL EVERY MORNING
Qty: 30 TABLET | Refills: 2 | Status: SHIPPED | OUTPATIENT
Start: 2023-10-16

## 2023-11-15 NOTE — PROGRESS NOTES
Assessment   1  Birth control (V25 9) (Z30 9)  2  Hypothyroidism (244 9) (E03 9)  3  Non compliance w medication regimen (V15 81) (Z91 14)    Plan    Birth control    · *1 - [facility] (Obstetrics/ Gynecology) Co-Management  *  Status: Hold For -    Scheduling  Requested for: 60NAC2180  () Care Summary provided  : Yes  Mild persistent asthma without complication    · Renew: Ventolin  (90 Base) MCG/ACT Inhalation Aerosol Solution; INHALE 2    PUFFS EVERY 4 HOURS AS NEEDED      (1) TSH; Status:Resulted - Requires Verification;   Done: 10ISD6975 12:00AM     Due:98Fsf4899; Ordered;       For:Hypothyroidism; Ordered By:Aliyah Damian; Discussion/Summary      Birth control- Discussed IUD, referal to GYN given tsh, discussed medication compliance  Nasim call with tsh level  The patient was counseled regarding instructions for management,-- importance of compliance with treatment  total time of encounter was 20 minutes-- and-- 10 minutes was spent counseling  Possible side effects of new medications were reviewed with the patient/guardian today  The treatment plan was reviewed with the patient/guardian  The patient/guardian understands and agrees with the treatment plan      Chief Complaint   pt is here for allergies, she would also like to get on birth control  History of Present Illness   HPI: 25year old here today for counselling for birth control, [age] year old child, not breastfeeding  Periods are light, irregular this month, prior this month, regularly  She smokes one pack daily  SHe has stopped her thyroid medication for 2 weeks because she was tired  Review of Systems        Constitutional: No fever, no chills, feels well, no tiredness, no recent weight gain or loss  ENT: no ear ache, no loss of hearing, no nosebleeds or nasal discharge, no sore throat or hoarseness        Cardiovascular: no complaints of slow or fast heart rate, no chest pain, no palpitations, no leg claudication or lower extremity edema  Respiratory: no complaints of shortness of breath, no wheezing, no dyspnea on exertion, no orthopnea or PND  Breasts: no complaints of breast pain, breast lump or nipple discharge  Gastrointestinal: no complaints of abdominal pain, no constipation, no nausea or diarrhea, no vomiting, no bloody stools  Genitourinary: no complaints of dysuria, no incontinence, no pelvic pain, no dysmenorrhea, no vaginal discharge or abnormal vaginal bleeding  Musculoskeletal: no complaints of arthralgia, no myalgia, no joint swelling or stiffness, no limb pain or swelling  Integumentary: no complaints of skin rash or lesion, no itching or dry skin, no skin wounds  Neurological: no complaints of headache, no confusion, no numbness or tingling, no dizziness or fainting  Active Problems   1  Acute sinusitis (461 9) (J01 90)  2  Asthma (493 90) (J45 909)  3  Depression (311) (F32 9)  4  Exposure to scabies (V01 89) (Z20 89)  5  Hashimoto's thyroiditis (245 2) (E06 3)  6  Hypothyroidism in pregnancy (648 10,244 9) (O99 280,E03 9)  7  Left arm pain (729 5) (M79 602)  8  Lower extremity pain, bilateral (729 5) (M79 604,M79 605)  9  Mild persistent asthma without complication (536 76) (H65 53)  10  Mood disorder (296 90) (F39)  11  Positive urine pregnancy test (V72 42) (Z32 01)  12  Pregnancy (V22 2) (Z34 90)  13  Seizures (780 39) (R56 9)  14  Strain of left shoulder, subsequent encounter (V58 89,840 9) (S46 912D)  15  Vaginal discharge (623 5) (N89 8)  16  Wart (078 10) (B07 9)    Past Medical History   1  History of No known problems (V49 89) (Z78 9)  Active Problems And Past Medical History Reviewed: The active problems and past medical history were reviewed and updated today  Family History   Mother   1  No pertinent family history  Father   2  Family history of Thyroid disease (246 9) (E07 9)  Brother   3   Family history of Hypertension (401 9) (I10)  Grandmother   4  Family history of Thyroid disease (246 9) (E07 9)  Maternal Grandmother   5  Family history of Hypertension (401 9) (I10)  Aunt   6  Family history of Thyroid disease (246 9) (E07 9)  Family History Reviewed: The family history was reviewed and updated today  Social History    · Current smoker (305 1) (F17 200)   · Former smoker (V15 82) (C99 436)   · No alcohol use   · No drug use  The social history was reviewed and updated today  The social history was reviewed and is unchanged  Surgical History   1  History of Appendectomy  2  History of Wrist Surgery  Surgical History Reviewed: The surgical history was reviewed and updated today  Current Meds   1  Albuterol Sulfate (2 5 MG/3ML) 0 083% Inhalation Nebulization Solution; USE 1 UNIT     DOSE VIA NEBULIZER  4 TIMES A DAY AS NEEDED; Therapy: 98BLT5758 to (Last Rx:06Apr2017)  Requested for: 06Apr2017 Ordered  2  Gabapentin 100 MG Oral Capsule; TAKE 1 CAPSULE Bedtime; Therapy: 59BSW2887 to (Last AJ:06NMU2538)  Requested for: 09SGF8514; Status:     ACTIVE - Transmit to Pharmacy - Awaiting Verification Ordered  3  HydrOXYzine HCl - 10 MG Oral Tablet; TAKE 1 TABLET AT BEDTIME; Therapy: 11Aug2017 to (Evaluate:31Yqj7437)  Requested for: 11Aug2017; Last     Rx:11Aug2017 Ordered  4  Levothyroxine Sodium 75 MCG Oral Tablet; One tablet daily in the morning on an empty     stomach 45 minutes before breakfast;     Therapy: 23Jan2017 to (Evaluate:31Odz9140)  Requested for: 94Hfp6893; Last     Rx:21Bcs2270 Ordered  5  TraZODone HCl - 100 MG Oral Tablet; Take 1 tablet daily; Therapy: 19JHM1278 to (Last Rx:09Oct2252)  Requested for: 01QWU1299 Ordered  6  Ventolin  (90 Base) MCG/ACT Inhalation Aerosol Solution; INHALE 2 PUFFS     EVERY 4 HOURS AS NEEDED; Therapy: 71EOK7443 to (Last Rx:06Apr2017)  Requested for: 06Apr2017 Ordered  7  Ziprasidone HCl - 20 MG Oral Capsule; take 1 capsule daily;      Therapy: 14TNU7608 to (Evaluate:31Mar2017)  Requested for: 19VPH4180; Last     Rx:08Sbn2156 Ordered     The medication list was reviewed and updated today  Allergies   1  Abilify TABS  2  Azithromycin TABS  3  Tessalon Perles CAPS  4  Zithromax CAPS    Vitals    Recorded: 35ETW7286 09:02AM   Temperature 97 9 F   Heart Rate 92   Respiration 18   Systolic 609   Diastolic 78   Height 5 ft 2 in   Weight 165 lb    BMI Calculated 30 18   BSA Calculated 1 76   O2 Saturation 98     Physical Exam        Constitutional      General appearance: No acute distress, well appearing and well nourished  Pulmonary      Respiratory effort: No increased work of breathing or signs of respiratory distress  Auscultation of lungs: Clear to auscultation  Cardiovascular      Auscultation of heart: Normal rate and rhythm, normal S1 and S2, without murmurs  Lymphatic      Palpation of lymph nodes in neck: No lymphadenopathy  Psychiatric      Orientation to person, place, and time: Normal        Mood and affect: Normal           Signatures    Electronically signed by :  MARGO Hillman; Sep 22 2017  9:30AM EST                       (Author)     Electronically signed by : Charly Anthony DO; Nov 10 2017  2:49PM EST                       (Author)     Electronically signed by : Herrera Bowles MD; Jan 12 2018  3:53PM EST                       (Author) Enbrel Counseling:  I discussed with the patient the risks of etanercept including but not limited to myelosuppression, immunosuppression, autoimmune hepatitis, demyelinating diseases, lymphoma, and infections.  The patient understands that monitoring is required including a PPD at baseline and must alert us or the primary physician if symptoms of infection or other concerning signs are noted.

## 2023-12-09 ENCOUNTER — OFFICE VISIT (OUTPATIENT)
Dept: URGENT CARE | Facility: MEDICAL CENTER | Age: 28
End: 2023-12-09
Payer: COMMERCIAL

## 2023-12-09 VITALS — OXYGEN SATURATION: 98 % | TEMPERATURE: 98.1 F | RESPIRATION RATE: 20 BRPM | HEART RATE: 85 BPM

## 2023-12-09 DIAGNOSIS — J02.9 ACUTE PHARYNGITIS, UNSPECIFIED ETIOLOGY: Primary | ICD-10-CM

## 2023-12-09 DIAGNOSIS — J02.9 SORE THROAT: ICD-10-CM

## 2023-12-09 LAB — S PYO AG THROAT QL: NEGATIVE

## 2023-12-09 PROCEDURE — 99212 OFFICE O/P EST SF 10 MIN: CPT | Performed by: PHYSICIAN ASSISTANT

## 2023-12-09 PROCEDURE — 87880 STREP A ASSAY W/OPTIC: CPT | Performed by: PHYSICIAN ASSISTANT

## 2023-12-09 RX ORDER — AMOXICILLIN 500 MG/1
500 CAPSULE ORAL EVERY 12 HOURS SCHEDULED
Qty: 20 CAPSULE | Refills: 0 | Status: SHIPPED | OUTPATIENT
Start: 2023-12-09 | End: 2023-12-19

## 2023-12-09 NOTE — PROGRESS NOTES
[facility]        NAME: Missouri Dance is a 29 y.o. female  : 1995    MRN: [id number]  DATE: 2023  TIME: 1:17 PM    Assessment and Plan   Acute pharyngitis, unspecified etiology [J02.9]  1. Acute pharyngitis, unspecified etiology  amoxicillin (AMOXIL) 500 mg capsule      2. Sore throat  POCT rapid strepA            Patient Instructions     Start antibiotic as prescribed  Tylenol or Ibuprofen as needed for fever or pain  Drink plenty of fluids  Tea with honey  Gargle with salt water  If symptoms fail to improve follow up with PCP  If symptoms worsen have yourself rechecked    Follow up with PCP in 3-5 days. Proceed to  ER if symptoms worsen. Chief Complaint     Chief Complaint   Patient presents with   • Sore Throat         History of Present Illness       Patient presents with a 2-day history of a sore throat, fever, runny and stuffy nose and headache. Review of Systems   Review of Systems   Constitutional:  Positive for chills and fever. HENT:  Positive for congestion, postnasal drip and sore throat. Respiratory:  Negative for cough. Gastrointestinal:  Negative for diarrhea, nausea and vomiting. Musculoskeletal:  Negative for myalgias. Neurological:  Positive for headaches.          Current Medications       Current Outpatient Medications:   •  albuterol (2.5 mg/3 mL) 0.083 % nebulizer solution, Take 3 mL (2.5 mg total) by nebulization every 4 (four) hours as needed for wheezing or shortness of breath, Disp: 180 mL, Rfl: 1  •  albuterol (ProAir HFA) 90 mcg/act inhaler, Inhale 2 puffs every 4 (four) hours as needed for wheezing or shortness of breath, Disp: 8.5 g, Rfl: 1  •  amoxicillin (AMOXIL) 500 mg capsule, Take 1 capsule (500 mg total) by mouth every 12 (twelve) hours for 10 days, Disp: 20 capsule, Rfl: 0  •  levothyroxine 88 mcg tablet, Take 1 tablet (88 mcg total) by mouth every morning, Disp: 30 tablet, Rfl: 2  •  topiramate (TOPAMAX) 25 mg sprinkle capsule, take 2 capsules by mouth daily, Disp: 180 capsule, Rfl: 0  •  budesonide (Pulmicort Flexhaler) 90 MCG/ACT inhaler, Inhale 1 puff 2 (two) times a day (Patient not taking: Reported on 9/28/2023), Disp: 1 each, Rfl: 1  •  loratadine (CLARITIN) 10 mg tablet, Take 1 tablet (10 mg total) by mouth daily, Disp: 30 tablet, Rfl: 0  •  ondansetron (ZOFRAN) 4 mg tablet, Take 1 tablet (4 mg total) by mouth every 8 (eight) hours as needed for nausea or vomiting (Patient not taking: Reported on 12/9/2023), Disp: 20 tablet, Rfl: 0    Current Allergies     Allergies as of 12/09/2023 - Reviewed 12/09/2023   Allergen Reaction Noted   • Aripiprazole Other (See Comments), Confusion, and Seizures 12/16/2015   • Tessalon [benzonatate] Anxiety and Other (See Comments) 04/01/2016   • Zithromax [azithromycin] Rash, Hallucinations, and Hives 12/16/2015            The following portions of the patient's history were reviewed and updated as appropriate: allergies, current medications, past family history, past medical history, past social history, past surgical history and problem list.     Past Medical History:   Diagnosis Date   • Allergic    • Anxiety    • Asthma    • Depression    • Disease of thyroid gland    • Migraine    • Mood disorder ([address])    • Pregnant    • Psychiatric disorder     depression,anxiety, mood disorder       Past Surgical History:   Procedure Laterality Date   • APPENDECTOMY     • FRACTURE SURGERY Right     wrist   • TUBAL LIGATION         Family History   Problem Relation Age of Onset   • No Known Problems Mother    • Thyroid disease Father    • Hypertension Brother    • Hypertension Maternal Grandmother    • Thyroid disease Paternal Grandmother          Medications have been verified. Objective   Pulse 85   Temp 98.1 °F (36.7 °C)   Resp 20   LMP 11/16/2023 (Approximate) Comment: tubal  SpO2 98%   Patient's last menstrual period was 11/16/2023 (approximate).        Physical Exam     Physical Exam  Vitals and nursing note reviewed. Constitutional:       Appearance: She is well-developed. HENT:      Head: Normocephalic and atraumatic. Right Ear: Tympanic membrane normal.      Left Ear: Tympanic membrane normal.      Mouth/Throat:      Mouth: Mucous membranes are moist.      Tonsils: No tonsillar exudate. Comments: Erythema of the soft palate  Eyes:      Conjunctiva/sclera: Conjunctivae normal.   Cardiovascular:      Rate and Rhythm: Normal rate and regular rhythm. Heart sounds: Normal heart sounds. Pulmonary:      Effort: Pulmonary effort is normal.      Breath sounds: Normal breath sounds. Musculoskeletal:      Cervical back: Neck supple. Lymphadenopathy:      Cervical: No cervical adenopathy. Skin:     General: Skin is warm. Neurological:      Mental Status: She is alert.

## 2023-12-09 NOTE — PATIENT INSTRUCTIONS
Start antibiotic as prescribed  Tylenol or Ibuprofen as needed for fever or pain  Drink plenty of fluids  Tea with honey  Gargle with salt water  If symptoms fail to improve follow up with PCP  If symptoms worsen have yourself rechecked

## 2023-12-20 ENCOUNTER — OFFICE VISIT (OUTPATIENT)
Dept: URGENT CARE | Facility: MEDICAL CENTER | Age: 28
End: 2023-12-20
Payer: COMMERCIAL

## 2023-12-20 VITALS
OXYGEN SATURATION: 98 % | TEMPERATURE: 98.3 F | DIASTOLIC BLOOD PRESSURE: 66 MMHG | HEART RATE: 61 BPM | BODY MASS INDEX: 35.12 KG/M2 | SYSTOLIC BLOOD PRESSURE: 96 MMHG | WEIGHT: 192 LBS | RESPIRATION RATE: 20 BRPM

## 2023-12-20 DIAGNOSIS — J11.1 INFLUENZA: Primary | ICD-10-CM

## 2023-12-20 PROCEDURE — 99212 OFFICE O/P EST SF 10 MIN: CPT | Performed by: PHYSICIAN ASSISTANT

## 2023-12-20 NOTE — PROGRESS NOTES
[facility]        NAME: Maria D Grullon is a 28 y.o. female  : 1995    MRN: [id number]  DATE: 2023  TIME: 6:17 PM    Assessment and Plan   Influenza [J11.1]  1. Influenza              Patient Instructions     Take Tylenol or Motrin as needed for fever or pain  May take over the counter cold medication to control symptoms  Drink plenty of fluids  Rest  You are contagious until fever resolves  If symptoms worsen go to the ER for further evaluation  Follow up with PCP in 3-5 days.  Proceed to  ER if symptoms worsen.    Chief Complaint     Chief Complaint   Patient presents with   • Influenza     Exposed to flu by son. SX started today   • Cough   • Nausea         History of Present Illness       Patient presents with a 2-day history of fever, runny nose, cough, nausea, headaches.  Her older son was diagnosed with influenza.  She denies sore throat, diarrhea, vomiting.  Patient had a flu vaccine this season.        Review of Systems   Review of Systems   Constitutional:  Positive for fever. Negative for chills.   HENT:  Positive for rhinorrhea. Negative for congestion and sore throat.    Respiratory:  Positive for cough.    Gastrointestinal:  Positive for nausea. Negative for diarrhea and vomiting.   Musculoskeletal:  Negative for myalgias.   Neurological:  Positive for headaches.         Current Medications       Current Outpatient Medications:   •  levothyroxine 88 mcg tablet, Take 1 tablet (88 mcg total) by mouth every morning, Disp: 30 tablet, Rfl: 2  •  topiramate (TOPAMAX) 25 mg sprinkle capsule, take 2 capsules by mouth daily, Disp: 180 capsule, Rfl: 0  •  albuterol (2.5 mg/3 mL) 0.083 % nebulizer solution, Take 3 mL (2.5 mg total) by nebulization every 4 (four) hours as needed for wheezing or shortness of breath (Patient not taking: Reported on 2023), Disp: 180 mL, Rfl: 1  •  albuterol (ProAir HFA) 90 mcg/act inhaler, Inhale 2 puffs every 4 (four) hours as needed for  wheezing or shortness of breath (Patient not taking: Reported on 12/20/2023), Disp: 8.5 g, Rfl: 1  •  budesonide (Pulmicort Flexhaler) 90 MCG/ACT inhaler, Inhale 1 puff 2 (two) times a day (Patient not taking: Reported on 9/28/2023), Disp: 1 each, Rfl: 1  •  loratadine (CLARITIN) 10 mg tablet, Take 1 tablet (10 mg total) by mouth daily, Disp: 30 tablet, Rfl: 0  •  ondansetron (ZOFRAN) 4 mg tablet, Take 1 tablet (4 mg total) by mouth every 8 (eight) hours as needed for nausea or vomiting (Patient not taking: Reported on 12/9/2023), Disp: 20 tablet, Rfl: 0    Current Allergies     Allergies as of 12/20/2023 - Reviewed 12/20/2023   Allergen Reaction Noted   • Aripiprazole Other (See Comments), Confusion, and Seizures 12/16/2015   • Tessalon [benzonatate] Anxiety and Other (See Comments) 04/01/2016   • Zithromax [azithromycin] Rash, Hallucinations, and Hives 12/16/2015            The following portions of the patient's history were reviewed and updated as appropriate: allergies, current medications, past family history, past medical history, past social history, past surgical history and problem list.     Past Medical History:   Diagnosis Date   • Allergic    • Anxiety    • Asthma    • Depression    • Disease of thyroid gland    • Migraine    • Mood disorder (HCC)    • Pregnant    • Psychiatric disorder     depression,anxiety, mood disorder       Past Surgical History:   Procedure Laterality Date   • APPENDECTOMY     • FRACTURE SURGERY Right     wrist   • TUBAL LIGATION         Family History   Problem Relation Age of Onset   • No Known Problems Mother    • Thyroid disease Father    • Hypertension Brother    • Hypertension Maternal Grandmother    • Thyroid disease Paternal Grandmother          Medications have been verified.        Objective   BP 96/66   Pulse 61   Temp 98.3 °F (36.8 °C)   Resp 20   Wt 87.1 kg (192 lb)   LMP 11/16/2023 (Approximate) Comment: tubal  SpO2 98%   BMI 35.12 kg/m²   Patient's last  menstrual period was 11/16/2023 (approximate).       Physical Exam     Physical Exam  Vitals and nursing note reviewed.   Constitutional:       Appearance: Normal appearance.   HENT:      Head: Normocephalic and atraumatic.      Right Ear: Tympanic membrane normal.      Left Ear: Tympanic membrane normal.      Mouth/Throat:      Mouth: Mucous membranes are moist.      Pharynx: Oropharynx is clear.   Eyes:      Conjunctiva/sclera: Conjunctivae normal.   Cardiovascular:      Rate and Rhythm: Normal rate and regular rhythm.      Heart sounds: Normal heart sounds.   Pulmonary:      Effort: Pulmonary effort is normal.      Breath sounds: Normal breath sounds.   Musculoskeletal:      Cervical back: Neck supple.   Lymphadenopathy:      Cervical: No cervical adenopathy.   Skin:     General: Skin is warm.   Neurological:      Mental Status: She is alert.

## 2023-12-20 NOTE — PATIENT INSTRUCTIONS
Take Tylenol or Motrin as needed for fever or pain  May take over the counter cold medication to control symptoms  Drink plenty of fluids  Rest  You are contagious until fever resolves  If symptoms worsen go to the ER for further evaluation

## 2024-02-20 DIAGNOSIS — E06.3 HYPOTHYROIDISM DUE TO HASHIMOTO'S THYROIDITIS: ICD-10-CM

## 2024-02-20 DIAGNOSIS — E03.8 HYPOTHYROIDISM DUE TO HASHIMOTO'S THYROIDITIS: ICD-10-CM

## 2024-02-20 RX ORDER — LEVOTHYROXINE SODIUM 88 UG/1
88 TABLET ORAL EVERY MORNING
Qty: 30 TABLET | Refills: 2 | Status: SHIPPED | OUTPATIENT
Start: 2024-02-20

## 2024-03-12 DIAGNOSIS — G43.909 MIGRAINE WITHOUT STATUS MIGRAINOSUS, NOT INTRACTABLE, UNSPECIFIED MIGRAINE TYPE: ICD-10-CM

## 2024-03-13 RX ORDER — TOPIRAMATE 25 MG/1
50 CAPSULE ORAL DAILY
Qty: 180 CAPSULE | Refills: 0 | Status: SHIPPED | OUTPATIENT
Start: 2024-03-13

## 2024-05-27 ENCOUNTER — APPOINTMENT (EMERGENCY)
Dept: RADIOLOGY | Facility: HOSPITAL | Age: 29
End: 2024-05-27
Payer: COMMERCIAL

## 2024-05-27 ENCOUNTER — HOSPITAL ENCOUNTER (EMERGENCY)
Facility: HOSPITAL | Age: 29
Discharge: HOME | End: 2024-05-27
Attending: EMERGENCY MEDICINE | Admitting: EMERGENCY MEDICINE
Payer: COMMERCIAL

## 2024-05-27 VITALS
HEIGHT: 62 IN | DIASTOLIC BLOOD PRESSURE: 78 MMHG | RESPIRATION RATE: 18 BRPM | TEMPERATURE: 97.8 F | SYSTOLIC BLOOD PRESSURE: 123 MMHG | HEART RATE: 80 BPM | OXYGEN SATURATION: 97 % | WEIGHT: 192.02 LBS | BODY MASS INDEX: 35.34 KG/M2

## 2024-05-27 DIAGNOSIS — M72.2 PLANTAR FASCIITIS: Primary | ICD-10-CM

## 2024-05-27 DIAGNOSIS — M79.673 FOOT PAIN: ICD-10-CM

## 2024-05-27 PROCEDURE — 99283 EMERGENCY DEPT VISIT LOW MDM: CPT

## 2024-05-27 PROCEDURE — 73630 X-RAY EXAM OF FOOT: CPT

## 2024-05-27 PROCEDURE — 99284 EMERGENCY DEPT VISIT MOD MDM: CPT | Performed by: EMERGENCY MEDICINE

## 2024-05-27 NOTE — DISCHARGE INSTRUCTIONS
Please use an arch support in your shoe.  You may take Tylenol and Motrin every 6 hours as needed for pain.  Please call podiatry to make a follow-up appointment.

## 2024-05-27 NOTE — ED PROVIDER NOTES
"History  Chief Complaint   Patient presents with    Foot Pain     Patient reports pain in left ankle/heel for a \"long time\". States that it may be related to getting kicked with soccer cleats      HPI    28-year-old female who presents for evaluation of left foot pain.  Patient states she has been having pain in her left heel for the past few weeks.  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  She states she was initially having severe pain for a few days and the pain started to ease up.  She has had persistent pain in the heel when she tries to walk.  Denies fevers.  She states she has some subjective tingling in the foot but denies numbness or weakness.  Denies any other injuries or falls.  She is not taking anything at home for pain.    Prior to Admission Medications   Prescriptions Last Dose Informant Patient Reported? Taking?   albuterol (2.5 mg/3 mL) 0.083 % nebulizer solution   No No   Sig: Take 3 mL (2.5 mg total) by nebulization every 4 (four) hours as needed for wheezing or shortness of breath   Patient not taking: Reported on 12/20/2023   albuterol (ProAir HFA) 90 mcg/act inhaler   No No   Sig: Inhale 2 puffs every 4 (four) hours as needed for wheezing or shortness of breath   Patient not taking: Reported on 12/20/2023   budesonide (Pulmicort Flexhaler) 90 MCG/ACT inhaler   No No   Sig: Inhale 1 puff 2 (two) times a day   Patient not taking: Reported on 9/28/2023   levothyroxine 88 mcg tablet   No No   Sig: take 1 tablet by mouth every morning   loratadine (CLARITIN) 10 mg tablet   No No   Sig: Take 1 tablet (10 mg total) by mouth daily   ondansetron (ZOFRAN) 4 mg tablet   No No   Sig: Take 1 tablet (4 mg total) by mouth every 8 (eight) hours as needed for nausea or vomiting   Patient not taking: Reported on 12/9/2023   topiramate (TOPAMAX) 25 mg sprinkle capsule   No No   Sig: Take 2 capsules (50 mg total) by mouth daily      Facility-Administered Medications: None "       Past Medical History:   Diagnosis Date    Allergic     Anxiety     Asthma     Depression     Disease of thyroid gland     Migraine     Mood disorder (HCC)     Pregnant     Psychiatric disorder     depression,anxiety, mood disorder       Past Surgical History:   Procedure Laterality Date    APPENDECTOMY      FRACTURE SURGERY Right     wrist    TUBAL LIGATION         Family History   Problem Relation Age of Onset    No Known Problems Mother     Thyroid disease Father     Hypertension Brother     Hypertension Maternal Grandmother     Thyroid disease Paternal Grandmother      I have reviewed and agree with the history as documented.    E-Cigarette/Vaping    E-Cigarette Use Never User      E-Cigarette/Vaping Substances    Nicotine No     THC No     CBD No     Flavoring No     Other No     Unknown No      Social History     Tobacco Use    Smoking status: Every Day     Current packs/day: 1.00     Types: Cigarettes    Smokeless tobacco: Never    Tobacco comments:     trying to cut back but feels she is unable to quit at this time per pt   Vaping Use    Vaping status: Never Used   Substance Use Topics    Alcohol use: No    Drug use: No       Review of Systems   Constitutional:  Negative for fever.   Musculoskeletal:  Positive for arthralgias. Negative for myalgias.   Skin:  Negative for wound.   Neurological:  Negative for weakness and numbness.   All other systems reviewed and are negative.      Physical Exam  Physical Exam  Vitals and nursing note reviewed.   Constitutional:       General: She is not in acute distress.     Appearance: Normal appearance. She is well-developed and normal weight. She is not ill-appearing, toxic-appearing or diaphoretic.   HENT:      Head: Normocephalic and atraumatic.      Right Ear: External ear normal.      Left Ear: External ear normal.      Nose: Nose normal.      Mouth/Throat:      Mouth: Mucous membranes are moist.      Pharynx: Oropharynx is clear.   Eyes:      Extraocular  Movements: Extraocular movements intact.      Conjunctiva/sclera: Conjunctivae normal.   Cardiovascular:      Rate and Rhythm: Normal rate and regular rhythm.      Pulses: Normal pulses.      Heart sounds: Normal heart sounds. No murmur heard.     No friction rub. No gallop.   Pulmonary:      Effort: Pulmonary effort is normal. No respiratory distress.      Breath sounds: Normal breath sounds. No wheezing or rales.   Abdominal:      General: There is no distension.      Palpations: Abdomen is soft.      Tenderness: There is no abdominal tenderness. There is no guarding or rebound.   Musculoskeletal:         General: Swelling and tenderness present.      Cervical back: Neck supple.      Comments: Tenderness to the bottom of the heel, mild swelling of the foot.  No tenderness over the metatarsals or midfoot.   Skin:     General: Skin is warm and dry.      Coloration: Skin is not pale.      Findings: No erythema or rash.   Neurological:      General: No focal deficit present.      Mental Status: She is alert and oriented to person, place, and time.      Cranial Nerves: No cranial nerve deficit.      Sensory: No sensory deficit.      Motor: No weakness.   Psychiatric:         Mood and Affect: Mood normal.         Behavior: Behavior normal.         Vital Signs  ED Triage Vitals [05/27/24 1758]   Temperature Pulse Respirations Blood Pressure SpO2   97.8 °F (36.6 °C) 77 16 123/78 97 %      Temp src Heart Rate Source Patient Position - Orthostatic VS BP Location FiO2 (%)   -- Monitor Sitting Left arm --      Pain Score       1           Vitals:    05/27/24 1758 05/27/24 1800   BP: 123/78 123/78   Pulse: 77 80   Patient Position - Orthostatic VS: Sitting Sitting         Visual Acuity      ED Medications  Medications - No data to display    Diagnostic Studies  Results Reviewed       None                   XR foot 3+ views LEFT    (Results Pending)              Procedures  Procedures         ED Course                                SBIRT 22yo+      Flowsheet Row Most Recent Value   Initial Alcohol Screen: US AUDIT-C     1. How often do you have a drink containing alcohol? 0 Filed at: 05/27/2024 1800   2. How many drinks containing alcohol do you have on a typical day you are drinking?  0 Filed at: 05/27/2024 1800   3a. Male UNDER 65: How often do you have five or more drinks on one occasion? 0 Filed at: 05/27/2024 1800   3b. FEMALE Any Age, or MALE 65+: How often do you have 4 or more drinks on one occassion? 0 Filed at: 05/27/2024 1800   Audit-C Score 0 Filed at: 05/27/2024 1800   ELIZABETH: How many times in the past year have you...    Used an illegal drug or used a prescription medication for non-medical reasons? Never Filed at: 05/27/2024 1800                      Medical Decision Making  Amount and/or Complexity of Data Reviewed  Radiology: ordered.      28-year-old female who presents for evaluation of left foot pain for several weeks, patient does have tenderness to the bottom of the heel, likely plantar fasciitis.  Will obtain x-ray to evaluate for fracture.  Patient does not want any medication for pain at this time.  Reviewed and interpreted x-ray, no acute fracture.  Discussed with patient about treatment for suspected plantar fasciitis.  Recommend she obtains arch support for her foot.  She can also take Tylenol and Motrin as needed for pain.  Will provide referral for podiatry.  Strict return precautions discussed.  Patient expressed understanding and was agreeable for discharge.       Disposition  Final diagnoses:   Plantar fasciitis   Foot pain     Time reflects when diagnosis was documented in both MDM as applicable and the Disposition within this note       Time User Action Codes Description Comment    5/27/2024  7:08 PM Pily Aj Add [M72.2] Plantar fasciitis     5/27/2024  7:08 PM Pily Aj Add [M79.673] Foot pain           ED Disposition       ED Disposition   Discharge    Condition    Stable    Date/Time   Mon May 27, 2024 1908    Comment   Maria D Grullon discharge to home/self care.                   Follow-up Information       Follow up With Specialties Details Why Contact Info Additional Information    [facility] Schedule an appointment as soon as possible for a visit   [address] [facility] [address] [phone] [facility] [place], [address] [place] [address], [id number], [phone]            Discharge Medication List as of 5/27/2024  7:08 PM        CONTINUE these medications which have NOT CHANGED    Details   albuterol (2.5 mg/3 mL) 0.083 % nebulizer solution Take 3 mL (2.5 mg total) by nebulization every 4 (four) hours as needed for wheezing or shortness of breath, Starting Sat 4/2/2022, Normal      albuterol (ProAir HFA) 90 mcg/act inhaler Inhale 2 puffs every 4 (four) hours as needed for wheezing or shortness of breath, Starting Sun 8/7/2022, Normal      budesonide (Pulmicort Flexhaler) 90 MCG/ACT inhaler Inhale 1 puff 2 (two) times a day, Starting Mon 8/8/2022, Normal      levothyroxine 88 mcg tablet take 1 tablet by mouth every morning, Starting Tue 2/20/2024, Normal      loratadine (CLARITIN) 10 mg tablet Take 1 tablet (10 mg total) by mouth daily, Starting Mon 11/4/2019, Until Tue 4/18/2023, Normal      ondansetron (ZOFRAN) 4 mg tablet Take 1 tablet (4 mg total) by mouth every 8 (eight) hours as needed for nausea or vomiting, Starting Thu 9/28/2023, Normal      topiramate (TOPAMAX) 25 mg sprinkle capsule Take 2 capsules (50 mg total) by mouth daily, Starting Wed 3/13/2024, Normal                 PDMP Review         Value Time User    PDMP Reviewed  Yes 2/5/2020  1:11 PM Annika Escalona PA-C            ED Provider  Electronically Signed by             Pily Aj MD  05/27/24 4948

## 2024-06-02 DIAGNOSIS — E03.8 HYPOTHYROIDISM DUE TO HASHIMOTO'S THYROIDITIS: ICD-10-CM

## 2024-06-02 DIAGNOSIS — E06.3 HYPOTHYROIDISM DUE TO HASHIMOTO'S THYROIDITIS: ICD-10-CM

## 2024-06-04 DIAGNOSIS — E03.9 ACQUIRED HYPOTHYROIDISM: Primary | ICD-10-CM

## 2024-06-04 DIAGNOSIS — E03.8 HYPOTHYROIDISM DUE TO HASHIMOTO'S THYROIDITIS: ICD-10-CM

## 2024-06-04 DIAGNOSIS — E06.3 HYPOTHYROIDISM DUE TO HASHIMOTO'S THYROIDITIS: ICD-10-CM

## 2024-06-04 RX ORDER — LEVOTHYROXINE SODIUM 88 UG/1
88 TABLET ORAL EVERY MORNING
Qty: 30 TABLET | Refills: 0 | Status: SHIPPED | OUTPATIENT
Start: 2024-06-04

## 2024-06-04 NOTE — TELEPHONE ENCOUNTER
Filled x 30 days; please call patient to let her know there are labs ordered for her to complete before further refills will be given. Thanks!

## 2024-07-05 DIAGNOSIS — E06.3 HYPOTHYROIDISM DUE TO HASHIMOTO'S THYROIDITIS: ICD-10-CM

## 2024-07-05 DIAGNOSIS — E03.8 HYPOTHYROIDISM DUE TO HASHIMOTO'S THYROIDITIS: ICD-10-CM

## 2024-07-05 RX ORDER — LEVOTHYROXINE SODIUM 88 UG/1
88 TABLET ORAL EVERY MORNING
Qty: 14 TABLET | Refills: 0 | Status: SHIPPED | OUTPATIENT
Start: 2024-07-05

## 2024-07-17 ENCOUNTER — OFFICE VISIT (OUTPATIENT)
Dept: FAMILY MEDICINE CLINIC | Facility: HOME HEALTHCARE | Age: 29
End: 2024-07-17
Payer: COMMERCIAL

## 2024-07-17 VITALS
HEIGHT: 62 IN | HEART RATE: 71 BPM | BODY MASS INDEX: 32.46 KG/M2 | RESPIRATION RATE: 18 BRPM | SYSTOLIC BLOOD PRESSURE: 108 MMHG | DIASTOLIC BLOOD PRESSURE: 63 MMHG | WEIGHT: 176.4 LBS | TEMPERATURE: 98.1 F | OXYGEN SATURATION: 98 %

## 2024-07-17 DIAGNOSIS — E66.09 CLASS 1 OBESITY DUE TO EXCESS CALORIES WITHOUT SERIOUS COMORBIDITY WITH BODY MASS INDEX (BMI) OF 32.0 TO 32.9 IN ADULT: ICD-10-CM

## 2024-07-17 DIAGNOSIS — G43.009 MIGRAINE WITHOUT AURA AND WITHOUT STATUS MIGRAINOSUS, NOT INTRACTABLE: ICD-10-CM

## 2024-07-17 DIAGNOSIS — E03.9 ACQUIRED HYPOTHYROIDISM: ICD-10-CM

## 2024-07-17 DIAGNOSIS — Z00.00 ANNUAL PHYSICAL EXAM: Primary | ICD-10-CM

## 2024-07-17 PROBLEM — E66.811 CLASS 1 OBESITY DUE TO EXCESS CALORIES WITHOUT SERIOUS COMORBIDITY WITH BODY MASS INDEX (BMI) OF 32.0 TO 32.9 IN ADULT: Status: ACTIVE | Noted: 2024-07-17

## 2024-07-17 PROCEDURE — 99395 PREV VISIT EST AGE 18-39: CPT | Performed by: PHYSICIAN ASSISTANT

## 2024-07-17 NOTE — PATIENT INSTRUCTIONS
"Patient Education     Routine physical for adults   The Basics   Written by the doctors and editors at [facility]   What is a physical? -- A physical is a routine visit, or \"check-up,\" with your doctor. You might also hear it called a \"wellness visit\" or \"preventive visit.\"  During each visit, the doctor will:   Ask about your physical and mental health   Ask about your habits, behaviors, and lifestyle   Do an exam   Give you vaccines if needed   Talk to you about any medicines you take   Give advice about your health   Answer your questions  Getting regular check-ups is an important part of taking care of your health. It can help your doctor find and treat any problems you have. But it's also important for preventing health problems.  A routine physical is different from a \"sick visit.\" A sick visit is when you see a doctor because of a health concern or problem. Since physicals are scheduled ahead of time, you can think about what you want to ask the doctor.  How often should I get a physical? -- It depends on your age and health. In general, for people age 21 years and older:   If you are younger than 50 years, you might be able to get a physical every 3 years.   If you are 50 years or older, your doctor might recommend a physical every year.  If you have an ongoing health condition, like diabetes or high blood pressure, your doctor will probably want to see you more often.  What happens during a physical? -- In general, each visit will include:   Physical exam - The doctor or nurse will check your height, weight, heart rate, and blood pressure. They will also look at your eyes and ears. They will ask about how you are feeling and whether you have any symptoms that bother you.   Medicines - It's a good idea to bring a list of all the medicines you take to each doctor visit. Your doctor will talk to you about your medicines and answer any questions. Tell them if you are having any side effects that bother you. You " "should also tell them if you are having trouble paying for any of your medicines.   Habits and behaviors - This includes:   Your diet   Your exercise habits   Whether you smoke, drink alcohol, or use drugs   Whether you are sexually active   Whether you feel safe at home  Your doctor will talk to you about things you can do to improve your health and lower your risk of health problems. They will also offer help and support. For example, if you want to quit smoking, they can give you advice and might prescribe medicines. If you want to improve your diet or get more physical activity, they can help you with this, too.   Lab tests, if needed - The tests you get will depend on your age and situation. For example, your doctor might want to check your:   Cholesterol   Blood sugar   Iron level   Vaccines - The recommended vaccines will depend on your age, health, and what vaccines you already had. Vaccines are very important because they can prevent certain serious or deadly infections.   Discussion of screening - \"Screening\" means checking for diseases or other health problems before they cause symptoms. Your doctor can recommend screening based on your age, risk, and preferences. This might include tests to check for:   Cancer, such as breast, prostate, cervical, ovarian, colorectal, prostate, lung, or skin cancer   Sexually transmitted infections, such as chlamydia and gonorrhea   Mental health conditions like depression and anxiety  Your doctor will talk to you about the different types of screening tests. They can help you decide which screenings to have. They can also explain what the results might mean.   Answering questions - The physical is a good time to ask the doctor or nurse questions about your health. If needed, they can refer you to other doctors or specialists, too.  Adults older than 65 years often need other care, too. As you get older, your doctor will talk to you about:   How to prevent falling at " home   Hearing or vision tests   Memory testing   How to take your medicines safely   Making sure that you have the help and support you need at home  All topics are updated as new evidence becomes available and our peer review process is complete.  This topic retrieved from [employer] on: May 02, 2024.  Topic 419017 Version 1.0  Release: 32.4.3 - C32.122  © 2024 UpToDate, Inc. and/or its affiliates. All rights reserved.  Consumer Information Use and Disclaimer   Disclaimer: This generalized information is a limited summary of diagnosis, treatment, and/or medication information. It is not meant to be comprehensive and should be used as a tool to help the user understand and/or assess potential diagnostic and treatment options. It does NOT include all information about conditions, treatments, medications, side effects, or risks that may apply to a specific patient. It is not intended to be medical advice or a substitute for the medical advice, diagnosis, or treatment of a health care provider based on the health care provider's examination and assessment of a patient's specific and unique circumstances. Patients must speak with a health care provider for complete information about their health, medical questions, and treatment options, including any risks or benefits regarding use of medications. This information does not endorse any treatments or medications as safe, effective, or approved for treating a specific patient. UpToDate, Inc. and its affiliates disclaim any warranty or liability relating to this information or the use thereof.The use of this information is governed by the Terms of Use, available at https://www.woltersThumbAduwer.com/en/know/clinical-effectiveness-terms. 2024© UpToDate, Inc. and its affiliates and/or licensors. All rights reserved.  Copyright   © 2024 UpToDate, Inc. and/or its affiliates. All rights reserved.

## 2024-07-17 NOTE — PROGRESS NOTES
Adult Annual Physical  Name: Maria D Grullon      : 1995      MRN: [id number]  Encounter Provider: Telly Vasquez PA-C  Encounter Date: 2024   Encounter department: [facility]    Assessment & Plan   1. Annual physical exam  -     CBC and differential; Future  -     Comprehensive metabolic panel; Future  -     Lipid panel; Future  2. Acquired hypothyroidism  -     TSH, 3rd generation with Free T4 reflex; Future  3. Migraine without aura and without status migrainosus, not intractable  -     rimegepant sulfate (NURTEC) 75 mg TBDP; Take 1 tablet (75 mg total) by mouth once as needed (migraine)  4. Class 1 obesity due to excess calories without serious comorbidity with body mass index (BMI) of 32.0 to 32.9 in adult    - Labs as ordered  - Will trial nurtec for migraine relief  - Follow up in 6 months    Immunizations and preventive care screenings were discussed with patient today. Appropriate education was printed on patient's after visit summary.    Counseling:  Alcohol/drug use: discussed moderation in alcohol intake, the recommendations for healthy alcohol use, and avoidance of illicit drug use.  Dental Health: discussed importance of regular tooth brushing, flossing, and dental visits.  Injury prevention: discussed safety/seat belts, safety helmets, smoke detectors, carbon dioxide detectors, and smoking near bedding or upholstery.  Sexual health: discussed sexually transmitted diseases, partner selection, use of condoms, avoidance of unintended pregnancy, and contraceptive alternatives.  Exercise: the importance of regular exercise/physical activity was discussed. Recommend exercise 3-5 times per week for at least 30 minutes.       Depression Screening and Follow-up Plan: Patient was screened for depression during today's encounter. They screened negative with a PHQ-2 score of 0.    Tobacco Cessation Counseling: Tobacco cessation counseling was provided. The  patient is sincerely urged to quit consumption of tobacco. She is ready to quit tobacco. Medication options not discussed.         History of Present Illness     Adult Annual Physical:  Patient presents for annual physical. Maria D is a 28 year old female with history of hypothyroidism, migraines, asthma, and tobacco use, presenting for her physical.    Hypothyroidism is managed with levothyroxine 88 mcg daily.  TSH 4/2023 WNL.    Migraines have previously been managed with topamax.  Patient reports stopping this medication on her own.  She is getting migraines approximately once per month which are associated with nausea and vomiting.  She has not tried any rescue medication aside from OTC midol.    Asthma has previously been managed with albuterol and pulmicort.  Reports she has not needed to use these for a while.  Continues to smoke but has cut back to 1/2 ppd..     Diet and Physical Activity:  - Diet/Nutrition: poor diet.  - Exercise: no formal exercise.    Depression Screening:  - PHQ-2 Score: 0    General Health:  - Sleep: sleeps poorly and 4-6 hours of sleep on average.  - Hearing: normal hearing bilateral ears.  - Vision: no vision problems, wears glasses and most recent eye exam > 1 year ago.  - Dental: regular dental visits.    /GYN Health:  - Follows with GYN: no.   - Menopause: premenopausal.   - History of STDs: no  - Contraception:. s/p tubal      Advanced Care Planning:  - Has an advanced directive?: no    - Has a durable medical POA?: no      Review of Systems   Constitutional:  Negative for chills, diaphoresis and fever.   Respiratory:  Negative for cough, chest tightness, shortness of breath and wheezing.    Cardiovascular:  Negative for chest pain, palpitations and leg swelling.   Gastrointestinal:  Negative for abdominal pain, constipation, diarrhea, nausea and vomiting.   Musculoskeletal:  Positive for arthralgias (L foot).   Skin:  Negative for rash and wound.   Neurological:  Negative for  dizziness, syncope, weakness, light-headedness and headaches.     Medical History Reviewed by provider this encounter:  Tobacco  Allergies  Meds  Problems  Med Hx  Surg Hx  Fam Hx       Current Outpatient Medications on File Prior to Visit   Medication Sig Dispense Refill   • levothyroxine 88 mcg tablet take 1 tablet by mouth every morning 14 tablet 0   • [DISCONTINUED] topiramate (TOPAMAX) 25 mg sprinkle capsule Take 2 capsules (50 mg total) by mouth daily 180 capsule 0   • [DISCONTINUED] albuterol (2.5 mg/3 mL) 0.083 % nebulizer solution Take 3 mL (2.5 mg total) by nebulization every 4 (four) hours as needed for wheezing or shortness of breath (Patient not taking: Reported on 12/20/2023) 180 mL 1   • [DISCONTINUED] albuterol (ProAir HFA) 90 mcg/act inhaler Inhale 2 puffs every 4 (four) hours as needed for wheezing or shortness of breath (Patient not taking: Reported on 12/20/2023) 8.5 g 1   • [DISCONTINUED] budesonide (Pulmicort Flexhaler) 90 MCG/ACT inhaler Inhale 1 puff 2 (two) times a day (Patient not taking: Reported on 9/28/2023) 1 each 1   • [DISCONTINUED] loratadine (CLARITIN) 10 mg tablet Take 1 tablet (10 mg total) by mouth daily 30 tablet 0   • [DISCONTINUED] ondansetron (ZOFRAN) 4 mg tablet Take 1 tablet (4 mg total) by mouth every 8 (eight) hours as needed for nausea or vomiting (Patient not taking: Reported on 12/9/2023) 20 tablet 0     No current facility-administered medications on file prior to visit.      Social History     Tobacco Use   • Smoking status: Every Day     Current packs/day: 0.50     Average packs/day: 1 pack/day for 12.5 years (12.5 ttl pk-yrs)     Types: Cigarettes     Start date: 2012   • Smokeless tobacco: Never   • Tobacco comments:     trying to cut back but feels she is unable to quit at this time per pt   Vaping Use   • Vaping status: Never Used   Substance and Sexual Activity   • Alcohol use: No   • Drug use: Yes     Types: Marijuana   • Sexual activity: Yes      "Partners: Male     Birth control/protection: Surgical       Objective     /63 (BP Location: Left arm, Patient Position: Sitting, Cuff Size: Standard)   Pulse 71   Temp 98.1 °F (36.7 °C)   Resp 18   Ht 5' 2\" (1.575 m)   Wt 80 kg (176 lb 6.4 oz)   LMP  (LMP Unknown)   SpO2 98%   BMI 32.26 kg/m²     Physical Exam  Vitals and nursing note reviewed.   Constitutional:       General: She is not in acute distress.     Appearance: Normal appearance.   HENT:      Head: Normocephalic and atraumatic.   Eyes:      Extraocular Movements: Extraocular movements intact.      Conjunctiva/sclera: Conjunctivae normal.      Pupils: Pupils are equal, round, and reactive to light.   Cardiovascular:      Rate and Rhythm: Normal rate and regular rhythm.      Heart sounds: Normal heart sounds. No murmur heard.  Pulmonary:      Effort: Pulmonary effort is normal. No respiratory distress.      Breath sounds: Normal breath sounds. No wheezing.   Musculoskeletal:      Cervical back: Normal range of motion and neck supple.      Right lower leg: No edema.      Left lower leg: No edema.   Skin:     General: Skin is warm and dry.   Neurological:      General: No focal deficit present.      Mental Status: She is alert and oriented to person, place, and time.      Cranial Nerves: No cranial nerve deficit.   Psychiatric:         Mood and Affect: Mood normal.         Behavior: Behavior normal.         "

## 2024-07-22 ENCOUNTER — DOCUMENTATION (OUTPATIENT)
Dept: FAMILY MEDICINE CLINIC | Facility: CLINIC | Age: 29
End: 2024-07-22

## 2024-08-29 ENCOUNTER — APPOINTMENT (OUTPATIENT)
Dept: RADIOLOGY | Facility: CLINIC | Age: 29
End: 2024-08-29
Payer: COMMERCIAL

## 2024-08-29 DIAGNOSIS — M54.17 LUMBOSACRAL NEURITIS: ICD-10-CM

## 2024-08-29 PROCEDURE — 72110 X-RAY EXAM L-2 SPINE 4/>VWS: CPT

## 2024-09-17 ENCOUNTER — OFFICE VISIT (OUTPATIENT)
Dept: FAMILY MEDICINE CLINIC | Facility: HOME HEALTHCARE | Age: 29
End: 2024-09-17
Payer: COMMERCIAL

## 2024-09-17 VITALS
DIASTOLIC BLOOD PRESSURE: 79 MMHG | BODY MASS INDEX: 31.2 KG/M2 | SYSTOLIC BLOOD PRESSURE: 114 MMHG | OXYGEN SATURATION: 98 % | HEART RATE: 68 BPM | TEMPERATURE: 98 F | WEIGHT: 170.6 LBS

## 2024-09-17 DIAGNOSIS — Z12.4 SCREENING FOR CERVICAL CANCER: ICD-10-CM

## 2024-09-17 DIAGNOSIS — Z00.00 NORMAL BREAST EXAM: ICD-10-CM

## 2024-09-17 DIAGNOSIS — Z12.4 SCREENING FOR CERVICAL CANCER: Primary | ICD-10-CM

## 2024-09-17 PROCEDURE — G0145 SCR C/V CYTO,THINLAYER,RESCR: HCPCS

## 2024-09-17 PROCEDURE — 99213 OFFICE O/P EST LOW 20 MIN: CPT

## 2024-09-17 NOTE — PROGRESS NOTES
Ambulatory Visit  Name: Maria D Grullon      : 1995      MRN: [id number]  Encounter Provider: Ann-Marie Villanueva DO  Encounter Date: 2024   Encounter department: [facility]    Assessment & Plan  Screening for cervical cancer  Patient recalled she had pap smear during last pregnancy, no abnormal result.  - pap smear done this visit, will call patient if result is abnormal  Orders:    Cervical or vaginal cytopath, thin prep; Future    Normal breast exam  Patient denied abnormal discharge or mass/lumps felt at home  Breast exam performed and no firm masses felt. No discharge noted.           History of Present Illness     HPI  Maria D Grullon is a 30 yo female with PMH of hypothyroidism, migraine, hx of smoking presents for women's wellness exam. Patient has regular period and no spotting bleeding in between periods.She recalled last pap smear was done during her last pregnancy and it's normal. She didn't feel any lumps or have any abnormal discharge of b/l breasts. Denied abdominal pain, vaginal discharge, vaginal bleeding.     History obtained from : patient and daughter  Review of Systems   Constitutional:  Negative for chills and fever.   Gastrointestinal:  Negative for abdominal pain.   Genitourinary:  Negative for dysuria, hematuria, menstrual problem, pelvic pain, vaginal bleeding and vaginal discharge.   Skin:  Negative for color change and rash.        No discharge or lumps felt in both breasts   All other systems reviewed and are negative.    Medical History Reviewed by provider this encounter:       Current Outpatient Medications on File Prior to Visit   Medication Sig Dispense Refill    levothyroxine 88 mcg tablet take 1 tablet by mouth every morning (Patient not taking: Reported on 2024) 14 tablet 0    methylPREDNISolone 4 MG tablet therapy pack use as directed FOLLOW DIRECTIONS ON BACK OF FOIL PACK (Patient not taking: Reported on 2024)       rimegepant sulfate (NURTEC) 75 mg TBDP Take 1 tablet (75 mg total) by mouth once as needed (migraine) (Patient not taking: Reported on 9/17/2024) 9 tablet 0     No current facility-administered medications on file prior to visit.      Social History     Tobacco Use    Smoking status: Every Day     Current packs/day: 0.50     Average packs/day: 1 pack/day for 12.7 years (12.6 ttl pk-yrs)     Types: Cigarettes     Start date: 2012    Smokeless tobacco: Never    Tobacco comments:     trying to cut back but feels she is unable to quit at this time per pt   Vaping Use    Vaping status: Never Used   Substance and Sexual Activity    Alcohol use: No    Drug use: Yes     Types: Marijuana    Sexual activity: Yes     Partners: Male     Birth control/protection: Surgical         Objective     /79   Pulse 68   Temp 98 °F (36.7 °C)   Wt 77.4 kg (170 lb 9.6 oz)   SpO2 98%   BMI 31.20 kg/m²     Physical Exam  Vitals and nursing note reviewed.   Constitutional:       General: She is not in acute distress.     Appearance: Normal appearance. She is well-developed. She is not ill-appearing.   HENT:      Head: Normocephalic and atraumatic.      Left Ear: External ear normal.   Cardiovascular:      Pulses: Normal pulses.   Pulmonary:      Effort: No respiratory distress.   Abdominal:      General: Abdomen is flat.      Palpations: Abdomen is soft.   Genitourinary:     General: Normal vulva.      Vagina: Normal. No foreign body. No vaginal discharge or bleeding.   Skin:     General: Skin is warm and dry.      Capillary Refill: Capillary refill takes less than 2 seconds.      Coloration: Skin is not pale.      Findings: No erythema, lesion or rash.   Neurological:      Mental Status: She is alert and oriented to person, place, and time.       Administrative Statements   I have spent a total time of 20 minutes in caring for this patient on the day of the visit/encounter including Counseling / Coordination of care, Documenting in  the medical record, Reviewing / ordering tests, medicine, procedures  , and Obtaining or reviewing history  .    Marcy Ribeiro), DO, Family Medicine PGY-2

## 2024-09-20 LAB
LAB AP GYN PRIMARY INTERPRETATION: NORMAL
Lab: NORMAL

## 2025-01-05 ENCOUNTER — APPOINTMENT (OUTPATIENT)
Dept: RADIOLOGY | Facility: MEDICAL CENTER | Age: 30
End: 2025-01-05
Payer: COMMERCIAL

## 2025-01-05 ENCOUNTER — OFFICE VISIT (OUTPATIENT)
Dept: URGENT CARE | Facility: MEDICAL CENTER | Age: 30
End: 2025-01-05
Payer: COMMERCIAL

## 2025-01-05 VITALS
SYSTOLIC BLOOD PRESSURE: 100 MMHG | TEMPERATURE: 97.9 F | DIASTOLIC BLOOD PRESSURE: 68 MMHG | OXYGEN SATURATION: 98 % | HEART RATE: 88 BPM | WEIGHT: 163 LBS | BODY MASS INDEX: 29.81 KG/M2 | RESPIRATION RATE: 20 BRPM
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
## 2025-01-05 PROCEDURE — G0383 LEV 4 HOSP TYPE B ED VISIT: HCPCS

## 2025-01-05 PROCEDURE — 73060 X-RAY EXAM OF HUMERUS: CPT

## 2025-01-05 PROCEDURE — 73090 X-RAY EXAM OF FOREARM: CPT

## 2025-01-05 PROCEDURE — S9083 URGENT CARE CENTER GLOBAL: HCPCS

## 2025-01-05 RX ORDER — IBUPROFEN 400 MG/1
400 TABLET, FILM COATED ORAL ONCE
Status: COMPLETED | OUTPATIENT
Start: 2025-01-05 | End: 2025-01-05

## 2025-01-05 RX ADMIN — IBUPROFEN 400 MG: 400 TABLET, FILM COATED ORAL at 13:40

## 2025-01-05 NOTE — PROGRESS NOTES
[facility]        NAME: Maria D Grullon is a 29 y.o. female  : 1995    MRN: [id number]  DATE: 2025  TIME: 2:24 PM    Assessment and Plan   Fall down steps, initial encounter [W10.8XXA]  1. Fall down steps, initial encounter  XR humerus right    XR forearm 2 vw right    XR elbow 2 vw right    ibuprofen (MOTRIN) tablet 400 mg            Patient Instructions       Follow up with PCP in 3-5 days.  Proceed to  ER if symptoms worsen.    If tests are performed, our office will contact you with results only if changes need to made to the care plan discussed with you at the visit. [facility]    Chief Complaint     Chief Complaint   Patient presents with   • Arm Pain     Right wrist, arm, and shoulder pain/ S/P fell down stairs at 0800 this am. Did not hit head. Landed mainly on right arm. Fell down 6-7 steps. Unable to straighten arm. When she lifts arm up, upper arm and shoulder hurt. Is able to move wrist but is painful. Has bruising and swelling. Has been ising the area.          History of Present Illness       Patient here with right arm/wrist pain s/p fall. She reports she fell down stairs around 8am today when she was clumsy. She denies striking her head, denies LOC. She reports she fell down approx 6-7 steps. Has been icing the area. Pain/Limited ROM of the right arm. Pain in the left wrist is throbbing in nature. She also has elbow and upper arm pain. She did not take anything for the pain.       Provider Radiology Interpretation (preliminary)   Final results will be as per official Radiology Report when available:    No acute fractures.   Soft tissue swelling noted.         Review of Systems   Review of Systems   Constitutional:  Negative for appetite change, chills, fatigue and fever.   Respiratory:  Negative for cough and shortness of breath.    Cardiovascular:  Negative for chest pain.   Gastrointestinal:  Negative for abdominal  pain, constipation, diarrhea, nausea and vomiting.   Musculoskeletal:  Positive for arthralgias and joint swelling. Negative for back pain, neck pain and neck stiffness.   Skin:  Positive for color change. Negative for rash and wound.   Neurological:  Negative for dizziness, light-headedness and headaches.         Current Medications       Current Outpatient Medications:   •  levothyroxine 88 mcg tablet, take 1 tablet by mouth every morning (Patient not taking: Reported on 1/5/2025), Disp: 14 tablet, Rfl: 0  •  methylPREDNISolone 4 MG tablet therapy pack, use as directed FOLLOW DIRECTIONS ON BACK OF FOIL PACK (Patient not taking: Reported on 1/5/2025), Disp: , Rfl:   •  rimegepant sulfate (NURTEC) 75 mg TBDP, Take 1 tablet (75 mg total) by mouth once as needed (migraine) (Patient not taking: Reported on 1/5/2025), Disp: 9 tablet, Rfl: 0  No current facility-administered medications for this visit.    Current Allergies     Allergies as of 01/05/2025 - Reviewed 01/05/2025   Allergen Reaction Noted   • Aripiprazole Other (See Comments), Confusion, and Seizures 12/16/2015   • Tessalon [benzonatate] Anxiety and Other (See Comments) 04/01/2016   • Zithromax [azithromycin] Rash, Hallucinations, and Hives 12/16/2015            The following portions of the patient's history were reviewed and updated as appropriate: allergies, current medications, past family history, past medical history, past social history, past surgical history and problem list.     Past Medical History:   Diagnosis Date   • Allergic    • Anxiety    • Asthma    • Depression    • Disease of thyroid gland    • Migraine    • Mood disorder (HCC)    • Pregnant    • Psychiatric disorder     depression,anxiety, mood disorder       Past Surgical History:   Procedure Laterality Date   • APPENDECTOMY     • FRACTURE SURGERY Right     wrist   • TUBAL LIGATION         Family History   Problem Relation Age of Onset   • No Known Problems Mother    • Thyroid disease  Father    • Hypertension Brother    • Hypertension Maternal Grandmother    • Thyroid disease Paternal Grandmother          Medications have been verified.        Objective   /68   Pulse 88   Temp 97.9 °F (36.6 °C)   Resp 20   Wt 73.9 kg (163 lb)   SpO2 98%   BMI 29.81 kg/m²        Physical Exam     Physical Exam  Vitals and nursing note reviewed.   Constitutional:       General: She is awake. She is not in acute distress.     Appearance: Normal appearance. She is well-developed and normal weight. She is not ill-appearing.   HENT:      Head: Normocephalic and atraumatic.      Mouth/Throat:      Lips: Pink.      Mouth: Mucous membranes are moist.   Cardiovascular:      Rate and Rhythm: Normal rate and regular rhythm.      Pulses: Normal pulses.      Heart sounds: Normal heart sounds.   Pulmonary:      Effort: Pulmonary effort is normal.      Breath sounds: Normal breath sounds.   Musculoskeletal:         General: Swelling, tenderness and signs of injury present.      Right shoulder: Normal.      Right upper arm: Tenderness present. No swelling.      Right elbow: Decreased range of motion. Tenderness present in lateral epicondyle.      Right forearm: Swelling and tenderness present.      Right wrist: Tenderness present. No swelling, snuff box tenderness or crepitus. Decreased range of motion. Normal pulse.      Right hand: Decreased strength of thumb/finger opposition and wrist extension. Normal strength of finger abduction.      Cervical back: Normal range of motion and neck supple.      Comments: Swelling of the posterior forearm in area of contusion/abrasion.   Patient reports inability to laterally move wrist due to discomfort. Also reports pain up into forearm with extension of the wrist.    Skin:     General: Skin is warm and dry.      Capillary Refill: Capillary refill takes less than 2 seconds.      Findings: Abrasion and bruising present. No rash.          Neurological:      General: No focal  deficit present.      Mental Status: She is alert. Mental status is at baseline.   Psychiatric:         Mood and Affect: Mood normal.         Behavior: Behavior is cooperative.

## 2025-02-13 ENCOUNTER — OFFICE VISIT (OUTPATIENT)
Dept: FAMILY MEDICINE CLINIC | Facility: HOME HEALTHCARE | Age: 30
End: 2025-02-13
Payer: COMMERCIAL

## 2025-02-13 VITALS
OXYGEN SATURATION: 98 % | SYSTOLIC BLOOD PRESSURE: 104 MMHG | HEART RATE: 84 BPM | RESPIRATION RATE: 18 BRPM | DIASTOLIC BLOOD PRESSURE: 66 MMHG | BODY MASS INDEX: 28.72 KG/M2 | WEIGHT: 157 LBS | TEMPERATURE: 98 F

## 2025-02-13 DIAGNOSIS — M25.511 ACUTE PAIN OF RIGHT SHOULDER: Primary | ICD-10-CM

## 2025-02-13 PROBLEM — E66.09 CLASS 1 OBESITY DUE TO EXCESS CALORIES WITHOUT SERIOUS COMORBIDITY WITH BODY MASS INDEX (BMI) OF 32.0 TO 32.9 IN ADULT: Status: RESOLVED | Noted: 2024-07-17 | Resolved: 2025-02-13

## 2025-02-13 PROBLEM — E66.811 CLASS 1 OBESITY DUE TO EXCESS CALORIES WITHOUT SERIOUS COMORBIDITY WITH BODY MASS INDEX (BMI) OF 32.0 TO 32.9 IN ADULT: Status: RESOLVED | Noted: 2024-07-17 | Resolved: 2025-02-13

## 2025-02-13 PROCEDURE — 99213 OFFICE O/P EST LOW 20 MIN: CPT | Performed by: PHYSICIAN ASSISTANT

## 2025-02-13 RX ORDER — METHYLPREDNISOLONE 4 MG/1
TABLET ORAL
Qty: 21 TABLET | Refills: 0 | Status: SHIPPED | OUTPATIENT
Start: 2025-02-13

## 2025-02-13 NOTE — PROGRESS NOTES
Name: Maria D Grullon      : 1995      MRN: [id number]  Encounter Provider: Telly Vasquez PA-C  Encounter Date: 2025   Encounter department: [facility]  :  Assessment & Plan  Acute pain of right shoulder  Will treat with course of prednisone for pain/inflammation.  May utilize tylenol and ice for additional pain relief.  Follow up with orthopedics, referral provided.  Orders:  •  Ambulatory Referral to Orthopedic Surgery; Future  •  methylPREDNISolone 4 MG tablet therapy pack; Use as directed on package           History of Present Illness   Maria D is a 29-year-old female with history of hypothyroidism, asthma, tobacco use, and migraines, presenting with concern for shoulder pain.    Patient endorses right shoulder pain which began last night after she was rearranging things in her closet.  Today, she is unable to  her arm without pain.  She has not taken any medications for this.  Reports a fall in January where she injured her right arm, but otherwise denies recent injury.      Review of Systems   Constitutional:  Negative for chills, diaphoresis and fever.   Respiratory:  Negative for cough, chest tightness, shortness of breath and wheezing.    Cardiovascular:  Negative for chest pain, palpitations and leg swelling.   Gastrointestinal:  Negative for abdominal pain, constipation, diarrhea, nausea and vomiting.   Musculoskeletal:  Positive for arthralgias and myalgias.   Skin:  Negative for rash and wound.   Neurological:  Negative for dizziness, syncope, weakness, light-headedness and headaches.       Objective   /66   Pulse 84   Temp 98 °F (36.7 °C) (Tympanic)   Resp 18   Wt 71.2 kg (157 lb)   SpO2 98%   BMI 28.72 kg/m²      Physical Exam  Vitals and nursing note reviewed.   Constitutional:       General: She is not in acute distress.     Appearance: Normal appearance.   HENT:      Head: Normocephalic and atraumatic.   Eyes:      Extraocular  Movements: Extraocular movements intact.      Conjunctiva/sclera: Conjunctivae normal.      Pupils: Pupils are equal, round, and reactive to light.   Cardiovascular:      Rate and Rhythm: Normal rate and regular rhythm.      Heart sounds: Normal heart sounds. No murmur heard.  Pulmonary:      Effort: Pulmonary effort is normal. No respiratory distress.      Breath sounds: Normal breath sounds. No wheezing.   Musculoskeletal:      Right shoulder: Tenderness present. No swelling, deformity or effusion. Decreased range of motion.      Left shoulder: Normal.      Cervical back: Normal range of motion and neck supple.      Right lower leg: No edema.      Left lower leg: No edema.   Skin:     General: Skin is warm and dry.   Neurological:      General: No focal deficit present.      Mental Status: She is alert and oriented to person, place, and time.      Cranial Nerves: No cranial nerve deficit.      Motor: No weakness.   Psychiatric:         Mood and Affect: Mood normal.         Behavior: Behavior normal.

## 2025-05-07 ENCOUNTER — OFFICE VISIT (OUTPATIENT)
Dept: URGENT CARE | Facility: MEDICAL CENTER | Age: 30
End: 2025-05-07
Payer: COMMERCIAL

## 2025-05-07 ENCOUNTER — APPOINTMENT (OUTPATIENT)
Dept: RADIOLOGY | Facility: MEDICAL CENTER | Age: 30
End: 2025-05-07
Attending: PHYSICIAN ASSISTANT
Payer: COMMERCIAL

## 2025-05-07 VITALS
DIASTOLIC BLOOD PRESSURE: 64 MMHG | WEIGHT: 147 LBS | HEART RATE: 82 BPM | RESPIRATION RATE: 16 BRPM | OXYGEN SATURATION: 98 % | TEMPERATURE: 98.4 F | BODY MASS INDEX: 26.89 KG/M2 | SYSTOLIC BLOOD PRESSURE: 112 MMHG

## 2025-05-07 DIAGNOSIS — S93.401A SPRAIN OF RIGHT ANKLE, UNSPECIFIED LIGAMENT, INITIAL ENCOUNTER: Primary | ICD-10-CM

## 2025-05-07 DIAGNOSIS — S99.911A RIGHT ANKLE INJURY, INITIAL ENCOUNTER: ICD-10-CM

## 2025-05-07 PROCEDURE — 73610 X-RAY EXAM OF ANKLE: CPT

## 2025-05-07 PROCEDURE — S9083 URGENT CARE CENTER GLOBAL: HCPCS | Performed by: PHYSICIAN ASSISTANT

## 2025-05-07 PROCEDURE — G0382 LEV 3 HOSP TYPE B ED VISIT: HCPCS | Performed by: PHYSICIAN ASSISTANT

## 2025-05-07 RX ORDER — IBUPROFEN 600 MG/1
600 TABLET, FILM COATED ORAL EVERY 6 HOURS PRN
Qty: 30 TABLET | Refills: 0 | Status: SHIPPED | OUTPATIENT
Start: 2025-05-07

## 2025-05-07 NOTE — PROGRESS NOTES
[facility]        NAME: Maria D Grullon is a 29 y.o. female  : 1995    MRN: [id number]  DATE: May 7, 2025  TIME: 7:55 PM    Assessment and Plan   Sprain of right ankle, unspecified ligament, initial encounter [S93.401A]  1. Sprain of right ankle, unspecified ligament, initial encounter  XR ankle 3+ vw right    ibuprofen (MOTRIN) 600 mg tablet            Patient Instructions     Apply ice every 2 hours for approximately 15 to 20 minutes.  Do not apply ice directly to the skin.  Wear stabilizing ankle support  Use crutches, weight bearing as tolerated to ambulate  Take Tylenol or ibuprofen as needed for pain  Elevate leg while sitting.  If symptoms fail to improve follow up with orthopedics    Follow up with PCP in 3-5 days.  Proceed to  ER if symptoms worsen.    If tests have been performed at [facility], our office will contact you with results if changes need to be made to the care plan discussed with you at the visit.  You can review your full results on [facility] MyChart.    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         History of Present Illness       With patient presents with right ankle pain.  She is a  and collided with another player injuring her right ankle.        Review of Systems   Review of Systems   Constitutional:  Negative for fever.   Musculoskeletal:         Right ankle pain   Neurological:  Negative for weakness and numbness.         Current Medications       Current Outpatient Medications:     ibuprofen (MOTRIN) 600 mg tablet, Take 1 tablet (600 mg total) by mouth every 6 (six) hours as needed for mild pain, Disp: 30 tablet, Rfl: 0    levothyroxine 88 mcg tablet, take 1 tablet by mouth every morning (Patient not taking: Reported on 2025), Disp: 14 tablet, Rfl: 0    methylPREDNISolone 4 MG tablet therapy  pack, Use as directed on package (Patient not taking: Reported on 5/7/2025), Disp: 21 tablet, Rfl: 0    rimegepant sulfate (NURTEC) 75 mg TBDP, Take 1 tablet (75 mg total) by mouth once as needed (migraine) (Patient not taking: Reported on 5/7/2025), Disp: 9 tablet, Rfl: 0    Current Allergies     Allergies as of 05/07/2025 - Reviewed 05/07/2025   Allergen Reaction Noted    Aripiprazole Other (See Comments), Confusion, and Seizures 12/16/2015    Tessalon [benzonatate] Anxiety and Other (See Comments) 04/01/2016    Zithromax [azithromycin] Rash, Hallucinations, and Hives 12/16/2015            The following portions of the patient's history were reviewed and updated as appropriate: allergies, current medications, past family history, past medical history, past social history, past surgical history and problem list.     Past Medical History:   Diagnosis Date    Allergic     Anxiety     Asthma     Depression     Disease of thyroid gland     Migraine     Mood disorder (HCC)     Pregnant     Psychiatric disorder     depression,anxiety, mood disorder       Past Surgical History:   Procedure Laterality Date    APPENDECTOMY      FRACTURE SURGERY Right     wrist    TUBAL LIGATION         Family History   Problem Relation Age of Onset    No Known Problems Mother     Thyroid disease Father     Hypertension Brother     Hypertension Maternal Grandmother     Thyroid disease Paternal Grandmother          Medications have been verified.        Objective   /64 (BP Location: Right arm, Patient Position: Sitting, Cuff Size: Standard)   Pulse 82   Temp 98.4 °F (36.9 °C) (Temporal)   Resp 16   Wt 66.7 kg (147 lb)   LMP  (LMP Unknown)   SpO2 98%   BMI 26.89 kg/m²   No LMP recorded (lmp unknown). Patient has had a hysterectomy.       Physical Exam     Physical Exam  Vitals and nursing note reviewed.   Constitutional:       Appearance: Normal appearance.   HENT:      Head: Normocephalic and atraumatic.   Cardiovascular:       Rate and Rhythm: Normal rate.   Pulmonary:      Effort: Pulmonary effort is normal.   Musculoskeletal:      Comments: Right ankle and foot without swelling, ecchymosis, rashes.  There is a small abrasion over the medial right ankle.  Patient is jumping when touching with light touch.  Capillary refill brisk.   Skin:     General: Skin is warm.   Neurological:      Mental Status: She is alert.     Xray independently reviewed by me contemporaneously as no acute osseous abnormality or acute process. Awaiting radiology interpretation.    Patient fitted for stabilizing ankle support.  And fitted for crutches by myself.

## 2025-05-07 NOTE — PATIENT INSTRUCTIONS
Apply ice every 2 hours for approximately 15 to 20 minutes.  Do not apply ice directly to the skin.  Wear stabilizing ankle support  Use crutches, weight bearing as tolerated to ambulate  Take Tylenol or ibuprofen as needed for pain  Elevate leg while sitting.  If symptoms fail to improve follow up with orthopedics      If tests have been performed at [facility], our office will contact you with results if changes need to be made to the care plan discussed with you at the visit.  You can review your full results on [facility] MyChart

## 2025-07-14 ENCOUNTER — DOCUMENTATION (OUTPATIENT)
Dept: FAMILY MEDICINE CLINIC | Facility: CLINIC | Age: 30
End: 2025-07-14